# Patient Record
Sex: MALE | Race: WHITE | HISPANIC OR LATINO | Employment: OTHER | URBAN - METROPOLITAN AREA
[De-identification: names, ages, dates, MRNs, and addresses within clinical notes are randomized per-mention and may not be internally consistent; named-entity substitution may affect disease eponyms.]

---

## 2019-05-15 ENCOUNTER — TRANSCRIBE ORDERS (OUTPATIENT)
Dept: RADIOLOGY | Facility: HOSPITAL | Age: 69
End: 2019-05-15

## 2019-05-15 ENCOUNTER — HOSPITAL ENCOUNTER (OUTPATIENT)
Dept: RADIOLOGY | Facility: HOSPITAL | Age: 69
Discharge: HOME | End: 2019-05-15
Attending: PODIATRIST
Payer: COMMERCIAL

## 2019-05-15 ENCOUNTER — APPOINTMENT (OUTPATIENT)
Dept: LAB | Facility: HOSPITAL | Age: 69
End: 2019-05-15
Attending: PODIATRIST
Payer: COMMERCIAL

## 2019-05-15 DIAGNOSIS — Z00.00 ROUTINE GENERAL MEDICAL EXAMINATION AT A HEALTH CARE FACILITY: ICD-10-CM

## 2019-05-15 DIAGNOSIS — E11.621 DIABETIC FOOT ULCER WITH OSTEOMYELITIS (HCC): ICD-10-CM

## 2019-05-15 DIAGNOSIS — E11.69 DIABETIC FOOT ULCER WITH OSTEOMYELITIS (HCC): Primary | ICD-10-CM

## 2019-05-15 DIAGNOSIS — E11.69 DIABETIC FOOT ULCER WITH OSTEOMYELITIS (HCC): ICD-10-CM

## 2019-05-15 DIAGNOSIS — E11.621 DIABETIC FOOT ULCER WITH OSTEOMYELITIS (HCC): Primary | ICD-10-CM

## 2019-05-15 DIAGNOSIS — M86.9 DIABETIC FOOT ULCER WITH OSTEOMYELITIS (HCC): ICD-10-CM

## 2019-05-15 DIAGNOSIS — M86.9 DIABETIC FOOT ULCER WITH OSTEOMYELITIS (HCC): Primary | ICD-10-CM

## 2019-05-15 DIAGNOSIS — L97.509 DIABETIC FOOT ULCER WITH OSTEOMYELITIS (HCC): Primary | ICD-10-CM

## 2019-05-15 DIAGNOSIS — L97.509 DIABETIC FOOT ULCER WITH OSTEOMYELITIS (HCC): ICD-10-CM

## 2019-05-15 LAB
ALBUMIN SERPL BCP-MCNC: 3.4 G/DL (ref 3.5–5)
ALP SERPL-CCNC: 93 U/L (ref 46–116)
ALT SERPL W P-5'-P-CCNC: 25 U/L (ref 12–78)
ANION GAP SERPL CALCULATED.3IONS-SCNC: 8 MMOL/L (ref 4–13)
AST SERPL W P-5'-P-CCNC: 24 U/L (ref 5–45)
BASOPHILS # BLD AUTO: 0.03 THOUSANDS/ΜL (ref 0–0.1)
BASOPHILS NFR BLD AUTO: 1 % (ref 0–1)
BILIRUB SERPL-MCNC: 0.66 MG/DL (ref 0.2–1)
BUN SERPL-MCNC: 17 MG/DL (ref 5–25)
CALCIUM SERPL-MCNC: 8.7 MG/DL (ref 8.3–10.1)
CHLORIDE SERPL-SCNC: 108 MMOL/L (ref 100–108)
CO2 SERPL-SCNC: 24 MMOL/L (ref 21–32)
CREAT SERPL-MCNC: 0.85 MG/DL (ref 0.6–1.3)
EOSINOPHIL # BLD AUTO: 0.16 THOUSAND/ΜL (ref 0–0.61)
EOSINOPHIL NFR BLD AUTO: 3 % (ref 0–6)
ERYTHROCYTE [DISTWIDTH] IN BLOOD BY AUTOMATED COUNT: 13.2 % (ref 11.6–15.1)
EST. AVERAGE GLUCOSE BLD GHB EST-MCNC: 183 MG/DL
GFR SERPL CREATININE-BSD FRML MDRD: 90 ML/MIN/1.73SQ M
GLUCOSE P FAST SERPL-MCNC: 191 MG/DL (ref 65–99)
HBA1C MFR BLD: 8 % (ref 4.2–6.3)
HCT VFR BLD AUTO: 44.3 % (ref 36.5–49.3)
HGB BLD-MCNC: 14.8 G/DL (ref 12–17)
IMM GRANULOCYTES # BLD AUTO: 0.02 THOUSAND/UL (ref 0–0.2)
IMM GRANULOCYTES NFR BLD AUTO: 0 % (ref 0–2)
LYMPHOCYTES # BLD AUTO: 1.37 THOUSANDS/ΜL (ref 0.6–4.47)
LYMPHOCYTES NFR BLD AUTO: 25 % (ref 14–44)
MCH RBC QN AUTO: 30.8 PG (ref 26.8–34.3)
MCHC RBC AUTO-ENTMCNC: 33.4 G/DL (ref 31.4–37.4)
MCV RBC AUTO: 92 FL (ref 82–98)
MONOCYTES # BLD AUTO: 0.66 THOUSAND/ΜL (ref 0.17–1.22)
MONOCYTES NFR BLD AUTO: 12 % (ref 4–12)
NEUTROPHILS # BLD AUTO: 3.35 THOUSANDS/ΜL (ref 1.85–7.62)
NEUTS SEG NFR BLD AUTO: 59 % (ref 43–75)
NRBC BLD AUTO-RTO: 0 /100 WBCS
PLATELET # BLD AUTO: 171 THOUSANDS/UL (ref 149–390)
PMV BLD AUTO: 12.5 FL (ref 8.9–12.7)
POTASSIUM SERPL-SCNC: 4 MMOL/L (ref 3.5–5.3)
PROT SERPL-MCNC: 7.5 G/DL (ref 6.4–8.2)
RBC # BLD AUTO: 4.81 MILLION/UL (ref 3.88–5.62)
SODIUM SERPL-SCNC: 140 MMOL/L (ref 136–145)
WBC # BLD AUTO: 5.59 THOUSAND/UL (ref 4.31–10.16)

## 2019-05-15 PROCEDURE — 85025 COMPLETE CBC W/AUTO DIFF WBC: CPT

## 2019-05-15 PROCEDURE — 83036 HEMOGLOBIN GLYCOSYLATED A1C: CPT | Performed by: PODIATRIST

## 2019-05-15 PROCEDURE — 73630 X-RAY EXAM OF FOOT: CPT

## 2019-05-15 PROCEDURE — 36415 COLL VENOUS BLD VENIPUNCTURE: CPT | Performed by: PODIATRIST

## 2019-05-15 PROCEDURE — 80053 COMPREHEN METABOLIC PANEL: CPT

## 2019-05-21 ENCOUNTER — DOCUMENTATION (OUTPATIENT)
Dept: OTHER | Facility: HOSPITAL | Age: 69
End: 2019-05-21

## 2019-05-21 DIAGNOSIS — S91.009A OPEN WOUND OF ANKLE AND FOOT: ICD-10-CM

## 2019-05-21 DIAGNOSIS — M86.9 OSTEOMYELITIS OF GREAT TOE OF RIGHT FOOT (HCC): Primary | ICD-10-CM

## 2019-05-21 DIAGNOSIS — S91.309A OPEN WOUND OF ANKLE AND FOOT: ICD-10-CM

## 2019-05-23 ENCOUNTER — DOCUMENTATION (OUTPATIENT)
Dept: OTHER | Facility: HOSPITAL | Age: 69
End: 2019-05-23

## 2022-03-15 ENCOUNTER — HOSPITAL ENCOUNTER (INPATIENT)
Facility: HOSPITAL | Age: 72
LOS: 1 days | Discharge: HOME | DRG: 309 | End: 2022-03-17
Attending: EMERGENCY MEDICINE | Admitting: INTERNAL MEDICINE
Payer: COMMERCIAL

## 2022-03-15 DIAGNOSIS — Z86.73 HISTORY OF TRANSIENT ISCHEMIC ATTACK (TIA): ICD-10-CM

## 2022-03-15 DIAGNOSIS — R44.3 HALLUCINATIONS: ICD-10-CM

## 2022-03-15 DIAGNOSIS — R26.2 AMBULATORY DYSFUNCTION: Primary | ICD-10-CM

## 2022-03-15 DIAGNOSIS — I47.1 SVT (SUPRAVENTRICULAR TACHYCARDIA) (HCC): ICD-10-CM

## 2022-03-15 LAB
ALBUMIN SERPL BCP-MCNC: 3.1 G/DL (ref 3.5–5)
ALP SERPL-CCNC: 75 U/L (ref 46–116)
ALT SERPL W P-5'-P-CCNC: 66 U/L (ref 12–78)
AMMONIA PLAS-SCNC: <10 UMOL/L (ref 11–35)
ANION GAP SERPL CALCULATED.3IONS-SCNC: 10 MMOL/L (ref 4–13)
APTT PPP: 27 SECONDS (ref 23–37)
AST SERPL W P-5'-P-CCNC: 119 U/L (ref 5–45)
BACTERIA UR QL AUTO: ABNORMAL /HPF
BASOPHILS # BLD AUTO: 0.03 THOUSANDS/ΜL (ref 0–0.1)
BASOPHILS NFR BLD AUTO: 0 % (ref 0–1)
BILIRUB SERPL-MCNC: 0.79 MG/DL (ref 0.2–1)
BILIRUB UR QL STRIP: ABNORMAL
BUN SERPL-MCNC: 55 MG/DL (ref 5–25)
CALCIUM ALBUM COR SERPL-MCNC: 9.8 MG/DL (ref 8.3–10.1)
CALCIUM SERPL-MCNC: 9.1 MG/DL (ref 8.3–10.1)
CARDIAC TROPONIN I PNL SERPL HS: 26 NG/L
CHLORIDE SERPL-SCNC: 103 MMOL/L (ref 100–108)
CLARITY UR: CLEAR
CO2 SERPL-SCNC: 20 MMOL/L (ref 21–32)
COLOR UR: ABNORMAL
CREAT SERPL-MCNC: 1.51 MG/DL (ref 0.6–1.3)
EOSINOPHIL # BLD AUTO: 0.06 THOUSAND/ΜL (ref 0–0.61)
EOSINOPHIL NFR BLD AUTO: 1 % (ref 0–6)
ERYTHROCYTE [DISTWIDTH] IN BLOOD BY AUTOMATED COUNT: 14.7 % (ref 11.6–15.1)
ETHANOL SERPL-MCNC: <3 MG/DL (ref 0–3)
GFR SERPL CREATININE-BSD FRML MDRD: 45 ML/MIN/1.73SQ M
GLUCOSE SERPL-MCNC: 186 MG/DL (ref 65–140)
GLUCOSE SERPL-MCNC: 187 MG/DL (ref 65–140)
GLUCOSE UR STRIP-MCNC: NEGATIVE MG/DL
GRAN CASTS #/AREA URNS LPF: ABNORMAL /[LPF]
HCT VFR BLD AUTO: 40.9 % (ref 36.5–49.3)
HGB BLD-MCNC: 14.2 G/DL (ref 12–17)
HGB UR QL STRIP.AUTO: ABNORMAL
IMM GRANULOCYTES # BLD AUTO: 0.04 THOUSAND/UL (ref 0–0.2)
IMM GRANULOCYTES NFR BLD AUTO: 0 % (ref 0–2)
INR PPP: 1.28 (ref 0.84–1.19)
KETONES UR STRIP-MCNC: ABNORMAL MG/DL
LACTATE SERPL-SCNC: 1.6 MMOL/L (ref 0.5–2)
LEUKOCYTE ESTERASE UR QL STRIP: NEGATIVE
LYMPHOCYTES # BLD AUTO: 0.97 THOUSANDS/ΜL (ref 0.6–4.47)
LYMPHOCYTES NFR BLD AUTO: 10 % (ref 14–44)
MCH RBC QN AUTO: 28.6 PG (ref 26.8–34.3)
MCHC RBC AUTO-ENTMCNC: 34.7 G/DL (ref 31.4–37.4)
MCV RBC AUTO: 82 FL (ref 82–98)
MONOCYTES # BLD AUTO: 1.2 THOUSAND/ΜL (ref 0.17–1.22)
MONOCYTES NFR BLD AUTO: 12 % (ref 4–12)
NEUTROPHILS # BLD AUTO: 7.82 THOUSANDS/ΜL (ref 1.85–7.62)
NEUTS SEG NFR BLD AUTO: 77 % (ref 43–75)
NITRITE UR QL STRIP: NEGATIVE
NON-SQ EPI CELLS URNS QL MICRO: ABNORMAL /HPF
NRBC BLD AUTO-RTO: 0 /100 WBCS
PH UR STRIP.AUTO: 5.5 [PH] (ref 4.5–8)
PLATELET # BLD AUTO: 105 THOUSANDS/UL (ref 149–390)
POTASSIUM SERPL-SCNC: 4 MMOL/L (ref 3.5–5.3)
PROT SERPL-MCNC: 7.4 G/DL (ref 6.4–8.2)
PROT UR STRIP-MCNC: ABNORMAL MG/DL
PROTHROMBIN TIME: 15.5 SECONDS (ref 11.6–14.5)
RBC # BLD AUTO: 4.97 MILLION/UL (ref 3.88–5.62)
RBC #/AREA URNS AUTO: ABNORMAL /HPF
SODIUM SERPL-SCNC: 133 MMOL/L (ref 136–145)
SP GR UR STRIP.AUTO: >=1.03 (ref 1–1.03)
UROBILINOGEN UR QL STRIP.AUTO: 0.2 E.U./DL
WBC # BLD AUTO: 10.12 THOUSAND/UL (ref 4.31–10.16)
WBC #/AREA URNS AUTO: ABNORMAL /HPF

## 2022-03-15 PROCEDURE — 99285 EMERGENCY DEPT VISIT HI MDM: CPT | Performed by: EMERGENCY MEDICINE

## 2022-03-15 PROCEDURE — 85610 PROTHROMBIN TIME: CPT

## 2022-03-15 PROCEDURE — 93005 ELECTROCARDIOGRAM TRACING: CPT

## 2022-03-15 PROCEDURE — 85025 COMPLETE CBC W/AUTO DIFF WBC: CPT

## 2022-03-15 PROCEDURE — 82948 REAGENT STRIP/BLOOD GLUCOSE: CPT

## 2022-03-15 PROCEDURE — 92960 CARDIOVERSION ELECTRIC EXT: CPT | Performed by: EMERGENCY MEDICINE

## 2022-03-15 PROCEDURE — 82553 CREATINE MB FRACTION: CPT

## 2022-03-15 PROCEDURE — 36415 COLL VENOUS BLD VENIPUNCTURE: CPT

## 2022-03-15 PROCEDURE — 83605 ASSAY OF LACTIC ACID: CPT

## 2022-03-15 PROCEDURE — 84484 ASSAY OF TROPONIN QUANT: CPT

## 2022-03-15 PROCEDURE — 82550 ASSAY OF CK (CPK): CPT

## 2022-03-15 PROCEDURE — 80179 DRUG ASSAY SALICYLATE: CPT

## 2022-03-15 PROCEDURE — 80053 COMPREHEN METABOLIC PANEL: CPT

## 2022-03-15 PROCEDURE — 85730 THROMBOPLASTIN TIME PARTIAL: CPT

## 2022-03-15 PROCEDURE — 81001 URINALYSIS AUTO W/SCOPE: CPT

## 2022-03-15 PROCEDURE — 99291 CRITICAL CARE FIRST HOUR: CPT | Performed by: EMERGENCY MEDICINE

## 2022-03-15 PROCEDURE — 82077 ASSAY SPEC XCP UR&BREATH IA: CPT

## 2022-03-15 PROCEDURE — 99285 EMERGENCY DEPT VISIT HI MDM: CPT

## 2022-03-15 PROCEDURE — 96360 HYDRATION IV INFUSION INIT: CPT

## 2022-03-15 PROCEDURE — 82140 ASSAY OF AMMONIA: CPT

## 2022-03-15 PROCEDURE — 96361 HYDRATE IV INFUSION ADD-ON: CPT

## 2022-03-15 PROCEDURE — 80307 DRUG TEST PRSMV CHEM ANLYZR: CPT

## 2022-03-15 PROCEDURE — 83036 HEMOGLOBIN GLYCOSYLATED A1C: CPT | Performed by: STUDENT IN AN ORGANIZED HEALTH CARE EDUCATION/TRAINING PROGRAM

## 2022-03-15 PROCEDURE — 80143 DRUG ASSAY ACETAMINOPHEN: CPT

## 2022-03-15 PROCEDURE — 84443 ASSAY THYROID STIM HORMONE: CPT

## 2022-03-15 RX ORDER — ADENOSINE 3 MG/ML
6 INJECTION INTRAVENOUS ONCE
Status: COMPLETED | OUTPATIENT
Start: 2022-03-15 | End: 2022-03-16

## 2022-03-15 RX ADMIN — SODIUM CHLORIDE 1000 ML: 0.9 INJECTION, SOLUTION INTRAVENOUS at 23:06

## 2022-03-15 RX ADMIN — SODIUM CHLORIDE 1000 ML: 0.9 INJECTION, SOLUTION INTRAVENOUS at 23:12

## 2022-03-16 ENCOUNTER — APPOINTMENT (INPATIENT)
Dept: NON INVASIVE DIAGNOSTICS | Facility: HOSPITAL | Age: 72
DRG: 309 | End: 2022-03-16
Payer: COMMERCIAL

## 2022-03-16 ENCOUNTER — APPOINTMENT (EMERGENCY)
Dept: RADIOLOGY | Facility: HOSPITAL | Age: 72
DRG: 309 | End: 2022-03-16
Payer: COMMERCIAL

## 2022-03-16 PROBLEM — M62.82 RHABDOMYOLYSIS: Status: ACTIVE | Noted: 2022-03-16

## 2022-03-16 PROBLEM — I47.1 SVT (SUPRAVENTRICULAR TACHYCARDIA) (HCC): Status: ACTIVE | Noted: 2022-03-16

## 2022-03-16 PROBLEM — N17.9 AKI (ACUTE KIDNEY INJURY) (HCC): Status: ACTIVE | Noted: 2022-03-16

## 2022-03-16 PROBLEM — E11.9 DIABETES (HCC): Status: ACTIVE | Noted: 2022-03-16

## 2022-03-16 PROBLEM — I47.10 SVT (SUPRAVENTRICULAR TACHYCARDIA) (HCC): Status: ACTIVE | Noted: 2022-03-16

## 2022-03-16 PROBLEM — Z86.73 HISTORY OF TRANSIENT ISCHEMIC ATTACK (TIA): Status: ACTIVE | Noted: 2022-03-16

## 2022-03-16 PROBLEM — R41.89 IMPAIRED COGNITION: Status: ACTIVE | Noted: 2022-03-16

## 2022-03-16 PROBLEM — E87.1 HYPONATREMIA: Status: ACTIVE | Noted: 2022-03-16

## 2022-03-16 LAB
2HR DELTA HS TROPONIN: 8 NG/L
4HR DELTA HS TROPONIN: 1 NG/L
AMPHETAMINES SERPL QL SCN: NEGATIVE
ANION GAP SERPL CALCULATED.3IONS-SCNC: 7 MMOL/L (ref 4–13)
AORTIC ROOT: 4.3 CM
APAP SERPL-MCNC: <2 UG/ML (ref 10–20)
APICAL FOUR CHAMBER EJECTION FRACTION: 57 %
ASCENDING AORTA: 4 CM (ref 2.14–3.21)
ATRIAL RATE: 23 BPM
ATRIAL RATE: 88 BPM
BARBITURATES UR QL: NEGATIVE
BENZODIAZ UR QL: NEGATIVE
BUN SERPL-MCNC: 40 MG/DL (ref 5–25)
CALCIUM SERPL-MCNC: 8.7 MG/DL (ref 8.3–10.1)
CARDIAC TROPONIN I PNL SERPL HS: 27 NG/L
CARDIAC TROPONIN I PNL SERPL HS: 34 NG/L
CHLORIDE SERPL-SCNC: 109 MMOL/L (ref 100–108)
CHOLEST SERPL-MCNC: 147 MG/DL
CK MB SERPL-MCNC: 17.4 NG/ML (ref 0–5)
CK MB SERPL-MCNC: 22 NG/ML (ref 0–5)
CK MB SERPL-MCNC: <1 % (ref 0–2.5)
CK MB SERPL-MCNC: <1 % (ref 0–2.5)
CK SERPL-CCNC: 2081 U/L (ref 39–308)
CK SERPL-CCNC: 3039 U/L (ref 39–308)
CO2 SERPL-SCNC: 20 MMOL/L (ref 21–32)
COCAINE UR QL: NEGATIVE
CREAT SERPL-MCNC: 1.16 MG/DL (ref 0.6–1.3)
E WAVE DECELERATION TIME: 348 MS
EST. AVERAGE GLUCOSE BLD GHB EST-MCNC: 169 MG/DL
FRACTIONAL SHORTENING: 31 % (ref 28–44)
GFR SERPL CREATININE-BSD FRML MDRD: 63 ML/MIN/1.73SQ M
GLUCOSE SERPL-MCNC: 138 MG/DL (ref 65–140)
GLUCOSE SERPL-MCNC: 157 MG/DL (ref 65–140)
GLUCOSE SERPL-MCNC: 181 MG/DL (ref 65–140)
HBA1C MFR BLD: 7.5 %
HDLC SERPL-MCNC: 37 MG/DL
INTERVENTRICULAR SEPTUM IN DIASTOLE (PARASTERNAL SHORT AXIS VIEW): 1.4 CM
INTERVENTRICULAR SEPTUM: 1.4 CM (ref 0.56–1.04)
LAAS-AP2: 16.1 CM2
LAAS-AP4: 10.1 CM2
LDLC SERPL CALC-MCNC: 88 MG/DL (ref 0–100)
LEFT ATRIUM SIZE: 3.1 CM
LEFT INTERNAL DIMENSION IN SYSTOLE: 2.7 CM (ref 3.88–5.88)
LEFT VENTRICULAR INTERNAL DIMENSION IN DIASTOLE: 3.9 CM (ref 6.46–9.62)
LEFT VENTRICULAR POSTERIOR WALL IN END DIASTOLE: 1.4 CM (ref 0.54–1.03)
LEFT VENTRICULAR STROKE VOLUME: 38 ML
LVSV (TEICH): 38 ML
MAGNESIUM SERPL-MCNC: 2.7 MG/DL (ref 1.6–2.6)
METHADONE UR QL: NEGATIVE
MV E'TISSUE VEL-SEP: 7 CM/S
MV PEAK A VEL: 0.92 M/S
MV PEAK E VEL: 58 CM/S
MV STENOSIS PRESSURE HALF TIME: 101 MS
MV VALVE AREA P 1/2 METHOD: 2.18 CM2
NONHDLC SERPL-MCNC: 110 MG/DL
OPIATES UR QL SCN: NEGATIVE
OXYCODONE+OXYMORPHONE UR QL SCN: NEGATIVE
P AXIS: 36 DEGREES
PCP UR QL: NEGATIVE
PHOSPHATE SERPL-MCNC: 4.5 MG/DL (ref 2.3–4.1)
POTASSIUM SERPL-SCNC: 5.1 MMOL/L (ref 3.5–5.3)
PR INTERVAL: 154 MS
QRS AXIS: -54 DEGREES
QRS AXIS: -62 DEGREES
QRSD INTERVAL: 108 MS
QRSD INTERVAL: 112 MS
QT INTERVAL: 322 MS
QT INTERVAL: 366 MS
QTC INTERVAL: 442 MS
QTC INTERVAL: 511 MS
RIGHT ATRIAL 2D VOLUME: 20 ML
RIGHT ATRIUM AREA SYSTOLE A4C: 9 CM2
RIGHT VENTRICLE ID DIMENSION: 3.2 CM
SALICYLATES SERPL-MCNC: 3 MG/DL (ref 3–20)
SL CV LEFT ATRIUM LENGTH A2C: 4.8 CM
SL CV LV EF: 55
SL CV PED ECHO LEFT VENTRICLE DIASTOLIC VOLUME (MOD BIPLANE) 2D: 64 ML
SL CV PED ECHO LEFT VENTRICLE SYSTOLIC VOLUME (MOD BIPLANE) 2D: 26 ML
SODIUM SERPL-SCNC: 136 MMOL/L (ref 136–145)
T WAVE AXIS: 43 DEGREES
T WAVE AXIS: 87 DEGREES
THC UR QL: POSITIVE
TRIGL SERPL-MCNC: 109 MG/DL
TSH SERPL DL<=0.05 MIU/L-ACNC: 2.17 UIU/ML (ref 0.36–3.74)
VENTRICULAR RATE: 152 BPM
VENTRICULAR RATE: 88 BPM
Z-SCORE OF ASCENDING AORTA: 4.94
Z-SCORE OF INTERVENTRICULAR SEPTUM IN END DIASTOLE: 4.84
Z-SCORE OF LEFT VENTRICULAR DIMENSION IN END DIASTOLE: -7.03
Z-SCORE OF LEFT VENTRICULAR DIMENSION IN END SYSTOLE: -4.51
Z-SCORE OF LEFT VENTRICULAR POSTERIOR WALL IN END DIASTOLE: 4.95

## 2022-03-16 PROCEDURE — 80061 LIPID PANEL: CPT | Performed by: STUDENT IN AN ORGANIZED HEALTH CARE EDUCATION/TRAINING PROGRAM

## 2022-03-16 PROCEDURE — 84484 ASSAY OF TROPONIN QUANT: CPT

## 2022-03-16 PROCEDURE — 99223 1ST HOSP IP/OBS HIGH 75: CPT | Performed by: INTERNAL MEDICINE

## 2022-03-16 PROCEDURE — G1004 CDSM NDSC: HCPCS

## 2022-03-16 PROCEDURE — 82550 ASSAY OF CK (CPK): CPT | Performed by: STUDENT IN AN ORGANIZED HEALTH CARE EDUCATION/TRAINING PROGRAM

## 2022-03-16 PROCEDURE — 80048 BASIC METABOLIC PNL TOTAL CA: CPT | Performed by: STUDENT IN AN ORGANIZED HEALTH CARE EDUCATION/TRAINING PROGRAM

## 2022-03-16 PROCEDURE — 82948 REAGENT STRIP/BLOOD GLUCOSE: CPT

## 2022-03-16 PROCEDURE — 74177 CT ABD & PELVIS W/CONTRAST: CPT

## 2022-03-16 PROCEDURE — 93010 ELECTROCARDIOGRAM REPORT: CPT | Performed by: INTERNAL MEDICINE

## 2022-03-16 PROCEDURE — 83735 ASSAY OF MAGNESIUM: CPT | Performed by: STUDENT IN AN ORGANIZED HEALTH CARE EDUCATION/TRAINING PROGRAM

## 2022-03-16 PROCEDURE — 93306 TTE W/DOPPLER COMPLETE: CPT | Performed by: INTERNAL MEDICINE

## 2022-03-16 PROCEDURE — 84100 ASSAY OF PHOSPHORUS: CPT | Performed by: STUDENT IN AN ORGANIZED HEALTH CARE EDUCATION/TRAINING PROGRAM

## 2022-03-16 PROCEDURE — 99222 1ST HOSP IP/OBS MODERATE 55: CPT | Performed by: INTERNAL MEDICINE

## 2022-03-16 PROCEDURE — 93005 ELECTROCARDIOGRAM TRACING: CPT

## 2022-03-16 PROCEDURE — 72125 CT NECK SPINE W/O DYE: CPT

## 2022-03-16 PROCEDURE — 71260 CT THORAX DX C+: CPT

## 2022-03-16 PROCEDURE — 5A2204Z RESTORATION OF CARDIAC RHYTHM, SINGLE: ICD-10-PCS

## 2022-03-16 PROCEDURE — 36415 COLL VENOUS BLD VENIPUNCTURE: CPT

## 2022-03-16 PROCEDURE — 70450 CT HEAD/BRAIN W/O DYE: CPT

## 2022-03-16 PROCEDURE — 97163 PT EVAL HIGH COMPLEX 45 MIN: CPT

## 2022-03-16 PROCEDURE — 82553 CREATINE MB FRACTION: CPT | Performed by: STUDENT IN AN ORGANIZED HEALTH CARE EDUCATION/TRAINING PROGRAM

## 2022-03-16 PROCEDURE — 96374 THER/PROPH/DIAG INJ IV PUSH: CPT

## 2022-03-16 PROCEDURE — 93306 TTE W/DOPPLER COMPLETE: CPT

## 2022-03-16 RX ORDER — ASPIRIN 81 MG/1
81 TABLET, CHEWABLE ORAL DAILY
Status: DISCONTINUED | OUTPATIENT
Start: 2022-03-16 | End: 2022-03-17 | Stop reason: HOSPADM

## 2022-03-16 RX ORDER — HEPARIN SODIUM 5000 [USP'U]/ML
5000 INJECTION, SOLUTION INTRAVENOUS; SUBCUTANEOUS EVERY 8 HOURS SCHEDULED
Status: DISCONTINUED | OUTPATIENT
Start: 2022-03-16 | End: 2022-03-17 | Stop reason: HOSPADM

## 2022-03-16 RX ORDER — ATORVASTATIN CALCIUM 40 MG/1
40 TABLET, FILM COATED ORAL
Status: DISCONTINUED | OUTPATIENT
Start: 2022-03-16 | End: 2022-03-17 | Stop reason: HOSPADM

## 2022-03-16 RX ORDER — ADENOSINE 3 MG/ML
INJECTION, SOLUTION INTRAVENOUS
Status: COMPLETED
Start: 2022-03-16 | End: 2022-03-16

## 2022-03-16 RX ADMIN — ADENOSINE 6 MG: 3 INJECTION INTRAVENOUS at 00:06

## 2022-03-16 RX ADMIN — IOHEXOL 100 ML: 350 INJECTION, SOLUTION INTRAVENOUS at 00:25

## 2022-03-16 RX ADMIN — ADENOSINE 12 MG: 3 INJECTION INTRAVENOUS at 00:10

## 2022-03-16 NOTE — ASSESSMENT & PLAN NOTE
Lab Results   Component Value Date    HGBA1C 8 0 (H) 05/15/2019       Recent Labs     03/15/22  2136   POCGLU 186*       Blood Sugar Average: Last 72 hrs:  (P) 186     Patient with history of diabetes with A1c 8 0 in 2019     · Patient not compliant with medications  · Will recheck A1c

## 2022-03-16 NOTE — ED ATTENDING ATTESTATION
3/15/2022  I, Jorge Hernandez MD, saw and evaluated the patient  I have discussed the patient with the resident/non-physician practitioner and agree with the resident's/non-physician practitioner's findings, Plan of Care, and MDM as documented in the resident's/non-physician practitioner's note, except where noted  All available labs and Radiology studies were reviewed  I was present for key portions of any procedure(s) performed by the resident/non-physician practitioner and I was immediately available to provide assistance  At this point I agree with the current assessment done in the Emergency Department  I have conducted an independent evaluation of this patient a history and physical is as follows:    OA: 70 y/o m with h/o DM, HTN, HLD CVA stroke 2012 and TIA 2019, noncompliant with medications who presents with confusion  Pt found down by friends who came to check on him  Pt states that he felt weak 2 days ago, has not been able to get up since  Has not been eating or drinking fluids during this time  Evidence that the pt has soiled his clothing  + hallucinations of squirrels  Pt admits noncompliance with medications but 'takes vitamins'  Only c/o back pain and feeling week at this time  Otherwise denies cp/sob/n/v/headache/dizziness  Cannot clearly tell me how he ended up on the ground  PE, overall well developed m, VSS, dry MM, clear sclera, neck supple/FROM, nonttp over C, T and L spine, no stepoff or deformity, RR, lungs CTAB, abd soft, NT/ND, +Bs, -r/g, - LE edema, - calf ttp, intact distal pulses, symmetric face, clear speech, intact finger to nose, intact rapid alternating movements  Oriented and follows commands  A/p generalized weakness, on the ground for 2 days  Concern for primary neuro v cardiopulmonary v infectious cause of sxms  Broad work including eval for traumatic injury  CT imaging, broad labs, tox screen, EKG  IVF hydration, EKG  Monitor closely   No obvious stigmata of trauma however pt in the lopez and waiting to be moved to a room  Will require further assessment at lexi time  ED Course   pt signed out with workup pending including labs and imaging  Pt will ultimately require admission         Critical Care Time  Procedures

## 2022-03-16 NOTE — ASSESSMENT & PLAN NOTE
Patient with history of TIA in 2019  Was previously prescribed aspirin 81 mg daily and atorvastatin 40 mg daily     · Patient noncompliant with medications  · Will restart aspiring and atorvastatin for patient

## 2022-03-16 NOTE — ASSESSMENT & PLAN NOTE
Patient with CK elevated at 3,039 upon admission  · Received 1L normal saline bolus x2 in ED  · Will monitor CK in AM

## 2022-03-16 NOTE — ED CARE HANDOFF
Emergency Department Sign Out Note        Sign out and transfer of care from [place], [place]  See Separate Emergency Department note  The patient, Kolby De La Fuente, was evaluated by the previous provider for acute encephalopathy  Workup Completed:  Laboratory results and CT scanning    ED Course / Workup Pending (followup): Results Reviewed     Procedure Component Value Units Date/Time    Rapid drug screen, urine [[id number]]  (Abnormal) Collected: 03/15/22 2216    Lab Status: Final result Specimen: Urine, Clean Catch Updated: 03/16/22 0004     Amph/Meth UR Negative     Barbiturate Ur Negative     Benzodiazepine Urine Negative     Cocaine Urine Negative     Methadone Urine Negative     Opiate Urine Negative     PCP Ur Negative     THC Urine Positive     Oxycodone Urine Negative    Narrative:      Presumptive report  If requested, specimen will be sent to reference lab for confirmation  FOR MEDICAL PURPOSES ONLY  IF CONFIRMATION NEEDED PLEASE CONTACT THE LAB WITHIN 5 DAYS  Drug Screen Cutoff Levels:  AMPHETAMINE/METHAMPHETAMINES  1000 ng/mL  BARBITURATES     200 ng/mL  BENZODIAZEPINES     200 ng/mL  COCAINE      300 ng/mL  METHADONE      300 ng/mL  OPIATES      300 ng/mL  PHENCYCLIDINE     25 ng/mL  THC       50 ng/mL  OXYCODONE      022 ng/mL    Salicylate level [[id number]]  (Normal) Collected: 03/15/22 2223    Lab Status: Final result Specimen: Blood from Arm, Right Updated: 52/07/80 2364     Salicylate Lvl 3 mg/dL     Acetaminophen level-If concentration is detectable, please discuss with medical  on call   [[id number]]  (Abnormal) Collected: 03/15/22 2223    Lab Status: Final result Specimen: Blood from Arm, Right Updated: 03/16/22 0003     Acetaminophen Level <2 ug/mL     Comprehensive metabolic panel [[id number]]  (Abnormal) Resulted: 03/15/22 2353    Lab Status: Final result Specimen: Blood Updated: 03/15/22 2353     Sodium 133 mmol/L      Potassium 4 0 mmol/L      Chloride 103 mmol/L CO2 20 mmol/L      ANION GAP 10 mmol/L      BUN 55 mg/dL      Creatinine 1 51 mg/dL      Glucose 187 mg/dL      Calcium 9 1 mg/dL      Corrected Calcium 9 8 mg/dL       U/L      ALT 66 U/L      Alkaline Phosphatase 75 U/L      Total Protein 7 4 g/dL      Albumin 3 1 g/dL      Total Bilirubin 0 79 mg/dL      eGFR 45 ml/min/1 73sq m     Narrative:      [place] guidelines for Chronic Kidney Disease (CKD):     Stage 1 with normal or high GFR (GFR > 90 mL/min/1 73 square meters)    Stage 2 Mild CKD (GFR = 60-89 mL/min/1 73 square meters)    Stage 3A Moderate CKD (GFR = 45-59 mL/min/1 73 square meters)    Stage 3B Moderate CKD (GFR = 30-44 mL/min/1 73 square meters)    Stage 4 Severe CKD (GFR = 15-29 mL/min/1 73 square meters)    Stage 5 End Stage CKD (GFR <15 mL/min/1 73 square meters)  Note: GFR calculation is accurate only with a steady state creatinine    Ammonia [[id number]]  (Abnormal) Collected: 03/15/22 2305    Lab Status: Final result Specimen: Blood from Arm, Right Updated: 03/15/22 2346     Ammonia <10 umol/L     Ethanol [[id number]]  (Normal) Collected: 03/15/22 2224    Lab Status: Final result Specimen: Blood from Arm, Right Updated: 03/15/22 2346     Ethanol Lvl <3 mg/dL     Protime-INR [[id number]]  (Abnormal) Collected: 03/15/22 2305    Lab Status: Final result Specimen: Blood from Arm, Right Updated: 03/15/22 2334     Protime 15 5 seconds      INR 1 28    APTT [[id number]]  (Normal) Collected: 03/15/22 2305    Lab Status: Final result Specimen: Blood from Arm, Right Updated: 03/15/22 2334     PTT 27 seconds     HS Troponin I 4hr [[id number]]     Lab Status: No result Specimen: Blood     CBC and differential [[id number]]  (Abnormal) Collected: 03/15/22 2217    Lab Status: Final result Specimen: Blood Updated: 03/15/22 2323     WBC 10 12 Thousand/uL      RBC 4 97 Million/uL      Hemoglobin 14 2 g/dL      Hematocrit 40 9 %      MCV 82 fL      MCH 28 6 pg      MCHC 34 7 g/dL RDW 14 7 %      Platelets 970 Thousands/uL      nRBC 0 /100 WBCs      Neutrophils Relative 77 %      Immat GRANS % 0 %      Lymphocytes Relative 10 %      Monocytes Relative 12 %      Eosinophils Relative 1 %      Basophils Relative 0 %      Neutrophils Absolute 7 82 Thousands/µL      Immature Grans Absolute 0 04 Thousand/uL      Lymphocytes Absolute 0 97 Thousands/µL      Monocytes Absolute 1 20 Thousand/µL      Eosinophils Absolute 0 06 Thousand/µL      Basophils Absolute 0 03 Thousands/µL     HS Troponin I 2hr [[id number]]     Lab Status: No result Specimen: Blood     HS Troponin 0hr (reflex protocol) [[id number]]  (Normal) Resulted: 03/15/22 2311    Lab Status: Final result Specimen: Blood Updated: 03/15/22 2311     hs TnI 0hr 26 ng/L     Urine Microscopic [[id number]]  (Abnormal) Collected: 03/15/22 2144    Lab Status: Final result Specimen: Urine, Clean Catch Updated: 03/15/22 2258     RBC, UA None Seen /hpf      WBC, UA None Seen /hpf      Epithelial Cells Occasional /hpf      Bacteria, UA None Seen /hpf      Granular Casts, UA 0-3    Lactic acid [[id number]]  (Normal) Resulted: 03/15/22 2251    Lab Status: Final result Specimen: Blood Updated: 03/15/22 2251     LACTIC ACID 1 6 mmol/L     Narrative:      Result may be elevated if tourniquet was used during collection  TSH [[id number]] Updated: 03/15/22 2216    Lab Status: In process Specimen: Blood     CK Total with Reflex CKMB [[id number]] Updated: 03/15/22 2216    Lab Status:  In process Specimen: Blood     Urine Macroscopic, POC [[id number]]  (Abnormal) Collected: 03/15/22 2144    Lab Status: Final result Specimen: Urine Updated: 03/15/22 2146     Color, UA Lian     Clarity, UA Clear     pH, UA 5 5     Leukocytes, UA Negative     Nitrite, UA Negative     Protein,  (2+) mg/dl      Glucose, UA Negative mg/dl      Ketones, UA 15 (1+) mg/dl      Urobilinogen, UA 0 2 E U /dl      Bilirubin, UA Interference- unable to analyze     Blood, UA Trace Specific Gravity, UA >=1 030    Narrative:      CLINITEK RESULT    Fingerstick Glucose (POCT) [[id number]]  (Abnormal) Collected: 03/15/22 2136    Lab Status: Final result Updated: 03/15/22 2137     POC Glucose 186 mg/dl         CT head without contrast    (Results Pending)   CT cervical spine without contrast    (Results Pending)   CT chest abdomen pelvis w contrast    (Results Pending)        called to room about heart rate of 160 beats per minute; patient is awake and alert; blood pressure is 130/80  We discussed cardioversion with the patient  Because the patient is being evaluated for confusion and because of the life-threatening scenario of 160 beats per minute, plan to do emergent cardioversion utilizing adenosine  Adenosine 6 mg IV then 12 mg IV was administered; after the 12 mg dose of IV adenosine the patient reverted to sinus rhythm  ED Course as of 03/16/22 0016   Tue Mar 15, 2022   2328 71 YO gait problem; plan to admit   Wed Mar 16, 2022   0015 Called to room about narrow complex tachycardia that is regular at 150-160 beats per minute       CriticalCare Time  Performed by: Magda Molina MD  Authorized by: Magda Molina MD     Critical care provider statement:     Critical care time (minutes):  32    Critical care time was exclusive of:  Separately billable procedures and treating other patients and teaching time    Critical care was necessary to treat or prevent imminent or life-threatening deterioration of the following conditions:  Cardiac failure    Critical care was time spent personally by me on the following activities:  Obtaining history from patient or surrogate, development of treatment plan with patient or surrogate, discussions with consultants, evaluation of patient's response to treatment, examination of patient, ordering and performing treatments and interventions, ordering and review of laboratory studies, ordering and review of radiographic studies, re-evaluation of patient's condition and review of old charts    I assumed direction of critical care for this patient from another provider in my specialty: yes    Comments:      Patient is awake and alert tolerating heart rate of 160 beats per minute; vagal maneuvers attempted to slow heart rate without success  Discussed emergent cardioversion with the patient; plan to administer adenosine intravenously to cardiovert the patient  Patient received 6 mg of adenosine IV and then 12 mg of adenosine IV; after the 2nd dose of adenosine the patient converted to sinus rhythm  MDM        Disposition  Final diagnoses:   None     ED Disposition     None      Follow-up Information    None       Patient's Medications    No medications on file     No discharge procedures on file         ED Provider  Electronically Signed by     Yulissa Peraza MD  03/16/22 4899

## 2022-03-16 NOTE — ED NOTES
Pt's EKG showed SVT in hallway pt moved into room and another IV started and given 2L bolus  Pt on monitor and resident and attending in room with patient  Vagal maneuvers done and no success  Pt has no complaints at this time and awaiting for IVF to run it before any other treatment  Awaiting further orders at this time        Sarah Pelayo RN  03/15/22 0729

## 2022-03-16 NOTE — ASSESSMENT & PLAN NOTE
Patient found down by friend after 2 days  Presently alert and oriented to person, place, and time  Patient unable to describe events around his fall, and states that he thinks he was hallucinating while down as well  Patient has some word finding difficulty, and was tangential in thoughts  · Will monitor patient's mental status  · Patient has history of 2019, but does not seem to be compliant with medications prescribed at discharge  · CT Head on 03/15 showed "decreased attenuation is noted in periventricular and subcortical white matter demonstrating an appearance that is statistically most likely to represent mild to moderate microangiopathic change  There is an old infarction adjacent to the anterior left ventricle and extending to the centrum semiovale  No definite CT signs of acute infarction  No intracranial mass, positive mass effect or midline shift  No acute parenchymal hemorrhage    · May consider MRI as more sensitive than CT  · CT head negative for hydrocephalus

## 2022-03-16 NOTE — ASSESSMENT & PLAN NOTE
Patient found down at home by friend after 2 days  States that he feels loss of balance and is unable to walk, but denies any weakness  Patient unable to recall any inciting event and denied any history of palpitations, lightheadedness  In ED, kami had witnessed SVT with HR of 160  Patient otherwise asymptomatic during SVT  · S/p cardioversion in ED with adenosine 6 mg IV once then 12 mg IV  After 12 mg IV was administered patient converted to NSR and remains in NSR     · Cardiology consulted, recommendations are appreciated  · TTE ordered   · Patient had CT head, CT neck, and CT C/A/P but negative for explanation of loss of balance  · No indication of infection  · Patient denies any recent ETOH use and states he has not drank in over 20 years  · Suspect cardiogenic etiology of patient's symptoms  · Will monitor electrolytes and keep Mag >2, Phos >3, and K>4

## 2022-03-16 NOTE — PHYSICAL THERAPY NOTE
Physical Therapy Evaluation    Patient's Name: Peewee Metz    Admitting Diagnosis  Balance problem [R26 89]    Problem List  Patient Active Problem List   Diagnosis    [facility]ra[facility]    Impaired cognition    [facility]    Rhabdomyolysis    History of transient ischemic attack (TIA)    Diabetes ([facility] )    Hyponatremia       Past Medical History  No past medical history on file  Past Surgical History  No past surgical history on file        03/16/22 1227   PT Last Visit   PT Visit Date 03/16/22   Note Type   Note type Evaluation   Pain Assessment   Pain Assessment Tool 0-10   Pain Score No Pain   Restrictions/Precautions   Weight Bearing Precautions Per Order No   Other Precautions Fall Risk;Multiple lines;Telemetry   Home Living   Type of [address] [facility] Multi-level;Stairs to enter with rails; Able to live on main level with bedroom/bathroom  (2 [address]MANUEL)   Prior Function   Level of [place] Independent with ADLs and functional mobility   Lives With Alone   Receives Help From Family  (brother lives nearby)   ADL Assistance Independent   IADLs Independent  (+ )   Falls in the last 6 months 1 to 4  (pt recalls 2 falls leading to admission)   Vocational Retired   Comments Pt reports independent mobility, no AD PTA   General   Family/Caregiver Present No   Cognition   Overall Cognitive Status WFL   Orientation Level Oriented X4   Following Commands Follows multistep commands with increased time or repetition   Comments Pt pleasant and cooperative throughout session, motivated to participate and mobilize   RLE Assessment   RLE Assessment WFL  (Grossly 4+/5)   LLE Assessment   LLE Assessment WFL  (Grossly 4+/5)   Light Touch   RLE Light Touch Grossly intact   LLE Light Touch Grossly intact   Bed Mobility   Supine to Sit 5  Supervision   Sit to Supine 6  Modified independent   Transfers   Sit to Stand 5  Supervision   Stand to Sit 5  Supervision Additional Comments no AD   Ambulation/Elevation   Gait pattern Decreased foot clearance; Wide BEV; Short stride; Inconsistent blue   Gait Assistance 4  Minimal assist  (CGA)   Additional items Assist x 1   Assistive Device None   Distance 100 ft without AD, 1x LOB with head turn/distraction by environment, required Claudia to correct  Second trial with straight cane, cues for sequencing/cane placement, improved balance and control  HR stable throughout, /78 following activity  Stairs simulated with toe tap to high cone with 1 UE support   Balance   Static Sitting Good   Dynamic Sitting Fair +   Static Standing Fair -   Dynamic Standing Poor +   Ambulatory Poor +   Activity Tolerance   Activity Tolerance Patient tolerated treatment well   Nurse Made Aware RN updated   Assessment   Prognosis Good   Problem List Decreased endurance; Impaired balance;Decreased mobility; Decreased safety awareness   Assessment Pt is a 71 y o  male seen for PT evaluation s/p admit to [facility] on 3/15/2022  Pt was admitted with a primary dx of: SVT  PT now consulted for assessment of mobility and d/c needs  Pts current comorbidities and personal factors effecting treatment include: Rhabdomyolysis, DM, TIA, resides alone, stairs to enter home  Pts current clinical presentation is Unstable/ Unpredictable (high complexity) due to Ongoing medical management for primary dx, Increased reliance on more restrictive AD compared to baseline, Decreased activity tolerance compared to baseline, Fall risk, Ongoing telemetry monitoring, Trending lab values  Prior to admission, pt was independent with all mobility  Upon evaluation, pt currently is requiring supervision for bed mobility; supervision for transfers and Claudia for ambulation 100 ft w/ no AD   Pt presents at PT eval functioning below baseline and currently w/ overall mobility deficits 2* to: impaired balance, decreased endurance, gait deviations, decreased activity tolerance compared to baseline, decreased functional mobility tolerance compared to baseline, decreased safety awareness, fall risk  Pt currently at a fall risk 2* to impairments listed above  Pt will continue to benefit from skilled acute PT interventions to address stated impairments; to maximize functional mobility; for ongoing pt/ family training; and DME needs  At conclusion of PT session pt returned BTB with phone and call bell within reach  Pt denies any further questions at this time  Recommend home with family care and HHPT upon hospital D/C, encouraged pt to stay with his brother or have his brother stay with him upon discharge for increased support, verbalized understanding  Goals   Patient Goals to go home   STG Expiration Date 03/30/22   Short Term Goal #1 In 14 days pt will be able to: 1  Demonstrate ability to perform all aspects of bed mobility independently to increase functional independence  2  Perform functional transfers with LRAD independently to facilitate safe return to previous living environment  3   Ambulate 150 ft with LRAD independently with stable vitals to improve safety with household distances and reduce fall risk  4  Improve LE strength grades by 1 to increase ease of functional mobility with transfers and gait  5  Pt will demonstrate improved balance by one grade in order to decrease risk of falls  6  Climb 2 steps with supervision and 1 HR to simulate entrance to home  PT Treatment Day 0   Plan   Treatment/Interventions Functional transfer training;LE strengthening/ROM; Elevations; Therapeutic exercise; Endurance training;Patient/family training;Equipment eval/education; Bed mobility;Gait training   PT Frequency 2-3x/wk   Recommendation   PT Discharge Recommendation Home with home health rehabilitation   Equipment Recommended Cane  (issued during session, CM updated)   AM-PAC Basic Mobility Inpatient   Turning in Bed Without Bedrails 4   Lying on Back to Sitting on Edge of Flat Bed 4 Moving Bed to Chair 3   Standing Up From Chair 3   Walk in Room 3   Climb 3-5 Stairs 3   Basic Mobility Inpatient Raw Score 20   Basic Mobility Standardized Score 43 99   Highest Level Of Mobility   [facility] Goal 6: Walk 10 steps or more     Anna Krause, PT, DPT, GCS

## 2022-03-16 NOTE — ED PROVIDER NOTES
History  Chief Complaint   Patient presents with    Gait Problem     Pt has been having problems with balance and walking  Pt reports seeing things that other people aren't seeing  Previous stroke 2012     72 y o M w/ h/o DM2, HTN, PVD, L basal ganglia infarct in 2012, TIA 2019, presents via EMS due to ambulatory dysfunction and visual hallucinations  His friend notes that he had not heard from [address] for 3 days so he went over to his house and found him down on the ground  Pt says he fell 2 days ago and couldn't get himself up because his legs felt unstable  He stayed on the ground since then, unable to eat or drink during this time  When his friend arrived, he was telling him about squirrels that were moving around the room and admits that he was hallucinating them as well as other animals  He complains of back pain, lower extremity weakness, urinary incontinence, dysuria and generalized confusion/weakness  He hasn't been able to ambulate without significant assistance  He denies numbness, headache, nausea, vomiting, chest pain, sob, or other subjective symptoms  He has not taken any of his prior medications  Patient denies history of alcohol use or other illicit drugs  None       No past medical history on file  No past surgical history on file  No family history on file  I have reviewed and agree with the history as documented  No existing history information found  No existing history information found  Social History     Tobacco Use    Smoking status: Not on file    Smokeless tobacco: Not on file   Substance Use Topics    Alcohol use: Not on file    Drug use: Not on file        Review of Systems   Constitutional: Positive for fatigue  Negative for chills and fever  Respiratory: Negative for shortness of breath  Cardiovascular: Negative for chest pain  Genitourinary: Positive for dysuria  Neurological: Positive for weakness  Negative for dizziness, seizures, syncope and numbness  All other systems reviewed and are negative  Physical Exam  ED Triage Vitals   Temperature Pulse Respirations Blood Pressure SpO2   03/15/22 2022 03/15/22 2022 03/15/22 2022 03/15/22 2022 03/15/22 2022   98 5 °F (36 9 °C) 89 19 158/90 96 %      Temp Source Heart Rate Source Patient Position - Orthostatic VS BP Location FiO2 (%)   03/15/22 2022 03/15/22 2315 03/15/22 2315 03/15/22 2345 --   Oral Monitor Lying Right arm       Pain Score       03/15/22 2022       No Pain             Orthostatic Vital Signs  Vitals:    03/16/22 0245 03/16/22 0345 03/16/22 0530 03/16/22 0630   BP: 135/72 130/60 155/74    Pulse: 64 68 72 66   Patient Position - Orthostatic VS:  Lying         Physical Exam  Vitals reviewed  Constitutional:       General: He is not in acute distress  Appearance: He is not toxic-appearing  HENT:      Head: Normocephalic  Right Ear: External ear normal       Left Ear: External ear normal       Nose: Nose normal       Mouth/Throat:      Mouth: Mucous membranes are moist    Eyes:      Extraocular Movements: Extraocular movements intact  Pupils: Pupils are equal, round, and reactive to light  Cardiovascular:      Rate and Rhythm: Normal rate and regular rhythm  Heart sounds: Normal heart sounds  Pulmonary:      Effort: Pulmonary effort is normal       Breath sounds: Normal breath sounds  Abdominal:      General: There is no distension  Palpations: Abdomen is soft  Tenderness: There is no abdominal tenderness  Musculoskeletal:         General: No deformity  Cervical back: No rigidity  Right lower leg: No edema  Left lower leg: No edema  Skin:     General: Skin is warm  Coloration: Skin is not pale  Neurological:      General: No focal deficit present  Mental Status: He is alert and oriented to person, place, and time  Cranial Nerves: No cranial nerve deficit  Sensory: No sensory deficit  Motor: No weakness        Comments: Unsteady gait, unable to stand without support     Psychiatric:         Mood and Affect: Mood normal          ED Medications  Medications   heparin (porcine) subcutaneous injection 5,000 Units (5,000 Units Subcutaneous Not Given 3/16/22 0534)   aspirin chewable tablet 81 mg (has no administration in time range)   atorvastatin (LIPITOR) tablet 40 mg (has no administration in time range)   insulin lispro (HumaLOG) 100 units/mL subcutaneous injection 1-6 Units (has no administration in time range)   sodium chloride 0 9 % bolus 1,000 mL (0 mL Intravenous Stopped 3/16/22 0014)   sodium chloride 0 9 % bolus 1,000 mL (0 mL Intravenous Stopped 3/16/22 0000)   adenosine (ADENOCARD) injection 6 mg (6 mg Intravenous Given 3/16/22 0006)   adenosine (ADENOCARD) 6 mg/2 mL injection **ADS Override Pull** (12 mg  Given 3/16/22 0010)   iohexol (OMNIPAQUE) 350 MG/ML injection (SINGLE-DOSE) 100 mL (100 mL Intravenous Given 3/16/22 0025)       Diagnostic Studies  Results Reviewed     Procedure Component Value Units Date/Time    Hemoglobin A1C [[id number]]  (Abnormal) Collected: 03/15/22 2217    Lab Status: Final result Specimen: Blood Updated: 03/16/22 0733     Hemoglobin A1C 7 5 %       mg/dl     CKMB [[id number]]  (Abnormal) Collected: 03/16/22 0542    Lab Status: Final result Specimen: Blood from Arm, Left Updated: 03/16/22 0727     CK-MB Index <1 0 %      CK-MB 17 4 ng/mL     Lipid panel [[id number]]  (Abnormal) Collected: 03/16/22 0542    Lab Status: Final result Specimen: Blood from Arm, Left Updated: 03/16/22 0710     Cholesterol 147 mg/dL      Triglycerides 109 mg/dL      HDL, Direct 37 mg/dL      LDL Calculated 88 mg/dL      Non-HDL-Chol (CHOL-HDL) 110 mg/dl     Basic metabolic panel [[id number]]  (Abnormal) Collected: 03/16/22 0542    Lab Status: Final result Specimen: Blood from Arm, Left Updated: 03/16/22 0710     Sodium 136 mmol/L      Potassium 5 1 mmol/L      Chloride 109 mmol/L      CO2 20 mmol/L      ANION GAP 7 mmol/L      BUN 40 mg/dL      Creatinine 1 16 mg/dL      Glucose 157 mg/dL      Calcium 8 7 mg/dL      eGFR 63 ml/min/1 73sq m     Narrative:      [place] guidelines for Chronic Kidney Disease (CKD):     Stage 1 with normal or high GFR (GFR > 90 mL/min/1 73 square meters)    Stage 2 Mild CKD (GFR = 60-89 mL/min/1 73 square meters)    Stage 3A Moderate CKD (GFR = 45-59 mL/min/1 73 square meters)    Stage 3B Moderate CKD (GFR = 30-44 mL/min/1 73 square meters)    Stage 4 Severe CKD (GFR = 15-29 mL/min/1 73 square meters)    Stage 5 End Stage CKD (GFR <15 mL/min/1 73 square meters)  Note: GFR calculation is accurate only with a steady state creatinine    CK (with reflex to MB) [[id number]]  (Abnormal) Collected: 03/16/22 0542    Lab Status: Final result Specimen: Blood from Arm, Left Updated: 03/16/22 0710     Total CK 2,081 U/L     Magnesium [[id number]]  (Abnormal) Collected: 03/16/22 0542    Lab Status: Final result Specimen: Blood from Arm, Left Updated: 03/16/22 0710     Magnesium 2 7 mg/dL     Phosphorus [[id number]]  (Abnormal) Collected: 03/16/22 0542    Lab Status: Final result Specimen: Blood from Arm, Left Updated: 03/16/22 0710     Phosphorus 4 5 mg/dL     HS Troponin I 4hr [[id number]]  (Normal) Collected: 03/16/22 0141    Lab Status: Final result Specimen: Blood from Arm, Right Updated: 03/16/22 0219     hs TnI 4hr 27 ng/L      Delta 4hr hsTnI 1 ng/L     HS Troponin I 2hr [[id number]]  (Normal) Collected: 03/16/22 0019    Lab Status: Final result Specimen: Blood from Arm, Right Updated: 03/16/22 0100     hs TnI 2hr 34 ng/L      Delta 2hr hsTnI 8 ng/L     CKMB [[id number]]  (Abnormal) Resulted: 03/16/22 0058    Lab Status: Final result Specimen: Blood Updated: 03/16/22 0058     CK-MB Index <1 0 %      CK-MB 22 0 ng/mL     TSH [[id number]]  (Normal) Resulted: 03/16/22 0043    Lab Status: Final result Specimen: Blood Updated: 03/16/22 0043     TSH 3RD GENERATON 2 170 uIU/mL Narrative:      Patients undergoing fluorescein dye angiography may retain small amounts of fluorescein in the body for 48-72 hours post procedure  Samples containing fluorescein can produce falsely depressed TSH values  If the patient had this procedure,a specimen should be resubmitted post fluorescein clearance  CK Total with Reflex CKMB [[id number]]  (Abnormal) Resulted: 03/16/22 0043    Lab Status: Final result Specimen: Blood Updated: 03/16/22 0043     Total CK 3,039 U/L     Rapid drug screen, urine [[id number]]  (Abnormal) Collected: 03/15/22 2216    Lab Status: Final result Specimen: Urine, Clean Catch Updated: 03/16/22 0004     Amph/Meth UR Negative     Barbiturate Ur Negative     Benzodiazepine Urine Negative     Cocaine Urine Negative     Methadone Urine Negative     Opiate Urine Negative     PCP Ur Negative     THC Urine Positive     Oxycodone Urine Negative    Narrative:      Presumptive report  If requested, specimen will be sent to reference lab for confirmation  FOR MEDICAL PURPOSES ONLY  IF CONFIRMATION NEEDED PLEASE CONTACT THE LAB WITHIN 5 DAYS  Drug Screen Cutoff Levels:  AMPHETAMINE/METHAMPHETAMINES  1000 ng/mL  BARBITURATES     200 ng/mL  BENZODIAZEPINES     200 ng/mL  COCAINE      300 ng/mL  METHADONE      300 ng/mL  OPIATES      300 ng/mL  PHENCYCLIDINE     25 ng/mL  THC       50 ng/mL  OXYCODONE      399 ng/mL    Salicylate level [[id number]]  (Normal) Collected: 03/15/22 2223    Lab Status: Final result Specimen: Blood from Arm, Right Updated: 93/70/76 1519     Salicylate Lvl 3 mg/dL     Acetaminophen level-If concentration is detectable, please discuss with medical  on call   [[id number]]  (Abnormal) Collected: 03/15/22 2223    Lab Status: Final result Specimen: Blood from Arm, Right Updated: 03/16/22 0003     Acetaminophen Level <2 ug/mL     Comprehensive metabolic panel [[id number]]  (Abnormal) Resulted: 03/15/22 9553    Lab Status: Final result Specimen: Blood Updated: 03/15/22 2353     Sodium 133 mmol/L      Potassium 4 0 mmol/L      Chloride 103 mmol/L      CO2 20 mmol/L      ANION GAP 10 mmol/L      BUN 55 mg/dL      Creatinine 1 51 mg/dL      Glucose 187 mg/dL      Calcium 9 1 mg/dL      Corrected Calcium 9 8 mg/dL       U/L      ALT 66 U/L      Alkaline Phosphatase 75 U/L      Total Protein 7 4 g/dL      Albumin 3 1 g/dL      Total Bilirubin 0 79 mg/dL      eGFR 45 ml/min/1 73sq m     Narrative:      [place] guidelines for Chronic Kidney Disease (CKD):     Stage 1 with normal or high GFR (GFR > 90 mL/min/1 73 square meters)    Stage 2 Mild CKD (GFR = 60-89 mL/min/1 73 square meters)    Stage 3A Moderate CKD (GFR = 45-59 mL/min/1 73 square meters)    Stage 3B Moderate CKD (GFR = 30-44 mL/min/1 73 square meters)    Stage 4 Severe CKD (GFR = 15-29 mL/min/1 73 square meters)    Stage 5 End Stage CKD (GFR <15 mL/min/1 73 square meters)  Note: GFR calculation is accurate only with a steady state creatinine    Ammonia [[id number]]  (Abnormal) Collected: 03/15/22 2305    Lab Status: Final result Specimen: Blood from Arm, Right Updated: 03/15/22 2346     Ammonia <10 umol/L     Ethanol [[id number]]  (Normal) Collected: 03/15/22 2224    Lab Status: Final result Specimen: Blood from Arm, Right Updated: 03/15/22 2346     Ethanol Lvl <3 mg/dL     Protime-INR [[id number]]  (Abnormal) Collected: 03/15/22 2305    Lab Status: Final result Specimen: Blood from Arm, Right Updated: 03/15/22 2334     Protime 15 5 seconds      INR 1 28    APTT [[id number]]  (Normal) Collected: 03/15/22 2305    Lab Status: Final result Specimen: Blood from Arm, Right Updated: 03/15/22 2334     PTT 27 seconds     CBC and differential [[id number]]  (Abnormal) Collected: 03/15/22 2217    Lab Status: Final result Specimen: Blood Updated: 03/15/22 2323     WBC 10 12 Thousand/uL      RBC 4 97 Million/uL      Hemoglobin 14 2 g/dL      Hematocrit 40 9 %      MCV 82 fL      MCH 28 6 pg MCHC 34 7 g/dL      RDW 14 7 %      Platelets 863 Thousands/uL      nRBC 0 /100 WBCs      Neutrophils Relative 77 %      Immat GRANS % 0 %      Lymphocytes Relative 10 %      Monocytes Relative 12 %      Eosinophils Relative 1 %      Basophils Relative 0 %      Neutrophils Absolute 7 82 Thousands/µL      Immature Grans Absolute 0 04 Thousand/uL      Lymphocytes Absolute 0 97 Thousands/µL      Monocytes Absolute 1 20 Thousand/µL      Eosinophils Absolute 0 06 Thousand/µL      Basophils Absolute 0 03 Thousands/µL     HS Troponin 0hr (reflex protocol) [[id number]]  (Normal) Resulted: 03/15/22 2311    Lab Status: Final result Specimen: Blood Updated: 03/15/22 2311     hs TnI 0hr 26 ng/L     Urine Microscopic [[id number]]  (Abnormal) Collected: 03/15/22 2144    Lab Status: Final result Specimen: Urine, Clean Catch Updated: 03/15/22 2258     RBC, UA None Seen /hpf      WBC, UA None Seen /hpf      Epithelial Cells Occasional /hpf      Bacteria, UA None Seen /hpf      Granular Casts, UA 0-3    Lactic acid [[id number]]  (Normal) Resulted: 03/15/22 2251    Lab Status: Final result Specimen: Blood Updated: 03/15/22 2251     LACTIC ACID 1 6 mmol/L     Narrative:      Result may be elevated if tourniquet was used during collection      Urine Macroscopic, POC [[id number]]  (Abnormal) Collected: 03/15/22 2144    Lab Status: Final result Specimen: Urine Updated: 03/15/22 2146     Color, UA Lian     Clarity, UA Clear     pH, UA 5 5     Leukocytes, UA Negative     Nitrite, UA Negative     Protein,  (2+) mg/dl      Glucose, UA Negative mg/dl      Ketones, UA 15 (1+) mg/dl      Urobilinogen, UA 0 2 E U /dl      Bilirubin, UA Interference- unable to analyze     Blood, UA Trace     Specific Gravity, UA >=1 030    Narrative:      CLINITEK RESULT    Fingerstick Glucose (POCT) [[id number]]  (Abnormal) Collected: 03/15/22 2136    Lab Status: Final result Updated: 03/15/22 2137     POC Glucose 186 mg/dl                  CT head without contrast   Final Result by Roc Sneed MD (03/16 0104)      No acute intracranial hemorrhage  Chronic appearing findings, as described above  Please see discussion  Clinical correlation is recommended  If further evaluation is indicated, MRI with diffusion-weighted imaging could be performed, if there are no    contraindications  Workstation performed: [id number]         CT cervical spine without contrast   Final Result by Roc Sneed MD (03/16 0134)      No acute cervical spine fracture or traumatic malalignment  Workstation performed: [id number]         CT chest abdomen pelvis w contrast   Final Result by Roc Sneed MD (03/16 0208)      The prostate is enlarged, measuring approximately 6 2 cm transverse dimension  The prostate is somewhat heterogeneous and contains calcification  Clinical correlation, laboratory correlation and follow-up is recommended  The urinary bladder is distended, extending approximately 2 cm below the level of the umbilicus  Clinical correlation regarding the possibility of voiding difficulty is recommended  There is some material containing gas bubbles layering within the posterior aspect of the right mainstem bronchus, possibly representing secretions or aspiration  The lungs are well expanded  There is no evidence of focal consolidation  There is no    pneumothorax  There is some posterior abdominal and pelvic body wall edema, most notably overlying the mid to lower right flank  Clinical correlation is recommended  Atherosclerosis  Coronary artery disease  Cholelithiasis without CT evidence of acute cholecystitis              Workstation performed: [id number]               Procedures  Cardioversion    Date/Time: 3/16/2022 12:20 AM  Performed by: Mireya Rust MD  Authorized by: Mireya Rust MD     Verbal consent obtained?: Yes    Emergent situation    Risks and benefits: Risks, benefits and alternatives were discussed    Consent given by:  Patient  Required items: Required blood products, implants, devices and special equipment available    Patient identity confirmed:  Verbally with patient and arm band  Patient sedated: No    Cardioversion basis:  Emergent  Pre-procedure rhythm:  Supraventricular tachycardia  Position: Patient was placed in a supine position    Chest area exposed: Chest area was exposed    Electrodes:  Pads  Electrodes placed:  Anterior-posterior  Number of attempts:  2   Conversion initially attempted with 6mg adenosine with no response on cardiac monitor  Patient given 12mg on second attempt with return of normal sinus rhythm in 80s    ECG 12 Lead Documentation Only    Date/Time: 3/16/2022 12:32 AM  Performed by: Monica Calderon MD  Authorized by: Monica Calderon MD     ECG reviewed by me, the ED Provider: yes    Patient location:  ED  Previous ECG:     Previous ECG:  Unavailable  Interpretation:     Interpretation: abnormal    Rate:     ECG rate:  152    ECG rate assessment: tachycardic    Rhythm:     Rhythm: SVT    Ectopy:     Ectopy: none    QRS:     QRS axis:  Normal    QRS intervals:  Normal  Conduction:     Conduction: normal    ST segments:     ST segments:  Normal  T waves:     T waves: normal    ECG 12 Lead Documentation Only    Date/Time: 3/16/2022 12:33 AM  Performed by: Monica Calderon MD  Authorized by: Monica Calderon MD     Indications / Diagnosis:  Post-medical cardioversion with adenosine  ECG reviewed by me, the ED Provider: yes    Patient location:  ED  Interpretation:     Interpretation: abnormal    Rate:     ECG rate:  88    ECG rate assessment: normal    Rhythm:     Rhythm: sinus rhythm    Ectopy:     Ectopy: none    QRS:     QRS axis:  Left    QRS intervals:  Normal  Conduction:     Conduction: normal    ST segments:     ST segments:  Normal          ED Course                                       MDM  Number of Diagnoses or Management Options  Ambulatory dysfunction  Hallucinations  Diagnosis management comments: 71 y o M w/ poor medication compliance presents after being down for 2 days due to unknown etiology  Will order broad workup including CBC, CMP, TSH, Coma panel, lactate, UA, cardiac labs, CK, EKG, and scans to evaluate for trauma and possible etiology of the fall  Starting 1LNS due to dehydration  Patient went into SVT while in the emergency department  BP is stable and patient has no complaints  Did not respond to vagal maneuvers or another bolus of 1L NS  Will medically cardiovert with adenosine  Patient tolerated procedure with return of NSR  Workup significant for elevated CK likely reflective of long period down  Otherwise no obvious explanation for his current symptoms  Some blood work indicates the possibility of ethanol use, including abnormal LFTs  Patient adamantly declining ethanol use  Patient would benefit from further inpatient management of his symptoms including evaluation for cause of his fall  Disposition  Final diagnoses:   Ambulatory dysfunction   Hallucinations     Time reflects when diagnosis was documented in both MDM as applicable and the Disposition within this note     Time User Action Codes Description Comment    3/16/2022  2:43 AM Sherrie Aden Add [R26 2] Ambulatory dysfunction     3/16/2022  2:43 AM Sherrie Aden Add [R44 3] Hallucinations     3/16/2022  5:20 AM Steffany Holder Add [I47 1] SVT (supraventricular tachycardia) [facility])       ED Disposition     ED Disposition Condition Date/Time Comment    Admit Stable Wed Mar 16, 2022  2:43 AM Case was discussed with Dr Yoana Lester and the patient's admission status was agreed to be Admission Status: inpatient status to the service of Dr Hanh Hardy   Follow-up Information    None         Patient's Medications    No medications on file     No discharge procedures on file      PDMP Review     None           ED Provider  Attending physically available and evaluated Lora Diallo I managed the patient along with the ED Attending      Electronically Signed by         Kathyrn Sandhoff, MD  03/16/22 6908

## 2022-03-16 NOTE — CONSULTS
Consultation - Cardiology   Eli Shaikh 71 y o  male MRN: [id number]  Unit/Bed#: ED 17 Encounter: [id number]      Assessment and Plan:  Principal Problem:    SVT (supraventricular tachycardia) (HCC)  Active Problems:    Impaired cognition    NORMA (acute kidney injury) ([facility] )    Rhabdomyolysis    History of transient ischemic attack (TIA)    Diabetes ([facility] )    Hyponatremia    # SVT  - Noted to have SVT in the ED, s/p adenosine 6 mg followed by 12 mg   - He was asymptomatic  - Currently NSR, rate at 60-80s  - Will not give BB due to concerning for medication compliance  Patient reports he does not want to take any medications regularly  However he said he will follow up with cardiology as an outpatient and he is agreeable to wear cardiac event monitor  # Hypertension   - Refused to take any BP medications  # Hyperlipidemia  - Not on any medications  # NORMA  - Likely due to dehydration and rhabdomyolysis    # Rhabdomyolysis  - as per primary team    # History of CVA    # Medication noncompliance        History of Present Illness   Physician Requesting Consult: Re Beltran DO  Reason for Consult / Principal Problem: SVT  HPI: Eli Shaikh is a 71y o  year old male with PMH of HTN, HLD, DM, history of CVA and medication non compliance who presents with ambulatory dysfunction  Patient reports he recently bought Dragon mouth spray for penile enlargement from online  He started using it 2-3 days ago  He used 2 times a day  He reports after using sprays, he started hearing voices and seeing small animals  He felt that he almost passed out twice  First time, he was able to hold himself not to fall down  Second time, he fell back to the ground and not able to get up  He was sitting on the floor for 2 days  He denies any syncope, dizziness, lightheadedness, CP, SOB, palpitation  He reports he does not believe in medications and does not want to take any medications   Even if he needs it, he wants to take as needed  He reports he called his landlord after he realized that he saw the small animals that were not in the room  His brother came and checked him and found he was on the floor  He denies any similar episodes in the past      He was found to have SVT in the ED and he was given adenosine 6 mg followed by 12 mg, returned to NSR  He denies any palpitation, SOB , CP, lightheadedness or dizziness during that time  During our encounter, patient reports he is feeling well and is eager to go home  He states he will follow up with the cardiology upon discharge and he is agreeable to wear cardiac event monitor  However he does not want to take any medications on regular basics  Inpatient consult to Cardiology  Consult performed by: Jericho Nino MD  Consult ordered by: Dania Christianson DO          Review of Systems:  Review of Systems   Constitutional: Negative for activity change, chills, diaphoresis, fatigue, fever and unexpected weight change  HENT: Negative  Eyes: Negative for visual disturbance  Respiratory: Negative for cough, shortness of breath, wheezing and stridor  Cardiovascular: Negative for chest pain, palpitations and leg swelling  Gastrointestinal: Negative for abdominal distention, abdominal pain, constipation, diarrhea, nausea and vomiting  Genitourinary: Negative for difficulty urinating, dysuria, flank pain and frequency  Musculoskeletal: Negative  Neurological: Negative for dizziness, seizures, syncope, speech difficulty, weakness and light-headedness  Psychiatric/Behavioral: Positive for hallucinations (visual and auditory)  Negative for decreased concentration, sleep disturbance and suicidal ideas  The patient is not nervous/anxious  Historical Information   No past medical history on file  No past surgical history on file    Social History     Substance and Sexual Activity   Alcohol Use Not on file     Social History     Substance and Sexual Activity   Drug Use Not on file     Social History     Tobacco Use   Smoking Status Not on file   Smokeless Tobacco Not on file     Family History: non-contributory    Meds/Allergies   all current active meds have been reviewed  Allergies   Allergen Reactions    Penicillins Hives       Objective   Vitals: Blood pressure 144/72, pulse 62, temperature 98 5 °F (36 9 °C), temperature source Oral, resp  rate 21, height 5' 11" (1 803 m), weight 97 5 kg (215 lb), SpO2 96 %  , Body mass index is 29 99 kg/m² ,   Orthostatic Blood Pressures      Most Recent Value   Blood Pressure 144/72 filed at 03/16/2022 1331   Patient Position - Orthostatic VS Lying filed at 03/16/2022 1331            Intake/Output Summary (Last 24 hours) at 3/16/2022 1344  Last data filed at 3/16/2022 0014  Gross per 24 hour   Intake 1000 ml   Output --   Net 1000 ml       Invasive Devices  Report    Peripheral Intravenous Line            Peripheral IV 03/15/22 Right Forearm 1 day    Peripheral IV 03/16/22 Left Forearm <1 day                    Physical Exam:  Physical Exam  Vitals and nursing note reviewed  Constitutional:       Appearance: Normal appearance  HENT:      Head: Normocephalic and atraumatic  Eyes:      Extraocular Movements: Extraocular movements intact  Cardiovascular:      Rate and Rhythm: Normal rate and regular rhythm  Pulses: Normal pulses  Heart sounds: Normal heart sounds  No murmur heard  No gallop  Pulmonary:      Effort: Pulmonary effort is normal  No respiratory distress  Breath sounds: Normal breath sounds  No wheezing  Abdominal:      General: Bowel sounds are normal  There is no distension  Palpations: Abdomen is soft  Tenderness: There is no abdominal tenderness  Musculoskeletal:      Right lower leg: No edema  Left lower leg: No edema  Skin:     General: Skin is warm and dry  Neurological:      General: No focal deficit present  Mental Status: He is alert     Psychiatric:         Mood and Affect: Mood normal            Lab Results:     Lab Results   Component Value Date    CKTOTAL 2,081 (H) 03/16/2022    CKTOTAL 3,039 (H) 03/16/2022    CKMB 17 4 (H) 03/16/2022    CKMB 22 0 (H) 03/16/2022    CKMBINDEX <1 0 03/16/2022    CKMBINDEX <1 0 03/16/2022       Lab Results   Component Value Date    CALCIUM 8 7 03/16/2022    K 5 1 03/16/2022    CO2 20 (L) 03/16/2022     (H) 03/16/2022    BUN 40 (H) 03/16/2022    CREATININE 1 16 03/16/2022       Lab Results   Component Value Date    WBC 10 12 03/15/2022    HGB 14 2 03/15/2022    HCT 40 9 03/15/2022    MCV 82 03/15/2022     (L) 03/15/2022       No results found for: CHOL  Lab Results   Component Value Date    HDL 37 (L) 03/16/2022     Lab Results   Component Value Date    LDLCALC 88 03/16/2022     Lab Results   Component Value Date    TRIG 109 03/16/2022       Lab Results   Component Value Date    ALT 66 03/15/2022     (H) 03/15/2022       Results from last 7 days   Lab Units 03/15/22  2305   INR  1 28*         Imaging: I have personally reviewed pertinent reports        EKG: Supraventricular tachycardia  Left axis deviation  Left ventricular hypertrophy with repolarization abnormality    Repeat EKG: Normal sinus rhythm  Left anterior fascicular block  Moderate voltage criteria for LVH, may be normal variant

## 2022-03-16 NOTE — ASSESSMENT & PLAN NOTE
Patient presented to ED with sodium of 133  Prior sodium normal    · Unsure if hyponatremia is acute or chronic   If acute, may be contributing to patient's confusion  · S/P 1 L normal saline bolus x2 in ED  · Will monitor sodium

## 2022-03-16 NOTE — H&P
INTERNAL MEDICINE RESIDENCY ADMISSION H&P     Name: Luis M Cerna   Age & Sex: 71 y o  male   MRN: [id number]  Unit/Bed#: ED 16   Encounter: [id number]  Primary Care Provider: No primary care provider on file  Code Status: Level 1 - Full Code  Admission Status: INPATIENT   Disposition: Patient requires Med/Surg with Telemetry    Admit to team: SOD Team C     ASSESSMENT/PLAN     Principal Problem:    SVT (supraventricular tachycardia) (HCC)  Active Problems:    Impaired cognition    [facility] ([facility] )    Rhabdomyolysis    History of transient ischemic attack (TIA)    Diabetes ([facility] )    Hyponatremia      Hyponatremia  Assessment & Plan  Patient presented to ED with sodium of 133  Prior sodium normal    Unsure if hyponatremia is acute or chronic  If acute, may be contributing to patient's confusion  S/P 1 L normal saline bolus x2 in ED  Will monitor sodium    Diabetes [facility])  Assessment & Plan  Lab Results   Component Value Date    HGBA1C 8 0 (H) 05/15/2019       Recent Labs     03/15/22  2136   POCGLU 186*       Blood Sugar Average: Last 72 hrs:  (P) 186     Patient with history of diabetes with A1c 8 0 in 2019  Patient not compliant with medications  Will recheck A1c    History of transient ischemic attack (TIA)  Assessment & Plan  Patient with history of TIA in 2019  Was previously prescribed aspirin 81 mg daily and atorvastatin 40 mg daily  Patient noncompliant with medications  Will restart aspiring and atorvastatin for patient    Rhabdomyolysis  Assessment & Plan  Patient with CK elevated at 3,039 upon admission  Received 1L normal saline bolus x2 in ED  Will monitor CK in AM    NORMA (acute kidney injury) [facility])  Assessment & Plan  Patient presented with BUN 55 and Cr 1 51  CK also elevated upon admission at 3,039  Suspect NORMA 2/2 rhabdo   May be pre-renal due to dehydration as patient was down for 2 days  Patient received 1 L normal saline bolus x2 in ED  Will monitor patient's Cr and hold any further fluids until after echo is complete    Impaired cognition  Assessment & Plan  Patient found down by friend after 2 days  Presently alert and oriented to person, place, and time  Patient unable to describe events around his fall, and states that he thinks he was hallucinating while down as well  Patient has some word finding difficulty, and was tangential in thoughts  Will monitor patient's mental status  Patient has history of 2019, but does not seem to be compliant with medications prescribed at discharge  CT Head on 03/15 showed "decreased attenuation is noted in periventricular and subcortical white matter demonstrating an appearance that is statistically most likely to represent mild to moderate microangiopathic change  There is an old infarction adjacent to the anterior left ventricle and extending to the centrum semiovale  No definite CT signs of acute infarction  No intracranial mass, positive mass effect or midline shift  No acute parenchymal hemorrhage  May consider MRI as more sensitive than CT  CT head negative for hydrocephalus      * SVT (supraventricular tachycardia) [facility])  Assessment & Plan  Patient found down at home by friend after 2 days  States that he feels loss of balance and is unable to walk, but denies any weakness  Patient unable to recall any inciting event and denied any history of palpitations, lightheadedness  In ED, kami had witnessed SVT with HR of 160  Patient otherwise asymptomatic during SVT  S/p cardioversion in ED with adenosine 6 mg IV once then 12 mg IV  After 12 mg IV was administered patient converted to NSR and remains in NSR     Cardiology consulted, recommendations are appreciated  TTE ordered   Patient had CT head, CT neck, and CT C/A/P but negative for explanation of loss of balance  No indication of infection  Patient denies any recent ETOH use and states he has not drank in over 20 years  Suspect cardiogenic etiology of patient's symptoms  Will monitor electrolytes and keep Mag >2, Phos >3, and K>4      VTE Pharmacologic Prophylaxis: Heparin  VTE Mechanical Prophylaxis: sequential compression device    CHIEF COMPLAINT     Chief Complaint   Patient presents with    Gait Problem     Pt has been having problems with balance and walking  Pt reports seeing things that other people aren't seeing  Previous stroke 2012      HISTORY OF PRESENT ILLNESS     Mr Jermaine Kilpatrick is a 67-year-old male with past medical history of TIA in 2019 noncompliant with medication that presents to ED after being found down at home by friend after 2 days  Patient's friend has not heard from them for 2-3 days and so went over to patient's house and found him down on the ground  Patient states that about 2 days ago he fell to the ground and could not get himself up because he had lost his balance  He states that he did not have any weakness, but that his legs around stable and he could not keep his balance to get up  He states that he has stayed on the ground since then and has not eaten or drank during this 2 days  Patient states that when his friend arrived he believes that he was hallucinating and was telling him about some small animal that was moving around the room  Patient states that his friend was not able to see any the and will study was seeing  He also states that he has had an episode of urinary incontinence, some back pain, and some confusion  In the emergency department, patient arrived afebrile with temperature of 98 5°, pulse 89, respiratory rate 19, blood pressure 158/90, and SpO2 of 96% on room air  Urinalysis was positive for 2+ protein, 1+ ketones, negative for nitrites  Urine microscopic unremarkable  Rapid drug screen positive for THC in urine  Cbc unremarkable  CMP showed sodium of 133, BUN 55, creatinine 1 51, glucose 187, albumin 3 1, AST of 119  Lactic acid and ammonia less than 10   Initial high sensitivity troponin was 26 with subsequent readings of 34 and 27  TSH normal   Total CK elevated at 3039 with CK-MB elevated at 22 0  CT head showed no acute intracranial hemorrhaging but chronic problems of decreased attenuation in periventricular and subcortical white matter demonstrating appearance that is likely to represent mild to moderate microangiopathic change  Old infarction adjacent to the anterior left ventricle and extending to the centrum semiovale  No definite CT signs of acute infarction  CT spine negative for acute cervical fracture or traumatic malalignment  CT chest abdomen pelvis negative for explanation of patient's symptoms  While in ED, patient developed heart rate in 160s  Patient was alert and otherwise asymptomatic  He was cardioverted using adenosine 6 mg IV once then 12 mg IV once  After the administration of the 12 mg IV patient converted to sinus rhythm  Upon my encounter patient was lying comfortably in hospital bed  He was unable to describe events around his fall, and states that he thinks he was hallucinating while down as well  Patient has some word finding difficulty, and was tangential in thoughts  He had another episode of urinary incontinence  He states that he was trying to alert nurse with call bell, but they were unable to respond in time  He states his problem was that he has lost his balance  He presently denies any fever, chills, nausea vomiting, diarrhea, constipation, chest pain, shortness a breath  He otherwise denies any new or worsening complaints  He will be admitted to hospital under care of SOD-C for SVT and altered cognition  REVIEW OF SYSTEMS     Review of Systems   Constitutional: Negative for chills, fatigue and fever  HENT: Negative for congestion, rhinorrhea and sore throat  Eyes: Negative for pain and redness  Respiratory: Negative for apnea, cough, shortness of breath and wheezing  Cardiovascular: Negative for chest pain, palpitations and leg swelling  Gastrointestinal: Negative for abdominal distention, abdominal pain, constipation, diarrhea, nausea and vomiting  Endocrine: Negative for cold intolerance, heat intolerance and polydipsia  Genitourinary: Negative for difficulty urinating, dysuria and flank pain  Musculoskeletal: Negative for arthralgias and back pain  Skin: Negative for rash and wound  Neurological: Positive for dizziness (loss of balance)  Negative for syncope, weakness, light-headedness, numbness and headaches  Psychiatric/Behavioral: Negative for agitation, behavioral problems and confusion  OBJECTIVE     Vitals:    22 0115 22 0245 22 0345 22 0530   BP: 147/79 135/72 130/60 155/74   BP Location:   Right arm    Pulse: 82 64 68 72   Resp: (!) 25 21 22 20   Temp:       TempSrc:       SpO2: 95% 96% 96% 96%   Weight:       Height:          Temperature:   Temp (24hrs), Av 5 °F (36 9 °C), Min:98 5 °F (36 9 °C), Max:98 5 °F (36 9 °C)    Temperature: 98 5 °F (36 9 °C)  Intake & Output:  I/O        0701  03/15 0700 03/15 0701   0700    IV Piggyback  1000    Total Intake(mL/kg)  1000 (10 3)    Net  +1000              Weights:   IBW (Ideal Body Weight): 75 3 kg    Body mass index is 29 99 kg/m²  Weight (last 2 days)     Date/Time Weight    03/15/22 2022 97 5 (215)        Physical Exam  Constitutional:       General: He is not in acute distress  Appearance: Normal appearance  HENT:      Head: Normocephalic and atraumatic  Mouth/Throat:      Mouth: Mucous membranes are moist       Pharynx: Oropharynx is clear  Eyes:      Extraocular Movements: Extraocular movements intact  Conjunctiva/sclera: Conjunctivae normal       Pupils: Pupils are equal, round, and reactive to light  Cardiovascular:      Rate and Rhythm: Normal rate and regular rhythm  Pulses: Normal pulses  Heart sounds: Normal heart sounds  No murmur heard        Pulmonary:      Effort: Pulmonary effort is normal  No respiratory distress  Breath sounds: Normal breath sounds  No wheezing, rhonchi or rales  Abdominal:      General: Abdomen is flat  Bowel sounds are normal  There is no distension  Palpations: Abdomen is soft  Tenderness: There is no abdominal tenderness  Musculoskeletal:      Right lower leg: No edema  Left lower leg: No edema  Neurological:      General: No focal deficit present  Mental Status: He is alert and oriented to person, place, and time  Motor: No weakness  Psychiatric:         Mood and Affect: Mood normal          Speech: Speech is tangential          Behavior: Behavior normal          Cognition and Memory: Cognition is impaired  PAST MEDICAL HISTORY   No past medical history on file  PAST SURGICAL HISTORY   No past surgical history on file  SOCIAL & FAMILY HISTORY     Social History     Substance and Sexual Activity   Alcohol Use Not on file     Substance and Sexual Activity   Alcohol Use Not on file        Substance and Sexual Activity   Drug Use Not on file     Social History     Tobacco Use   Smoking Status Not on file   Smokeless Tobacco Not on file     No family history on file  LABORATORY DATA     Labs: I have personally reviewed pertinent reports  Results from last 7 days   Lab Units 03/15/22  2217   WBC Thousand/uL 10 12   HEMOGLOBIN g/dL 14 2   HEMATOCRIT % 40 9   PLATELETS Thousands/uL 105*   NEUTROS PCT % 77*   MONOS PCT % 12      Results from last 7 days   Lab Units 03/15/22  2353   POTASSIUM mmol/L 4 0   CHLORIDE mmol/L 103   CO2 mmol/L 20*   BUN mg/dL 55*   CREATININE mg/dL 1 51*   CALCIUM mg/dL 9 1   ALK PHOS U/L 75   ALT U/L 66   AST U/L 119*              Results from last 7 days   Lab Units 03/15/22  2305   INR  1 28*   PTT seconds 27     Results from last 7 days   Lab Units 03/15/22  2251   LACTIC ACID mmol/L 1 6         Micro:  No results found for: Susan Ortega SPUTUMCULTUR  IMAGING & DIAGNOSTIC TESTS     Imaging:  I have personally reviewed pertinent reports  CT head without contrast    Result Date: 3/16/2022  Impression: No acute intracranial hemorrhage  Chronic appearing findings, as described above  Please see discussion  Clinical correlation is recommended  If further evaluation is indicated, MRI with diffusion-weighted imaging could be performed, if there are no contraindications  Workstation performed: [id number]     CT cervical spine without contrast    Result Date: 3/16/2022  Impression: No acute cervical spine fracture or traumatic malalignment  Workstation performed: [id number]     CT chest abdomen pelvis w contrast    Result Date: 3/16/2022  Impression: The prostate is enlarged, measuring approximately 6 2 cm transverse dimension  The prostate is somewhat heterogeneous and contains calcification  Clinical correlation, laboratory correlation and follow-up is recommended  The urinary bladder is distended, extending approximately 2 cm below the level of the umbilicus  Clinical correlation regarding the possibility of voiding difficulty is recommended  There is some material containing gas bubbles layering within the posterior aspect of the right mainstem bronchus, possibly representing secretions or aspiration  The lungs are well expanded  There is no evidence of focal consolidation  There is no pneumothorax  There is some posterior abdominal and pelvic body wall edema, most notably overlying the mid to lower right flank  Clinical correlation is recommended  Atherosclerosis  Coronary artery disease  Cholelithiasis without CT evidence of acute cholecystitis  Workstation performed: [id number]     EKG, Pathology, and Other Studies: I have personally reviewed pertinent reports       ALLERGIES     Allergies   Allergen Reactions    Penicillins Hives     MEDICATIONS PRIOR TO ARRIVAL     Prior to Admission medications    Not on File     MEDICATIONS ADMINISTERED IN LAST 24 HOURS     Medication Administration - last 24 hours from 03/15/2022 0631 to 03/16/2022 0631       Date/Time Order Dose Route Action Action by     03/16/2022 0014 sodium chloride 0 9 % bolus 1,000 mL 0 mL Intravenous Stopped Heather Chaves RN     03/15/2022 2306 sodium chloride 0 9 % bolus 1,000 mL 1,000 mL Intravenous [place] Noreen, RN     03/16/2022 0000 sodium chloride 0 9 % bolus 1,000 mL 0 mL Intravenous Stopped Sarah Pelayo, AMEENA     03/15/2022 2312 sodium chloride 0 9 % bolus 1,000 mL 1,000 mL Intravenous New Bag Mike Net, RN     03/16/2022 0006 adenosine (ADENOCARD) injection 6 mg 6 mg Intravenous Given Heather Chaves RN     03/16/2022 0010 adenosine (ADENOCARD) 6 mg/2 mL injection **ADS Override Pull** 12 mg  Given Heather Chaves RN     03/16/2022 0025 iohexol (OMNIPAQUE) 350 MG/ML injection (SINGLE-DOSE) 100 mL 100 mL Intravenous Given Susana Jeffrey     03/16/2022 0534 heparin (porcine) subcutaneous injection 5,000 Units 5,000 Units Subcutaneous Not Given Heather Chaves RN        CURRENT MEDICATIONS     Current Facility-Administered Medications   Medication Dose Route Frequency Provider Last Rate    aspirin  81 mg Oral Daily Blayne Quispe DO      atorvastatin  40 mg Oral Daily With [employer], DO      heparin (porcine)  5,000 Units Subcutaneous Q8H [address] [place] [address] , DO               Admission Time  I spent 45 minutes admitting the patient  This involved direct patient contact where I performed a full history and physical, reviewing previous records, and reviewing laboratory and other diagnostic studies  Portions of the record may have been created with voice recognition software  Occasional wrong word or "sound a like" substitutions may have occurred due to the inherent limitations of voice recognition software    Read the chart carefully and recognize, using context, where substitutions have occurred     ==  Blayne Quispe DO  [address]  Internal Medicine Residency PGY-1

## 2022-03-16 NOTE — PLAN OF CARE
Problem: PHYSICAL THERAPY ADULT  Goal: Performs mobility at highest level of function for planned discharge setting  See evaluation for individualized goals  Description: Treatment/Interventions: Functional transfer training,LE strengthening/ROM,Elevations,Therapeutic exercise,Endurance training,Patient/family training,Equipment eval/education,Bed mobility,Gait training  Equipment Recommended: Cane (issued during session, CM updated)       See flowsheet documentation for full assessment, interventions and recommendations  Note: Prognosis: Good  Problem List: Decreased endurance,Impaired balance,Decreased mobility,Decreased safety awareness  Assessment: Pt is a 71 y o  male seen for PT evaluation s/p admit to [facility] on 3/15/2022  Pt was admitted with a primary dx of: SVT  PT now consulted for assessment of mobility and d/c needs  Pts current comorbidities and personal factors effecting treatment include: Rhabdomyolysis, DM, TIA, resides alone, stairs to enter home  Pts current clinical presentation is Unstable/ Unpredictable (high complexity) due to Ongoing medical management for primary dx, Increased reliance on more restrictive AD compared to baseline, Decreased activity tolerance compared to baseline, Fall risk, Ongoing telemetry monitoring, Trending lab values  Prior to admission, pt was independent with all mobility  Upon evaluation, pt currently is requiring supervision for bed mobility; supervision for transfers and Claudia for ambulation 100 ft w/ no AD  Pt presents at PT eval functioning below baseline and currently w/ overall mobility deficits 2* to: impaired balance, decreased endurance, gait deviations, decreased activity tolerance compared to baseline, decreased functional mobility tolerance compared to baseline, decreased safety awareness, fall risk  Pt currently at a fall risk 2* to impairments listed above    Pt will continue to benefit from skilled acute PT interventions to address stated impairments; to maximize functional mobility; for ongoing pt/ family training; and DME needs  At conclusion of PT session pt returned BTB with phone and call bell within reach  Pt denies any further questions at this time  Recommend home with family care and HHPT upon hospital D/C, encouraged pt to stay with his brother or have his brother stay with him upon discharge for increased support, verbalized understanding  PT Discharge Recommendation: Home with home health rehabilitation          See flowsheet documentation for full assessment

## 2022-03-16 NOTE — CASE MANAGEMENT
Case Management Assessment & Discharge Planning Note    Patient name Sánchez Palacios  Location ED 17/ED 17 MRN [id number]  : 1950 Date 3/16/2022       Current Admission Date: 3/15/2022  Current Admission Diagnosis:SVT (supraventricular tachycardia) [facility])   Patient Active Problem List    Diagnosis Date Noted    [facility]    Impaired cognition 2022    NORMA (acute kidney injury) ([facility] ) 2022    Rhabdomyolysis 2022    History of transient ischemic attack (TIA) 2022    Diabetes ([facility] ) 2022    Hyponatremia 2022      LOS (days): 0  Geometric Mean LOS (GMLOS) (days):   Days to GMLOS:     OBJECTIVE:    Risk of Unplanned Readmission Score: 11    Current admission status: Inpatient  Referral Reason: Other    Preferred Pharmacy:   PATIENT/FAMILY REPORTS NO PREFERRED PHARMACY  No address on file      Primary Care Provider: No primary care provider on file  Primary Insurance: [employer]  Secondary Insurance:     ASSESSMENT:  Active Health Care Proxies     [address] Representative - Other   Primary Phone: [phone] (Mobile)                  Readmission Root Cause  30 Day Readmission: No    Patient Information  Admitted from[de-identified] Home  Mental Status: Alert  During Assessment patient was accompanied by: Not accompanied during assessment  Assessment information provided by[de-identified] Patient  Primary Caregiver: Self  Support Systems: [facility] of Residence: [address] [place] [address] do you live in?: [facility] entry access options   Select all that apply : Stairs  Number of steps to enter home : 3  Type of Current Residence: 3 story home  Upon entering residence, is there a bedroom on the main floor (no further steps)?: No  A bedroom is located on the following floor levels of residence (select all that apply):: 2nd Floor  Upon entering residence, is there a bathroom on the main floor (no further steps)?: No  Indicate which floors of current residence have a bathroom (select all the apply):: 2nd Floor  Number of steps to 2nd floor from main floor: One Flight  In the last 12 months, was there a time when you were not able to pay the mortgage or rent on time?: No  In the last 12 months, how many places have you lived?: 1  In the last 12 months, was there a time when you did not have a steady place to sleep or slept in a shelter (including now)?: No  Homeless/housing insecurity resource given?: No  Living Arrangements: Lives Alone    Activities of Daily Living Prior to Admission  Functional Status: Independent  Completes ADLs independently?: Yes  Ambulates independently?: Yes  Does patient use assisted devices?: No  Does patient currently own DME?: No  Does patient have a history of Outpatient Therapy (PT/OT)?: No  Does the patient have a history of Short-Term Rehab?: No  Does patient have a history of HHC?: No  Does patient currently have ViViFi?: No    Patient Information Continued  Income Source: Pension/skilled nursing  Does patient have prescription coverage?: Yes  Within the past 12 months, you worried that your food would run out before you got the money to buy more : Never true  Within the past 12 months, the food you bought just didnt last and you didnt have money to get more : Never true  Food insecurity resource given?: N/A  Does patient receive dialysis treatments?: No  Does patient have a history of substance abuse?: No  Does patient have a history of Mental Health Diagnosis?: No    Means of Transportation  Means of Transport to Appts[de-identified] Drives Self  In the past 12 months, has lack of transportation kept you from medical appointments or from getting medications?: No  In the past 12 months, has lack of transportation kept you from meetings, work, or from getting things needed for daily living?: No  Was application for public transport provided?: No    DISCHARGE DETAILS:    Discharge planning discussed with[de-identified] patient  Freedom of Choice: Yes     CM contacted family/caregiver?: No- see comments (CM offered to contact pt's emergeny contact (his ex wife) however pt declined)     Treatment Team Recommendation: Home  Discharge Destination Plan[de-identified] Home  Transport at Discharge : Family      Additional Comments: CM met with pt at bedside to complete Open and address CM consult for "other"  At bedside pt was not altered and was able to easily participate in converstation  Pt expressed that he wants to go home and he feels he is ready to leave  Pt denies that he has ever felt "dizzy"  and he does not know why people keep saying that he was dizzy when he came in  Pt states to CM that he believes the whole issue occured because he bought a "medication" that would "help me grow because I am small" (pt gestured to his genitals) and pt states that he used several sprays of the "medication" and began having difficulty after taking it  Pt told CM that at this time he feels back to his base line

## 2022-03-17 VITALS
TEMPERATURE: 96.8 F | DIASTOLIC BLOOD PRESSURE: 78 MMHG | RESPIRATION RATE: 18 BRPM | SYSTOLIC BLOOD PRESSURE: 150 MMHG | BODY MASS INDEX: 30.1 KG/M2 | HEART RATE: 63 BPM | WEIGHT: 215 LBS | HEIGHT: 71 IN | OXYGEN SATURATION: 95 %

## 2022-03-17 PROBLEM — M62.82 RHABDOMYOLYSIS: Status: RESOLVED | Noted: 2022-03-16 | Resolved: 2022-03-17

## 2022-03-17 PROBLEM — N17.9 AKI (ACUTE KIDNEY INJURY) (HCC): Status: RESOLVED | Noted: 2022-03-16 | Resolved: 2022-03-17

## 2022-03-17 PROBLEM — E87.1 HYPONATREMIA: Status: RESOLVED | Noted: 2022-03-16 | Resolved: 2022-03-17

## 2022-03-17 PROBLEM — I47.10 SVT (SUPRAVENTRICULAR TACHYCARDIA) (HCC): Status: RESOLVED | Noted: 2022-03-16 | Resolved: 2022-03-17

## 2022-03-17 PROBLEM — I47.1 SVT (SUPRAVENTRICULAR TACHYCARDIA) (HCC): Status: RESOLVED | Noted: 2022-03-16 | Resolved: 2022-03-17

## 2022-03-17 LAB
ANION GAP SERPL CALCULATED.3IONS-SCNC: 8 MMOL/L (ref 4–13)
BASOPHILS # BLD AUTO: 0.03 THOUSANDS/ΜL (ref 0–0.1)
BASOPHILS NFR BLD AUTO: 1 % (ref 0–1)
BUN SERPL-MCNC: 36 MG/DL (ref 5–25)
CALCIUM SERPL-MCNC: 8.5 MG/DL (ref 8.3–10.1)
CHLORIDE SERPL-SCNC: 111 MMOL/L (ref 100–108)
CO2 SERPL-SCNC: 21 MMOL/L (ref 21–32)
CREAT SERPL-MCNC: 1 MG/DL (ref 0.6–1.3)
EOSINOPHIL # BLD AUTO: 0.26 THOUSAND/ΜL (ref 0–0.61)
EOSINOPHIL NFR BLD AUTO: 5 % (ref 0–6)
ERYTHROCYTE [DISTWIDTH] IN BLOOD BY AUTOMATED COUNT: 15 % (ref 11.6–15.1)
GFR SERPL CREATININE-BSD FRML MDRD: 75 ML/MIN/1.73SQ M
GLUCOSE SERPL-MCNC: 129 MG/DL (ref 65–140)
GLUCOSE SERPL-MCNC: 148 MG/DL (ref 65–140)
GLUCOSE SERPL-MCNC: 168 MG/DL (ref 65–140)
HCT VFR BLD AUTO: 35.2 % (ref 36.5–49.3)
HGB BLD-MCNC: 12 G/DL (ref 12–17)
IMM GRANULOCYTES # BLD AUTO: 0.02 THOUSAND/UL (ref 0–0.2)
IMM GRANULOCYTES NFR BLD AUTO: 0 % (ref 0–2)
LYMPHOCYTES # BLD AUTO: 1.3 THOUSANDS/ΜL (ref 0.6–4.47)
LYMPHOCYTES NFR BLD AUTO: 23 % (ref 14–44)
MAGNESIUM SERPL-MCNC: 2.6 MG/DL (ref 1.6–2.6)
MCH RBC QN AUTO: 28 PG (ref 26.8–34.3)
MCHC RBC AUTO-ENTMCNC: 34.1 G/DL (ref 31.4–37.4)
MCV RBC AUTO: 82 FL (ref 82–98)
MONOCYTES # BLD AUTO: 0.72 THOUSAND/ΜL (ref 0.17–1.22)
MONOCYTES NFR BLD AUTO: 13 % (ref 4–12)
NEUTROPHILS # BLD AUTO: 3.38 THOUSANDS/ΜL (ref 1.85–7.62)
NEUTS SEG NFR BLD AUTO: 58 % (ref 43–75)
NRBC BLD AUTO-RTO: 0 /100 WBCS
PHOSPHATE SERPL-MCNC: 3.5 MG/DL (ref 2.3–4.1)
PLATELET # BLD AUTO: 114 THOUSANDS/UL (ref 149–390)
PMV BLD AUTO: 13.2 FL (ref 8.9–12.7)
POTASSIUM SERPL-SCNC: 3 MMOL/L (ref 3.5–5.3)
RBC # BLD AUTO: 4.29 MILLION/UL (ref 3.88–5.62)
SODIUM SERPL-SCNC: 140 MMOL/L (ref 136–145)
WBC # BLD AUTO: 5.71 THOUSAND/UL (ref 4.31–10.16)

## 2022-03-17 PROCEDURE — 97129 THER IVNTJ 1ST 15 MIN: CPT

## 2022-03-17 PROCEDURE — 80048 BASIC METABOLIC PNL TOTAL CA: CPT

## 2022-03-17 PROCEDURE — 84100 ASSAY OF PHOSPHORUS: CPT

## 2022-03-17 PROCEDURE — 82948 REAGENT STRIP/BLOOD GLUCOSE: CPT

## 2022-03-17 PROCEDURE — 85025 COMPLETE CBC W/AUTO DIFF WBC: CPT

## 2022-03-17 PROCEDURE — 83735 ASSAY OF MAGNESIUM: CPT

## 2022-03-17 PROCEDURE — 99239 HOSP IP/OBS DSCHRG MGMT >30: CPT | Performed by: INTERNAL MEDICINE

## 2022-03-17 PROCEDURE — 97166 OT EVAL MOD COMPLEX 45 MIN: CPT

## 2022-03-17 RX ORDER — ASPIRIN 81 MG/1
81 TABLET, CHEWABLE ORAL DAILY
Qty: 30 TABLET | Refills: 0 | Status: SHIPPED | OUTPATIENT
Start: 2022-03-18 | End: 2022-04-17

## 2022-03-17 RX ORDER — ATORVASTATIN CALCIUM 40 MG/1
40 TABLET, FILM COATED ORAL
Qty: 30 TABLET | Refills: 0 | Status: SHIPPED | OUTPATIENT
Start: 2022-03-17 | End: 2022-04-16

## 2022-03-17 RX ORDER — POTASSIUM CHLORIDE 20 MEQ/1
40 TABLET, EXTENDED RELEASE ORAL
Status: DISCONTINUED | OUTPATIENT
Start: 2022-03-17 | End: 2022-03-17 | Stop reason: HOSPADM

## 2022-03-17 RX ORDER — POTASSIUM CHLORIDE 14.9 MG/ML
20 INJECTION INTRAVENOUS
Status: COMPLETED | OUTPATIENT
Start: 2022-03-17 | End: 2022-03-17

## 2022-03-17 RX ADMIN — POTASSIUM CHLORIDE 40 MEQ: 1500 TABLET, EXTENDED RELEASE ORAL at 12:24

## 2022-03-17 RX ADMIN — POTASSIUM CHLORIDE 20 MEQ: 14.9 INJECTION, SOLUTION INTRAVENOUS at 10:51

## 2022-03-17 RX ADMIN — ASPIRIN 81 MG CHEWABLE TABLET 81 MG: 81 TABLET CHEWABLE at 09:37

## 2022-03-17 RX ADMIN — POTASSIUM CHLORIDE 20 MEQ: 14.9 INJECTION, SOLUTION INTRAVENOUS at 12:28

## 2022-03-17 RX ADMIN — POTASSIUM CHLORIDE 40 MEQ: 1500 TABLET, EXTENDED RELEASE ORAL at 09:37

## 2022-03-17 NOTE — CASE MANAGEMENT
Case Management Discharge Planning Note    Patient name Eugenia Sauceda  Location [facility] 929/[facility] 069-46 MRN [id number]  : 1950 Date 3/17/2022       Current Admission Date: 3/15/2022  Current Admission Diagnosis:Impaired cognition   Patient Active Problem List    Diagnosis Date Noted    Impaired cognition 2022    History of transient ischemic attack (TIA) 2022    Diabetes ([address] ) 2022      LOS (days): 1  Geometric Mean LOS (GMLOS) (days):   Days to GMLOS:     OBJECTIVE:  Risk of Unplanned Readmission Score: 11         Current admission status: Inpatient   Preferred Pharmacy:   PATIENT/FAMILY REPORTS NO PREFERRED PHARMACY  No address on file      Primary Care Provider: No primary care provider on file  Primary Insurance: [employer]  Secondary Insurance:     DISCHARGE DETAILS:      DME Referral Provided  Referral made for DME?:  (Pt received SPC)      Treatment Team Recommendation: Home with  [facility]  Discharge Destination Plan[de-identified] Home,Other (OP PT)  Transport at Discharge : Other (Comment) (Shuttle)     Additional Comments: CM met with pt to discuss d/c plans  Pt refused VNA services but was open to OP PT  CM notified pt's resident via TT and provided pt the list of OP PT locations  CM requested shuttle ride for pt at d/c and pt was given [facility] at d/c

## 2022-03-17 NOTE — UTILIZATION REVIEW
Initial Clinical Review    Admission: Date/Time/Statement:   Admission Orders (From admission, onward)     Ordered        03/16/22 0244  Inpatient Admission  Once                      Orders Placed This Encounter   Procedures    Inpatient Admission     Standing Status:   Standing     Number of Occurrences:   1     Order Specific Question:   Level of Care     Answer:   Med Surg [16]     Order Specific Question:   Estimated length of stay     Answer:   More than 2 Midnights     Order Specific Question:   Certification     Answer:   I certify that inpatient services are medically necessary for this patient for a duration of greater than two midnights  See H&P and MD Progress Notes for additional information about the patient's course of treatment  ED Arrival Information     Expected Arrival Acuity    - 3/15/2022 20:14 Urgent         Means of arrival Escorted by Service Admission type    [facility] Urgent         Arrival complaint    Balance         Chief Complaint   Patient presents with    Gait Problem     Pt has been having problems with balance and walking  Pt reports seeing things that other people aren't seeing  Previous stroke 2012       Initial Presentation: 71year old male to the ED from home via EMS with complaints of difficulty walking, visual hallucinations  Fell 2 days prior,  Unable to get up  Admitted to inpatient  For hyponatremia, NORMA, rhabdomyolysis, SVT, impaired cognition  Found by his friend  CT head neg for acute abnormality  Arrives with GCS 15  Unclear as to why or how he fell  Has elevated CK  IV fluid bolus x 2 liters in the ED  States he took OTC medication called Dragon for erectile dysfunction and that's when his visual hallucinations started which have since resolved   Creat 1 51, bun55 likely in setting of rhabdo  Continue to monitor  While in the ED, he goes into SVT with heartrate 160  Given IV adenosine and converted to NSR  Cardiology consult  Check ECHO  Continue with tele  Tangential speech  Cardiology consult:  Not interested in medical therapy for svt at this time  Difficulty to determine cause of svt at this time  CUrrently in NSR  No BB due to concern for medication compliance  He does not want medications regularly  Date: 3/17   Day 2:   CK improving  Heart rate stable and WNL          ED Triage Vitals   Temperature Pulse Respirations Blood Pressure SpO2   03/15/22 2022 03/15/22 2022 03/15/22 2022 03/15/22 2022 03/15/22 2022   98 5 °F (36 9 °C) 89 19 158/90 96 %      Temp Source Heart Rate Source Patient Position - Orthostatic VS BP Location FiO2 (%)   03/15/22 2022 03/15/22 2315 03/15/22 2315 03/15/22 2345 --   Oral Monitor Lying Right arm       Pain Score       03/15/22 2022       No Pain          Wt Readings from Last 1 Encounters:   03/16/22 97 5 kg (215 lb)     Additional Vital Signs:   Date/Time Temp Pulse Resp BP MAP (mmHg) SpO2 O2 Device Patient Position - Orthostatic VS   03/17/22 11:16:53 -- 63 18 150/78 102 95 % -- --   03/17/22 07:16:05 96 8 °F (36 °C) Abnormal  57 18 148/80 103 97 % -- --   03/17/22 02:53:17 98 °F (36 7 °C) 65 16 127/78 94 94 % -- --   03/16/22 22:22:27 97 4 °F (36 3 °C) Abnormal  80 16 137/78 98 95 % -- --   03/16/22 19:19:54 98 1 °F (36 7 °C) 63 16 141/67 92 93 % -- --   03/16/22 16:51:48 -- 74 16 136/76 96 96 % -- --   03/16/22 1604 -- 59 19 153/71 102 97 % None (Room air) Lying   03/16/22 1431 -- 58 20 144/72 -- 97 % None (Room air) Lying   03/16/22 1331 -- 62 21 144/72 103 96 % None (Room air) Lying   03/16/22 1324 -- 62 20 -- -- 96 % -- --   03/16/22 1209 -- 60 18 141/68 98 98 % None (Room air) Lying   03/16/22 1052 -- 56 17 145/69 99 98 % None (Room air) Lying   03/16/22 0837 -- 66 29 Abnormal  155/74 -- 97 % None (Room air) Lying   03/16/22 0833 -- 58 19 -- -- 96 % -- --   03/16/22 0630 -- 66 19 -- -- 95 % -- --   03/16/22 0530 -- 72 20 155/74 106 96 % -- --   03/16/22 0345 -- 68 22 130/60 86 96 % None (Room air) Lying   03/16/22 0245 -- 64 21 135/72 98 96 % -- --   03/16/22 0115 -- 82 25 Abnormal  147/79 107 95 % -- --   03/16/22 0035 -- 88 -- -- -- -- -- --   03/16/22 0015 -- 84 22 159/75 108 98 % -- --   03/15/22 2345 -- 150 Abnormal  14 140/77 102 98 % None (Room air) Lying   03/15/22 2315 -- 158 Abnormal  26 Abnormal  129/76 97 99 % None (Room air) Lying   03/15/22 2306 -- 164 Abnormal  28 Abnormal  127/74 95 -- -- --   03/15/22 2300 -- 164 Abnormal  23 Abnormal  99/66 77 98 % None (Room air) --   03/15/22 2259 -- 164 Abnormal  19 108/58 73 -- -- --   03/15/22 2022 98 5 °F (36 9 °C) 89 19 158/90 -- 96 % None (Room air)        Pertinent Labs/Diagnostic Test Results:   3/16 ECHO:Left Ventricle: Left ventricular cavity size is normal  Wall thickness is normal  The left ventricular ejection fraction is 55%  Systolic function is normal  Wall motion is normal  Diastolic function is mildly abnormal, consistent with grade I (abnormal) relaxation    Aortic Valve: There is mild to moderate regurgitation with an eccentrically directed jet    Tricuspid Valve: There is mild regurgitation    Pulmonic Valve: There is mild regurgitation    Aorta: The aortic root is mildly dilated  4 3cm The ascending aorta is mildly dilated  4 2cm   3/16 EKG: Normal sinus rhythm  Left anterior fascicular block  Moderate voltage criteria for LVH, may be normal variant  Abnormal ECG  When compared with ECG of 16-MAR-2022 00:13, (unconfirmed)  Premature ventricular complexes are no longer Present    CT head without contrast   Final Result by Kamille Garcia MD (03/16 0104)      No acute intracranial hemorrhage  Chronic appearing findings, as described above  Please see discussion  Clinical correlation is recommended  If further evaluation is indicated, MRI with diffusion-weighted imaging could be performed, if there are no    contraindications                    Workstation performed: [id number]         CT cervical spine without contrast   Final Result by Karen Thomas MD (03/16 0134)      No acute cervical spine fracture or traumatic malalignment  Workstation performed: [id number]         CT chest abdomen pelvis w contrast   Final Result by Karen Thomas MD (03/16 0208)      The prostate is enlarged, measuring approximately 6 2 cm transverse dimension  The prostate is somewhat heterogeneous and contains calcification  Clinical correlation, laboratory correlation and follow-up is recommended  The urinary bladder is distended, extending approximately 2 cm below the level of the umbilicus  Clinical correlation regarding the possibility of voiding difficulty is recommended  There is some material containing gas bubbles layering within the posterior aspect of the right mainstem bronchus, possibly representing secretions or aspiration  The lungs are well expanded  There is no evidence of focal consolidation  There is no    pneumothorax  There is some posterior abdominal and pelvic body wall edema, most notably overlying the mid to lower right flank  Clinical correlation is recommended  Atherosclerosis  Coronary artery disease  Cholelithiasis without CT evidence of acute cholecystitis              Workstation performed: [id number]               Results from last 7 days   Lab Units 03/17/22  0514 03/15/22  2217   WBC Thousand/uL 5 71 10 12   HEMOGLOBIN g/dL 12 0 14 2   HEMATOCRIT % 35 2* 40 9   PLATELETS Thousands/uL 114* 105*   NEUTROS ABS Thousands/µL 3 38 7 82*         Results from last 7 days   Lab Units 03/17/22  0514 03/16/22  0542 03/15/22  2353   SODIUM mmol/L 140 136 133*   POTASSIUM mmol/L 3 0* 5 1 4 0   CHLORIDE mmol/L 111* 109* 103   CO2 mmol/L 21 20* 20*   ANION GAP mmol/L 8 7 10   BUN mg/dL 36* 40* 55*   CREATININE mg/dL 1 00 1 16 1 51*   EGFR ml/min/1 73sq m 75 63 45   CALCIUM mg/dL 8 5 8 7 9 1   MAGNESIUM mg/dL 2 6 2 7*  --    PHOSPHORUS mg/dL 3 5 4 5*  --      Results from last 7 days   Lab Units 03/15/22  2353 03/15/22  2305   AST U/L 119*  --    ALT U/L 66  --    ALK PHOS U/L 75  --    TOTAL PROTEIN g/dL 7 4  --    ALBUMIN g/dL 3 1*  --    TOTAL BILIRUBIN mg/dL 0 79  --    AMMONIA umol/L  --  <10*     Results from last 7 days   Lab Units 03/17/22  1118 03/17/22  0722 03/16/22  2056 03/16/22  1755 03/15/22  2136   POC GLUCOSE mg/dl 168* 129 181* 138 186*     Results from last 7 days   Lab Units 03/17/22  0514 03/16/22  0542 03/15/22  2353   GLUCOSE RANDOM mg/dL 148* 157* 187*         Results from last 7 days   Lab Units 03/15/22  2217   HEMOGLOBIN A1C % 7 5*   EAG mg/dl 169       Results from last 7 days   Lab Units 03/16/22  0542 03/16/22  0058 03/16/22  0043   CK TOTAL U/L 2,081*  --  3,039*   CK MB INDEX % <1 0 <1 0  --    CK MB ng/mL 17 4* 22 0*  --      Results from last 7 days   Lab Units 03/16/22  0141 03/16/22  0019 03/15/22  2311   HS TNI 0HR ng/L  --   --  26   HS TNI 2HR ng/L  --  34  --    HSTNI D2 ng/L  --  8  --    HS TNI 4HR ng/L 27  --   --    HSTNI D4 ng/L 1  --   --          Results from last 7 days   Lab Units 03/15/22  2305   PROTIME seconds 15 5*   INR  1 28*   PTT seconds 27     Results from last 7 days   Lab Units 03/16/22  0043   TSH 3RD GENERATON uIU/mL 2 170         Results from last 7 days   Lab Units 03/15/22  2251   LACTIC ACID mmol/L 1 6         Results from last 7 days   Lab Units 03/15/22  2144   CLARITY UA  Clear   COLOR UA  Lian   SPEC GRAV UA  >=1 030   PH UA  5 5   GLUCOSE UA mg/dl Negative   KETONES UA mg/dl 15 (1+)*   BLOOD UA  Trace*   PROTEIN UA mg/dl 100 (2+)*   NITRITE UA  Negative   BILIRUBIN UA  Interference- unable to analyze*   UROBILINOGEN UA E U /dl 0 2   LEUKOCYTES UA  Negative   WBC UA /hpf None Seen   RBC UA /hpf None Seen   BACTERIA UA /hpf None Seen   EPITHELIAL CELLS WET PREP /hpf Occasional             Results from last 7 days   Lab Units 03/15/22  2216   AMPH/METH  Negative   BARBITURATE UR  Negative BENZODIAZEPINE UR  Negative   COCAINE UR  Negative   METHADONE URINE  Negative   OPIATE UR  Negative   PCP UR  Negative   THC UR  Positive*     Results from last 7 days   Lab Units 03/15/22  2224 03/15/22  2223   ETHANOL LVL mg/dL <3  --    ACETAMINOPHEN LVL ug/mL  --  <2*   SALICYLATE LVL mg/dL  --  3     ED Treatment:   Medication Administration from 03/15/2022 2014 to 03/16/2022 1629       Date/Time Order Dose Route Comments     03/16/2022 0014 sodium chloride 0 9 % bolus 1,000 mL 0 mL Intravenous      03/15/2022 2306 sodium chloride 0 9 % bolus 1,000 mL 1,000 mL Intravenous      03/16/2022 0000 sodium chloride 0 9 % bolus 1,000 mL 0 mL Intravenous      03/15/2022 2312 sodium chloride 0 9 % bolus 1,000 mL 1,000 mL Intravenous      03/16/2022 0006 adenosine (ADENOCARD) injection 6 mg 6 mg Intravenous      03/16/2022 0010 adenosine (ADENOCARD) 6 mg/2 mL injection **ADS Override Pull** 12 mg       03/16/2022 1330 heparin (porcine) subcutaneous injection 5,000 Units 5,000 Units Subcutaneous      03/16/2022 0534 heparin (porcine) subcutaneous injection 5,000 Units 5,000 Units Subcutaneous      03/16/2022 0837 aspirin chewable tablet 81 mg 81 mg Oral      03/16/2022 1556 atorvastatin (LIPITOR) tablet 40 mg 40 mg Oral      03/16/2022 1556 insulin lispro (HumaLOG) 100 units/mL subcutaneous injection 1-6 Units 1 Units Subcutaneous      03/16/2022 1004 insulin lispro (HumaLOG) 100 units/mL subcutaneous injection 1-6 Units 1 Units Subcutaneous             Admitting Diagnosis: Hallucinations [R44 3]  SVT (supraventricular tachycardia) (HCC) [I47 1]  Balance problem [R26 89]  Ambulatory dysfunction [R26 2]  Age/Sex: 71 y o  male  Admission Orders:  Tele  I/O  SCDs  Scheduled Medications:  aspirin, 81 mg, Oral, Daily  atorvastatin, 40 mg, Oral, Daily With Dinner  heparin (porcine), 5,000 Units, Subcutaneous, Q8H JOSÉ MIGUEL  insulin lispro, 1-6 Units, Subcutaneous, TID AC  potassium chloride, 40 mEq, Oral, TID With Meals  potassium chloride, 20 mEq, Intravenous, Q2H      Continuous IV Infusions:     PRN Meds:       IP CONSULT TO CARDIOLOGY  IP CONSULT TO CASE MANAGEMENT    Network Utilization Review Department  ATTENTION: Please call with any questions or concerns to [phone] and carefully listen to the prompts so that you are directed to the right person  All voicemails are confidential   Nina Vela all requests for admission clinical reviews, approved or denied determinations and any other requests to dedicated fax number below belonging to the campus where the patient is receiving treatment   List of dedicated fax numbers for the Facilities:  [address] DENIALS (Administrative/Medical Necessity) [phone]   [address] (Maternity/NICU/Pediatrics) [phone] [address]  [address] Medical [place] [address] [address] [place] [address] [address] [facility] [address] [facility] [phone]

## 2022-03-17 NOTE — DISCHARGE SUMMARY
INTERNAL MEDICINE RESIDENCY DISCHARGE SUMMARY     Renan Sunshine   71 y o  male  MRN: [id number]  Room/Bed: [place] [address]/[facility] [address] [place] [address]   Encounter: [id number]    Principal Problem:    SVT (supraventricular tachycardia) ([facility] )  Active Problems:    Impaired cognition    NORMA (acute kidney injury) ([facility] )    Rhabdomyolysis    History of transient ischemic attack (TIA)    Diabetes ([facility] )    Hyponatremia      [address]     Patient presented to the ED after being found down at home for two days  The patient could not clearly remember the events leading up to or after the fall but did endorse visual hallucinations of squirrels while he was down  He stated that he was unable to get up because he felt too weak  In the ED, he was found to have SVT and was given adenosine 6 mg followed by 12 mg and returned to NSR  Patient also had an NORMA secondary to rhabdomyolysis after his fall  He received IV NS in the ED and his labs trended downward since admission  Cardiology assessed the patient and he medical therapy for SVT but agreed to following up with cardiology out-patient and wearing a cardiac monitor upon follow-up in the out-patient setting  PT evaluated patient and recommended he be discharged to home with family help and encouraged him to stay with his brother  The patient stated that he was able to move "fine" and did not need help at home  The risks of refusing treatment, including death, were explained to the patient multiple times by the medical team and the patient stated that he understood  He was instructed to follow up with his PCP or follow up with [address] [place] [address] outpatient for his condition  The patient does not have a pharmacy to send his prescriptions to and prefers them printed out and handed to him  Scripts for aspirin and atorvastatin were printed, signed, and handed to the patient   Plan was discussed with patient and patient reiterated that he understood and would follow-up, make appointments and see Cardiology and his PCP/ [facility]  The risks involved in not following up, including death, were explained to the patient by the medical team and the patient states that he understood  Patient was agreeable to plan  All questions answered  Pt medically stable for discharge  Physical Exam  Vitals and nursing note reviewed  Constitutional:       General: He is not in acute distress  Appearance: Normal appearance  Cardiovascular:      Rate and Rhythm: Normal rate and regular rhythm  Pulses: Normal pulses  Heart sounds: Normal heart sounds  Pulmonary:      Effort: Pulmonary effort is normal  No respiratory distress  Breath sounds: Normal breath sounds  Abdominal:      General: Bowel sounds are normal       Palpations: Abdomen is soft  Tenderness: There is no abdominal tenderness  Musculoskeletal:         General: Normal range of motion  Skin:     General: Skin is warm and dry  Capillary Refill: Capillary refill takes less than 2 seconds  Neurological:      General: No focal deficit present  Mental Status: He is alert and oriented to person, place, and time     Psychiatric:         Mood and Affect: Mood normal          Behavior: Behavior normal        DISCHARGE INFORMATION     PCP at Discharge: No PCP on file    Admitting Provider: Omid Mcclendon MD  Admission Date: 3/15/2022    Discharge Provider: Nathaniel Roberts DO  Discharge Date: 3/17/2022     Discharge Disposition: Final discharge disposition not confirmed  Discharge Condition: good  Discharge with Lines: no    Discharge Diet: diabetic diet  Activity Restrictions: none  Test Results Pending at Discharge: None    Discharge Diagnoses:  Principal Problem:    SVT (supraventricular tachycardia) ([facility] )  Active Problems:    Impaired cognition    NORMA (acute kidney injury) [facility] )    Rhabdomyolysis    History of transient ischemic attack (TIA) Diabetes ([facility] )    Hyponatremia  Resolved Problems:    * No resolved hospital problems  *      Consulting Providers: Cardiology      Diagnostic & Therapeutic Procedures Performed:  CT head without contrast    Result Date: 3/16/2022  Impression: No acute intracranial hemorrhage  Chronic appearing findings, as described above  Please see discussion  Clinical correlation is recommended  If further evaluation is indicated, MRI with diffusion-weighted imaging could be performed, if there are no contraindications  Workstation performed: [id number]     CT cervical spine without contrast    Result Date: 3/16/2022  Impression: No acute cervical spine fracture or traumatic malalignment  Workstation performed: [id number]     CT chest abdomen pelvis w contrast    Result Date: 3/16/2022  Impression: The prostate is enlarged, measuring approximately 6 2 cm transverse dimension  The prostate is somewhat heterogeneous and contains calcification  Clinical correlation, laboratory correlation and follow-up is recommended  The urinary bladder is distended, extending approximately 2 cm below the level of the umbilicus  Clinical correlation regarding the possibility of voiding difficulty is recommended  There is some material containing gas bubbles layering within the posterior aspect of the right mainstem bronchus, possibly representing secretions or aspiration  The lungs are well expanded  There is no evidence of focal consolidation  There is no pneumothorax  There is some posterior abdominal and pelvic body wall edema, most notably overlying the mid to lower right flank  Clinical correlation is recommended  Atherosclerosis  Coronary artery disease  Cholelithiasis without CT evidence of acute cholecystitis  Workstation performed: [id number]       Code Status: Level 1 - Full Code  Advance Directive & Living Will: <no information>  Power of :    POLST:        Allergies:   Allergies   Allergen Reactions    Penicillins Hives       FOLLOW-UP     PCP Outpatient Follow-up:  yes      Follow up within next: week    Consulting Providers Follow-up:  yes      Physician name: Bandar Sykes MD  Specialty: Cardiology  Follow up within next: 1-2 weeks     Active Issues Requiring Follow-up:   yes     Diabetes Mellitus Type 2, Supraventricular tachycardia, HTN, history of TIA and Infarct       Discharge Statement:   I spent 45 minutes minutes discharging the patient  This time was spent on the day of discharge  I had direct contact with the patient on the day of discharge  Additional documentation is required if more than 30 minutes were spent on discharge  Portions of the record may have been created with voice recognition software  Occasional wrong word or "sound a like" substitutions may have occurred due to the inherent limitations of voice recognition software    Read the chart carefully and recognize, using context, where substitutions have occurred     ==  [address]   Internal Medicine Resident PGY-1

## 2022-03-17 NOTE — PLAN OF CARE
Problem: OCCUPATIONAL THERAPY ADULT  Goal: Performs self-care activities at highest level of function for planned discharge setting  See evaluation for individualized goals  Description: Treatment Interventions: Cognitive reorientation          See flowsheet documentation for full assessment, interventions and recommendations  Note:       Assessment: Pt is a 71 y o  male admitted to [facility] on 3/15/2022 w/ SVT, hallucinations  Pt reports hallucinations are currently resolved  PMH includes NORMA, TIA, rhabdomyolysis, diabetes  Pt with active OT orders  Pt seen as a co-evaluation with PT due to the patient's co-morbidities, clinically unstable presentation/clinical complexity, and present impairments  As per pt report, pta, resides in multi-story home that was converted into apartments  Pt lives on 2nd floor - has 2STE + FF to his room  Pt reports being I with ADLs/IADLs, fnxl mobility PTA  Upon evaluation, pt currently requires S for transfers and mobility  Pt currently requires I eating, I grooming, I UB ADLs, S LB ADLs, and S toileting  Pt reports supportive brother and KAI who can assist if needed, states he believes brother will stop by to check in on him  Pt also reports supportive neighbors/other tennants of the building who can assist if needed as well  From OT standpoint, recommendation would be home with increased social support and OPOT for cognition  Pt to benefit from 1 additional OT tx session to administer cognitive assessment  The patient's raw score on the AM-PAC Daily Activity inpatient short form is 21, standardized score is 44 27, greater than 39 4  Patients at this level are likely to benefit from discharge to home  Please refer to the recommendation of the Occupational Therapist for safe discharge planning       OT Discharge Recommendation: No rehabilitation needs (home with increased social support)  OT - OK to Discharge: Yes (when medically stable )

## 2022-03-17 NOTE — OCCUPATIONAL THERAPY NOTE
Occupational Therapy Evaluation     Patient Name: Cherie Samano  Today's Date: 3/17/2022  Problem List  Active Problems:    Impaired cognition    History of transient ischemic attack (TIA)    Diabetes ([facility] )    Past Medical History  No past medical history on file  Past Surgical History  No past surgical history on file         03/17/22 1104   OT Last Visit   OT Visit Date 03/17/22   Note Type   Note type Evaluation   Restrictions/Precautions   Weight Bearing Precautions Per Order No   Other Precautions Multiple lines; Fall Risk   Pain Assessment   Pain Assessment Tool 0-10   Pain Score No Pain   Home Living   Type of Home House   Home Layout Multi-level;Bed/bath upstairs; Performs ADLs on one level  (2STE + FF to pt's living area)   Bathroom Shower/Tub Tub/shower unit   [address]   (denies)   Additional Comments Pt reports living in home that was converted into apts  Pt's apt is located on the 2nd flr   Prior Function   Level of [place] Independent with ADLs and functional mobility   Lives With Alone   Receives Help From Family;Friend(s); [address] [employer] in the last 6 months 1 to 4  (2 per pt report)   Vocational Retired   Comments Pt reports supportive, local brother and KAI   Also reports he is friends with other tennants of the building he lives in who can assist if needed   Lifestyle   Autonomy PTA, pt reports being I with ADLs/IADLs, fnxl mobility, (+)    Reciprocal Relationships Family, friends, neighbors   Service to Others Retired   [address] [employer]   Psychosocial   Psychosocial (WDL) WDL   ADL   Where Assessed Chair   Eating Assistance 7  Independent   Grooming Assistance 7  Independent   UB Bathing Assistance 7  Independent   LB Bathing Assistance 5  [address]  7  Independent   LB Dressing Assistance 5  [address]  5 Supervision/Setup   Bed Mobility   Supine to Sit Unable to assess   Sit to Supine Unable to assess   Additional Comments Pt seated OOB in chair upon arrival    Transfers   Sit to Stand 5  Supervision   Stand to Sit 5  Supervision   Additional Comments Transfers w/o AD   Functional Mobility   Functional Mobility 5  Supervision   Additional Comments Ax1  1 LOB, self-corrected   Balance   Static Sitting Good   Dynamic Sitting Fair +   Static Standing Fair   Dynamic Standing Fair -   Ambulatory Poor +   Activity Tolerance   Activity Tolerance Patient tolerated treatment well   Nurse Made Aware RN clearance for session    RUE Assessment   RUE Assessment WFL   LUE Assessment   LUE Assessment WFL   Hand Function   Gross Motor Coordination Functional   Fine Motor Coordination Functional   Vision - Complex Assessment   Ocular Range of Motion WFL   Head Position WDL   Tracking Able to track stimulus in all quads without difficulty   Additional Comments Per notes, pt was experiencing visual hallucinations PTA  Pt reports currently resolved    Cognition   Overall Cognitive Status WFL   Arousal/Participation Responsive; Cooperative   Attention Attends with cues to redirect   Orientation Level Oriented X4   Memory Within functional limits   Following Commands Follows one step commands without difficulty   Comments Pt pleasant and cooperative t/o session  Oriented, appropriately answered questions  Assessment   Assessment Pt is a 71 y o  male admitted to [facility] on 3/15/2022 w/ SVT, hallucinations  Pt reports hallucinations are currently resolved  PMH includes NORMA, TIA, rhabdomyolysis, diabetes  Pt with active OT orders  Pt seen as a co-evaluation with PT due to the patient's co-morbidities, clinically unstable presentation/clinical complexity, and present impairments  As per pt report, pta, resides in multi-story home that was converted into apartments  Pt lives on 2nd floor - has 2STE + FF to his room   Pt reports being I with ADLs/IADLs, fnxl mobility PTA  Upon evaluation, pt currently requires S for transfers and mobility  Pt currently requires I eating, I grooming, I UB ADLs, S LB ADLs, and S toileting  Pt reports supportive brother and KAI who can assist if needed, states he believes brother will stop by to check in on him  Pt also reports supportive neighbors/other tennants of the building who can assist if needed as well  From OT standpoint, recommendation would be home with increased social support  No further acute OT needs  D/C OT  Please re-consult if needed  Thank you  Pt was left after session with all current needs met  The patient's raw score on the AM-PAC Daily Activity inpatient short form is 21, standardized score is 44 27, greater than 39 4  Patients at this level are likely to benefit from discharge to home  Please refer to the recommendation of the Occupational Therapist for safe discharge planning     Goals   Patient Goals to shower   Plan   OT Frequency Eval only   Recommendation   OT Discharge Recommendation No rehabilitation needs  (home with increased social support)   OT - OK to Discharge Yes  (when medically stable )   AM-PAC Daily Activity Inpatient   Lower Body Dressing 3   Bathing 3   Toileting 3   Upper Body Dressing 4   Grooming 4   Eating 4   Daily Activity Raw Score 21   Daily Activity Standardized Score (Calc for Raw Score >=11) 44 27   AM-PAC Applied Cognition Inpatient   Following a Speech/Presentation 3   Understanding Ordinary Conversation 4   Taking Medications 4   Remembering Where Things Are Placed or Put Away 4   Remembering List of 4-5 Errands 4   Taking Care of Complicated Tasks 3   Applied Cognition Raw Score 22   Applied Cognition Standardized Score 47 83     Krissy Haskins MS, OTR/L

## 2022-03-17 NOTE — OCCUPATIONAL THERAPY NOTE
Occupational Therapy Evaluation and Tx Session      Patient Name: Nicole Jeffery  BJJPV'P Date: 3/17/2022  Problem List  Active Problems:    Impaired cognition    History of transient ischemic attack (TIA)    Diabetes ([facility] )    Past Medical History  No past medical history on file  Past Surgical History  No past surgical history on file         03/17/22 1104   OT Last Visit   OT Visit Date 03/17/22   Note Type   Note type Evaluation   Restrictions/Precautions   Weight Bearing Precautions Per Order No   Other Precautions Multiple lines; Fall Risk   Pain Assessment   Pain Assessment Tool 0-10   Pain Score No Pain   Home Living   Type of Home House   Home Layout Multi-level;Bed/bath upstairs; Performs ADLs on one level  (2STE + FF to pt's living area)   Bathroom Shower/Tub Tub/shower unit   [address]   (denies)   Additional Comments Pt reports living in home that was converted into apts  Pt's apt is located on the 2nd flr   Prior Function   Level of [place] Independent with ADLs and functional mobility   Lives With Alone   Receives Help From Family;Friend(s); [address] [employer] [address] in the last 6 months 1 to 4  (2 per pt report)   Vocational Retired   Comments Pt reports supportive, local brother and KAI   Also reports he is friends with other tennants of the building he lives in who can assist if needed   Lifestyle   Autonomy PTA, pt reports being I with ADLs/IADLs, fnxl mobility, (+)    Reciprocal Relationships Family, friends, neighbors   Service to Others Retired   [address]   Psychosocial   Psychosocial (WDL) WDL   ADL   Where Assessed Chair   Eating Assistance 7  Independent   Grooming Assistance 7  Independent   UB Bathing Assistance 7  [address] [address]  7  [address] [address]  [phone]  Supervision/Setup   Bed Mobility   Supine to Sit Unable to assess   Sit to Supine Unable to assess   Additional Comments Pt seated OOB in chair upon arrival    Transfers   Sit to Stand 5  Supervision   Stand to Sit 5  Supervision   Additional Comments Transfers w/o AD   Functional Mobility   Functional Mobility 5  Supervision   Additional Comments Ax1  1 LOB, self-corrected   Balance   Static Sitting Good   Dynamic Sitting Fair +   Static Standing Fair   Dynamic Standing Fair -   Ambulatory Poor +   Activity Tolerance   Activity Tolerance Patient tolerated treatment well   Nurse Made Aware RN clearance for session    RUE Assessment   RUE Assessment WFL   LUE Assessment   LUE Assessment WFL   Hand Function   Gross Motor Coordination Functional   Fine Motor Coordination Functional   Vision - Complex Assessment   Ocular Range of Motion WFL   Head Position WDL   Tracking Able to track stimulus in all quads without difficulty   Additional Comments Per notes, pt was experiencing visual hallucinations PTA  Pt reports currently resolved    Cognition   Overall Cognitive Status WFL   Arousal/Participation Responsive; Cooperative   Attention Attends with cues to redirect   Orientation Level Oriented X4   Memory Within functional limits   Following Commands Follows one step commands without difficulty   Comments Pt pleasant and cooperative t/o session  Oriented, appropriately answered questions  Does demonstrate decreased processing speed at times  Assessment   Assessment Pt is a 71 y o  male [facility] w/ SVT, hallucinations  Pt reports hallucinations are currently resolved  PMH includes NORMA, TIA, rhabdomyolysis, diabetes  Pt with active OT orders  Pt seen as a co-evaluation with PT due to the patient's co-morbidities, clinically unstable presentation/clinical complexity, and present impairments  As per pt report, pta, resides in multi-story home that was converted into apartments   Pt lives on 2nd floor - has 2STE + FF to his room  Pt reports being I with ADLs/IADLs, fnxl mobility PTA  Upon evaluation, pt currently requires S for transfers and mobility  Pt currently requires I eating, I grooming, I UB ADLs, S LB ADLs, and S toileting  Pt reports supportive brother and KAI who can assist if needed, states he believes brother will stop by to check in on him  Pt also reports supportive neighbors/other tennants of the building who can assist if needed as well  From OT standpoint, recommendation would be home with increased social support and OPOT for cognition  Pt to benefit from 1 additional OT tx session to administer cognitive assessment  The patient's raw score on the AM-PAC Daily Activity inpatient short form is 21, standardized score is 44 27, greater than 39 4  Patients at this level are likely to benefit from discharge to home  Please refer to the recommendation of the Occupational Therapist for safe discharge planning  Goals   Patient Goals to shower   STG Time Frame 1-3   Plan   Treatment Interventions Cognitive reorientation   Goal Expiration Date 03/17/22   OT Treatment Day 1   OT Frequency Eval only  (+ 1 follow-up tx)   Additional Treatment Session   Start Time 1521   End Time 1440   Treatment Assessment Pt seen for follow up OT tx session to [facility] Cognitive Assessment  Pt scored 22/30 indicating a mild cognitive impairment  Please see further details regarding score below  Recommend pt follow up with OPOT upon d/c to further address cognition  Pt reports supportive family and roommates who can check in on him daily and assist PRN  No further acute OT needs  D/C OT  Please re-consult if needed  Thank you  Pt was left after session with all current needs met      Additional Treatment Day 1   Recommendation   OT Discharge Recommendation No rehabilitation needs  (home with increased social support)   OT - OK to Discharge Yes  (when medically stable )   AM-PAC Daily Activity Inpatient   Lower Body Dressing 3   Bathing 3   Toileting 3   Upper Body Dressing 4   Grooming 4   Eating 4   Daily Activity Raw Score 21   Daily Activity Standardized Score (Calc for Raw Score >=11) 44 27   AM-PAC Applied Cognition Inpatient   Following a Speech/Presentation 3   Understanding Ordinary Conversation 4   Taking Medications 4   Remembering Where Things Are Placed or Put Away 4   Remembering List of 4-5 Errands 4   Taking Care of Complicated Tasks 3   Applied Cognition Raw Score 22   Applied Cognition Standardized Score 47 83     GOALS    - Pt will engage in ongoing cognitive assessment w/ G participation to assist w/ safe d/c planning/recommendations      MOCA    PT SEEN FOR BRITTNEY COGNITIVE ASSESSMENT  SCORED  20/32 INDICATING MILD COGNITIVE IMPAIRMENT FOR AGE/EDUCATION  SCORES ARE AS FOLLOWS:    VISUOSPATIAL/EXECUTIVE FUNCTION: 4/5  Pt was able to complete trail  Pt was able to copy cube  Pt was able to draw a clock, accurately place the numbers, but unable properly place the hands at ten past eleven (set to 11:05)  NAMING: 3/3  Pt able to name 3/3 animals  ATTENTION: 5/6  Pt was able to repeat sequence of numbers forwards and backwards  Pt able to attend to sequence of letters  Pt was able to correctly subtract 7 from 100 2/5 times  LANGUAGE: 1/3  Pt was able to repeat 1/2 sentences back to therapist  Pt was able to produce 4 words starting with the letter F in 1 minute  ABSTRACTION: 0/2  Pt was unable to identify the similarity of two items x2 trials  DELAYED RECALL: 4/5  Pt recalled 4/5 words without cues  Pt recalled 0/5 words with category cue  Pt recalled 1/5 words with multiple choice options  ORIENTATION: 5/6  Pt is oriented to date, month, year, place and city  States day is Thursday                 Sophia Marks MS, OTR/L

## 2022-03-23 NOTE — UTILIZATION REVIEW
Active EMPIRE plan for admission date 03/15/2022      Inpatient Admission Authorization Request   NOTIFICATION OF INPATIENT ADMISSION/INPATIENT AUTHORIZATION REQUEST   SERVICING FACILITY:   [address] [place] [address]  Address: [address] [place] [address], [address] [place] [address]  Tax ID: [id number]  NPI: [id number]  Place of Service: Inpatient [address] [facility] Code: 24     ATTENDING PROVIDER:  Attending Name and NPI#: Alden Karly [[id number]]  Address: [address] [place] [address], [address] [place] [address]  Phone: [phone]     UTILIZATION REVIEW CONTACT:  Honey Hearn, Utilization   Network Utilization Review Department  Phone: [phone]  Fax: [phone]  Email: [email or web address]     PHYSICIAN ADVISORY SERVICES:  FOR QLHA-RN-SXET REVIEW - MEDICAL NECESSITY DENIAL  Phone: [phone]  Fax: [phone]  Email: [email or web address]     TYPE OF REQUEST:  Inpatient Status     ADMISSION INFORMATION:  ADMISSION DATE/TIME: 3/16/22  2:44 AM  PATIENT DIAGNOSIS CODE/DESCRIPTION:  Hallucinations [R44 3]  SVT (supraventricular tachycardia) (HCC) [I47 1]  Balance problem [R26 89]  Ambulatory dysfunction [R26 2]  DISCHARGE DATE/TIME: 3/17/2022  5:55 PM   IMPORTANT INFORMATION:  Please contact the [facility] directly with any questions or concerns regarding this request  Department voicemails are confidential     Send requests for admission clinical reviews, concurrent reviews, approvals, and administrative denials due to lack of clinical to fax [phone]                               Initial Clinical Review    Admission: Date/Time/Statement:   Admission Orders (From admission, onward)     Ordered        03/16/22 0244  Inpatient Admission  Once                      Orders Placed This Encounter   Procedures    Inpatient Admission     Standing Status:   Standing     Number of Occurrences:   1     Order Specific Question:   Level of Care     Answer:   Med Surg [16] Order Specific Question:   Estimated length of stay     Answer:   More than 2 Midnights     Order Specific Question:   Certification     Answer:   I certify that inpatient services are medically necessary for this patient for a duration of greater than two midnights  See H&P and MD Progress Notes for additional information about the patient's course of treatment  ED Arrival Information     Expected Arrival Acuity    - 3/15/2022 20:14 Urgent         Means of arrival Escorted by Service Admission type    Ambulance [facility] SOD[facility] Medicine Urgent         Arrival complaint    Balance         Chief Complaint   Patient presents with    Gait Problem     Pt has been having problems with balance and walking  Pt reports seeing things that other people aren't seeing  Previous stroke 2012       Initial Presentation: 71year old male to the ED from home via EMS with complaints of difficulty walking, visual hallucinations  Fell 2 days prior,  Unable to get up  Admitted to inpatient  For hyponatremia, NORMA, rhabdomyolysis, SVT, impaired cognition  Found by his friend  CT head neg for acute abnormality  Arrives with GCS 15  Unclear as to why or how he fell  Has elevated CK  IV fluid bolus x 2 liters in the ED  States he took OTC medication called Dragon for erectile dysfunction and that's when his visual hallucinations started which have since resolved   Creat 1 51, bun55 likely in setting of rhabdo  Continue to monitor  While in the ED, he goes into SVT with heartrate 160  Given IV adenosine and converted to NSR  Cardiology consult  Check ECHO  Continue with tele  Tangential speech  Cardiology consult:  Not interested in medical therapy for svt at this time  Difficulty to determine cause of svt at this time  CUrrently in NSR  No BB due to concern for medication compliance  He does not want medications regularly  Date: 3/17   Day 2:   CK improving  Heart rate stable and WNL          ED Triage Vitals   Temperature Pulse Respirations Blood Pressure SpO2   03/15/22 2022 03/15/22 2022 03/15/22 2022 03/15/22 2022 03/15/22 2022   98 5 °F (36 9 °C) 89 19 158/90 96 %      Temp Source Heart Rate Source Patient Position - Orthostatic VS BP Location FiO2 (%)   03/15/22 2022 03/15/22 2315 03/15/22 2315 03/15/22 2345 --   Oral Monitor Lying Right arm       Pain Score       03/15/22 2022       No Pain          Wt Readings from Last 1 Encounters:   03/16/22 97 5 kg (215 lb)     Additional Vital Signs:   Date/Time Temp Pulse Resp BP MAP (mmHg) SpO2 O2 Device Patient Position - Orthostatic VS   03/17/22 11:16:53 -- 63 18 150/78 102 95 % -- --   03/17/22 07:16:05 96 8 °F (36 °C) Abnormal  57 18 148/80 103 97 % -- --   03/17/22 02:53:17 98 °F (36 7 °C) 65 16 127/78 94 94 % -- --   03/16/22 22:22:27 97 4 °F (36 3 °C) Abnormal  80 16 137/78 98 95 % -- --   03/16/22 19:19:54 98 1 °F (36 7 °C) 63 16 141/67 92 93 % -- --   03/16/22 16:51:48 -- 74 16 136/76 96 96 % -- --   03/16/22 1604 -- 59 19 153/71 102 97 % None (Room air) Lying   03/16/22 1431 -- 58 20 144/72 -- 97 % None (Room air) Lying   03/16/22 1331 -- 62 21 144/72 103 96 % None (Room air) Lying   03/16/22 1324 -- 62 20 -- -- 96 % -- --   03/16/22 1209 -- 60 18 141/68 98 98 % None (Room air) Lying   03/16/22 1052 -- 56 17 145/69 99 98 % None (Room air) Lying   03/16/22 0837 -- 66 29 Abnormal  155/74 -- 97 % None (Room air) Lying   03/16/22 0833 -- 58 19 -- -- 96 % -- --   03/16/22 0630 -- 66 19 -- -- 95 % -- --   03/16/22 0530 -- 72 20 155/74 106 96 % -- --   03/16/22 0345 -- 68 22 130/60 86 96 % None (Room air) Lying   03/16/22 0245 -- 64 21 135/72 98 96 % -- --   03/16/22 0115 -- 82 25 Abnormal  147/79 107 95 % -- --   03/16/22 0035 -- 88 -- -- -- -- -- --   03/16/22 0015 -- 84 22 159/75 108 98 % -- --   03/15/22 2345 -- 150 Abnormal  14 140/77 102 98 % None (Room air) Lying   03/15/22 2315 -- 158 Abnormal  26 Abnormal  129/76 97 99 % None (Room air) Lying   03/15/22 2306 -- 164 Abnormal  28 Abnormal  127/74 95 -- -- --   03/15/22 2300 -- 164 Abnormal  23 Abnormal  99/66 77 98 % None (Room air) --   03/15/22 2259 -- 164 Abnormal  19 108/58 73 -- -- --   03/15/22 2022 98 5 °F (36 9 °C) 89 19 158/90 -- 96 % None (Room air)        Pertinent Labs/Diagnostic Test Results:   3/16 ECHO:Left Ventricle: Left ventricular cavity size is normal  Wall thickness is normal  The left ventricular ejection fraction is 55%  Systolic function is normal  Wall motion is normal  Diastolic function is mildly abnormal, consistent with grade I (abnormal) relaxation    Aortic Valve: There is mild to moderate regurgitation with an eccentrically directed jet    Tricuspid Valve: There is mild regurgitation    Pulmonic Valve: There is mild regurgitation    Aorta: The aortic root is mildly dilated  4 3cm The ascending aorta is mildly dilated  4 2cm   3/16 EKG: Normal sinus rhythm  Left anterior fascicular block  Moderate voltage criteria for LVH, may be normal variant  Abnormal ECG  When compared with ECG of 16-MAR-2022 00:13, (unconfirmed)  Premature ventricular complexes are no longer Present    CT head without contrast   Final Result by Inder Salas MD (03/16 0104)      No acute intracranial hemorrhage  Chronic appearing findings, as described above  Please see discussion  Clinical correlation is recommended  If further evaluation is indicated, MRI with diffusion-weighted imaging could be performed, if there are no    contraindications  Workstation performed: [id number]         CT cervical spine without contrast   Final Result by Inder Salas MD (03/16 0139)      No acute cervical spine fracture or traumatic malalignment  Workstation performed: [id number]         CT chest abdomen pelvis w contrast   Final Result by Inder Salas MD (03/16 9344)      The prostate is enlarged, measuring approximately 6 2 cm transverse dimension    The prostate is somewhat heterogeneous and contains calcification  Clinical correlation, laboratory correlation and follow-up is recommended  The urinary bladder is distended, extending approximately 2 cm below the level of the umbilicus  Clinical correlation regarding the possibility of voiding difficulty is recommended  There is some material containing gas bubbles layering within the posterior aspect of the right mainstem bronchus, possibly representing secretions or aspiration  The lungs are well expanded  There is no evidence of focal consolidation  There is no    pneumothorax  There is some posterior abdominal and pelvic body wall edema, most notably overlying the mid to lower right flank  Clinical correlation is recommended  Atherosclerosis  Coronary artery disease  Cholelithiasis without CT evidence of acute cholecystitis              Workstation performed: [id number]               Results from last 7 days   Lab Units 03/17/22  0514   WBC Thousand/uL 5 71   HEMOGLOBIN g/dL 12 0   HEMATOCRIT % 35 2*   PLATELETS Thousands/uL 114*   NEUTROS ABS Thousands/µL 3 38         Results from last 7 days   Lab Units 03/17/22  0514   SODIUM mmol/L 140   POTASSIUM mmol/L 3 0*   CHLORIDE mmol/L 111*   CO2 mmol/L 21   ANION GAP mmol/L 8   BUN mg/dL 36*   CREATININE mg/dL 1 00   EGFR ml/min/1 73sq m 75   CALCIUM mg/dL 8 5   MAGNESIUM mg/dL 2 6   PHOSPHORUS mg/dL 3 5         Results from last 7 days   Lab Units 03/17/22  1118 03/17/22  0722 03/16/22 2056 03/16/22  1755   POC GLUCOSE mg/dl 168* 129 181* 138     Results from last 7 days   Lab Units 03/17/22  0514   GLUCOSE RANDOM mg/dL 148*                                                                   ED Treatment:   Medication Administration from 03/15/2022 2014 to 03/16/2022 1629       Date/Time Order Dose Route Comments     03/16/2022 0014 sodium chloride 0 9 % bolus 1,000 mL 0 mL Intravenous      03/15/2022 2306 sodium chloride 0 9 % bolus 1,000 mL 1,000 mL Intravenous      03/16/2022 0000 sodium chloride 0 9 % bolus 1,000 mL 0 mL Intravenous      03/15/2022 2312 sodium chloride 0 9 % bolus 1,000 mL 1,000 mL Intravenous      03/16/2022 0006 adenosine (ADENOCARD) injection 6 mg 6 mg Intravenous      03/16/2022 0010 adenosine (ADENOCARD) 6 mg/2 mL injection **ADS Override Pull** 12 mg       03/16/2022 1330 heparin (porcine) subcutaneous injection 5,000 Units 5,000 Units Subcutaneous      03/16/2022 0534 heparin (porcine) subcutaneous injection 5,000 Units 5,000 Units Subcutaneous      03/16/2022 0837 aspirin chewable tablet 81 mg 81 mg Oral      03/16/2022 1556 atorvastatin (LIPITOR) tablet 40 mg 40 mg Oral      03/16/2022 1556 insulin lispro (HumaLOG) 100 units/mL subcutaneous injection 1-6 Units 1 Units Subcutaneous      03/16/2022 1004 insulin lispro (HumaLOG) 100 units/mL subcutaneous injection 1-6 Units 1 Units Subcutaneous             Admitting Diagnosis: Hallucinations [R44 3]  SVT (supraventricular tachycardia) (HCC) [I47 1]  Balance problem [R26 89]  Ambulatory dysfunction [R26 2]  Age/Sex: 71 y o  male  Admission Orders:  Tele  I/O  SCDs  Scheduled Medications:  No current facility-administered medications for this encounter  Continuous IV Infusions:  No current facility-administered medications for this encounter  PRN Meds:  No current facility-administered medications for this encounter  IP CONSULT TO CARDIOLOGY  IP CONSULT TO CASE MANAGEMENT    Network Utilization Review Department  ATTENTION: Please call with any questions or concerns to [phone] and carefully listen to the prompts so that you are directed to the right person  All voicemails are confidential   Deloris Ann all requests for admission clinical reviews, approved or denied determinations and any other requests to dedicated fax number below belonging to the campus where the patient is receiving treatment   List of dedicated fax numbers for the Facilities:  Britany Lynn NUMBER   ADMISSION DENIALS (Administrative/Medical Necessity) [address] (Maternity/NICU/Pediatrics) [address] [place] [address],[address]  [phone]   Yareli Mcpherson [address] [facility] [place] Mairaida Alin [place] [address] [place] [address] [address] [facility] [phone]       Notification of Discharge   This is a Notification of Discharge from our facility [address]  Please be advised that this patient has been discharge from our facility  Below you will find the admission and discharge date and time including the patients disposition  UTILIZATION REVIEW CONTACT:  Shivam Escoto  Utilization   Network Utilization Review Department  Phone: [phone] x carefully listen to the prompts  All voicemails are confidential   Email: [email or web address]     PHYSICIAN ADVISORY SERVICES:  FOR VYCG-PP-ISZE REVIEW - MEDICAL NECESSITY DENIAL  Phone: [phone]  Fax: [phone]  Email: [email or web address]     PRESENTATION DATE: 3/15/2022  8:14 PM  OBERVATION ADMISSION DATE:  INPATIENT ADMISSION DATE: 3/16/22  2:44 AM   DISCHARGE DATE: 3/17/2022  5:55 PM  DISPOSITION: Home/Self Care Home/Self Care      IMPORTANT INFORMATION:  Send all requests for admission clinical reviews, approved or denied determinations and any other requests to dedicated fax number below belonging to the campus where the patient is receiving treatment   List of dedicated fax numbers:  FACILITY NAME UR FAX NUMBER   ADMISSION DENIALS (Administrative/Medical Necessity) [phone]   [facility] (Maternity/NICU/Pediatrics) [phone]   [facility] [phone]   [facility] [address] [facility] [phone]   [address] [place] [address],[address] [phone]   [facility]  [phone]   [address] [facility]  [address] [facility] [address] [facility] [phone]

## 2022-04-03 NOTE — PROGRESS NOTES
Cardiology Follow Up    Camille Watts  1950  [id number]  [address] [facility] [place]  [address]  [address]  [phone]    1  Primary hypertension     2  SVT (supraventricular tachycardia) ([facility] [address] )  Ambulatory referral to Cardiology   3  Hyperlipidemia, unspecified hyperlipidemia type     4  Type 2 diabetes mellitus without complication, without long-term current use of insulin ([facility])         Interval History:   Mr Camille Watts was admitted to [facility] with SVT and ambulatory dysfunction  He was found down at home on the floor for 2 days  He could not clearly remember events leading up to the fall  He admitted to visual hallucinations of squirrels while he was on the floor  Robert Krishnan was unable to get off the floor due to weakness  In the  emergency room [place] Krishnan was found to have SVT and treated with adenosine 6 mg followed by 12 mg of Adenosine with return to normal sinus rhythm  According to Robert Krishnan he took Dragon mouth spray for ED which was purchased on line  He was found to have NORMA secondary to rhabdomyolysis after his fall  He was treated with IV fluids  Cardiology was consulted for SVT  He was not interested in medical therapy for SVT  He was willing to follow up as an OP  Robert Krishnan was agreeable to 2 week Zio monitor to determine further therapy  3/16/22 TTE:   Left ventricle cavity size normal wall thickness normal LVEF 29% systolic function normal grade 1 abnormal relaxation  Aortic valve mild-to-moderate regurgitation with an eccentric lead directed jet  Mild TR, mild pulmonic valve regurgitation  Aortic root mildly dilated 4 3 cm  Ascending aorta mildly dilated 4 2 cm  3/16/22 , , HDL 37, LDL 88  Hgb A1C 7 5  He was encouraged to stay with his brother he was encouraged to follow-up with his PCP for follow-up [facility] clinic        Mr Camille Watts presents to our office for a recent hospitalization follow up visit  She is accompanied by his brother  Deepa Grijalva denies CP, palpitations, dyspnea with exertion  He is refusing further cardiac testing with ZIO patch  He does not want to take medications  Medical History:  Hypertension  Hyperlipidemia  DM2  TIA  Medical non compliance  Patient Active Problem List   Diagnosis    Impaired cognition    History of transient ischemic attack (TIA)    Diabetes ([facility] [address] )     No past medical history on file  Social History     Socioeconomic History    Marital status:      Spouse name: Not on file    Number of children: Not on file    Years of education: Not on file    Highest education level: Not on file   Occupational History    Not on file   Tobacco Use    Smoking status: Never Smoker    Smokeless tobacco: Never Used   Substance and Sexual Activity    Alcohol use: Not on file    Drug use: Not on file    Sexual activity: Not on file   Other Topics Concern    Not on file   Social History Narrative    Not on file     Social Determinants of Health     Financial Resource Strain: Not on file   Food Insecurity: No Food Insecurity    Worried About Running Out of Food in the Last Year: Never true    Martínez of Food in the Last Year: Never true   Transportation Needs: No Transportation Needs    Lack of Transportation (Medical): No    Lack of Transportation (Non-Medical): No   Physical Activity: Not on file   Stress: Not on file   Social Connections: Not on file   Intimate Partner Violence: Not on file   Housing Stability: Low Risk     Unable to Pay for Housing in the Last Year: No    Number of Places Lived in the Last Year: 1    Unstable Housing in the Last Year: No      No family history on file  No past surgical history on file      Current Outpatient Medications:     aspirin 81 mg chewable tablet, Chew 1 tablet (81 mg total) daily, Disp: 30 tablet, Rfl: 0    atorvastatin (LIPITOR) 40 mg tablet, Take 1 tablet (40 mg total) by mouth daily with dinner, Disp: 30 tablet, Rfl: 0  Allergies   Allergen Reactions    Penicillins Hives       Labs:  Admission on 03/15/2022, Discharged on 03/17/2022   Component Date Value    WBC 03/15/2022 10 12     RBC 03/15/2022 4 97     Hemoglobin 03/15/2022 14 2     Hematocrit 03/15/2022 40 9     MCV 03/15/2022 82     MCH 03/15/2022 28 6     MCHC 03/15/2022 34 7     RDW 03/15/2022 14 7     Platelets 08/04/0893 105*    nRBC 03/15/2022 0     Neutrophils Relative 03/15/2022 77*    Immat GRANS % 03/15/2022 0     Lymphocytes Relative 03/15/2022 10*    Monocytes Relative 03/15/2022 12     Eosinophils Relative 03/15/2022 1     Basophils Relative 03/15/2022 0     Neutrophils Absolute 03/15/2022 7 82*    Immature Grans Absolute 03/15/2022 0 04     Lymphocytes Absolute 03/15/2022 0 97     Monocytes Absolute 03/15/2022 1 20     Eosinophils Absolute 03/15/2022 0 06     Basophils Absolute 03/15/2022 0 03     Protime 03/15/2022 15 5*    INR 03/15/2022 1 28*    PTT 03/15/2022 27     Sodium 03/15/2022 133*    Potassium 03/15/2022 4 0     Chloride 03/15/2022 103     CO2 03/15/2022 20*    ANION GAP 03/15/2022 10     BUN 03/15/2022 55*    Creatinine 03/15/2022 1 51*    Glucose 03/15/2022 187*    Calcium 03/15/2022 9 1     Corrected Calcium 03/15/2022 9 8     AST 03/15/2022 119*    ALT 03/15/2022 66     Alkaline Phosphatase 03/15/2022 75     Total Protein 03/15/2022 7 4     Albumin 03/15/2022 3 1*    Total Bilirubin 03/15/2022 0 79     eGFR 03/15/2022 45     TSH 3RD GENERATON 03/16/2022 2 170     Ammonia 03/15/2022 <10*    LACTIC ACID 03/15/2022 1 6     Total CK 03/16/2022 3,039*    hs TnI 0hr 03/15/2022 26     POC Glucose 03/15/2022 186*    Color, UA 03/15/2022 Lian     Clarity, UA 03/15/2022 Clear     pH, UA 03/15/2022 5 5     Leukocytes, UA 03/15/2022 Negative     Nitrite, UA 03/15/2022 Negative     Protein, UA 03/15/2022 100 (2+)*    Glucose, UA 03/15/2022 Negative     Ketones, UA 03/15/2022 15 (1+)*    Urobilinogen, UA 03/15/2022 0 2     Bilirubin, UA 03/15/2022 Interference- unable to analyze*    Blood, UA 03/15/2022 Trace*    Specific Gravity, UA 03/15/2022 >=1 030     RBC, UA 03/15/2022 None Seen     WBC, UA 03/15/2022 None Seen     Epithelial Cells 03/15/2022 Occasional     Bacteria, UA 03/15/2022 None Seen     Granular Casts, UA 03/15/2022 0-3*    Amph/Meth UR 03/15/2022 Negative     Barbiturate Ur 03/15/2022 Negative     Benzodiazepine Urine 03/15/2022 Negative     Cocaine Urine 03/15/2022 Negative     Methadone Urine 03/15/2022 Negative     Opiate Urine 03/15/2022 Negative     PCP Ur 03/15/2022 Negative     THC Urine 03/15/2022 Positive*    Oxycodone Urine 03/15/2022 Negative     Ethanol Lvl 95/73/9360 <3     Salicylate Lvl 70/33/4758 3     Acetaminophen Level 03/15/2022 <2*    hs TnI 2hr 03/16/2022 34     Delta 2hr hsTnI 03/16/2022 8     hs TnI 4hr 03/16/2022 27     Delta 4hr hsTnI 03/16/2022 1     CK-MB Index 03/16/2022 <1 0     CK-MB 03/16/2022 22 0*    Hemoglobin A1C 03/15/2022 7 5*    EAG 03/15/2022 169     Cholesterol 03/16/2022 147     Triglycerides 03/16/2022 109     HDL, Direct 03/16/2022 37*    LDL Calculated 03/16/2022 88     Non-HDL-Chol (CHOL-HDL) 03/16/2022 110     Sodium 03/16/2022 136     Potassium 03/16/2022 5 1     Chloride 03/16/2022 109*    CO2 03/16/2022 20*    ANION GAP 03/16/2022 7     BUN 03/16/2022 40*    Creatinine 03/16/2022 1 16     Glucose 03/16/2022 157*    Calcium 03/16/2022 8 7     eGFR 03/16/2022 63     Total CK 03/16/2022 2,081*    LA size 03/16/2022 3 1     LVPWd 03/16/2022 1 40     Left Atrium Area-systoli* 03/16/2022 16 1     Left Atrium Area-systoli* 03/16/2022 10 1     MV E' Tissue Velocity Se* 03/16/2022 7     RA 2D Volume 03/16/2022 20 0     IVSd 03/16/2022 1 33     LV DIASTOLIC VOLUME (MOD* 33/01/3496 64     LEFT VENTRICLE SYSTOLIC * 31/33/3338 26     Left ventricular stroke * 03/16/2022 38 00     A4C EF 03/16/2022 57     LA length (A2C) 03/16/2022 4 80     LVIDd 03/16/2022 3 90     IVS 03/16/2022 1 4     LVIDS 03/16/2022 2 70     FS 03/16/2022 31     Asc Ao 03/16/2022 4     Ao root 03/16/2022 4 30     RVID d 03/16/2022 3 2     MV valve area p 1/2 meth* 03/16/2022 2 18     E wave deceleration time 03/16/2022 348     MV Peak E Ahmet 03/16/2022 58     MV Peak A Ahmet 03/16/2022 0 92     RAA A4C 03/16/2022 9     MV stenosis pressure 1/2* 03/16/2022 101     LVSV, 2D 03/16/2022 38     Ao asc z-score 03/16/2022 4 94     ZLVPWD 03/16/2022 4 95     ZLVIDS 03/16/2022 -4 51     ZLVIDD 03/16/2022 -7 03     ZIVSD 03/16/2022 4 84     LV EF 03/16/2022 55     Magnesium 03/16/2022 2 7*    Phosphorus 03/16/2022 4 5*    CK-MB Index 03/16/2022 <1 0     CK-MB 03/16/2022 17 4*    Ventricular Rate 03/16/2022 88     Atrial Rate 03/16/2022 88     CT Interval 03/16/2022 154     QRSD Interval 03/16/2022 108     QT Interval 03/16/2022 366     QTC Interval 03/16/2022 442     P Axis 03/16/2022 36     QRS Axis 03/16/2022 -54     T Wave Axis 03/16/2022 43     Ventricular Rate 03/15/2022 152     Atrial Rate 03/15/2022 23     QRSD Interval 03/15/2022 112     QT Interval 03/15/2022 322     QTC Interval 03/15/2022 511     QRS Axis 03/15/2022 -62     T Wave Axis 03/15/2022 87     POC Glucose 03/16/2022 138     POC Glucose 03/16/2022 181*    WBC 03/17/2022 5 71     RBC 03/17/2022 4 29     Hemoglobin 03/17/2022 12 0     Hematocrit 03/17/2022 35 2*    MCV 03/17/2022 82     MCH 03/17/2022 28 0     MCHC 03/17/2022 34 1     RDW 03/17/2022 15 0     MPV 03/17/2022 13 2*    Platelets 50/38/6868 114*    nRBC 03/17/2022 0     Neutrophils Relative 03/17/2022 58     Immat GRANS % 03/17/2022 0     Lymphocytes Relative 03/17/2022 23     Monocytes Relative 03/17/2022 13*    Eosinophils Relative 03/17/2022 5     Basophils Relative 03/17/2022 1     Neutrophils Absolute 03/17/2022 3 38     Immature Grans Absolute 03/17/2022 0 02     Lymphocytes Absolute 03/17/2022 1 30     Monocytes Absolute 03/17/2022 0 72     Eosinophils Absolute 03/17/2022 0 26     Basophils Absolute 03/17/2022 0 03     Magnesium 03/17/2022 2 6     Phosphorus 03/17/2022 3 5     Sodium 03/17/2022 140     Potassium 03/17/2022 3 0*    Chloride 03/17/2022 111*    CO2 03/17/2022 21     ANION GAP 03/17/2022 8     BUN 03/17/2022 36*    Creatinine 03/17/2022 1 00     Glucose 03/17/2022 148*    Calcium 03/17/2022 8 5     eGFR 03/17/2022 75     POC Glucose 03/17/2022 129     POC Glucose 03/17/2022 168*     Imaging: CT head without contrast    Result Date: 3/16/2022  Narrative: CT BRAIN - WITHOUT CONTRAST INDICATION:   fall, down for 2 days  hallucinations  Frequent falls, weakness, hallucinations  History of diabetes, hypertension, prior stroke  COMPARISON:  None available  TECHNIQUE:  CT examination of the brain was performed  In addition to axial images, sagittal and coronal 2D reformatted images were created and submitted for interpretation  Radiation dose length product (DLP) for this visit:  859 58 mGy-cm   This examination, like all CT scans performed in the [facility], was performed utilizing techniques to minimize radiation dose exposure, including the use of iterative  reconstruction and automated exposure control  IMAGE QUALITY:  Diagnostic  FINDINGS: PARENCHYMA: Decreased attenuation is noted in periventricular and subcortical white matter demonstrating an appearance that is statistically most likely to represent mild to moderate microangiopathic change  There is an old infarction adjacent to the anterior left ventricle and extending to the centrum semiovale  No definite CT signs of acute infarction; MRI is more sensitive  No intracranial mass, positive mass effect or midline shift  No acute parenchymal hemorrhage   VENTRICLES AND EXTRA-AXIAL SPACES:  There is ex vacuo dilatation of the left ventricle, most pronounced involving the anterior horn  No hydrocephalus  No acute extra-axial hemorrhage  VISUALIZED ORBITS AND PARANASAL SINUSES:  There is mild mucosal thickening in the left maxillary sinus  There is partial opacification of the right ethmoids  No evidence of acute abnormality involving the orbits  CALVARIUM AND EXTRACRANIAL SOFT TISSUES:  Normal       Impression: No acute intracranial hemorrhage  Chronic appearing findings, as described above  Please see discussion  Clinical correlation is recommended  If further evaluation is indicated, MRI with diffusion-weighted imaging could be performed, if there are no contraindications  Workstation performed: [id number]     CT cervical spine without contrast    Result Date: 3/16/2022  Narrative: CT CERVICAL SPINE - WITHOUT CONTRAST INDICATION:   fall, down for 2 days  Frequent falls  COMPARISON:  None available  TECHNIQUE:  CT examination of the cervical spine was performed without intravenous contrast   Contiguous axial images were obtained  Sagittal and coronal reconstructions were performed  Radiation dose length product (DLP) for this visit:  419 27 mGy-cm   This examination, like all CT scans performed in the [facility], was performed utilizing techniques to minimize radiation dose exposure, including the use of iterative  reconstruction and automated exposure control  IMAGE QUALITY:  Diagnostic  FINDINGS: ALIGNMENT:  There is mild straightening of cervical lordosis in the mid to lower cervical spine  There is no subluxation  VERTEBRAL BODIES:  No acute fracture  DEGENERATIVE CHANGES:  Mild to moderate multilevel degenerative changes are present  Disc space narrowing is most pronounced at C5-6  Facet arthropathy is most pronounced at C4-5  PREVERTEBRAL AND PARASPINAL SOFT TISSUES:  Unremarkable  THORACIC INLET:  Unremarkable       Impression: No acute cervical spine fracture or traumatic malalignment  Workstation performed: [id number]     CT chest abdomen pelvis w contrast    Result Date: 3/16/2022  Narrative: CT CHEST, ABDOMEN AND PELVIS WITH IV CONTRAST INDICATION:   fall, down for 2 days, urinary sxs     Multiple recent falls, hallucinations  Dysuria, urinary incontinence  COMPARISON:  None available  TECHNIQUE: CT examination of the chest, abdomen and pelvis was performed  Axial, sagittal, and coronal 2D reformatted images were created from the source data and submitted for interpretation  3D reconstructions of the bony thorax were performed in order to improved sensitivity of evaluation for rib fractures  Radiation dose length product (DLP) for this visit:  1165 04 mGy-cm   This examination, like all CT scans performed in the [facility], was performed utilizing techniques to minimize radiation dose exposure, including the use of iterative reconstruction and automated exposure control  IV Contrast:  100 mL of iohexol (OMNIPAQUE) Enteric Contrast: Enteric contrast was not administered  FINDINGS: CHEST LUNGS:  The lungs are well expanded  There is no focal consolidation  There is no pneumothorax  There is some material containing gas bubbles layering within the posterior aspect of the right mainstem bronchus, possibly related to secretions or aspiration  PLEURA:  Unremarkable  HEART/GREAT VESSELS: Coronary artery disease  The ascending thoracic aorta measures approximately 4 cm diameter  MEDIASTINUM AND NORBERTO:  Unremarkable  CHEST WALL AND LOWER NECK:   Unremarkable  ABDOMEN LIVER/BILIARY TREE:  Unremarkable  GALLBLADDER:  There are gallstone(s) within the gallbladder neck and proximal cystic duct, without pericholecystic inflammatory changes  SPLEEN:  Unremarkable  PANCREAS:  Unremarkable  ADRENAL GLANDS:  Unremarkable  KIDNEYS/URETERS:  Unremarkable  No hydronephrosis  STOMACH AND BOWEL:  Unremarkable  APPENDIX:  No findings to suggest appendicitis   ABDOMINOPELVIC CAVITY: No ascites  No pneumoperitoneum  No lymphadenopathy  VESSELS:  Atherosclerosis  No abdominal aortic aneurysm  PELVIS REPRODUCTIVE ORGANS:  The prostate is enlarged, measuring approximately 6 2 cm transverse dimension  The prostate is somewhat heterogeneous and contains calcifications  URINARY BLADDER:  The urinary bladder is distended, extending approximately 2 cm below the level of the umbilicus  ABDOMINAL WALL/INGUINAL REGIONS:  There is some body wall edema, most notably posteriorly and overlying the mid to lower right flank  OSSEOUS STRUCTURES:  No acute fracture or destructive osseous lesion  Impression: The prostate is enlarged, measuring approximately 6 2 cm transverse dimension  The prostate is somewhat heterogeneous and contains calcification  Clinical correlation, laboratory correlation and follow-up is recommended  The urinary bladder is distended, extending approximately 2 cm below the level of the umbilicus  Clinical correlation regarding the possibility of voiding difficulty is recommended  There is some material containing gas bubbles layering within the posterior aspect of the right mainstem bronchus, possibly representing secretions or aspiration  The lungs are well expanded  There is no evidence of focal consolidation  There is no pneumothorax  There is some posterior abdominal and pelvic body wall edema, most notably overlying the mid to lower right flank  Clinical correlation is recommended  Atherosclerosis  Coronary artery disease  Cholelithiasis without CT evidence of acute cholecystitis  Workstation performed: [id number]     Echo complete w/ contrast if indicated    Result Date: 3/16/2022  Narrative: Holly Go  Left Ventricle: Left ventricular cavity size is normal  Wall thickness is normal  The left ventricular ejection fraction is 55%  Systolic function is normal  Wall motion is normal  Diastolic function is mildly abnormal, consistent with grade I (abnormal) relaxation    Aortic Valve: There is mild to moderate regurgitation with an eccentrically directed jet    Tricuspid Valve: There is mild regurgitation    Pulmonic Valve: There is mild regurgitation    Aorta: The aortic root is mildly dilated  4 3cm The ascending aorta is mildly dilated  4 2cm       Review of Systems:  Review of Systems   All other systems reviewed and are negative  Physical Exam:  Physical Exam  Vitals reviewed  Constitutional:       Appearance: He is obese  HENT:      Head: Normocephalic  Cardiovascular:      Rate and Rhythm: Normal rate and regular rhythm  Pulses: Normal pulses  Heart sounds: Normal heart sounds  Pulmonary:      Effort: Pulmonary effort is normal       Breath sounds: Normal breath sounds  Abdominal:      General: Bowel sounds are normal       Palpations: Abdomen is soft  Musculoskeletal:         General: Normal range of motion  Cervical back: Normal range of motion and neck supple  Right lower leg: No edema  Left lower leg: No edema  Skin:     General: Skin is warm and dry  Capillary Refill: Capillary refill takes less than 2 seconds  Neurological:      General: No focal deficit present  Mental Status: He is alert and oriented to person, place, and time  Psychiatric:         Mood and Affect: Mood normal          Behavior: Behavior normal          Discussion/Summary:  1  SVT refusing further evaluation, refusing Zio patch, refusing to take medication  2  Hypertension RUE sitting 142/86, educated on 2gm sodium diet   3  Hyperlipidemia 3/16/22 , , HDL 37, LDL 8- refusing Lipitor, education on heart healthy diet      4  DM2 3/16/22  Hgb A1C 7 5  refusing medication  Discussed risk factors associated with DM that is not treated  Discussed dietary management and daily exercise  Refusing testing and medication management  Suggested follow up with PCP and cardiology when needed

## 2022-04-04 ENCOUNTER — OFFICE VISIT (OUTPATIENT)
Dept: CARDIOLOGY CLINIC | Facility: CLINIC | Age: 72
End: 2022-04-04
Payer: COMMERCIAL

## 2022-04-04 VITALS
BODY MASS INDEX: 31.05 KG/M2 | HEIGHT: 71 IN | WEIGHT: 221.8 LBS | HEART RATE: 70 BPM | DIASTOLIC BLOOD PRESSURE: 86 MMHG | SYSTOLIC BLOOD PRESSURE: 152 MMHG | OXYGEN SATURATION: 98 %

## 2022-04-04 DIAGNOSIS — I10 PRIMARY HYPERTENSION: Primary | ICD-10-CM

## 2022-04-04 DIAGNOSIS — E11.9 TYPE 2 DIABETES MELLITUS WITHOUT COMPLICATION, WITHOUT LONG-TERM CURRENT USE OF INSULIN (HCC): ICD-10-CM

## 2022-04-04 DIAGNOSIS — E78.5 HYPERLIPIDEMIA, UNSPECIFIED HYPERLIPIDEMIA TYPE: ICD-10-CM

## 2022-04-04 DIAGNOSIS — I47.1 SVT (SUPRAVENTRICULAR TACHYCARDIA) (HCC): ICD-10-CM

## 2022-04-04 PROCEDURE — 99214 OFFICE O/P EST MOD 30 MIN: CPT | Performed by: NURSE PRACTITIONER

## 2022-04-04 NOTE — PATIENT INSTRUCTIONS
2gm sodium diet, No concentrated sweets, low carbohydrate diet, low fat low chortosterol diet       Daily exercise

## 2025-07-05 ENCOUNTER — HOSPITAL ENCOUNTER (INPATIENT)
Facility: HOSPITAL | Age: 75
LOS: 12 days | Discharge: NURSING, REHAB OR LONG-TERM CARE | DRG: 853 | End: 2025-07-17
Attending: EMERGENCY MEDICINE | Admitting: INTERNAL MEDICINE
Payer: MEDICARE

## 2025-07-05 DIAGNOSIS — L03.90 CELLULITIS: Primary | ICD-10-CM

## 2025-07-05 DIAGNOSIS — R78.81 MSSA BACTEREMIA: ICD-10-CM

## 2025-07-05 DIAGNOSIS — L03.115 CELLULITIS OF RIGHT FOOT: ICD-10-CM

## 2025-07-05 DIAGNOSIS — I63.9 CVA (CEREBRAL VASCULAR ACCIDENT) (HCC): ICD-10-CM

## 2025-07-05 DIAGNOSIS — B35.1 ONYCHOMYCOSIS: ICD-10-CM

## 2025-07-05 DIAGNOSIS — B95.61 MSSA BACTEREMIA: ICD-10-CM

## 2025-07-05 DIAGNOSIS — R29.90 STROKE-LIKE SYMPTOMS: ICD-10-CM

## 2025-07-05 DIAGNOSIS — N17.9 AKI (ACUTE KIDNEY INJURY) (HCC): ICD-10-CM

## 2025-07-05 DIAGNOSIS — R78.81 GRAM-POSITIVE COCCI BACTEREMIA: ICD-10-CM

## 2025-07-05 DIAGNOSIS — I10 HYPERTENSION: ICD-10-CM

## 2025-07-05 DIAGNOSIS — R41.89 IMPAIRED COGNITION: ICD-10-CM

## 2025-07-05 DIAGNOSIS — E11.9 DIABETES (HCC): ICD-10-CM

## 2025-07-05 DIAGNOSIS — R33.9 URINARY RETENTION: ICD-10-CM

## 2025-07-05 PROBLEM — R79.89 ELEVATED LFTS: Chronic | Status: ACTIVE | Noted: 2025-07-05

## 2025-07-05 PROBLEM — Z86.39 HISTORY OF DIABETES MELLITUS: Chronic | Status: ACTIVE | Noted: 2022-03-16

## 2025-07-05 LAB
ALBUMIN SERPL BCG-MCNC: 3.7 G/DL (ref 3.5–5)
ALP SERPL-CCNC: 69 U/L (ref 34–104)
ALT SERPL W P-5'-P-CCNC: 30 U/L (ref 7–52)
ANION GAP SERPL CALCULATED.3IONS-SCNC: 10 MMOL/L (ref 4–13)
APTT PPP: 29 SECONDS (ref 23–34)
AST SERPL W P-5'-P-CCNC: 55 U/L (ref 13–39)
BASOPHILS # BLD AUTO: 0.02 THOUSANDS/ÂΜL (ref 0–0.1)
BASOPHILS NFR BLD AUTO: 0 % (ref 0–1)
BILIRUB SERPL-MCNC: 1.48 MG/DL (ref 0.2–1)
BUN SERPL-MCNC: 42 MG/DL (ref 5–25)
CALCIUM SERPL-MCNC: 8.7 MG/DL (ref 8.4–10.2)
CHLORIDE SERPL-SCNC: 100 MMOL/L (ref 96–108)
CHOLEST SERPL-MCNC: 127 MG/DL (ref ?–200)
CO2 SERPL-SCNC: 22 MMOL/L (ref 21–32)
CREAT SERPL-MCNC: 1.95 MG/DL (ref 0.6–1.3)
EOSINOPHIL # BLD AUTO: 0 THOUSAND/ÂΜL (ref 0–0.61)
EOSINOPHIL NFR BLD AUTO: 0 % (ref 0–6)
ERYTHROCYTE [DISTWIDTH] IN BLOOD BY AUTOMATED COUNT: 14.8 % (ref 11.6–15.1)
GFR SERPL CREATININE-BSD FRML MDRD: 32 ML/MIN/1.73SQ M
GLUCOSE SERPL-MCNC: 191 MG/DL (ref 65–140)
HCT VFR BLD AUTO: 38.6 % (ref 36.5–49.3)
HDLC SERPL-MCNC: 47 MG/DL
HGB BLD-MCNC: 13 G/DL (ref 12–17)
IMM GRANULOCYTES # BLD AUTO: 0.09 THOUSAND/UL (ref 0–0.2)
IMM GRANULOCYTES NFR BLD AUTO: 1 % (ref 0–2)
INR PPP: 1.34 (ref 0.85–1.19)
LACTATE SERPL-SCNC: 1.5 MMOL/L (ref 0.5–2)
LDLC SERPL CALC-MCNC: 66 MG/DL (ref 0–100)
LYMPHOCYTES # BLD AUTO: 0.55 THOUSANDS/ÂΜL (ref 0.6–4.47)
LYMPHOCYTES NFR BLD AUTO: 3 % (ref 14–44)
MCH RBC QN AUTO: 30.3 PG (ref 26.8–34.3)
MCHC RBC AUTO-ENTMCNC: 33.7 G/DL (ref 31.4–37.4)
MCV RBC AUTO: 90 FL (ref 82–98)
MONOCYTES # BLD AUTO: 2.28 THOUSAND/ÂΜL (ref 0.17–1.22)
MONOCYTES NFR BLD AUTO: 14 % (ref 4–12)
NEUTROPHILS # BLD AUTO: 13.05 THOUSANDS/ÂΜL (ref 1.85–7.62)
NEUTS SEG NFR BLD AUTO: 82 % (ref 43–75)
NRBC BLD AUTO-RTO: 0 /100 WBCS
PLATELET # BLD AUTO: 112 THOUSANDS/UL (ref 149–390)
PMV BLD AUTO: 13.2 FL (ref 8.9–12.7)
POTASSIUM SERPL-SCNC: 3.5 MMOL/L (ref 3.5–5.3)
PROCALCITONIN SERPL-MCNC: 4.06 NG/ML
PROT SERPL-MCNC: 7 G/DL (ref 6.4–8.4)
PROTHROMBIN TIME: 16.8 SECONDS (ref 12.3–15)
RBC # BLD AUTO: 4.29 MILLION/UL (ref 3.88–5.62)
SODIUM SERPL-SCNC: 132 MMOL/L (ref 135–147)
TRIGL SERPL-MCNC: 69 MG/DL (ref ?–150)
WBC # BLD AUTO: 15.99 THOUSAND/UL (ref 4.31–10.16)

## 2025-07-05 PROCEDURE — 87186 SC STD MICRODIL/AGAR DIL: CPT

## 2025-07-05 PROCEDURE — 80061 LIPID PANEL: CPT

## 2025-07-05 PROCEDURE — 80053 COMPREHEN METABOLIC PANEL: CPT

## 2025-07-05 PROCEDURE — 93005 ELECTROCARDIOGRAM TRACING: CPT

## 2025-07-05 PROCEDURE — 99223 1ST HOSP IP/OBS HIGH 75: CPT | Performed by: INTERNAL MEDICINE

## 2025-07-05 PROCEDURE — 80074 ACUTE HEPATITIS PANEL: CPT

## 2025-07-05 PROCEDURE — 87147 CULTURE TYPE IMMUNOLOGIC: CPT

## 2025-07-05 PROCEDURE — NC001 PR NO CHARGE: Performed by: INTERNAL MEDICINE

## 2025-07-05 PROCEDURE — 83036 HEMOGLOBIN GLYCOSYLATED A1C: CPT

## 2025-07-05 PROCEDURE — 99285 EMERGENCY DEPT VISIT HI MDM: CPT | Performed by: EMERGENCY MEDICINE

## 2025-07-05 PROCEDURE — 85025 COMPLETE CBC W/AUTO DIFF WBC: CPT

## 2025-07-05 PROCEDURE — 99285 EMERGENCY DEPT VISIT HI MDM: CPT

## 2025-07-05 PROCEDURE — 96365 THER/PROPH/DIAG IV INF INIT: CPT

## 2025-07-05 PROCEDURE — 87040 BLOOD CULTURE FOR BACTERIA: CPT

## 2025-07-05 PROCEDURE — 96375 TX/PRO/DX INJ NEW DRUG ADDON: CPT

## 2025-07-05 PROCEDURE — 84145 PROCALCITONIN (PCT): CPT

## 2025-07-05 PROCEDURE — 87154 CUL TYP ID BLD PTHGN 6+ TRGT: CPT

## 2025-07-05 PROCEDURE — 83605 ASSAY OF LACTIC ACID: CPT

## 2025-07-05 PROCEDURE — 36415 COLL VENOUS BLD VENIPUNCTURE: CPT

## 2025-07-05 PROCEDURE — 85610 PROTHROMBIN TIME: CPT

## 2025-07-05 PROCEDURE — 85730 THROMBOPLASTIN TIME PARTIAL: CPT

## 2025-07-05 RX ORDER — OXYCODONE HYDROCHLORIDE 5 MG/1
5 TABLET ORAL EVERY 4 HOURS PRN
Refills: 0 | Status: DISCONTINUED | OUTPATIENT
Start: 2025-07-05 | End: 2025-07-09

## 2025-07-05 RX ORDER — HYDROMORPHONE HCL/PF 1 MG/ML
0.5 SYRINGE (ML) INJECTION ONCE
Status: COMPLETED | OUTPATIENT
Start: 2025-07-05 | End: 2025-07-05

## 2025-07-05 RX ORDER — KETOROLAC TROMETHAMINE 30 MG/ML
15 INJECTION, SOLUTION INTRAMUSCULAR; INTRAVENOUS ONCE
Status: COMPLETED | OUTPATIENT
Start: 2025-07-05 | End: 2025-07-05

## 2025-07-05 RX ORDER — CEFAZOLIN SODIUM 2 G/50ML
2000 SOLUTION INTRAVENOUS ONCE
Status: COMPLETED | OUTPATIENT
Start: 2025-07-05 | End: 2025-07-05

## 2025-07-05 RX ORDER — ACETAMINOPHEN 325 MG/1
650 TABLET ORAL ONCE
Status: COMPLETED | OUTPATIENT
Start: 2025-07-05 | End: 2025-07-05

## 2025-07-05 RX ORDER — POLYETHYLENE GLYCOL 3350 17 G/17G
17 POWDER, FOR SOLUTION ORAL DAILY PRN
Status: DISCONTINUED | OUTPATIENT
Start: 2025-07-05 | End: 2025-07-08

## 2025-07-05 RX ORDER — ACETAMINOPHEN 325 MG/1
975 TABLET ORAL EVERY 8 HOURS PRN
Status: DISCONTINUED | OUTPATIENT
Start: 2025-07-05 | End: 2025-07-17 | Stop reason: HOSPADM

## 2025-07-05 RX ORDER — HYDROMORPHONE HCL IN WATER/PF 6 MG/30 ML
0.2 PATIENT CONTROLLED ANALGESIA SYRINGE INTRAVENOUS EVERY 2 HOUR PRN
Refills: 0 | Status: DISCONTINUED | OUTPATIENT
Start: 2025-07-05 | End: 2025-07-09

## 2025-07-05 RX ORDER — CEFAZOLIN SODIUM 2 G/50ML
2000 SOLUTION INTRAVENOUS EVERY 8 HOURS
Status: DISCONTINUED | OUTPATIENT
Start: 2025-07-05 | End: 2025-07-06

## 2025-07-05 RX ORDER — SODIUM CHLORIDE, SODIUM GLUCONATE, SODIUM ACETATE, POTASSIUM CHLORIDE, MAGNESIUM CHLORIDE, SODIUM PHOSPHATE, DIBASIC, AND POTASSIUM PHOSPHATE .53; .5; .37; .037; .03; .012; .00082 G/100ML; G/100ML; G/100ML; G/100ML; G/100ML; G/100ML; G/100ML
1000 INJECTION, SOLUTION INTRAVENOUS ONCE
Status: COMPLETED | OUTPATIENT
Start: 2025-07-05 | End: 2025-07-06

## 2025-07-05 RX ORDER — HEPARIN SODIUM 5000 [USP'U]/ML
5000 INJECTION, SOLUTION INTRAVENOUS; SUBCUTANEOUS EVERY 8 HOURS SCHEDULED
Status: DISCONTINUED | OUTPATIENT
Start: 2025-07-05 | End: 2025-07-17 | Stop reason: HOSPADM

## 2025-07-05 RX ORDER — ONDANSETRON 2 MG/ML
4 INJECTION INTRAMUSCULAR; INTRAVENOUS EVERY 6 HOURS PRN
Status: DISCONTINUED | OUTPATIENT
Start: 2025-07-05 | End: 2025-07-17 | Stop reason: HOSPADM

## 2025-07-05 RX ADMIN — SODIUM CHLORIDE, SODIUM GLUCONATE, SODIUM ACETATE, POTASSIUM CHLORIDE, MAGNESIUM CHLORIDE, SODIUM PHOSPHATE, DIBASIC, AND POTASSIUM PHOSPHATE 1000 ML: .53; .5; .37; .037; .03; .012; .00082 INJECTION, SOLUTION INTRAVENOUS at 19:05

## 2025-07-05 RX ADMIN — ACETAMINOPHEN 650 MG: 325 TABLET ORAL at 15:20

## 2025-07-05 RX ADMIN — KETOROLAC TROMETHAMINE 15 MG: 30 INJECTION, SOLUTION INTRAMUSCULAR; INTRAVENOUS at 15:19

## 2025-07-05 RX ADMIN — CEFAZOLIN SODIUM 2000 MG: 2 SOLUTION INTRAVENOUS at 21:58

## 2025-07-05 RX ADMIN — HYDROMORPHONE HYDROCHLORIDE 0.5 MG: 1 INJECTION, SOLUTION INTRAMUSCULAR; INTRAVENOUS; SUBCUTANEOUS at 16:32

## 2025-07-05 RX ADMIN — HEPARIN SODIUM 5000 UNITS: 5000 INJECTION INTRAVENOUS; SUBCUTANEOUS at 21:58

## 2025-07-05 RX ADMIN — CEFAZOLIN SODIUM 2000 MG: 2 SOLUTION INTRAVENOUS at 14:50

## 2025-07-05 NOTE — ASSESSMENT & PLAN NOTE
R foot pain x4 days. Inability to bear weight. Limited ROM. Endorses fatigue, fevers, chills, loss of appetite. Febrile on admissoin to 101.5. ED Workup significant for WBC of 15.99, stable Hb, mild thrombocytopenia, mild hyponatremia, NORMA w Cr of 1.95 (baseline of 1), glucose of 191, AST elevated at 55, T bili 1.48. Lactic 1.5, procal 4.06, INR 1.34. Bcx drawn, and patient started on Ancef    Of note, patient has hx of R foot cellulitis, admission at [facility] in Jan 2024. Received Abx but left AMA before recommended amputation vs. Debridement by care team there. Has questionable hx of diabetes. Patient denies any hx of diabetes.    Plan:  Ancef for Abx Tx. Patient has allergies to penicillin but has had no rxn to ancef  F/u Bcx  F/u A1c , if elevated control sugars between 140-180 to promote healing  Encourage patient to eat/drink throughout day to promote healing  Will obtain EKG, given that patient has had prior Hx of SVT. Has not had recent EKG in close to 3.5 years  Patient should establish with podiatry outpatient, as he appears to have several ingrown toenails on R foot  Trend WBC, fever  Given patient's prior admission and him leaving AMA, continue to explain importance of receiving Abx therapy  Consider MRI should patient's exam, WBC, fever not improve to r/o OM

## 2025-07-05 NOTE — ASSESSMENT & PLAN NOTE
R foot pain x4 days. Inability to bear weight. Limited ROM. Endorses fatigue, fevers, chills, loss of appetite. Febrile on admissoin to 101.5. ED Workup significant for WBC of 15.99, stable Hb, mild thrombocytopenia, mild hyponatremia, NORMA w Cr of 1.95 (baseline of 1), glucose of 191, AST elevated at 55, T bili 1.48. Lactic 1.5, procal 4.06, INR 1.34. Bcx drawn, and patient started on Ancef    Of note, patient has hx of R foot cellulitis, admission at [facility] in Jan 2024. Received Abx but left AMA before recommended amputation vs. Debridement by care team there. Has questionable hx of diabetes. Patient denies any hx of diabetes.    Plan:  Ancef for Abx Tx. Patient has allergies to penicillin but has had no rxn to ancef  F/u Bcx  F/u A1c , if elevated control sugars between 140-180 to promote healing  Encourage patient to eat/drink throughout day to promote healing  Will obtain EKG, given that patient has had prior Hx of SVT. Has not had recent EKG in close to 3.5 years  Patient should establish with podiatry outpatient, as he appears to have several ingrown toenails on R foot  Trend WBC, fever  Given patient's prior admission and him leaving AMA, continue to explain importance of receiving Abx therapy  Consider MRI should patient's exam, WBC, fever not improve to r/o OM

## 2025-07-05 NOTE — ASSESSMENT & PLAN NOTE
Cr of 1.95 on admission. BUN:Cr ratio >20. Given this and patient's hx of not eating/drinking much for past 4 days along w end organ damage on other labs, suggest prerenal insult    Plan:  Avoid NSAIDs for pain mgmt  Given 1L bolus isolyte  Continue to trend Cr

## 2025-07-05 NOTE — ASSESSMENT & PLAN NOTE
Hx of elevated LFTs and INR of 1.34. Mild AST elevation this admission. Appears to have never had hepatitis panel in past    Plan:  F/u hepatitis panel

## 2025-07-05 NOTE — ASSESSMENT & PLAN NOTE
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 Unable to see in medical record if patient had residual deficits following diagnosis. Only + neuro findings are mild word finding difficulty    Plan:  Will contact family to obtain more hx and ask about word finding difficulty  Patient has mentioned that he has not been eating or drinking for the past 4 days. May be contributory to word finding difficulty  Can consider starting ASA 81 mg daily. Patient was not taking this as an outpatient, although it is listed in home meds

## 2025-07-05 NOTE — ASSESSMENT & PLAN NOTE
Remote hx of T2DM. Patient states that he does not take any medications and that he is not diabetics, although this is mentioned in prior documentation    Plan:  F/u HbA1c

## 2025-07-05 NOTE — PROGRESS NOTES
Senior Admission Note - Internal Medicine   Name: River Pedro 75 y.o. male I MRN: [id number]  Unit/Bed#: CRB I Date of Admission: 7/5/2025   Date of Service: 7/5/2025 I Hospital Day: 0       History and physical reviewed as per Dr. Suh. I agree with the documented assessment and plan, with the following additions/exceptions:    75-year-old male with remote history of left basal ganglia infarct in 2012, possible TIA in 2019, does not regularly follow with doctors.  Comes to the ED with 4-day history of worsening right foot pain and swelling.  Notes he has been having trouble with weightbearing and decreased range of motion.  Additionally complains of fever, chills and decreased appetite.    Reviewed previous records, he has a previous admission at [facility] in 2024 for right lower extremity cellulitis.  At that time left AMA and was discharged on oral cephalosporin, reports it did eventually resolve.  Assessment & Plan  Cellulitis of right foot  R foot pain x4 days. Inability to bear weight. Limited ROM. Endorses fatigue, fevers, chills, loss of appetite. Febrile on admissoin to 101.5. ED Workup significant for WBC of 15.99, stable Hb, mild thrombocytopenia, mild hyponatremia, NORMA w Cr of 1.95 (baseline of 1), glucose of 191, AST elevated at 55, T bili 1.48. Lactic 1.5, procal 4.06, INR 1.34. Bcx drawn, and patient started on Ancef    Of note, patient has hx of R foot cellulitis, admission at [facility] in Jan 2024. Received Abx but left AMA before recommended amputation vs. Debridement by care team there. Has questionable hx of diabetes. Patient denies any hx of diabetes.    Plan:  Ancef for Abx Tx. Patient has allergies to penicillin but has had no rxn to ancef  F/u Bcx  F/u A1c , if elevated control sugars between 140-180 to promote healing  Encourage patient to eat/drink throughout day to promote healing  Will obtain EKG, given that patient has had prior Hx of SVT. Has not had recent EKG in close to 3.5 years  Patient  should establish with podiatry outpatient, as he appears to have several ingrown toenails on R foot  Trend WBC, fever  Given patient's prior admission and him leaving AMA, continue to explain importance of receiving Abx therapy  Consider MRI should patient's exam, WBC, fever not improve to r/o OM  NORMA (acute kidney injury) (HCC)  Cr of 1.95 on admission. BUN:Cr ratio >20. Given this and patient's hx of not eating/drinking much for past 4 days along w end organ damage on other labs, suggest prerenal insult    Plan:  Avoid NSAIDs for pain mgmt  Given 1L bolus isolyte  Continue to trend Cr  History of diabetes mellitus  Remote hx of T2DM. Patient states that he does not take any medications and that he is not diabetics, although this is mentioned in prior documentation    Plan:  F/u HbA1c  CVA (cerebral vascular accident) (HCC)  Hx of L basal ganglia infarct in 2012 and TIA in 2019. Unable to see in medical record if patient had residual deficits following diagnosis. Only + neuro findings are mild word finding difficulty    Plan:  Will contact family to obtain more hx and ask about word finding difficulty  Patient has mentioned that he has not been eating or drinking for the past 4 days. May be contributory to word finding difficulty  Can consider starting ASA 81 mg daily. Patient was not taking this as an outpatient, although it is listed in home meds  Elevated LFTs  Hx of elevated LFTs and INR of 1.34. Mild AST elevation this admission. Appears to have never had hepatitis panel in past    Plan:  F/u hepatitis panel    Physical Exam  Vitals reviewed.   Constitutional:       General: He is not in acute distress.     Appearance: He is not toxic-appearing.   HENT:      Head: Normocephalic.      Mouth/Throat:      Mouth: Mucous membranes are dry.      Pharynx: Oropharynx is clear.     Eyes:      Pupils: Pupils are equal, round, and reactive to light.       Cardiovascular:      Rate and Rhythm: Normal rate and regular  rhythm.   Pulmonary:      Effort: Pulmonary effort is normal. No respiratory distress.   Abdominal:      General: There is no distension.      Palpations: Abdomen is soft.      Tenderness: There is no abdominal tenderness.     Musculoskeletal:      Left lower leg: No edema.      Comments: Nonpitting right lower extremity edema with surrounding erythema, no visible skin breaks or lesions noted.  No fluctuance.  No lymphangitis.     Skin:     General: Skin is warm and dry.     Neurological:      General: No focal deficit present.      Mental Status: He is alert and oriented to person, place, and time. Mental status is at baseline.          Patient will be admitted to [facility] under Dr. Blanton's service for continued IV antibiotics for treatment of nonpurulent cellulitis, as well as IV fluid hydration in setting of sepsis with NORMA.    Eren Fox MD  [facility]  Internal Medicine Residency PGY-3

## 2025-07-05 NOTE — ED PROVIDER NOTES
"Time reflects when diagnosis was documented in both MDM as applicable and the Disposition within this note       Time User Action Codes Description Comment    7/5/2025  3:45 PM Wicho Huddleston Add [L03.90] Cellulitis     7/5/2025  3:45 PM Wicho Huddleston Add [N17.9] NORMA (acute kidney injury) (HCC)     7/5/2025  3:45 PM Wicho Huddleston Add [E11.9] Diabetes (HCC)           ED Disposition       ED Disposition   Admit    Condition   Stable    Date/Time   Sat Jul 5, 2025  3:45 PM    Comment   Case was discussed with Dr. Fox and the patient's admission status was agreed to be Admission Status: inpatient status to the service of Dr. Blanton.               Assessment & Plan       Medical Decision Making  Patient is a 75 y.o. male who presents to the ED with right foot swelling, word finding difficulty.    Vital signs show the patient is febrile.  Physical exam as above.    History and physical exam most consistent with cellulitis. However, differential diagnosis included but not limited to stroke unlikely, DVT unlikely, osteomyelitis unlikely, at risk for sepsis, electrolyte abnormality, decreased kidney function.     Plan: We will order CBC, CMP, coags, lactic acid, Pro-Feliberto, blood cultures, Tylenol, Toradol, and Ancef.    View ED course above for further discussion on patient workup.     On review of previous records, patient was admitted to [facility] on 1/13/2024.  Discharge note reviewed.    All labs reviewed and utilized in the medical decision making process  All radiology studies independently viewed by me and interpreted by the radiologist.  I reviewed all testing with the patient.     Upon re-evaluation, patient remained stable.    Disposition: Patient admitted to [facility].    Portions of the record may have been created with voice recognition software. Occasional wrong word or \"sound a like\" substitutions may have occurred due to the inherent limitations of voice recognition software. Read the chart " carefully and recognize, using context, where substitutions have occurred.     Amount and/or Complexity of Data Reviewed  Labs: ordered. Decision-making details documented in ED Course.    Risk  OTC drugs.  Prescription drug management.  Decision regarding hospitalization.        ED Course as of 07/05/25 1558   Sat Jul 05, 2025   1428 Temperature(!): 101.5 °F (38.6 °C)  Rectal temp   1428 Blood Pressure: 155/72   1428 Pulse: 88   1428 Respirations: 18   1428 SpO2: 96 %   1511 CBC and differential(!)  Elevated WBC, left shift   1521 Protime-INR(!)  Reassuring   1521 APTT  Reassuring   1523 LACTIC ACID: 1.5  Reassuring   1527 Comprehensive metabolic panel(!)  NORMA, mild hyponatremia, mildly elevated bili, patient does not meet criteria for severe sepsis   1533 Procalcitonin(!): 4.06   1541 SOD contacted for admission.       Medications   HYDROmorphone (DILAUDID) injection 0.5 mg (has no administration in time range)   ceFAZolin (ANCEF) IVPB (premix in dextrose) 2,000 mg 50 mL (0 mg Intravenous Stopped 7/5/25 1520)   acetaminophen (TYLENOL) tablet 650 mg (650 mg Oral Given 7/5/25 1520)   ketorolac (TORADOL) injection 15 mg (15 mg Intravenous Given 7/5/25 1519)       ED Risk Strat Scores         Stroke Assessment       Row Name 07/05/25 1355             NIH Stroke Scale    Interval Baseline      Level of Consciousness (1a.) 0      LOC Questions (1b.) 0      LOC Commands (1c.) 0      Best Gaze (2.) 0      Visual (3.) 0      Facial Palsy (4.) 0      Motor Arm, Left (5a.) 0      Motor Arm, Right (5b.) 0      Motor Leg, Left (6a.) 0      Motor Leg, Right (6b.) 0      Limb Ataxia (7.) 0      Sensory (8.) 0      Best Language (9.) 0      Dysarthria (10.) 0      Extinction and Inattention (11.) (Formerly Neglect) 0      Total 0                    Flowsheet Row Most Recent Value   Thrombolytic Decision Options    Thrombolytic Decision Patient not a candidate.   Patient is not a candidate options Symptoms resolved/clearly non  disabling.                    No data recorded                            History of Present Illness       Chief Complaint   Patient presents with    Foot Swelling     Right foot swelling, redness, pain over the last 4 days, worse today resulting in EMS called.     Aphasia     Pt started to have trouble saying words he wanted to communicate starting last night apx 1830. Dr Johnston at bedside for eval.        Past Medical History[1]   Past Surgical History[2]   Family History[3]   Social History[4]   E-Cigarette/Vaping    E-Cigarette Use Never User       E-Cigarette/Vaping Substances      I have reviewed and agree with the history as documented.     Patient is a 75-year-old male with a past medical history of diabetes presents today with swelling of his right foot as well as some expressive aphasia.  The patient states that over the past 4 days he has noticed some redness and swelling of his right foot.  Patient states that the symptoms have gotten significantly worse over the past 2 days.  Patient notes chills but no fevers, however denies any other systemic symptoms.    The patient presents with his ex-wife who notes that since last night he has been having some slight word finding difficulty.  Patient states that this is new and he has never experienced this before.  They deny any other neurologic symptoms including weakness, facial droop, visual disturbances, sensory loss.        Review of Systems   Constitutional:  Positive for chills and fever.   HENT:  Negative for ear pain and sore throat.    Eyes:  Negative for pain and visual disturbance.   Respiratory:  Negative for cough and shortness of breath.    Cardiovascular:  Negative for chest pain and palpitations.   Gastrointestinal:  Negative for abdominal pain and vomiting.   Genitourinary:  Negative for dysuria and hematuria.   Musculoskeletal:  Negative for arthralgias and back pain.   Skin:  Positive for wound (Swelling and erythema of right foot). Negative  for color change and rash.   Neurological:  Negative for seizures and syncope.        Slight word finding difficulty   All other systems reviewed and are negative.          Objective       ED Triage Vitals [07/05/25 1356]   Temperature Pulse Blood Pressure Respirations SpO2 Patient Position - Orthostatic VS   99.1 °F (37.3 °C) 88 155/72 18 96 % --      Temp Source Heart Rate Source BP Location FiO2 (%) Pain Score    Oral -- -- -- 7      Vitals      Date and Time Temp Pulse SpO2 Resp BP Pain Score FACES Pain Rating User   07/05/25 1519 -- -- -- -- -- 9 -- HK   07/05/25 1424 101.5 °F (38.6 °C) -- -- -- -- -- --    07/05/25 1356 99.1 °F (37.3 °C) 88 96 % 18 155/72 7 -- HK            Physical Exam  Vitals and nursing note reviewed.   Constitutional:       General: He is not in acute distress.     Appearance: Normal appearance. He is well-developed. He is not ill-appearing.   HENT:      Head: Normocephalic and atraumatic.      Nose: Nose normal.      Mouth/Throat:      Mouth: Mucous membranes are moist.      Pharynx: Oropharynx is clear.     Eyes:      General: No visual field deficit.     Extraocular Movements: Extraocular movements intact.      Conjunctiva/sclera: Conjunctivae normal.      Pupils: Pupils are equal, round, and reactive to light.       Cardiovascular:      Rate and Rhythm: Normal rate and regular rhythm.      Pulses: Normal pulses.      Heart sounds: Normal heart sounds. No murmur heard.     No friction rub. No gallop.   Pulmonary:      Effort: Pulmonary effort is normal. No respiratory distress.      Breath sounds: Normal breath sounds. No stridor. No wheezing, rhonchi or rales.   Abdominal:      General: Abdomen is flat. There is no distension.      Palpations: Abdomen is soft. There is no mass.      Tenderness: There is no abdominal tenderness. There is no guarding or rebound.     Musculoskeletal:         General: Swelling present. Normal range of motion.      Cervical back: Normal range of motion  and neck supple.      Comments: The patient has erythema, pitting edema, and warmth to touch of his right foot.  Erythema extends to distal shin.  Patient has normal sensation and motor of foot.  Normal pulses and cap refill.     Skin:     General: Skin is warm and dry.      Capillary Refill: Capillary refill takes less than 2 seconds.     Neurological:      General: No focal deficit present.      Mental Status: He is alert and oriented to person, place, and time.      GCS: GCS eye subscore is 4. GCS verbal subscore is 5. GCS motor subscore is 6.      Cranial Nerves: Cranial nerves 2-12 are intact. No cranial nerve deficit or facial asymmetry.      Sensory: Sensation is intact. No sensory deficit.      Motor: Motor function is intact. No weakness, atrophy, abnormal muscle tone or pronator drift.      Coordination: Coordination is intact. Finger-Nose-Finger Test and Heel to Shin Test normal.      Comments: NIH 0   Psychiatric:         Mood and Affect: Mood normal.         Results Reviewed       Procedure Component Value Units Date/Time    Procalcitonin [[id number]]  (Abnormal) Collected: 07/05/25 1453    Lab Status: Final result Specimen: Blood from Arm, Right Updated: 07/05/25 1533     Procalcitonin 4.06 ng/ml     Comprehensive metabolic panel [[id number]]  (Abnormal) Collected: 07/05/25 1453    Lab Status: Final result Specimen: Blood from Arm, Right Updated: 07/05/25 1524     Sodium 132 mmol/L      Potassium 3.5 mmol/L      Chloride 100 mmol/L      CO2 22 mmol/L      ANION GAP 10 mmol/L      BUN 42 mg/dL      Creatinine 1.95 mg/dL      Glucose 191 mg/dL      Calcium 8.7 mg/dL      AST 55 U/L      ALT 30 U/L      Alkaline Phosphatase 69 U/L      Total Protein 7.0 g/dL      Albumin 3.7 g/dL      Total Bilirubin 1.48 mg/dL      eGFR 32 ml/min/1.73sq m     Narrative:      National Kidney Disease Foundation guidelines for Chronic Kidney Disease (CKD):     Stage 1 with normal or high GFR (GFR > 90 mL/min/1.73 square  meters)    Stage 2 Mild CKD (GFR = 60-89 mL/min/1.73 square meters)    Stage 3A Moderate CKD (GFR = 45-59 mL/min/1.73 square meters)    Stage 3B Moderate CKD (GFR = 30-44 mL/min/1.73 square meters)    Stage 4 Severe CKD (GFR = 15-29 mL/min/1.73 square meters)    Stage 5 End Stage CKD (GFR <15 mL/min/1.73 square meters)  Note: GFR calculation is accurate only with a steady state creatinine    Lactic acid [[id number]]  (Normal) Collected: 07/05/25 1453    Lab Status: Final result Specimen: Blood from Arm, Right Updated: 07/05/25 1523     LACTIC ACID 1.5 mmol/L     Narrative:      Result may be elevated if tourniquet was used during collection.    Protime-INR [[id number]]  (Abnormal) Collected: 07/05/25 1453    Lab Status: Final result Specimen: Blood from Arm, Right Updated: 07/05/25 1519     Protime 16.8 seconds      INR 1.34    Narrative:      INR Therapeutic Range    Indication                                             INR Range      Atrial Fibrillation                                               2.0-3.0  Hypercoagulable State                                    2.0.2.3  Left Ventricular Asist Device                            2.0-3.0  Mechanical Heart Valve                                  -    Aortic(with afib, MI, embolism, HF, LA enlargement,    and/or coagulopathy)                                     2.0-3.0 (2.5-3.5)     Mitral                                                             2.5-3.5  Prosthetic/Bioprosthetic Heart Valve               2.0-3.0  Venous thromboembolism (VTE: VT, PE        2.0-3.0    APTT [[id number]]  (Normal) Collected: 07/05/25 1453    Lab Status: Final result Specimen: Blood from Arm, Right Updated: 07/05/25 1519     PTT 29 seconds     CBC and differential [[id number]]  (Abnormal) Collected: 07/05/25 1453    Lab Status: Final result Specimen: Blood from Arm, Right Updated: 07/05/25 1506     WBC 15.99 Thousand/uL      RBC 4.29 Million/uL      Hemoglobin 13.0 g/dL      Hematocrit 38.6  %      MCV 90 fL      MCH 30.3 pg      MCHC 33.7 g/dL      RDW 14.8 %      MPV 13.2 fL      Platelets 112 Thousands/uL      nRBC 0 /100 WBCs      Segmented % 82 %      Immature Grans % 1 %      Lymphocytes % 3 %      Monocytes % 14 %      Eosinophils Relative 0 %      Basophils Relative 0 %      Absolute Neutrophils 13.05 Thousands/µL      Absolute Immature Grans 0.09 Thousand/uL      Absolute Lymphocytes 0.55 Thousands/µL      Absolute Monocytes 2.28 Thousand/µL      Eosinophils Absolute 0.00 Thousand/µL      Basophils Absolute 0.02 Thousands/µL     Blood culture #2 [[id number]] Collected: 07/05/25 1453    Lab Status: In process Specimen: Blood from Line, Venous Updated: 07/05/25 1459    Blood culture #1 [[id number]] Collected: 07/05/25 1453    Lab Status: In process Specimen: Blood from Line, Venous Updated: 07/05/25 1459            No orders to display       Procedures    ED Medication and Procedure Management   Prior to Admission Medications   Prescriptions Last Dose Informant Patient Reported? Taking?   aspirin 81 mg chewable tablet   No No   Sig: Chew 1 tablet (81 mg total) daily   atorvastatin (LIPITOR) 40 mg tablet   No No   Sig: Take 1 tablet (40 mg total) by mouth daily with dinner      Facility-Administered Medications: None     Patient's Medications   Discharge Prescriptions    No medications on file     No discharge procedures on file.  ED SEPSIS DOCUMENTATION   Time reflects when diagnosis was documented in both MDM as applicable and the Disposition within this note       Time User Action Codes Description Comment    7/5/2025  3:45 PM Wicho Huddleston Add [L03.90] Cellulitis     7/5/2025  3:45 PM Wicho Huddleston Add [N17.9] NORMA (acute kidney injury) (HCC)     7/5/2025  3:45 PM Wicho Huddleston Add [E11.9] Diabetes (HCC)            Initial Sepsis Screening       Row Name 07/05/25 1525                Is the patient's history suggestive of a new or worsening infection? Yes (Proceed)  -ODILIA        Suspected source  of infection soft tissue  -ODILIA        Indicate SIRS criteria Hyperthemia > 38.3C (100.9F) OR Hypothermia <36C (96.8F);Leukocytosis (WBC > 27010 IJL) OR Leukopenia (WBC <4000 IJL) OR Bandemia (WBC >10% bands)  -ODILIA        Are two or more of the above signs & symptoms of infection both present and new to the patient? Yes (Proceed)  -ODILIA        Assess for evidence of organ dysfunction: Are any of the below criteria present within 6 hours of suspected infection and SIRS criteria that are NOT considered to be chronic conditions? --                  User Key  (r) = Recorded By, (t) = Taken By, (c) = Cosigned By      Initials Name Provider Type    ODILIA iWcho Huddleston DO Resident                         [1] No past medical history on file.  [2] No past surgical history on file.  [3] No family history on file.  [4]   Social History  Tobacco Use    Smoking status: Never    Smokeless tobacco: Never   Vaping Use    Vaping status: Never Used   Substance Use Topics    Alcohol use: Never    Drug use: Yes     Types: Marijuana        Wicho Huddleston DO  07/05/25 1551

## 2025-07-05 NOTE — H&P
H&P - Internal Medicine   Name: River Pedro 75 y.o. male I MRN: [id number]  Unit/Bed#: CRB I Date of Admission: 7/5/2025   Date of Service: 7/5/2025 I Hospital Day: 0     Assessment & Plan  Cellulitis of right foot  R foot pain x4 days. Inability to bear weight. Limited ROM. Endorses fatigue, fevers, chills, loss of appetite. Febrile on admissoin to 101.5. ED Workup significant for WBC of 15.99, stable Hb, mild thrombocytopenia, mild hyponatremia, NORMA w Cr of 1.95 (baseline of 1), glucose of 191, AST elevated at 55, T bili 1.48. Lactic 1.5, procal 4.06, INR 1.34. Bcx drawn, and patient started on Ancef    Of note, patient has hx of R foot cellulitis, admission at [facility] in Jan 2024. Received Abx but left AMA before recommended amputation vs. Debridement by care team there. Has questionable hx of diabetes. Patient denies any hx of diabetes.    Plan:  Ancef for Abx Tx. Patient has allergies to penicillin but has had no rxn to ancef  F/u Bcx  F/u A1c , if elevated control sugars between 140-180 to promote healing  Encourage patient to eat/drink throughout day to promote healing  Will obtain EKG, given that patient has had prior Hx of SVT. Has not had recent EKG in close to 3.5 years  Patient should establish with podiatry outpatient, as he appears to have several ingrown toenails on R foot  Trend WBC, fever  Given patient's prior admission and him leaving AMA, continue to explain importance of receiving Abx therapy  Consider MRI should patient's exam, WBC, fever not improve to r/o OM  NORMA (acute kidney injury) (HCC)  Cr of 1.95 on admission. BUN:Cr ratio >20. Given this and patient's hx of not eating/drinking much for past 4 days along w end organ damage on other labs, suggest prerenal insult    Plan:  Avoid NSAIDs for pain mgmt  Given 1L bolus isolyte  Continue to trend Cr  History of diabetes mellitus  Remote hx of T2DM. Patient states that he does not take any medications and that he is not diabetics, although this  is mentioned in prior documentation    Plan:  F/u HbA1c  CVA (cerebral vascular accident) (HCC)  Hx of L basal ganglia infarct in 2012 and TIA in 2019. Unable to see in medical record if patient had residual deficits following diagnosis. Only + neuro findings are mild word finding difficulty    Plan:  Will contact family to obtain more hx and ask about word finding difficulty  Patient has mentioned that he has not been eating or drinking for the past 4 days. May be contributory to word finding difficulty  Can consider starting ASA 81 mg daily. Patient was not taking this as an outpatient, although it is listed in home meds  Elevated LFTs  Hx of elevated LFTs and INR of 1.34. Mild AST elevation this admission. Appears to have never had hepatitis panel in past    Plan:  F/u hepatitis panel    Code Status: Level 1 - Full Code  Admission Status: INPATIENT   Disposition: Patient requires Med Surg    Admit to team: SOD TEAM A    History of Present Illness   River Pedro is a 76 y/o M w remote hx of CVA- L basal ganglia infarct in 2012, TIA 2019 (poor f/u with doctors) who presents to the ED with R foot pain for the past 4 days. When asked what brought him to the hospital, he stated that he was having extreme difficulty putting weight on the food and was unable to move his foot much. He also presents with mild word-finding difficulty, fatigue, fevers, chills, loss of appetite for these 4 days. He denies any falls with his difficulty walking, any trauma to the foot, and any drainage from the foot. He also denies any loss of sensation over this area. He cannot remember the last time that he saw a provider, and he does not take any medications at home. Of note, he was admitted at [facility] on 01/13/2024 for a similar infection that was labeled a diabetic foot wound extending from R great toe up his distal R leg. He denies any hx of diabetes. Patient left hospital AMA, although care team there recommended  debridement vs. Amputation. Imaging at this visit was negative for OM. Of note, patient was seen on 04/04/2022 by cardiology following an admission in March of this year for ambulatory dysfxn. He was found down in his home on the floor for 2 days and was in SVT upon presentation to the ED. Patient declined any further cardiac testing beyond an echocardiogram done this year (findings below).    Review of Systems  Medical History Review: I have reviewed the patient's PMH, PSH, Social History, Family History, Meds, and Allergies   Historical Information   Past Medical History[1]  Past Surgical History[2]  Social History[3]  E-Cigarette/Vaping    E-Cigarette Use Never User      E-Cigarette/Vaping Substances     Family history non-contributory  Social History[4]    Current Facility-Administered Medications:     acetaminophen (TYLENOL) tablet 975 mg, Q8H PRN    heparin (porcine) subcutaneous injection 5,000 Units, Q8H JOSÉ MIGUEL    HYDROmorphone HCl (DILAUDID) injection 0.2 mg, Q2H PRN    multi-electrolyte (Plasmalyte-A/Isolyte-S PH 7.4/Normosol-R) IV bolus 1,000 mL, Once    naloxone (NARCAN) 0.04 mg/mL syringe 0.04 mg, Q1MIN PRN    ondansetron (ZOFRAN) injection 4 mg, Q6H PRN    oxyCODONE (ROXICODONE) split tablet 2.5 mg, Q4H PRN **OR** oxyCODONE (ROXICODONE) IR tablet 5 mg, Q4H PRN    polyethylene glycol (MIRALAX) packet 17 g, Daily PRN  Prior to Admission Medications   Prescriptions Last Dose Informant Patient Reported? Taking?   aspirin 81 mg chewable tablet   No No   Sig: Chew 1 tablet (81 mg total) daily   atorvastatin (LIPITOR) 40 mg tablet   No No   Sig: Take 1 tablet (40 mg total) by mouth daily with dinner      Facility-Administered Medications: None     Penicillins    Objective :  Temp:  [99.1 °F (37.3 °C)-101.5 °F (38.6 °C)] 101.5 °F (38.6 °C)  HR:  [64-88] 64  BP: (130-155)/(61-72) 140/68  Resp:  [18] 18  SpO2:  [94 %-96 %] 96 %  O2 Device: None (Room air)    Intake & Output:  I/O         07/03 0701 07/04 0700  07/04 0701 07/05 0700 07/05 0701 07/06 0700    IV Piggyback   50    Total Intake   50    Net   +50                 Weights:        There is no height or weight on file to calculate BMI.  Weight (last 2 days)       None          Physical Exam  Constitutional:       Appearance: He is obese. He is diaphoretic.   HENT:      Head: Normocephalic and atraumatic.      Right Ear: External ear normal.      Left Ear: External ear normal.      Nose: Nose normal. No congestion.      Mouth/Throat:      Mouth: Mucous membranes are moist.      Comments: Poor dentition    Eyes:      General: No scleral icterus.        Right eye: No discharge.         Left eye: No discharge.      Conjunctiva/sclera: Conjunctivae normal.       Cardiovascular:      Rate and Rhythm: Normal rate and regular rhythm.      Pulses: Normal pulses.      Heart sounds: Normal heart sounds. No murmur heard.     No friction rub. No gallop.   Pulmonary:      Effort: Pulmonary effort is normal. No respiratory distress.      Breath sounds: Normal breath sounds. No wheezing, rhonchi or rales.   Abdominal:      General: Abdomen is flat. There is no distension.      Palpations: Abdomen is soft.      Tenderness: There is no abdominal tenderness.     Musculoskeletal:         General: Swelling (of R foot and distal R leg), tenderness (R foot/distal R leg) and deformity (ingrown toe nails present on patient's R foot) present.      Right lower leg: No edema.      Left lower leg: No edema.     Skin:     Capillary Refill: Capillary refill takes less than 2 seconds.      Findings: Erythema (over R foot and distal R leg) present. No rash.     Neurological:      Mental Status: He is alert and oriented to person, place, and time.      Sensory: No sensory deficit.      Motor: No weakness.      Comments: Mild word finding difficulties while interviewing patient   Psychiatric:         Mood and Affect: Mood normal.         Behavior: Behavior normal.         Thought Content: Thought  "content normal.         Judgment: Judgment normal.         Lab Results: I have reviewed the following results:  Recent Labs     07/05/25  1453   WBC 15.99*   HGB 13.0   HCT 38.6   *   SODIUM 132*   K 3.5      CO2 22   BUN 42*   CREATININE 1.95*   GLUC 191*   AST 55*   ALT 30   ALB 3.7   TBILI 1.48*   ALKPHOS 69   PTT 29   INR 1.34*   LACTICACID 1.5     Micro:  No results found for: \"BLOODCX\", \"URINECX\", \"WOUNDCULT\", \"SPUTUMCULTUR\"    Imaging Results Review: No pertinent imaging studies reviewed.    Other Study Results Review: EKG was reviewed. Last EKG on file from 03/16/2022:     Echo from 2022: EF of 55%, Grade I diastolic dysfxn, Mild to mod regurg of aortic valve. Mild tricuspid and pulmonic regurg    Currently Ordered Meds:   Current Facility-Administered Medications:     acetaminophen (TYLENOL) tablet 975 mg, Q8H PRN    heparin (porcine) subcutaneous injection 5,000 Units, Q8H JOSÉ MIGUEL    HYDROmorphone HCl (DILAUDID) injection 0.2 mg, Q2H PRN    multi-electrolyte (Plasmalyte-A/Isolyte-S PH 7.4/Normosol-R) IV bolus 1,000 mL, Once    naloxone (NARCAN) 0.04 mg/mL syringe 0.04 mg, Q1MIN PRN    ondansetron (ZOFRAN) injection 4 mg, Q6H PRN    oxyCODONE (ROXICODONE) split tablet 2.5 mg, Q4H PRN **OR** oxyCODONE (ROXICODONE) IR tablet 5 mg, Q4H PRN    polyethylene glycol (MIRALAX) packet 17 g, Daily PRN  VTE Pharmacologic Prophylaxis: VTE covered by:  heparin (porcine), Subcutaneous      VTE Mechanical Prophylaxis: sequential compression device    Administrative Statements   I have spent a total time of 45 minutes in caring for this patient on the day of the visit/encounter including Diagnostic results, Risks and benefits of tx options, Patient and family education, Importance of tx compliance, Risk factor reductions, Counseling / Coordination of care, Documenting in the medical record, Reviewing/placing orders in the medical record (including tests, medications, and/or procedures), Obtaining or reviewing " "history  , and Communicating with other healthcare professionals .    Portions of the record may have been created with voice recognition software.  Occasional wrong word or \"sound a like\" substitutions may have occurred due to the inherent limitations of voice recognition software.  Read the chart carefully and recognize, using context, where substitutions have occurred.  ==  Loi Suh MD  [facility]  Internal Medicine Residency PGY-I       [1] No past medical history on file.  [2] No past surgical history on file.  [3]   Social History  Tobacco Use    Smoking status: Never    Smokeless tobacco: Never   Vaping Use    Vaping status: Never Used   Substance and Sexual Activity    Alcohol use: Never    Drug use: Yes     Types: Marijuana   [4]   Social History  Tobacco Use    Smoking status: Never    Smokeless tobacco: Never   Vaping Use    Vaping status: Never Used   Substance and Sexual Activity    Alcohol use: Never    Drug use: Yes     Types: Marijuana     "

## 2025-07-05 NOTE — SEPSIS NOTE
Sepsis Note   River Pedro 75 y.o. male MRN: [id number]  Unit/Bed#: CRB Encounter: [id number]       Initial Sepsis Screening       Row Name 07/05/25 1525                Is the patient's history suggestive of a new or worsening infection? Yes (Proceed)  -ODILIA        Suspected source of infection soft tissue  -ODILIA        Indicate SIRS criteria Hyperthemia > 38.3C (100.9F) OR Hypothermia <36C (96.8F);Leukocytosis (WBC > 65121 IJL) OR Leukopenia (WBC <4000 IJL) OR Bandemia (WBC >10% bands)  -ODILIA        Are two or more of the above signs & symptoms of infection both present and new to the patient? Yes (Proceed)  -ODILIA        Assess for evidence of organ dysfunction: Are any of the below criteria present within 6 hours of suspected infection and SIRS criteria that are NOT considered to be chronic conditions? --                  User Key  (r) = Recorded By, (t) = Taken By, (c) = Cosigned By      Initials Name Provider Type    DO Jovon Lewis                   Initial Sepsis Screening       Row Name 07/05/25 1525                   Initial Sepsis Assessment    Is the patient's history suggestive of a new or worsening infection? Yes (Proceed)  -ODILIA        Suspected source of infection soft tissue  -ODILIA        Indicate SIRS criteria Hyperthemia > 38.3C (100.9F) OR Hypothermia <36C (96.8F);Leukocytosis (WBC > 67780 IJL) OR Leukopenia (WBC <4000 IJL) OR Bandemia (WBC >10% bands)  -ODILIA        Are two or more of the above signs & symptoms of infection both present and new to the patient? Yes (Proceed)  -ODILIA                  User Key  (r) = Recorded By, (t) = Taken By, (c) = Cosigned By      Initials Name Provider Type    DO Jovon Lewis                         There is no height or weight on file to calculate BMI.  Wt Readings from Last 1 Encounters:   04/04/22 101 kg (221 lb 12.8 oz)        Patient weight not recorded

## 2025-07-05 NOTE — ED NOTES
Patient c/o headache at this time. Denies any other symptoms. Dr. Huddleston notified.     Char Coreas RN  07/05/25 9883

## 2025-07-05 NOTE — ED ATTENDING ATTESTATION
7/5/2025  I, Daljit Lehman MD, saw and evaluated the patient. I have discussed the patient with the resident/non-physician practitioner and agree with the resident's/non-physician practitioner's findings, Plan of Care, and MDM as documented in the resident's/non-physician practitioner's note, except where noted. All available labs and Radiology studies were reviewed.  I was present for key portions of any procedure(s) performed by the resident/non-physician practitioner and I was immediately available to provide assistance.       At this point I agree with the current assessment done in the Emergency Department.  I have conducted an independent evaluation of this patient a history and physical is as follows:    75-year-old male with history of DM presenting with right foot swelling, erythema and pain and subjective fever.  He does have fever in the emergency department.  There is significant swelling and erythema to the right foot consistent with cellulitis.  White blood cell count 16,000.  Meet SIRS criteria.  Will start IV antibiotics and admit.  Of note there had been triage note regarding expressive aphasia but the patient does not have this on my exam.  He does not have any other abnormal neurological findings on neuro exam.    ED Course         Critical Care Time  Procedures

## 2025-07-06 ENCOUNTER — APPOINTMENT (INPATIENT)
Dept: RADIOLOGY | Facility: HOSPITAL | Age: 75
DRG: 853 | End: 2025-07-06
Payer: MEDICARE

## 2025-07-06 PROBLEM — T14.8XXA OPEN WOUND: Status: ACTIVE | Noted: 2025-07-06

## 2025-07-06 PROBLEM — R78.81 GRAM-POSITIVE COCCI BACTEREMIA: Status: ACTIVE | Noted: 2025-07-06

## 2025-07-06 LAB
ANION GAP SERPL CALCULATED.3IONS-SCNC: 11 MMOL/L (ref 4–13)
ATRIAL RATE: 58 BPM
BACTERIA UR QL AUTO: ABNORMAL /HPF
BASOPHILS # BLD AUTO: 0.02 THOUSANDS/ÂΜL (ref 0–0.1)
BASOPHILS NFR BLD AUTO: 0 % (ref 0–1)
BILIRUB UR QL STRIP: NEGATIVE
BUDDING YEAST: PRESENT
BUN SERPL-MCNC: 51 MG/DL (ref 5–25)
CALCIUM SERPL-MCNC: 8.2 MG/DL (ref 8.4–10.2)
CHLORIDE SERPL-SCNC: 102 MMOL/L (ref 96–108)
CLARITY UR: ABNORMAL
CO2 SERPL-SCNC: 21 MMOL/L (ref 21–32)
COLOR UR: YELLOW
CREAT SERPL-MCNC: 1.87 MG/DL (ref 0.6–1.3)
EOSINOPHIL # BLD AUTO: 0 THOUSAND/ÂΜL (ref 0–0.61)
EOSINOPHIL NFR BLD AUTO: 0 % (ref 0–6)
ERYTHROCYTE [DISTWIDTH] IN BLOOD BY AUTOMATED COUNT: 14.7 % (ref 11.6–15.1)
EST. AVERAGE GLUCOSE BLD GHB EST-MCNC: 146 MG/DL
GFR SERPL CREATININE-BSD FRML MDRD: 34 ML/MIN/1.73SQ M
GLUCOSE SERPL-MCNC: 149 MG/DL (ref 65–140)
GLUCOSE SERPL-MCNC: 215 MG/DL (ref 65–140)
GLUCOSE UR STRIP-MCNC: NEGATIVE MG/DL
HAV IGM SER QL: NORMAL
HBA1C MFR BLD: 6.7 %
HBV CORE IGM SER QL: NORMAL
HBV SURFACE AG SER QL: NORMAL
HCT VFR BLD AUTO: 39.8 % (ref 36.5–49.3)
HCV AB SER QL: NORMAL
HGB BLD-MCNC: 13.2 G/DL (ref 12–17)
HGB UR QL STRIP.AUTO: ABNORMAL
IMM GRANULOCYTES # BLD AUTO: 0.06 THOUSAND/UL (ref 0–0.2)
IMM GRANULOCYTES NFR BLD AUTO: 1 % (ref 0–2)
KETONES UR STRIP-MCNC: NEGATIVE MG/DL
LEUKOCYTE ESTERASE UR QL STRIP: ABNORMAL
LYMPHOCYTES # BLD AUTO: 0.52 THOUSANDS/ÂΜL (ref 0.6–4.47)
LYMPHOCYTES NFR BLD AUTO: 4 % (ref 14–44)
MCH RBC QN AUTO: 30.1 PG (ref 26.8–34.3)
MCHC RBC AUTO-ENTMCNC: 33.2 G/DL (ref 31.4–37.4)
MCV RBC AUTO: 91 FL (ref 82–98)
MONOCYTES # BLD AUTO: 1.37 THOUSAND/ÂΜL (ref 0.17–1.22)
MONOCYTES NFR BLD AUTO: 11 % (ref 4–12)
MUCOUS THREADS UR QL AUTO: ABNORMAL
NEUTROPHILS # BLD AUTO: 10.54 THOUSANDS/ÂΜL (ref 1.85–7.62)
NEUTS SEG NFR BLD AUTO: 84 % (ref 43–75)
NITRITE UR QL STRIP: NEGATIVE
NON-SQ EPI CELLS URNS QL MICRO: ABNORMAL /HPF
NRBC BLD AUTO-RTO: 0 /100 WBCS
P AXIS: 46 DEGREES
PH UR STRIP.AUTO: 6.5 [PH]
PLATELET # BLD AUTO: 102 THOUSANDS/UL (ref 149–390)
PMV BLD AUTO: 13.2 FL (ref 8.9–12.7)
POTASSIUM SERPL-SCNC: 3.3 MMOL/L (ref 3.5–5.3)
PR INTERVAL: 190 MS
PROT UR STRIP-MCNC: ABNORMAL MG/DL
QRS AXIS: -58 DEGREES
QRSD INTERVAL: 128 MS
QT INTERVAL: 440 MS
QTC INTERVAL: 431 MS
RBC # BLD AUTO: 4.39 MILLION/UL (ref 3.88–5.62)
RBC #/AREA URNS AUTO: ABNORMAL /HPF
SODIUM SERPL-SCNC: 134 MMOL/L (ref 135–147)
SP GR UR STRIP.AUTO: 1.03 (ref 1–1.03)
T WAVE AXIS: -28 DEGREES
UROBILINOGEN UR STRIP-ACNC: <2 MG/DL
VENTRICULAR RATE: 58 BPM
WBC # BLD AUTO: 12.51 THOUSAND/UL (ref 4.31–10.16)
WBC #/AREA URNS AUTO: ABNORMAL /HPF

## 2025-07-06 PROCEDURE — 73630 X-RAY EXAM OF FOOT: CPT

## 2025-07-06 PROCEDURE — 81001 URINALYSIS AUTO W/SCOPE: CPT

## 2025-07-06 PROCEDURE — 0JBQ0ZZ EXCISION OF RIGHT FOOT SUBCUTANEOUS TISSUE AND FASCIA, OPEN APPROACH: ICD-10-PCS | Performed by: PODIATRIST

## 2025-07-06 PROCEDURE — 99232 SBSQ HOSP IP/OBS MODERATE 35: CPT | Performed by: INTERNAL MEDICINE

## 2025-07-06 PROCEDURE — 82948 REAGENT STRIP/BLOOD GLUCOSE: CPT

## 2025-07-06 PROCEDURE — 80048 BASIC METABOLIC PNL TOTAL CA: CPT

## 2025-07-06 PROCEDURE — 93010 ELECTROCARDIOGRAM REPORT: CPT | Performed by: INTERNAL MEDICINE

## 2025-07-06 PROCEDURE — 85025 COMPLETE CBC W/AUTO DIFF WBC: CPT

## 2025-07-06 RX ORDER — INSULIN LISPRO 100 [IU]/ML
1-5 INJECTION, SOLUTION INTRAVENOUS; SUBCUTANEOUS
Status: DISCONTINUED | OUTPATIENT
Start: 2025-07-06 | End: 2025-07-08

## 2025-07-06 RX ORDER — POTASSIUM CHLORIDE 1500 MG/1
40 TABLET, EXTENDED RELEASE ORAL ONCE
Status: COMPLETED | OUTPATIENT
Start: 2025-07-06 | End: 2025-07-06

## 2025-07-06 RX ORDER — TAMSULOSIN HYDROCHLORIDE 0.4 MG/1
0.4 CAPSULE ORAL
Status: DISCONTINUED | OUTPATIENT
Start: 2025-07-06 | End: 2025-07-17 | Stop reason: HOSPADM

## 2025-07-06 RX ADMIN — ACETAMINOPHEN 975 MG: 325 TABLET ORAL at 02:40

## 2025-07-06 RX ADMIN — HEPARIN SODIUM 5000 UNITS: 5000 INJECTION INTRAVENOUS; SUBCUTANEOUS at 13:33

## 2025-07-06 RX ADMIN — TAMSULOSIN HYDROCHLORIDE 0.4 MG: 0.4 CAPSULE ORAL at 20:31

## 2025-07-06 RX ADMIN — INSULIN LISPRO 2 UNITS: 100 INJECTION, SOLUTION INTRAVENOUS; SUBCUTANEOUS at 21:34

## 2025-07-06 RX ADMIN — POTASSIUM CHLORIDE 40 MEQ: 1500 TABLET, EXTENDED RELEASE ORAL at 11:09

## 2025-07-06 RX ADMIN — HEPARIN SODIUM 5000 UNITS: 5000 INJECTION INTRAVENOUS; SUBCUTANEOUS at 06:02

## 2025-07-06 RX ADMIN — VANCOMYCIN HYDROCHLORIDE 2000 MG: 1 INJECTION, POWDER, LYOPHILIZED, FOR SOLUTION INTRAVENOUS at 13:30

## 2025-07-06 RX ADMIN — HEPARIN SODIUM 5000 UNITS: 5000 INJECTION INTRAVENOUS; SUBCUTANEOUS at 21:34

## 2025-07-06 RX ADMIN — OXYCODONE HYDROCHLORIDE 5 MG: 5 TABLET ORAL at 20:02

## 2025-07-06 RX ADMIN — CEFAZOLIN SODIUM 2000 MG: 2 SOLUTION INTRAVENOUS at 06:02

## 2025-07-06 NOTE — ASSESSMENT & PLAN NOTE
R foot pain x4 days. Inability to bear weight. Limited ROM. Endorses fatigue, fevers, chills, loss of appetite. Febrile on admissoin to 101.5. ED Workup significant for WBC of 15.99, stable Hb, mild thrombocytopenia, mild hyponatremia, NORMA w Cr of 1.95 (baseline of 1), glucose of 191, AST elevated at 55, T bili 1.48. Lactic 1.5, procal 4.06, INR 1.34. Bcx drawn, and patient started on Ancef    Of note, patient has hx of R foot cellulitis, admission at [facility] in Jan 2024. Received Abx but left AMA before recommended amputation vs. Debridement by care team there. Has questionable hx of diabetes. Patient denies any hx of diabetes.    Plan:  Ancef for Abx Tx, switched to vancomycin s/p gram positive bacteremia. Patient has allergies to penicillin but has had no rxn to ancef  F/u Bcx  Control glucose between 140-180 to promote healing  Encourage patient to eat/drink throughout day to promote healing  Podiatry consulted, appreciate recs  Trend WBC, fever  Given patient's prior admission and him leaving AMA, continue to explain importance of receiving Abx therapy  Consider MRI should patient's exam, WBC, fever not improve to r/o OM

## 2025-07-06 NOTE — ASSESSMENT & PLAN NOTE
Patient's blood cultures 2/2 for gram positive cocci in clusters. Escalated antibiotics to vancomycin     Plan:   ID consulted, appreciate recs  Pending final blood cultures   IV vancomycin, pharmacy consulted.

## 2025-07-06 NOTE — ASSESSMENT & PLAN NOTE
River Pedro is a 75 y.o. male admitted 7/5/2025 for right foot pain for the past few days.  Of note, patient has history of right foot cellulitis, admission at [facility] in January 2024.  Podiatry was consulted to evaluate the right foot pain.     Diagnostics:  7/6: Right foot X-Ray -per my personal read, no cortical erosions, no OM, no soft tissue emphysema.  Increase in soft tissue volume.  7/6: Left foot X-Ray -Per my personal read, no cortical erosions, no OM,  no soft tissue emphysema. Minor soft tissue disruption seen due to open wound on the distal aspect of hallux    Initial lab work/cultures  WBC: 12.51 (15.99)     Plan:    Wound debrided on distal hallux bilaterally.  Open wound (full thickness) found on distal left hallux and partial thickness on distal right hallux.  Plan to continue IV antibiotics management per internal medicine and local wound care. Will follow up on the final read of XR b/l.  Will monitor the cellulitic changes on the right foot.  No podiatric surgical intervention needed at this time.  Recommend local wound care on the left great toe consisting of Betadine paint, 2 x 2 gauze, wrap with Mendez. Wound care instructions placed.  Elevation on green foam wedges or pillows when non-ambulatory.  Rest of care per primary team.    Weightbearing status: Weightbearing as tolerated to bilateral lower extremities

## 2025-07-06 NOTE — ASSESSMENT & PLAN NOTE
Cr of 1.95 on admission. BUN:Cr ratio >20. Given this and patient's hx of not eating/drinking much for past 4 days along w end organ damage on other labs, suggest prerenal insult  Patient with urinary retention, reports waking up two times a night to go to the bathroom.    Plan:  Avoid NSAIDs for pain mgmt  Given 1L bolus isolyte  Continue to trend Cr daily  Urinary retention protocol, patient may require urology follow up outpatient

## 2025-07-06 NOTE — QUICK NOTE
Attempted to call patient's emergency contact, no answer. Reaching out to obtain more history of patient's baseline. Voicemail box full. Will try to reach out to contact again this afternoon.     Micheline Barrett,    Internal Medicine Residency PGY-2  [facility]

## 2025-07-06 NOTE — OCCUPATIONAL THERAPY NOTE
Occupational Therapy Cancel        Patient Name: River Pedro  Today's Date: 7/6/2025 07/06/25 0759   OT Last Visit   OT Visit Date 07/06/25   Note Type   Note type Cancelled Session   Cancel Reasons Medical status   Additional Comments Pt pending possible poditary consult. Will hold OT evaluation at this time, OT will continue to follow as appropriate/able.     RODOLFO Christina, OTR/L

## 2025-07-06 NOTE — CASE MANAGEMENT
Case Management Assessment & Discharge Planning Note    Patient name River Pedro  Location [facility] 9[facility] 901-01 MRN [id number]  : 1950 Date 2025       Current Admission Date: 2025  Current Admission Diagnosis:Cellulitis of right foot   Patient Active Problem List    Diagnosis Date Noted    Cellulitis of right foot 2025    CVA (cerebral vascular accident) ([facility]) 2025    Elevated LFTs 2025    Impaired cognition 2022    NORMA (acute kidney injury) ([facility]) 2022    History of transient ischemic attack (TIA) 2022    History of diabetes mellitus 2022      LOS (days): 1  Geometric Mean LOS (GMLOS) (days):   Days to GMLOS:     OBJECTIVE:    Risk of Unplanned Readmission Score: 8.83         Current admission status: Inpatient  Referral Reason: Other    Preferred Pharmacy:   PATIENT/FAMILY REPORTS NO PREFERRED PHARMACY  No address on file      Primary Care Provider: No primary care provider on file.    Primary Insurance: [employer] [facility]  Secondary Insurance:     ASSESSMENT:  Active Health Care Proxies       [facility] Representative - Other   Primary Phone: [phone] (Mobile)               Readmission Root Cause  30 Day Readmission: No    Patient Information  Admitted from:: Home  Mental Status: Alert  During Assessment patient was accompanied by: Not accompanied during assessment  Assessment information provided by:: Patient  Primary Caregiver: Self  Support Systems: Self, Family members  County of Residence: [place]  What city do you live in?: [place]  Home entry access options. Select all that apply.: No steps to enter home  Type of Current Residence: Apartment  Floor Level: 2  Upon entering residence, is there a bedroom on the main floor (no further steps)?: Yes  Upon entering residence, is there a bathroom on the main floor (no further steps)?: Yes  Living Arrangements: Lives Alone  Is patient a ?: No    Activities of Daily  Living Prior to Admission  Functional Status: Independent  Completes ADLs independently?: Yes  Ambulates independently?: Yes  Does patient use assisted devices?: No  Does patient currently own DME?: No  Does patient have a history of Outpatient Therapy (PT/OT)?: No  Does the patient have a history of Short-Term Rehab?: No  Does patient have a history of HHC?: No  Does patient currently have HHC?: No         Patient Information Continued  Income Source: Pension/jail  Does patient have prescription coverage?: Yes  Can the patient afford their medications and any related supplies (such as glucometers or test strips)?: Yes  Does patient receive dialysis treatments?: No  Does patient have a history of substance abuse?: No  Does patient have a history of Mental Health Diagnosis?: No         Means of Transportation  Means of Transport to [facility]:: Drives Self          DISCHARGE DETAILS:    Discharge planning discussed with:: Patient  Freedom of Choice: Yes  Comments - Freedom of Choice: Discussed FOC  CM contacted family/caregiver?: No- see comments (decline)  Were Treatment Team discharge recommendations reviewed with patient/caregiver?: Yes  Did patient/caregiver verbalize understanding of patient care needs?: Yes  Were patient/caregiver advised of the risks associated with not following Treatment Team discharge recommendations?: Yes        Additional Comments: CM student met with patient at bedside to complete open assessment. Patient lives alone in a 2nd-floor apartment and is independent at baseline with all ADLs and does not use any DME for ambulation. Patient currently drives. Patient denies any history of mental health concerns and denies current or past substance use. Patient stated he will need transportation home at d/c. CM will remain available for discharge need.

## 2025-07-06 NOTE — ASSESSMENT & PLAN NOTE
Remote hx of T2DM. Patient states that he does not take any medications and that he is not diabetics, although this is mentioned in prior documentation  Hemoglobin 6.7    Plan:  SSI, will continue to monitor glucose

## 2025-07-06 NOTE — PROGRESS NOTES
Progress Note - Internal Medicine   Name: River Pedro 75 y.o. male I MRN: [id number]  Unit/Bed#: [facility] 901-01 I Date of Admission: 7/5/2025   Date of Service: 7/6/2025 I Hospital Day: 1    Assessment & Plan  Cellulitis of right foot  R foot pain x4 days. Inability to bear weight. Limited ROM. Endorses fatigue, fevers, chills, loss of appetite. Febrile on admissoin to 101.5. ED Workup significant for WBC of 15.99, stable Hb, mild thrombocytopenia, mild hyponatremia, NORMA w Cr of 1.95 (baseline of 1), glucose of 191, AST elevated at 55, T bili 1.48. Lactic 1.5, procal 4.06, INR 1.34. Bcx drawn, and patient started on Ancef    Of note, patient has hx of R foot cellulitis, admission at [facility] in Jan 2024. Received Abx but left AMA before recommended amputation vs. Debridement by care team there. Has questionable hx of diabetes. Patient denies any hx of diabetes.    Plan:  Ancef for Abx Tx, switched to vancomycin s/p gram positive bacteremia. Patient has allergies to penicillin but has had no rxn to ancef  F/u Bcx  Control glucose between 140-180 to promote healing  Encourage patient to eat/drink throughout day to promote healing  Podiatry consulted, appreciate recs  Trend WBC, fever  Given patient's prior admission and him leaving AMA, continue to explain importance of receiving Abx therapy  Consider MRI should patient's exam, WBC, fever not improve to r/o OM  Gram-positive cocci bacteremia  Patient's blood cultures 2/2 for gram positive cocci in clusters. Escalated antibiotics to vancomycin     Plan:   ID consulted, appreciate recs  Pending final blood cultures   IV vancomycin, pharmacy consulted.   NORMA (acute kidney injury) (HCC)  Cr of 1.95 on admission. BUN:Cr ratio >20. Given this and patient's hx of not eating/drinking much for past 4 days along w end organ damage on other labs, suggest prerenal insult  Patient with urinary retention, reports waking up two times a night to go to the bathroom.    Plan:  Avoid NSAIDs  for pain mgmt  Given 1L bolus isolyte  Continue to trend Cr daily  Urinary retention protocol, patient may require urology follow up outpatient  History of diabetes mellitus  Remote hx of T2DM. Patient states that he does not take any medications and that he is not diabetics, although this is mentioned in prior documentation  Hemoglobin 6.7    Plan:  SSI, will continue to monitor glucose  CVA (cerebral vascular accident) (HCC)  Hx of L basal ganglia infarct in 2012 and TIA in 2019. Unable to see in medical record if patient had residual deficits following diagnosis. Only + neuro findings are mild word finding difficulty    Plan:  Will contact family to obtain more hx and ask about word finding difficulty  Patient has mentioned that he has not been eating or drinking for the past 4 days. May be contributory to word finding difficulty  Can consider starting ASA 81 mg daily. Patient was not taking this as an outpatient, although it is listed in home meds  Elevated LFTs  Hx of elevated LFTs and INR of 1.34. Mild AST elevation this admission. Appears to have never had hepatitis panel in past    Plan:  F/u hepatitis panel  Open wound on left distal toe  Podiatry consulted, appreciate recs    Disposition: Remains inpatient for 2/2 + gram positive cocci bacteremia      Team: SOD TEAM A    Subjective   Patient seen and examined. No acute events overnight. Patient with word finding difficulty. Patient reports that he feels like he is not getting better. Patient with fever overnight 100.9 x2. Patient states that he is having difficulty ambulating.   Patient reports that he has had self reported fevers for a couple of days.   Spoke with patient's emergency contact who helps him at home. She reports that patient's word finding difficulty is residual from stroke history. Emily reports that he only follows with podiatry and dentistry, he does not have a PCP. He reports he takes no medications and hasn't followed with doctors in  > 10 years. Patient's main concern today is how quickly his cellulitis will go away.     Objective :  Temp:  [97.3 °F (36.3 °C)-100.9 °F (38.3 °C)] 97.7 °F (36.5 °C)  HR:  [64-86] 72  BP: (108-149)/(59-76) 133/64  Resp:  [17-19] 18  SpO2:  [91 %-99 %] 94 %  O2 Device: None (Room air)    I/O         07/04 0701 07/05 0700 07/05 0701 07/06 0700 07/06 0701 07/07 0700    IV Piggyback  50     Total Intake(mL/kg)  50 (0.6)     Urine (mL/kg/hr)  845     Total Output  845     Net  -795                  Weights:   IBW (Ideal Body Weight): 75.3 kg    Body mass index is 25.52 kg/m².  Weight (last 2 days)       Date/Time Weight    07/05/25 20:27:33 83 (183)            Physical Exam  Constitutional:       Appearance: Normal appearance. He is not ill-appearing.   HENT:      Head: Normocephalic and atraumatic.      Mouth/Throat:      Comments: Poor dentition    Cardiovascular:      Rate and Rhythm: Normal rate and regular rhythm.      Pulses: Normal pulses.      Heart sounds: Normal heart sounds.   Pulmonary:      Effort: Pulmonary effort is normal.      Breath sounds: Normal breath sounds.   Abdominal:      General: Abdomen is flat. There is no distension.      Palpations: Abdomen is soft.     Musculoskeletal:      Right lower leg: Edema present.      Left lower leg: No edema.      Comments: RLE edema where cellulitis present. Patient reports not his baseline     Skin:     General: Skin is warm and dry.      Comments: Patient with RLE cellulitis, sharpie demarcating boarders, not extending. Toe wounds b/l see media.      Neurological:      Mental Status: Mental status is at baseline. He is disoriented.      Comments: Patient with word finding difficulty, required prompts to orient to location.    Psychiatric:         Mood and Affect: Mood normal.           Lab Results: I have reviewed the following results:  Recent Labs     07/05/25  1453 07/06/25  0540   WBC 15.99* 12.51*   HGB 13.0 13.2   HCT 38.6 39.8   * 102*    SODIUM 132* 134*   K 3.5 3.3*    102   CO2 22 21   BUN 42* 51*   CREATININE 1.95* 1.87*   GLUC 191* 149*   AST 55*  --    ALT 30  --    ALB 3.7  --    TBILI 1.48*  --    ALKPHOS 69  --    PTT 29  --    INR 1.34*  --    LACTICACID 1.5  --              Currently Ordered Meds:   Current Facility-Administered Medications:     acetaminophen (TYLENOL) tablet 975 mg, Q8H PRN    heparin (porcine) subcutaneous injection 5,000 Units, Q8H JOSÉ MIGUEL    HYDROmorphone HCl (DILAUDID) injection 0.2 mg, Q2H PRN    naloxone (NARCAN) 0.04 mg/mL syringe 0.04 mg, Q1MIN PRN    ondansetron (ZOFRAN) injection 4 mg, Q6H PRN    oxyCODONE (ROXICODONE) split tablet 2.5 mg, Q4H PRN **OR** oxyCODONE (ROXICODONE) IR tablet 5 mg, Q4H PRN    polyethylene glycol (MIRALAX) packet 17 g, Daily PRN    [START ON 7/7/2025] vancomycin 750 mg in sodium chloride 0.9% 250 mL, Q24H  VTE Pharmacologic Prophylaxis: VTE covered by:  heparin (porcine), Subcutaneous, 5,000 Units at 07/06/25 1333     VTE Mechanical Prophylaxis: sequential compression device    Administrative Statements     Portions of the record may have been created with voice recognition software.      Micheline Barrett,    Internal Medicine Residency PGY-2  [facility]

## 2025-07-06 NOTE — CONSULTS
Consultation - Podiatry   Name: River Pedro 75 y.o. male I MRN: [id number]  Unit/Bed#: [facility] 901-01 I Date of Admission: 7/5/2025   Date of Service: 7/6/2025 I Hospital Day: 1   Inpatient consult to Podiatry  Consult performed by: Geremias Christianson DPM  Consult ordered by: Micheline Barrett DO        Physician Requesting Evaluation: Camila Blanton MD   Reason for Evaluation / Principal Problem: right foot cellulitis    Assessment & Plan  Cellulitis of right foot  Open wound on left distal toe  River Pedro is a 75 y.o. male admitted 7/5/2025 for right foot pain for the past few days.  Of note, patient has history of right foot cellulitis, admission at [facility] in January 2024.  Podiatry was consulted to evaluate the right foot pain.     Diagnostics:  7/6: Right foot X-Ray -per my personal read, no cortical erosions, no OM, no soft tissue emphysema.  Increase in soft tissue volume.  7/6: Left foot X-Ray -Per my personal read, no cortical erosions, no OM,  no soft tissue emphysema. Minor soft tissue disruption seen due to open wound on the distal aspect of hallux    Initial lab work/cultures  WBC: 12.51 (15.99)     Plan:    Wound debrided on distal hallux bilaterally.  Open wound (full thickness) found on distal left hallux and partial thickness on distal right hallux.  Plan to continue IV antibiotics management per internal medicine and local wound care. Will follow up on the final read of XR b/l.  Will monitor the cellulitic changes on the right foot.  No podiatric surgical intervention needed at this time.  Recommend local wound care on the left great toe consisting of Betadine paint, 2 x 2 gauze, wrap with Mendez. Wound care instructions placed.  Elevation on green foam wedges or pillows when non-ambulatory.  Rest of care per primary team.    Weightbearing status: Weightbearing as tolerated to bilateral lower extremities    History of diabetes mellitus  Barrier for wound healing.  Management per primary  team.  NORMA (acute kidney injury) (HCC)  CVA (cerebral vascular accident) (HCC)  Elevated LFTs  Management per primary team    History of Present Illness   River Pedro is a 75 y.o. male who is currently admitted for right foot pain.  He has a past medical history of diabetes, CVA, NORMA, and cellulitis right foot.  Patient's ex-wife is at bedside.  Patient reports that he has been seeing Dr. Feliciano for podiatric care. Patient reports that he has been experiencing right foot pain for about 5 days which got progressively worse and could not even bear weight on it.  Patient's ex-wife states that the cellulitis has gotten better after receiving IV antibiotics. The patient was awake, alert, and in no acute distress. The patient is still experiencing significant pain to the right foot and denies pain to the left foot. Patient denies N/V/F/chills/SOB/CP.       Review of Systems    Constitutional: Negative.    HENT: Negative.    Eyes: Negative.    Respiratory: Negative.    Cardiovascular: Negative.    Gastrointestinal: Negative.    Musculoskeletal: Right foot pain, unable to move.   Skin: left distal hallux open wound.  See physical exam.  Neurological: Negative  Psych: Negative.      Objective :  Temp:  [97.3 °F (36.3 °C)-100.9 °F (38.3 °C)] 97.7 °F (36.5 °C)  HR:  [58-86] 72  BP: (108-149)/(59-76) 133/64  Resp:  [17-19] 18  SpO2:  [91 %-99 %] 94 %  O2 Device: None (Room air)    Physical Exam:    General Appearance: Alert, cooperative, no distress.  HEENT: Head normocephalic, atraumatic, without obvious abnormality.  Heart: Normal rate and rhythm.  Lungs: Non-labored breathing. No respiratory distress.  Abdomen: Without distension.  Psychiatric: AAOx3  Lower Extremity:  Vascular:   Right DP pulse is 2+  Right PT pulse is 2+  Left DP pulse is 1+  Left PT pulse is 1+   CRT < 3 seconds at the digits.   +2/4 edema noted at right lower extremities.   Pedal hair is absent.   Skin temperature is warm  bilaterally.    Musculoskeletal:  MMT is 5/5 in all muscle compartments left foot.  Patient is guarding his right foot due to the pain so unable to check for MMT.    Pain on palpation of right foot specially the dorsum of the foot  No gross deformities noted.     Dermatological:    Right foot is erythematous and edematous.  No open lesions, skin dry and intact  Right distal hallux has hyperkeratotic skin.  No open wound noted under after debridement.    Left lower extremity wound(s) as noted below:    Wound #: 1  Location: Distal tuft of left hallux  Length 0.3 cm: Width 0.4 cm: Depth 0.3 cm:   Deepest Tissue Noted in Base: Subcutaneous  Probe to Bone: No  Peripheral Skin Description: Attached  Granulation: 100% Fibrotic Tissue: 0% Necrotic Tissue: 0%   Drainage Amount: None  Signs of Infection: none    Neurological:  Gross sensation is diminished.   Protective sensation is diminished.   Patient Denies numbness and/or paresthesias..    Debridement Procedure:    After  Verbal Consent was obtained and time out performed. Wound located distal hallux of left foot was  excisionally debrided using scapel. Pre-debridement wound measures .1 cm x 0.1 cm x 0.3 cm.  Post debridement measurement 0.3 cm x 0.4 cm x 0.3 cm for a total of 1.2 square centimeters, with wound appearing Fresh bleeding tissue, viable, and granular. 100%  of wound debrided. Tissue debrided includes depth of Subcutaneous with devitalized tissue being debrided including Hyperkeratosis without any bleeding was noted during procedure.  Pain noted during procedure rated as 0. Pain noted after procedure rated as 0. Procedure was Well tolerated by patient.    Wound located distal hallux of right foot was excisionally debrided using scapel. Post debridement measurement 0.1 cm x 0.2 cm x 0.1 cm for a total of 0.2 square centimeters, with wound appearing  viable, and granular. Tissue debrided includes devitalized tissue including Hyperkeratosis without any  bleeding was noted during procedure.  Pain noted during procedure rated as 0. Pain noted after procedure rated as 0. Procedure was Well tolerated by patient.          Clinical Images 07/06/25:  Pre-debridement                Post debridement    Postdebridement      Additional data:     Lab Results: I have personally reviewed pertinent labs including:    Results from last 7 days   Lab Units 07/06/25  0540   WBC Thousand/uL 12.51*   HEMOGLOBIN g/dL 13.2   HEMATOCRIT % 39.8   PLATELETS Thousands/uL 102*   SEGS PCT % 84*   LYMPHO PCT % 4*   MONO PCT % 11   EOS PCT % 0     Results from last 7 days   Lab Units 07/06/25  0540 07/05/25  1453   POTASSIUM mmol/L 3.3* 3.5   CHLORIDE mmol/L 102 100   CO2 mmol/L 21 22   BUN mg/dL 51* 42*   CREATININE mg/dL 1.87* 1.95*   CALCIUM mg/dL 8.2* 8.7   ALK PHOS U/L  --  69   ALT U/L  --  30   AST U/L  --  55*     Results from last 7 days   Lab Units 07/05/25  1453   INR  1.34*       Cultures: I have personally reviewed pertinent cultures including:    Results from last 7 days   Lab Units 07/05/25  1453   GRAM STAIN RESULT  Gram positive cocci in clusters*  Gram positive cocci in clusters*           Imaging: I have personally reviewed pertinent reports in PACS.  EKG, Pathology, and Other Studies: I have personally reviewed pertinent reports.      Lab Results: I have reviewed the following results    Imaging Results Review: I reviewed radiology reports from this admission including: xray(s).

## 2025-07-06 NOTE — PLAN OF CARE
Problem: INFECTION - ADULT  Goal: Absence or prevention of progression during hospitalization  Description: INTERVENTIONS:  - Assess and monitor for signs and symptoms of infection  - Monitor lab/diagnostic results  - Monitor all insertion sites, i.e. indwelling lines, tubes, and drains  - Monitor endotracheal if appropriate and nasal secretions for changes in amount and color  - [place] appropriate cooling/warming therapies per order  - Administer medications as ordered  - Instruct and encourage patient and family to use good hand hygiene technique  - Identify and instruct in appropriate isolation precautions for identified infection/condition  Outcome: Progressing     Problem: SAFETY ADULT  Goal: Patient will remain free of falls  Description: INTERVENTIONS:  - Educate patient/family on patient safety including physical limitations  - Instruct patient to call for assistance with activity   - Consider consulting OT/PT to assist with strengthening/mobility based on AM PAC & JH-HLM score  - Consult OT/PT to assist with strengthening/mobility   - Keep Call bell within reach  - Keep bed low and locked with side rails adjusted as appropriate  - Keep care items and personal belongings within reach  - Initiate and maintain comfort rounds  - Make Fall Risk Sign visible to staff  - Offer Toileting every  Hours, in advance of need  - Initiate/Maintain alarm  - Obtain necessary fall risk management equipment:     Problem: DISCHARGE PLANNING  Goal: Discharge to home or other facility with appropriate resources  Description: INTERVENTIONS:  - Identify barriers to discharge w/patient and caregiver  - Arrange for needed discharge resources and transportation as appropriate  - Identify discharge learning needs (meds, wound care, etc.)  - Arrange for interpretive services to assist at discharge as needed  - Refer to Case Management Department for coordinating discharge planning if the patient needs post-hospital services based  on physician/advanced practitioner order or complex needs related to functional status, cognitive ability, or social support system  Outcome: Progressing     - Apply yellow socks and bracelet for high fall risk patients  - Consider moving patient to room near nurses station  Outcome: Progressing

## 2025-07-06 NOTE — DISCHARGE INSTR - AVS FIRST PAGE
Discharge Instructions - Podiatry    Weight Bearing Status: Weight bearing as tolerated bilaterally.                   Pain: Continue analgesics as needed     Follow-up appointment instructions: Please make an appointment within one week of discharge with Dr. Feliciano. Contact sooner if any increase in pain, or signs of infection occur    Nursing instructions: Please apply Betadine paint. Then cover with 4x4 dry gauze and secure with rolled gauze and tape. Please change dressing every Monday, Wednesday, and Friday .     Please establish with a PCP at [facility] in [place] and make an appointment for a hospital follow-up in 1 week.  Please follow up with urology in one week for a post hospital visit and to manage your hanks catheter.   Please follow up with neurology in one week for post hospital visit.  Please follow-up with infectious disease for your appointment on 7/29/2025 at 11:45 at [facility], [address] [place] [address], [id number] [phone]

## 2025-07-06 NOTE — ASSESSMENT & PLAN NOTE
River Pedro is a 75 y.o. male admitted 7/5/2025 for right foot pain for the past few days.  Of note, patient has history of right foot cellulitis, admission at [facility] in January 2024.  Podiatry was consulted to evaluate the right foot pain.     Diagnostics:  7/6: Right foot X-Ray -per my personal read, no cortical erosions, no OM, no soft tissue emphysema.  Increase in soft tissue volume.  7/6: Left foot X-Ray -Per my personal read, no cortical erosions, no OM,  no soft tissue emphysema. Minor soft tissue disruption seen due to open wound on the distal aspect of hallux    Initial lab work/cultures  WBC: 12.51 (15.99)     Plan:    Wound debrided on distal hallux bilaterally.  Open wound (full thickness) found on distal left hallux and partial thickness on distal right hallux.  Plan to continue IV antibiotics management per internal medicine and local wound care. Will follow up on the final read of XR b/l.  Will monitor the cellulitic changes on the right foot.  No podiatric surgical intervention needed at this time.  Recommend local wound care on the left great toe consisting of Betadine paint, 2 x 2 gauze, wrap with Mendez. Wound care instructions placed.  Elevation on green foam wedges or pillows when non-ambulatory.  Rest of care per primary team.    Weightbearing status: Weightbearing as tolerated to bilateral lower extremities

## 2025-07-06 NOTE — PROGRESS NOTES
River Pedro is a 75 y.o. male who is currently ordered Vancomycin IV with management by the Pharmacy Consult service.  Relevant clinical data and objective / subjective history reviewed.  Vancomycin Assessment:  Indication and Goal AUC/Trough: Bacteremia (goal -600, trough >10), -600, trough >10  Clinical Status: worsening  Micro:   Blood cultures abnormal with gram pos cocci in clusters  Renal Function:  SCr: 1.87 mg/dL  CrCl: 36.9 mL/min  Renal replacement: Not on dialysis  Days of Therapy: 1  Current Dose: 2000 mg IV load; 750 mg IV Q24H  Vancomycin Plan:  New Dosin mg IV Q24H  Estimated AUC: 444 mcg*hr/mL  Estimated Trough: 13.7 mcg/mL  Next Level: 2025 at 0600  Renal Function Monitoring: Daily BMP and UOP  Pharmacy will continue to follow closely for s/sx of nephrotoxicity, infusion reactions and appropriateness of therapy.  BMP and CBC will be ordered per protocol. We will continue to follow the patient’s culture results and clinical progress daily.    Ike Padilla, Pharmacist

## 2025-07-07 PROBLEM — B95.61 MSSA BACTEREMIA: Status: ACTIVE | Noted: 2025-07-06

## 2025-07-07 PROBLEM — A41.9 SEPSIS (HCC): Status: ACTIVE | Noted: 2025-07-07

## 2025-07-07 PROBLEM — R33.9 URINARY RETENTION: Status: ACTIVE | Noted: 2025-07-07

## 2025-07-07 LAB
ANION GAP SERPL CALCULATED.3IONS-SCNC: 6 MMOL/L (ref 4–13)
BASOPHILS # BLD AUTO: 0.02 THOUSANDS/ÂΜL (ref 0–0.1)
BASOPHILS NFR BLD AUTO: 0 % (ref 0–1)
BUN SERPL-MCNC: 43 MG/DL (ref 5–25)
CALCIUM SERPL-MCNC: 7.9 MG/DL (ref 8.4–10.2)
CHLORIDE SERPL-SCNC: 104 MMOL/L (ref 96–108)
CO2 SERPL-SCNC: 23 MMOL/L (ref 21–32)
CREAT SERPL-MCNC: 1.53 MG/DL (ref 0.6–1.3)
EOSINOPHIL # BLD AUTO: 0.02 THOUSAND/ÂΜL (ref 0–0.61)
EOSINOPHIL NFR BLD AUTO: 0 % (ref 0–6)
ERYTHROCYTE [DISTWIDTH] IN BLOOD BY AUTOMATED COUNT: 14.9 % (ref 11.6–15.1)
GFR SERPL CREATININE-BSD FRML MDRD: 44 ML/MIN/1.73SQ M
GLUCOSE SERPL-MCNC: 135 MG/DL (ref 65–140)
GLUCOSE SERPL-MCNC: 149 MG/DL (ref 65–140)
GLUCOSE SERPL-MCNC: 149 MG/DL (ref 65–140)
GLUCOSE SERPL-MCNC: 171 MG/DL (ref 65–140)
GLUCOSE SERPL-MCNC: 180 MG/DL (ref 65–140)
HCT VFR BLD AUTO: 34.5 % (ref 36.5–49.3)
HGB BLD-MCNC: 11.7 G/DL (ref 12–17)
IMM GRANULOCYTES # BLD AUTO: 0.07 THOUSAND/UL (ref 0–0.2)
IMM GRANULOCYTES NFR BLD AUTO: 1 % (ref 0–2)
LYMPHOCYTES # BLD AUTO: 0.78 THOUSANDS/ÂΜL (ref 0.6–4.47)
LYMPHOCYTES NFR BLD AUTO: 7 % (ref 14–44)
MCH RBC QN AUTO: 30.4 PG (ref 26.8–34.3)
MCHC RBC AUTO-ENTMCNC: 33.9 G/DL (ref 31.4–37.4)
MCV RBC AUTO: 90 FL (ref 82–98)
MONOCYTES # BLD AUTO: 1.19 THOUSAND/ÂΜL (ref 0.17–1.22)
MONOCYTES NFR BLD AUTO: 10 % (ref 4–12)
NEUTROPHILS # BLD AUTO: 9.67 THOUSANDS/ÂΜL (ref 1.85–7.62)
NEUTS SEG NFR BLD AUTO: 82 % (ref 43–75)
NRBC BLD AUTO-RTO: 0 /100 WBCS
PLATELET # BLD AUTO: 114 THOUSANDS/UL (ref 149–390)
PMV BLD AUTO: 13.4 FL (ref 8.9–12.7)
POTASSIUM SERPL-SCNC: 3.7 MMOL/L (ref 3.5–5.3)
RBC # BLD AUTO: 3.85 MILLION/UL (ref 3.88–5.62)
SODIUM SERPL-SCNC: 133 MMOL/L (ref 135–147)
WBC # BLD AUTO: 11.75 THOUSAND/UL (ref 4.31–10.16)

## 2025-07-07 PROCEDURE — 82948 REAGENT STRIP/BLOOD GLUCOSE: CPT

## 2025-07-07 PROCEDURE — 99223 1ST HOSP IP/OBS HIGH 75: CPT | Performed by: NURSE PRACTITIONER

## 2025-07-07 PROCEDURE — 97163 PT EVAL HIGH COMPLEX 45 MIN: CPT

## 2025-07-07 PROCEDURE — 85025 COMPLETE CBC W/AUTO DIFF WBC: CPT

## 2025-07-07 PROCEDURE — NC001 PR NO CHARGE: Performed by: INTERNAL MEDICINE

## 2025-07-07 PROCEDURE — 97167 OT EVAL HIGH COMPLEX 60 MIN: CPT

## 2025-07-07 PROCEDURE — G0545 PR INHERENT VISIT TO INPT: HCPCS | Performed by: INTERNAL MEDICINE

## 2025-07-07 PROCEDURE — 99223 1ST HOSP IP/OBS HIGH 75: CPT | Performed by: INTERNAL MEDICINE

## 2025-07-07 PROCEDURE — 87040 BLOOD CULTURE FOR BACTERIA: CPT

## 2025-07-07 PROCEDURE — 80048 BASIC METABOLIC PNL TOTAL CA: CPT

## 2025-07-07 RX ORDER — ATORVASTATIN CALCIUM 40 MG/1
40 TABLET, FILM COATED ORAL
Status: DISCONTINUED | OUTPATIENT
Start: 2025-07-07 | End: 2025-07-09

## 2025-07-07 RX ORDER — CEFAZOLIN SODIUM 2 G/50ML
2000 SOLUTION INTRAVENOUS EVERY 8 HOURS
Status: DISCONTINUED | OUTPATIENT
Start: 2025-07-07 | End: 2025-07-17 | Stop reason: HOSPADM

## 2025-07-07 RX ADMIN — CEFAZOLIN SODIUM 2000 MG: 2 SOLUTION INTRAVENOUS at 21:41

## 2025-07-07 RX ADMIN — HYDROMORPHONE HYDROCHLORIDE 0.2 MG: 0.2 INJECTION, SOLUTION INTRAMUSCULAR; INTRAVENOUS; SUBCUTANEOUS at 10:46

## 2025-07-07 RX ADMIN — ATORVASTATIN CALCIUM 40 MG: 40 TABLET, FILM COATED ORAL at 16:52

## 2025-07-07 RX ADMIN — OXYCODONE HYDROCHLORIDE 5 MG: 5 TABLET ORAL at 00:34

## 2025-07-07 RX ADMIN — HEPARIN SODIUM 5000 UNITS: 5000 INJECTION INTRAVENOUS; SUBCUTANEOUS at 05:18

## 2025-07-07 RX ADMIN — TAMSULOSIN HYDROCHLORIDE 0.4 MG: 0.4 CAPSULE ORAL at 16:52

## 2025-07-07 RX ADMIN — OXYCODONE HYDROCHLORIDE 5 MG: 5 TABLET ORAL at 19:50

## 2025-07-07 RX ADMIN — HEPARIN SODIUM 5000 UNITS: 5000 INJECTION INTRAVENOUS; SUBCUTANEOUS at 14:06

## 2025-07-07 RX ADMIN — OXYCODONE HYDROCHLORIDE 5 MG: 5 TABLET ORAL at 14:06

## 2025-07-07 RX ADMIN — INSULIN LISPRO 1 UNITS: 100 INJECTION, SOLUTION INTRAVENOUS; SUBCUTANEOUS at 12:41

## 2025-07-07 RX ADMIN — CEFAZOLIN SODIUM 2000 MG: 2 SOLUTION INTRAVENOUS at 14:07

## 2025-07-07 RX ADMIN — INSULIN LISPRO 1 UNITS: 100 INJECTION, SOLUTION INTRAVENOUS; SUBCUTANEOUS at 17:50

## 2025-07-07 RX ADMIN — ACETAMINOPHEN 975 MG: 325 TABLET ORAL at 16:53

## 2025-07-07 RX ADMIN — OXYCODONE HYDROCHLORIDE 5 MG: 5 TABLET ORAL at 07:25

## 2025-07-07 NOTE — ASSESSMENT & PLAN NOTE
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 Unable to see in medical record if patient had residual deficits following diagnosis. Only + neuro findings are mild word finding difficulty   Managed by primary team

## 2025-07-07 NOTE — ASSESSMENT & PLAN NOTE
R foot pain x4 days. Inability to bear weight. Limited ROM. Endorses fatigue, fevers, chills, loss of appetite. Febrile on admissoin to 101.5. ED Workup significant for WBC of 15.99, stable Hb, mild thrombocytopenia, mild hyponatremia, NORMA w Cr of 1.95 (baseline of 1), glucose of 191, AST elevated at 55, T bili 1.48. Lactic 1.5, procal 4.06, INR 1.34. Bcx drawn, and patient started on Ancef    Of note, patient has hx of R foot cellulitis, admission at [facility] in Jan 2024. Received Abx but left AMA before recommended amputation vs. Debridement by care team there. Has questionable hx of diabetes. Patient denies any hx of diabetes.    Plan:  Abx Tx w Ancef, as patient's Bcx have come back as gram positive cocci in clusters, suspect MSSA  Continue F/u Bcx for susceptibilities  Control glucose between 140-180 to promote healing  Encourage patient to eat/drink throughout day to promote healing  Podiatry consulted:  Yesterday, podiatry debrided distal hallux b/l. Open wound was found on distal L hallux  Trend WBC, fever  Given patient's prior admission and him leaving AMA, continue to explain importance of receiving Abx therapy  R foot MRI ordered, f/u read

## 2025-07-07 NOTE — OCCUPATIONAL THERAPY NOTE
Occupational Therapy Evaluation     Patient Name: River Pedro  Today's Date: 7/7/2025  Problem List  Principal Problem:    Cellulitis of right foot  Active Problems:    NORMA (acute kidney injury) ([facility])    History of diabetes mellitus    CVA (cerebral vascular accident) ([facility])    Elevated LFTs    Open wound bilateral 1st toes    MSSA bacteremia    Past Medical History  Past Medical History[1]  Past Surgical History  Past Surgical History[2]        07/07/25 0835   OT Last Visit   OT Visit Date 07/07/25   Note Type   Note type Evaluation   Pain Assessment   Pain Assessment Tool 0-10   Pain Score 9   Pain Location/Orientation Orientation: Left;Location: Foot   Pain Onset/Description Onset: Ongoing   Effect of Pain on Daily Activities Limits ability to engage in desired occupations   Patient's Stated Pain Goal No pain   Hospital Pain Intervention(s) Repositioned;Ambulation/increased activity   Multiple Pain Sites No   Restrictions/Precautions   Weight Bearing Precautions Per Order Yes   RLE Weight Bearing Per Order WBAT   LLE Weight Bearing Per Order WBAT   Other Precautions Cognitive;Chair Alarm;Bed Alarm;Telemetry;Fall Risk   Home Living   Type of Home Apartment   Home Layout One level;Stairs to enter with rails;Performs ADLs on one level   Bathroom Shower/Tub Tub/shower unit   Bathroom Toilet Standard   Bathroom Equipment Other (Comment)  (Reports no DME)   Bathroom Accessibility Accessible   Home Equipment Cane   Additional Comments Pt reports living in a second flr apt, no [address] but 3 steps w/ railing to second flr, no elevator.   Prior Function   Level of [place] Independent with ADLs;Independent with functional mobility;Independent with IADLS   Lives With (S)  Alone   Receives Help From Family   IADLs Independent with meal prep;Family/Friend/Other provides transportation   Falls in the last 6 months 0   Vocational Retired   Comments Pt reports his brother is local and he sees ~1x/month. Uses the bus/public  "transportation.   Lifestyle   Autonomy Pta pt ind. w/ adls and iadls, functional mobility, and (-) . Utilizes public transportation.   Reciprocal Relationships Supportive brother   Service to Others Retired   General   Additional Pertinent History Hx of basal ganglia infarct 2012 and suspect TIA 2019   Family/Caregiver Present Yes   Additional General Comments Pt requires increased time to respond and process questions/provide answers.   Subjective   Subjective \"The the the, the [place]\"   ADL   Where Assessed Edge of bed   Eating Assistance 5  Supervision/Setup   Grooming Assistance 5  Supervision/Setup   UB Bathing Assistance 4  Minimal Assistance   LB Bathing Assistance 3  Moderate Assistance   UB Dressing Assistance 4  Minimal Assistance   LB Dressing Assistance 3  Moderate Assistance   Toileting Assistance  3  Moderate Assistance   Functional Assistance 3  Moderate Assistance   Bed Mobility   Supine to Sit 3  Moderate assistance   Additional items Assist x 1;Increased time required;Verbal cues;LE management   Sit to Supine Unable to assess   Transfers   Sit to Stand 3  Moderate assistance   Additional items Assist x 2;Increased time required;Verbal cues   Stand to Sit 3  Moderate assistance   Additional items Assist x 2;Increased time required;Verbal cues   Additional Comments w/ RW   Functional Mobility   Functional Mobility 3  Moderate assistance   Additional Comments Pt performs transfers from EOB to bedside chair w/ mod a x 2 and RW this date.   Additional items Rolling walker   Balance   Static Sitting Fair +   Dynamic Sitting Fair   Static Standing Fair   Dynamic Standing Fair -   Ambulatory Fair -   Activity Tolerance   Activity Tolerance Patient limited by fatigue;Patient limited by pain   Medical Staff Made Aware Co-eval w/ PT Shauna due to pt medical complexities.   Nurse Made Aware RN cleared and updated.   RUE Assessment   RUE Assessment WFL   LUE Assessment   LUE Assessment WFL   Hand Function "   Gross Motor Coordination Functional   Fine Motor Coordination Functional   Sensation   Light Touch No apparent deficits   Sharp/Dull No apparent deficits   Cognition   Overall Cognitive Status Impaired   Arousal/Participation Alert;Responsive;Cooperative   Attention Attends with cues to redirect   Orientation Level Oriented X4   Memory Decreased long term memory;Decreased short term memory;Decreased recall of recent events;Decreased recall of precautions   Following Commands Follows one step commands with increased time or repetition   Comments Pt requires increased time for processing and response time for questions. Nonsensical at times and reports he has difficulty word finding. Questionable historian so w/ follow up w/ CM for confirmation on home set up and assist levels.   Assessment   Limitation Decreased ADL status;Decreased endurance;Decreased self-care trans;Decreased high-level ADLs   Prognosis Fair   Assessment Pt is a 75 y.o. male who was admitted to [facility] on 7/5/2025 with Cellulitis of right foot . Pt seen for an OT evaluation per active OT orders.  Pt  has no past medical history on file. Pt lives in a [address] [place] [address] apt, no [facility], 3 steps up to apartment w/ railings.  Pta, pt was independent w/ ADL/IADL and functional mobility, was (-) driving and was using a SPC at baseline. Currently, pt is Min Ax1 for UB ADL, Mod Ax1 for LB ADL, and completed transfers/FM w Mod Ax2. Pt currently presents with impairments in the following categories -steps to enter environment, limited home support, difficulty performing ADLS, difficulty performing IADLS , limited insight into deficits, and decreased initiation and engagement  activity tolerance, endurance, standing balance/tolerance, sitting balance/tolerance, arousal, memory, insight, safety , judgement , attention , and sequencing . These impairments, as well as pt's fatigue, pain, WBS , risk for falls, and home environment  limit pt's ability to safely  engage in all baseline areas of occupation, includinggrooming, bathing, dressing, toileting, functional mobility/transfers, community mobility, social participation , and leisure activities .    The patient's raw score on the AM-PAC Daily Activity Inpatient Short Form is 17 . A raw score of less than 19 suggests the patient may benefit from discharge to post-acute rehabilitation services. Please refer to the recommendation of the Occupational Therapist for safe discharge planning. . Pt would benefit from continued acute OT services throughout hospital course and following D/C. Plan for OT interventions 2-3xper week. From OT standpoint, recommend   Goals   Patient Goals To be relieved of pain   LTG Time Frame 10-14   Plan   Treatment Interventions ADL retraining;Functional transfer training;Endurance training;Cognitive reorientation;Patient/family training;Compensatory technique education;Continued evaluation;Energy conservation;Activityengagement   Goal Expiration Date 07/21/25   OT Treatment Day 0   OT Frequency 2-3x/wk   Discharge Recommendation   Rehab Resource Intensity Level, OT II (Moderate Resource Intensity)   AM-PAC Daily Activity Inpatient   Lower Body Dressing 3   Bathing 3   Toileting 2   Upper Body Dressing 3   Grooming 3   Eating 3   Daily Activity Raw Score 17   Daily Activity Standardized Score (Calc for Raw Score >=11) 37.26   AM-PAC Applied Cognition Inpatient   Following a Speech/Presentation 3   Understanding Ordinary Conversation 3   Taking Medications 2   Remembering Where Things Are Placed or Put Away 2   Remembering List of 4-5 Errands 2   Taking Care of Complicated Tasks 2   Applied Cognition Raw Score 14   Applied Cognition Standardized Score 32.02   End of Consult   Education Provided Yes   Patient Position at End of Consult Bedside chair;Bed/Chair alarm activated;All needs within reach   Nurse Communication Nurse aware of consult   End of Consult Comments Pt left seated in bedside chair  w/ alarm on and all needs within reach.       OT GOALS     - Pt will be Supervision with LB ADL by end of hospital course.    - Pt will be Supervision with UB ADL by end of hospital course.    - Pt will be Supervision with all functional transfers required for patient safety by end of hospital course.    - Pt will be Supervision with functional mobility to/from bathroom for increased independence with toileting tasks.    - Pt will transfer from  OOB to BSC Supervision using AD/DME as needed to maximize independence.     -Pt will demonstrate improved mobility using assistive device.       - Pt will demonstrate good understanding of safety precautions/activity restrictions prior to discharge.     -Pt will demonstrate increased safety awareness and problem-solving during ADLs/IADLS.     -Pt will independently implement energy conservation techniques into OT sessions.     - Pt activity tolerance will increase to 20 minutes in order to safely engage in ADLs, IADLs, and transfers.     - Pt standing tolerance will increase to 10 minutes  in order to promote desired ADLs and IADLs activity.    -Pt will demonstrate appropriate use of adaptive equipment (grab bars, modified utensils, commode chair, walker, shoe horn, sock aide, reacher) to improve safety and independence in daily activities.      -Pt will attend to and complete ongoing cognitive evaluation for safe discharge planning.     -Pt will attend to directions w/ min to no v/c and redirect in order to participate in therapeutic activities.     -Pt will attend to desired task/activity for 10 minutes to improve attention.        Kristen Macias, JERRICA, OTR/L                        [1] No past medical history on file.  [2] No past surgical history on file.

## 2025-07-07 NOTE — UTILIZATION REVIEW
Initial Clinical Review    Admission: Date/Time/Statement:   Admission Orders (From admission, onward)       Ordered        07/05/25 1545  INPATIENT ADMISSION  Once                          Orders Placed This Encounter   Procedures    INPATIENT ADMISSION     Standing Status:   Standing     Number of Occurrences:   1     Level of Care:   Med Surg [16]     Estimated length of stay:   More than 2 Midnights     Certification:   I certify that inpatient services are medically necessary for this patient for a duration of greater than two midnights. See H&P and MD Progress Notes for additional information about the patient's course of treatment.     ED Arrival Information       Expected   -    Arrival   7/5/2025 13:47    Acuity   Emergent              Means of arrival   Ambulance    Escorted by   [facility]    Service   [facility]    Admission type   Emergency              Arrival complaint   foot pain             Chief Complaint   Patient presents with    Foot Swelling     Right foot swelling, redness, pain over the last 4 days, worse today resulting in EMS called.     Aphasia     Pt started to have trouble saying words he wanted to communicate starting last night apx 1830. Dr Johnston at bedside for eval.        Initial Presentation: 75 y.o. male to ED by ems with R foot pain x4 days. Inability to bear weight. Limited ROM. Endorses fatigue, fevers, chills, loss of appetite.   In ED T 101.5. WBC 15.99, , BUN 42, Cr 1.95 (baseline 1.0), glucose of 191, AST elevated at 55, T bili 1.48. Lactic 1.5, procal 4.06, INR 1.34. Bcx drawn, and patient started on Ancef  Exam: obese, diaphoretic,   Swelling & erythema of R foot and distal R leg with tenderness, deformity (ingrown toe nails present on patient's R foot) present.   Plan: Admit INPT to MS unit with Cellulitis R foot. Continue Ancef. F/u blood cxs. Check A1c. Encourage po intake. Obtain EKG. Trend WBC,, fever. Continue to trend CR, BMP in AM. Consult  podiatry. Cons carb diet. Accu-Checks w/ ssi. SCDs.            Date: 7/6   Day 2: Temps elevated overnight. Blood cultures 2/2 for gram positive cocci in clusters. Escalated antibiotics to vancomycin. ID consulted. Pharmacy consult re vanco dose. Control glucose between 140-180 to promote healing. Urinary retention protocol.     Podiatry consult -- A: Cellulitis of right foot, Open wound on left distal toe   Wound debrided on distal hallux bilaterally.  Open wound found on distal left hallux.  Continue IV abx and local wound care - consisting of Betadine paint, 2 x 2 gauze, wrap with Mendez. . F/u on the final read of XR b/l.  Will monitor the cellulitic changes on the right foot.  No podiatric surgical intervention needed at this time. Elevation on green foam wedges or pillows when non-ambulatory. WBAT to b/l LE.    Date: 7/7  Day 3: Has surpassed a 2nd midnight with active treatments and services for tx of R foot cellulitis and + blood cxs.  Continue IV abx, f/u on final cxs and sensitivities. Accu-check w/ ssi. Cons carb diet, encourage good glucose control. Supportive care, prn analgesics.          ED Treatment-Medication Administration from 07/05/2025 1347 to 07/05/2025 2017         Date/Time Order Dose Route Action     07/05/2025 1450 ceFAZolin (ANCEF) IVPB (premix in dextrose) 2,000 mg 50 mL 2,000 mg Intravenous New Bag     07/05/2025 1520 acetaminophen (TYLENOL) tablet 650 mg 650 mg Oral Given     07/05/2025 1519 ketorolac (TORADOL) injection 15 mg 15 mg Intravenous Given     07/05/2025 1632 HYDROmorphone (DILAUDID) injection 0.5 mg 0.5 mg Intravenous Given     07/05/2025 1905 multi-electrolyte (Plasmalyte-A/Isolyte-S PH 7.4/Normosol-R) IV bolus 1,000 mL 1,000 mL Intravenous New Bag       Scheduled Medications:  Medications 07/05 07/06 07/07   acetaminophen (TYLENOL) tablet 650 mg  Dose: 650 mg  Freq: Once Route: PO  Start: 07/05/25 1515 End: 07/05/25 1520    1520          ceFAZolin (ANCEF) IVPB (premix in  dextrose) 2,000 mg 50 mL  Dose: 2,000 mg  Freq: Every 8 hours Route: IV  Start: 07/07/25 1400   Admin Instructions:      Order specific questions:         1400     2200        ceFAZolin (ANCEF) IVPB (premix in dextrose) 2,000 mg 50 mL  Dose: 2,000 mg  Freq: Every 8 hours Route: IV  Last Dose: 2,000 mg (07/06/25 0602)  Start: 07/05/25 2100 End: 07/06/25 1146   Admin Instructions:      Order specific questions:       2158 0602     1146-D/C'd       ceFAZolin (ANCEF) IVPB (premix in dextrose) 2,000 mg 50 mL  Dose: 2,000 mg  Freq: Once Route: IV  Last Dose: Stopped (07/05/25 1520)  Start: 07/05/25 1415 End: 07/05/25 1520   Admin Instructions:      Order specific questions:       1450     1520          heparin (porcine) subcutaneous injection 5,000 Units  Dose: 5,000 Units  Freq: Every 8 hours scheduled Route: SC  Start: 07/05/25 2200   Admin Instructions:       2158      0602     1333     2138      0518     1400     2200        HYDROmorphone (DILAUDID) injection 0.5 mg  Dose: 0.5 mg  Freq: Once Route: IV  Start: 07/05/25 1600 End: 07/05/25 1632   Admin Instructions:       1632           insulin lispro (HumALOG/ADMELOG) 100 units/mL subcutaneous injection 1-5 Units  Dose: 1-5 Units  Freq: 4 times daily (before meals and at bedtime) Route: SC  Start: 07/06/25 2200   Admin Instructions:        2138 (0144) 1584     1600     2200        ketorolac (TORADOL) injection 15 mg  Dose: 15 mg  Freq: Once Route: IV  Start: 07/05/25 1515 End: 07/05/25 1519   Admin Instructions:       1519          multi-electrolyte (Plasmalyte-A/Isolyte-S PH 7.4/Normosol-R) IV bolus 1,000 mL  Dose: 1,000 mL  Freq: Once Route: IV  Last Dose: Stopped (07/06/25 0647)  Start: 07/05/25 1815 End: 07/06/25 0647    1905      0647         potassium chloride (Klor-Con M20) CR tablet 40 mEq  Dose: 40 mEq  Freq: Once Route: PO  Start: 07/06/25 0830 End: 07/06/25 1109   Admin Instructions:        1109         tamsulosin (FLOMAX) capsule 0.4 mg  Dose:  0.4 mg  Freq: Daily with dinner Route: PO  Start: 07/06/25 2030   Admin Instructions:        2031 1630        vancomycin (VANCOCIN) 2,000 mg in sodium chloride 0.9 % 500 mL IVPB  Dose: 25 mg/kg  Weight Dosing Info: 83 kg  Freq: Once Route: IV  Last Dose: 2,000 mg (07/06/25 1330)  Start: 07/06/25 1230 End: 07/06/25 1530   Admin Instructions:      Order specific questions:        1330         vancomycin 750 mg in sodium chloride 0.9% 250 mL  Dose: 750 mg  Freq: Every 24 hours Route: IV  Start: 07/07/25 1230 End: 07/07/25 0746   Admin Instructions:      Order specific questions:         0746-D/C'd           PRN Meds:  Medications 07/05 07/06 07/07   acetaminophen (TYLENOL) tablet 975 mg  Dose: 975 mg  Freq: Every 8 hours PRN Route: PO  PRN Reasons: mild pain,fever  Indications Comment: Around the clock pain.  Start: 07/05/25 1806 0240         Or   oxyCODONE (ROXICODONE) IR tablet 5 mg  Dose: 5 mg  Freq: Every 4 hours PRN Route: PO  PRN Reason: severe pain  Start: 07/05/25 1807   Admin Instructions:        2002      0034 [C]     0725          ED Triage Vitals [07/05/25 1356]   Temperature Pulse Respirations Blood Pressure SpO2 Pain Score   99.1 °F (37.3 °C) 88 18 155/72 96 % 7     Weight (last 2 days)       Date/Time Weight    07/05/25 20:27:33 83 (183)            Vital Signs (last 3 days)       Date/Time Temp Pulse Resp BP MAP (mmHg) SpO2 O2 Device Patient Position - Orthostatic VS Tamika Coma Scale Score Pain    07/07/25 0725 -- -- -- -- -- -- -- -- -- 9    07/07/25 07:20:20 97.3 °F (36.3 °C) 62 -- 131/66 88 96 % -- -- -- --    07/06/25 22:08:34 99.9 °F (37.7 °C) 71 -- 124/65 85 93 % -- -- -- --    07/06/25 2002 -- -- -- -- -- -- -- -- -- 8    07/06/25 2000 -- -- -- -- -- -- None (Room air) -- 15 --    07/06/25 15:43:59 97.7 °F (36.5 °C) 72 18 133/64 87 94 % -- -- -- --    07/06/25 07:16:26 98.2 °F (36.8 °C) 64 -- 137/65 89 94 % -- -- 15 No Pain    07/06/25 03:13:47 98.3 °F (36.8 °C) 81 -- 108/59 75 91 % --  -- -- --    07/06/25 0306 98.3 °F (36.8 °C) 81 19 108/59 -- 91 % -- -- -- --    07/06/25 02:17:09 100.9 °F (38.3 °C) 86 -- 149/76 100 95 % -- -- -- --    07/06/25 0206 100.9 °F (38.3 °C) 86 18 149/76 -- 95 % -- -- -- --    07/05/25 2100 -- -- -- -- -- 96 % None (Room air) -- 15 No Pain    07/05/25 20:59:55 -- 69 17 148/60 89 96 % -- -- -- --    07/05/25 20:27:33 97.3 °F (36.3 °C) 64 18 139/73 95 99 % -- -- -- No Pain    07/05/25 1915 97.8 °F (36.6 °C) 58 19 136/66 95 97 % None (Room air) Lying -- --    07/05/25 1700 -- 64 18 140/68 98 96 % None (Room air) Lying -- 5    07/05/25 1630 -- 71 18 146/65 -- 95 % None (Room air) Lying -- 9    07/05/25 1600 -- 79 18 130/61 88 94 % None (Room air) -- -- --    07/05/25 1519 -- -- -- -- -- -- -- -- -- 9 07/05/25 1424 101.5 °F (38.6 °C) -- -- -- -- -- -- -- -- --    07/05/25 1400 -- -- -- -- -- -- -- -- 15 --    07/05/25 1356 99.1 °F (37.3 °C) 88 18 155/72 -- 96 % None (Room air) -- -- 7              Pertinent Labs/Diagnostic Test Results:   Radiology:  7/6: Right foot X-Ray -per my personal read, no cortical erosions, no OM, no soft tissue emphysema.  Increase in soft tissue volume.  7/6: Left foot X-Ray -Per my personal read, no cortical erosions, no OM,  no soft tissue emphysema. Minor soft tissue disruption seen due to open wound on the distal aspect of hallux    Cardiology:  ECG 12 lead   Final Result by Kenneth Mcdaniel MD (07/06 0715)   Sinus bradycardia   Left axis deviation   Left ventricular hypertrophy with QRS widening   Nonspecific T wave abnormality   Abnormal ECG   When compared with ECG of 16-Mar-2022 00:13,   No significant change was found   Confirmed by Kenneth Mcdaniel ([id number]) on 7/6/2025 7:15:17 AM            Results from last 7 days   Lab Units 07/07/25  0445 07/06/25  0540 07/05/25  1453   WBC Thousand/uL 11.75* 12.51* 15.99*   HEMOGLOBIN g/dL 11.7* 13.2 13.0   HEMATOCRIT % 34.5* 39.8 38.6   PLATELETS Thousands/uL 114* 102* 112*   TOTAL NEUT ABS  Thousands/µL 9.67* 10.54* 13.05*         Results from last 7 days   Lab Units 07/07/25  0445 07/06/25  0540 07/05/25  1453   SODIUM mmol/L 133* 134* 132*   POTASSIUM mmol/L 3.7 3.3* 3.5   CHLORIDE mmol/L 104 102 100   CO2 mmol/L 23 21 22   ANION GAP mmol/L 6 11 10   BUN mg/dL 43* 51* 42*   CREATININE mg/dL 1.53* 1.87* 1.95*   EGFR ml/min/1.73sq m 44 34 32   CALCIUM mg/dL 7.9* 8.2* 8.7     Results from last 7 days   Lab Units 07/05/25  1453   AST U/L 55*   ALT U/L 30   ALK PHOS U/L 69   TOTAL PROTEIN g/dL 7.0   ALBUMIN g/dL 3.7   TOTAL BILIRUBIN mg/dL 1.48*     Results from last 7 days   Lab Units 07/07/25  0718 07/06/25  2040   POC GLUCOSE mg/dl 149* 215*     Results from last 7 days   Lab Units 07/07/25  0445 07/06/25  0540 07/05/25  1453   GLUCOSE RANDOM mg/dL 135 149* 191*         Results from last 7 days   Lab Units 07/05/25  1453   HEMOGLOBIN A1C % 6.7*   EAG mg/dl 146       Results from last 7 days   Lab Units 07/05/25  1453   PROTIME seconds 16.8*   INR  1.34*   PTT seconds 29         Results from last 7 days   Lab Units 07/05/25  1453   PROCALCITONIN ng/ml 4.06*     Results from last 7 days   Lab Units 07/05/25  1453   LACTIC ACID mmol/L 1.5           Results from last 7 days   Lab Units 07/05/25  1905   HEP B S AG  Non-reactive   HEP C AB  Non-reactive   HEP B C IGM  Non-reactive       Results from last 7 days   Lab Units 07/06/25  0309   CLARITY UA  Turbid   COLOR UA  Yellow   SPEC GRAV UA  1.026   PH UA  6.5   GLUCOSE UA mg/dl Negative   KETONES UA mg/dl Negative   BLOOD UA  Large*   PROTEIN UA mg/dl 70 (1+)*   NITRITE UA  Negative   BILIRUBIN UA  Negative   UROBILINOGEN UA (BE) mg/dl <2.0   LEUKOCYTES UA  Trace*   WBC UA /hpf 2-4*   RBC UA /hpf Innumerable*   BACTERIA UA /hpf Moderate*   EPITHELIAL CELLS WET PREP /hpf None Seen   MUCUS THREADS  Occasional*         Results from last 7 days   Lab Units 07/05/25  1453   BLOOD CULTURE  Staphylococcus aureus*  Staphylococcus aureus*   GRAM STAIN RESULT  Gram  positive cocci in clusters*  Gram positive cocci in clusters*             Past Medical History[1]  Present on Admission:   History of diabetes mellitus   Cellulitis of right foot   CVA (cerebral vascular accident) (HCC)   NORMA (acute kidney injury) (HCC)   Open wound on left distal toe      Admitting Diagnosis: Cellulitis [L03.90]  Diabetes (HCC) [E11.9]  Foot pain [M79.673]  NORMA (acute kidney injury) (HCC) [N17.9]  Cellulitis of right foot [L03.115]  Age/Sex: 75 y.o. male    Network Utilization Review Department  ATTENTION: Please call with any questions or concerns to [phone] and carefully listen to the prompts so that you are directed to the right person. All voicemails are confidential.   For Discharge needs, contact Care Management DC Support Team at [phone] opt. 2  Send all requests for admission clinical reviews, approved or denied determinations and any other requests to dedicated fax number below belonging to the campus where the patient is receiving treatment. List of dedicated fax numbers for the Facilities:  FACILITY NAME UR FAX NUMBER   ADMISSION DENIALS (Administrative/Medical Necessity) [phone]   DISCHARGE SUPPORT TEAM (NETWORK) [phone]   PARENT CHILD HEALTH (Maternity/NICU/Pediatrics) [phone]   [facility] [phone]   [facility] [phone]   [facility] [phone]   [facility] [phone]   [facility] [phone]   [facility] [phone]   [facility] [phone]   [facility] [phone]   [facility] [phone]   [facility] [phone]   [facility] [phone]   [facility] [place] [phone]              [1] No past medical  history on file.

## 2025-07-07 NOTE — PLAN OF CARE
Problem: OCCUPATIONAL THERAPY ADULT  Goal: Performs self-care activities at highest level of function for planned discharge setting.  See evaluation for individualized goals.  Description: Treatment Interventions: ADL retraining, Functional transfer training, Endurance training, Cognitive reorientation, Patient/family training, Compensatory technique education, Continued evaluation, Energy conservation, Activityengagement          See flowsheet documentation for full assessment, interventions and recommendations.   Note: Limitation: Decreased ADL status, Decreased endurance, Decreased self-care trans, Decreased high-level ADLs  Prognosis: Fair  Assessment: Pt is a 75 y.o. male who was admitted to [facility] on 7/5/2025 with Cellulitis of right foot . Pt seen for an OT evaluation per active OT orders.  Pt  has no past medical history on file. Pt lives in a 2nd flr apt, no EMANUEL, 3 steps up to apartment w/ railings.  Pta, pt was independent w/ ADL/IADL and functional mobility, was (-) driving and was using a SPC at baseline. Currently, pt is Min Ax1 for UB ADL, Mod Ax1 for LB ADL, and completed transfers/FM w Mod Ax2. Pt currently presents with impairments in the following categories -steps to enter environment, limited home support, difficulty performing ADLS, difficulty performing IADLS , limited insight into deficits, and decreased initiation and engagement  activity tolerance, endurance, standing balance/tolerance, sitting balance/tolerance, arousal, memory, insight, safety , judgement , attention , and sequencing . These impairments, as well as pt's fatigue, pain, WBS , risk for falls, and home environment  limit pt's ability to safely engage in all baseline areas of occupation, includinggrooming, bathing, dressing, toileting, functional mobility/transfers, community mobility, social participation , and leisure activities .    The patient's raw score on the AM-PAC Daily Activity Inpatient Short Form is  17 . A raw score of less than 19 suggests the patient may benefit from discharge to post-acute rehabilitation services. Please refer to the recommendation of the Occupational Therapist for safe discharge planning. . Pt would benefit from continued acute OT services throughout hospital course and following D/C. Plan for OT interventions 2-3xper week. From OT standpoint, recommend     Rehab Resource Intensity Level, OT: II (Moderate Resource Intensity)

## 2025-07-07 NOTE — ASSESSMENT & PLAN NOTE
Likely multifactorial: Prior CVA, diabetes (highest documented A1c of 8.0), current medical condition  S/p placement of Manuel catheter 7/7 for 350 mL of urine; required straight cath for up to 600 mL previously  Urine microscopic with moderate bacteria and pyuria    Plan:  Continue Flomax 0.4 mg daily  Continue cefazolin and tailor to culture  Avoid medications that can cause urinary retention such as antihistamines, anticholinergics and narcotic pain medication  Daily bowel regimen prevent constipation  Glucose control  Continue Manuel catheter until patient medically stabilizes and is more ambulatory, not constipated  Void trial can be completed during hospitalization or as outpatient or in rehab

## 2025-07-07 NOTE — PROGRESS NOTES
Progress Note - Internal Medicine   Name: River Pedro 75 y.o. male I MRN: [id number]  Unit/Bed#: [facility] 901-01 I Date of Admission: 7/5/2025   Date of Service: 7/7/2025 I Hospital Day: 2    Assessment & Plan  Cellulitis of right foot  R foot pain x4 days. Inability to bear weight. Limited ROM. Endorses fatigue, fevers, chills, loss of appetite. Febrile on admissoin to 101.5. ED Workup significant for WBC of 15.99, stable Hb, mild thrombocytopenia, mild hyponatremia, NORMA w Cr of 1.95 (baseline of 1), glucose of 191, AST elevated at 55, T bili 1.48. Lactic 1.5, procal 4.06, INR 1.34. Bcx drawn, and patient started on Ancef    Of note, patient has hx of R foot cellulitis, admission at [facility] in Jan 2024. Received Abx but left AMA before recommended amputation vs. Debridement by care team there. Has questionable hx of diabetes. Patient denies any hx of diabetes.    Plan:  Abx Tx w Ancef, as patient's Bcx have come back as gram positive cocci in clusters, suspect MSSA  Continue F/u Bcx for susceptibilities  Control glucose between 140-180 to promote healing  Encourage patient to eat/drink throughout day to promote healing  Podiatry consulted:  Yesterday, podiatry debrided distal hallux b/l. Open wound was found on distal L hallux  Trend WBC, fever  Given patient's prior admission and him leaving AMA, continue to explain importance of receiving Abx therapy  R foot MRI ordered, f/u read  Gram-positive cocci bacteremia  Patient's blood cultures 2/2 for gram positive cocci in clusters. Escalated antibiotics to vancomycin     Plan:   ID consulted, appreciate recs:  Recommending TTE for possible IE, given positive blood culture for typical IE and fever  Also recommended 6 weeks of IV Abx tx  Pending final blood cultures   NORMA (acute kidney injury) (HCC)  Cr of 1.95 on admission. BUN:Cr ratio >20. Given this and patient's hx of not eating/drinking much for past 4 days along w end organ damage on other labs, suggest prerenal  insult  Patient with urinary retention, reports waking up two times a night to go to the bathroom.    Plan:  Avoid NSAIDs for pain mgmt  Given 1L bolus isolyte at admission, patient eating well and hydrating well, no need for fluids at this time  Continue to trend Cr daily  Urinary retention protocol, patient may require urology follow up outpatient  Urology consulted for red-colored urine in Manuel bag, likely traumatic Manuel insertion, f/u recommendations  History of diabetes mellitus  Remote hx of T2DM. Patient states that he does not take any medications and that he is not diabetics, although this is mentioned in prior documentation  Hemoglobin 6.7    Plan:  SSI, will continue to monitor glucose  CVA (cerebral vascular accident) (HCC)  Hx of L basal ganglia infarct in 2012 and TIA in 2019. Unable to see in medical record if patient had residual deficits following diagnosis. Only + neuro findings are mild word finding difficulty    Plan:  Will contact family to obtain more hx and ask about word finding difficulty  Patient has mentioned that he has not been eating or drinking for the past 4 days. May be contributory to word finding difficulty  Can consider starting ASA 81 mg daily. Patient was not taking this as an outpatient, although it is listed in home meds  Elevated LFTs  Hx of elevated LFTs and INR of 1.34. Mild AST elevation this admission. Appears to have never had hepatitis panel in past    Plan:  Hepatitis panel normal  Open wound on left distal toe  Podiatry consulted, appreciate recs    Disposition: Continue inpatient stay for IV Abx, monitoring of WBC and fever, wound care     Team: SOD TEAM A    Subjective   Patient seen and examined. No acute events overnight. This AM, Mr. Pedro says that he feels well, but he still endorses moderate R foot pain and limited ROM. He denies any fevers, chills, N/V/D, CP, palpitations, orthopnea, SOB. He says that he has a much better appetite now, and he is agreeable  to our plan of IV Abx and monitoring of his R foot wound at this time.    Objective :  Temp:  [97.3 °F (36.3 °C)-99.9 °F (37.7 °C)] 97.3 °F (36.3 °C)  HR:  [62-72] 62  BP: (124-133)/(64-66) 131/66  Resp:  [18] 18  SpO2:  [93 %-96 %] 96 %  O2 Device: None (Room air)    I/O         07/05 0701 07/06 0700 07/06 0701 07/07 0700    P.O.  120    IV Piggyback 50     Total Intake(mL/kg) 50 (0.6) 120 (1.4)    Urine (mL/kg/hr) 845 1000 (0.5)    Total Output 845 1000    Net -683 -478                Weights:   IBW (Ideal Body Weight): 75.3 kg    Body mass index is 25.52 kg/m².  Weight (last 2 days)       Date/Time Weight    07/05/25 20:27:33 83 (183)            Physical Exam      Lab Results: I have reviewed the following results:  Recent Labs     07/05/25  1453 07/06/25  0540 07/07/25  0445   WBC 15.99* 12.51* 11.75*   HGB 13.0 13.2 11.7*   HCT 38.6 39.8 34.5*   * 102* 114*   SODIUM 132* 134*  --    K 3.5 3.3*  --     102  --    CO2 22 21  --    BUN 42* 51*  --    CREATININE 1.95* 1.87*  --    GLUC 191* 149*  --    AST 55*  --   --    ALT 30  --   --    ALB 3.7  --   --    TBILI 1.48*  --   --    ALKPHOS 69  --   --    PTT 29  --   --    INR 1.34*  --   --    LACTICACID 1.5  --   --        Imaging Results Review: I personally reviewed the following image studies in PACS and associated radiology reports: XR L and R foot. My interpretation of the radiology images/reports is: osteo lesion of R great toe on Xray, suspicious for OM. MRI ordered, pending official read.    Other Study Results Review: EKG was personally reviewed and my interpretation is: Personally Reviewed. NSR w HR of 60. Slight QRS widening, LVH ..    Currently Ordered Meds:   Current Facility-Administered Medications:     acetaminophen (TYLENOL) tablet 975 mg, Q8H PRN    heparin (porcine) subcutaneous injection 5,000 Units, Q8H JOSÉ MIGUEL    HYDROmorphone HCl (DILAUDID) injection 0.2 mg, Q2H PRN    insulin lispro (HumALOG/ADMELOG) 100 units/mL subcutaneous  injection 1-5 Units, 4x Daily (AC & HS) **AND** Fingerstick Glucose (POCT), 4x Daily AC and at bedtime    naloxone (NARCAN) 0.04 mg/mL syringe 0.04 mg, Q1MIN PRN    ondansetron (ZOFRAN) injection 4 mg, Q6H PRN    oxyCODONE (ROXICODONE) split tablet 2.5 mg, Q4H PRN **OR** oxyCODONE (ROXICODONE) IR tablet 5 mg, Q4H PRN    polyethylene glycol (MIRALAX) packet 17 g, Daily PRN    tamsulosin (FLOMAX) capsule 0.4 mg, Daily With Dinner    vancomycin 750 mg in sodium chloride 0.9% 250 mL, Q24H  VTE Pharmacologic Prophylaxis: VTE covered by:  heparin (porcine), Subcutaneous, 5,000 Units at 07/07/25 0518     VTE Mechanical Prophylaxis: reason for no mechanical VTE prophylaxis Patient with RLE wound.    Administrative Statements   I have spent a total time of 30 minutes in caring for this patient on the day of the visit/encounter including Diagnostic results, Patient and family education, Importance of tx compliance, Risk factor reductions, Impressions, Counseling / Coordination of care, Documenting in the medical record, Reviewing/placing orders in the medical record (including tests, medications, and/or procedures), Obtaining or reviewing history  , and Communicating with other healthcare professionals .  Portions of the record may have been created with voice recognition software.      Loi Suh MD

## 2025-07-07 NOTE — PLAN OF CARE
Problem: PHYSICAL THERAPY ADULT  Goal: Performs mobility at highest level of function for planned discharge setting.  See evaluation for individualized goals.  Description: Treatment/Interventions: ADL retraining, Functional transfer training, LE strengthening/ROM, Elevations, Therapeutic exercise, Endurance training, Patient/family training, Equipment eval/education, Bed mobility, Gait training, Spoke to nursing, Spoke to case management, OT          See flowsheet documentation for full assessment, interventions and recommendations.  7/7/2025 1234 by Shauan Prado PT  Note: Prognosis: Good  Problem List: Decreased strength, Impaired balance, Decreased mobility, Decreased endurance, Decreased cognition, Impaired judgement, Decreased safety awareness, Pain  Assessment: Pt is a 75 y.o. male seen for PT evaluation s/p admit to [facility] on 7/5/2025. Pt was admitted with a primary dx of: cellulitis of R foot.  PT now consulted for assessment of mobility and d/c needs. Pt with OOB to chair orders.  Pts current comorbidities effecting treatment include: NORMA, hx of DM, hx of CVA, elevated LEFTs, open wound bilateral 1st toes, MSSA bacteremia, sepsis. Pt  has no past medical history on file. Pts current clinical presentation is Unstable/ Unpredictable (high complexity) due to Ongoing medical management for primary dx, Increased reliance on more restrictive AD compared to baseline, Decreased activity tolerance compared to baseline, Fall risk, Increased assistance needed from caregiver at current time, Cog status, Trending lab values, Continuous pulse oximetry monitoring . Prior to admission, pt was residing alone in apartment with EMANUEL, used SPC PRN. Upon evaluation, pt currently is requiring mod A for bed mobility; mod Ax2 for transfers; mod Ax2 for ambulation w/ RW. Pt presents at PT eval functioning below baseline and currently w/ overall mobility deficits 2* to: BLE weakness, impaired balance, decreased  endurance, gait deviations, pain, decreased activity tolerance compared to baseline, decreased functional mobility tolerance compared to baseline, impaired judgement, fall risk. Pt currently at a fall risk 2* to impairments listed above.  Pt will continue to benefit from skilled acute PT interventions to address stated impairments; to maximize functional mobility; for ongoing pt/ family training; and DME needs. At conclusion of PT session pt returned back in chair and chair alarm engaged with phone and call bell within reach. Pt denies any further questions at this time. The patient's AM-PAC Basic Mobility Inpatient Short Form Raw Score is 11. A Raw score of less than or equal to 16 suggests the patient may benefit from discharge to post-acute rehabilitation services. Please also refer to the recommendation of the Physical Therapist for safe discharge planning. PT to continue to follow pt t/o hospital stay, recommend level 2 resource intensity upon hospital D/C.  Barriers to Discharge: Decreased caregiver support, Inaccessible home environment     Rehab Resource Intensity Level, PT: II (Moderate Resource Intensity)    See flowsheet documentation for full assessment.

## 2025-07-07 NOTE — PLAN OF CARE
Problem: PAIN - ADULT  Goal: Verbalizes/displays adequate comfort level or baseline comfort level  Description: Interventions:  - Encourage patient to monitor pain and request assistance  - Assess pain using appropriate pain scale  - Administer analgesics as ordered based on type and severity of pain and evaluate response  - Implement non-pharmacological measures as appropriate and evaluate response  - Consider cultural and social influences on pain and pain management  - Notify physician/advanced practitioner if interventions unsuccessful or patient reports new pain  - Educate patient/family on pain management process including their role and importance of  reporting pain   - Provide non-pharmacologic/complimentary pain relief interventions  Outcome: Progressing     Problem: INFECTION - ADULT  Goal: Absence or prevention of progression during hospitalization  Description: INTERVENTIONS:  - Assess and monitor for signs and symptoms of infection  - Monitor lab/diagnostic results  - Monitor all insertion sites, i.e. indwelling lines, tubes, and drains  - Monitor endotracheal if appropriate and nasal secretions for changes in amount and color  - [place] appropriate cooling/warming therapies per order  - Administer medications as ordered  - Instruct and encourage patient and family to use good hand hygiene technique  - Identify and instruct in appropriate isolation precautions for identified infection/condition  Outcome: Progressing

## 2025-07-07 NOTE — UTILIZATION REVIEW
NOTIFICATION OF INPATIENT ADMISSION   AUTHORIZATION REQUEST   SERVICING FACILITY:   [facility]  Address: [address] [place] [address]  Tax ID: [id number]  NPI: [id number] ATTENDING PROVIDER:  Attending Name and NPI#: Camila Blanton Md [[id number]]  Address: [address] [place] [address]  Phone: [phone]   ADMISSION INFORMATION:  Place of Service: Inpatient [facility]  Place of Service Code: 21  Inpatient Admission Date/Time: 7/5/25  3:46 PM  Discharge Date/Time: No discharge date for patient encounter.  Admitting Diagnosis Code/Description:  Cellulitis [L03.90]  Diabetes (HCC) [E11.9]  Foot pain [M79.673]  NORMA (acute kidney injury) ([facility]) [N17.9]  Cellulitis of right foot [L03.115]     UTILIZATION REVIEW CONTACT:  Rey May Utilization   Network Utilization Review Department  Phone: [phone]  Fax: [phone]  Email: [email or web address]  Contact for approvals/pending authorizations, clinical reviews, and discharge.     PHYSICIAN ADVISORY SERVICES:  Medical Necessity Denial & Clxr-lm-Uncd Review  Phone: [phone]  Fax: [phone]  Email: [email or web address]     DISCHARGE SUPPORT TEAM:  For Patients Discharge Needs & Updates  Phone: [phone] opt. 2 Fax: [phone]  Email: [email or web address]

## 2025-07-07 NOTE — CONSULTS
Consultation - Urology   Name: River Pedro 75 y.o. male I MRN: [id number]  Unit/Bed#: [facility] 901-01 I Date of Admission: 7/5/2025   Date of Service: 7/7/2025 I Hospital Day: 2   Inpatient consult to Urology  Consult performed by: KAITY Parra  Consult ordered by: Micheline Barrett DO        Physician Requesting Evaluation: Camila Blanton MD   Reason for Evaluation / Principal Problem: Urinary retention    Assessment & Plan  Urinary retention  Likely multifactorial: Prior CVA, diabetes (highest documented A1c of 8.0), current medical condition  S/p placement of Manuel catheter 7/7 for 350 mL of urine; required straight cath for up to 600 mL previously  Urine microscopic with moderate bacteria and pyuria    Plan:  Continue Flomax 0.4 mg daily  Continue cefazolin and tailor to culture  Avoid medications that can cause urinary retention such as antihistamines, anticholinergics and narcotic pain medication  Daily bowel regimen prevent constipation  Glucose control  Continue Manuel catheter until patient medically stabilizes and is more ambulatory, not constipated  Void trial can be completed during hospitalization or as outpatient or in rehab  NORMA (acute kidney injury) (HCC)  Creat 1.53 (1.87 on 7/6 and 1.95 on 7/5)  No prior urological imaging    Plan:  Continue to trend labs  If creatinine does not continue to improve consider ultrasound of kidney and bladder to evaluate hydronephrosis  Avoid nephrotoxins  History of diabetes mellitus  Last A1c 6.7 in January 2024  Management per primary team  CVA (cerebral vascular accident) (HCC)  Management per primary team  Open wound bilateral 1st toes  Management per primary team  Sepsis (HCC)  Management per primary team  Urology service signing off.  Please contact the SecureChat role for the Urology service with any questions/concerns.    History of Present Illness   River Pedro is a 75 y.o. male who was admitted to the hospital on 7/5/2025 with right foot swelling  and redness with wounds to bilateral toes with fevers and leukocytosis.  Patient has a history of diabetes and hypertension with recent weight loss.  He also has a history of CVA with difficulty word finding    urology was asked to evaluate patient due to urinary retention.  Patient reports history of BPH but no personal or family history of prostate cancer.  He reports at home he was voiding without difficulty and complained of nocturia x 2.  He felt like he emptied his bladder and did not need to strain to start his stream.  Does report history of prior UTI which occur occasionally.  He has not previously been on BPH medications.    Review of Systems   Constitutional:  Negative for activity change, appetite change, chills, fatigue, fever and unexpected weight change.   HENT:  Negative for facial swelling.    Eyes:  Negative for discharge.   Respiratory: Negative.  Negative for cough and shortness of breath.    Cardiovascular:  Negative for chest pain and leg swelling.   Gastrointestinal: Negative.  Negative for abdominal distention, abdominal pain, constipation, diarrhea, nausea and vomiting.   Endocrine: Negative.    Genitourinary:  Positive for difficulty urinating. Negative for decreased urine volume, dysuria, enuresis, flank pain, frequency, genital sores, hematuria and urgency.   Musculoskeletal:  Positive for gait problem. Negative for back pain and myalgias.   Skin:  Negative for pallor and rash.   Allergic/Immunologic: Negative.  Negative for immunocompromised state.   Neurological:  Positive for speech difficulty. Negative for facial asymmetry.   Psychiatric/Behavioral:  Negative for agitation and confusion.      Medical History Review: I have reviewed the patient's PMH, PSH, Social History, Family History, Meds, and Allergies     Objective :  Temp:  [97.3 °F (36.3 °C)-100.5 °F (38.1 °C)] 100.5 °F (38.1 °C)  HR:  [62-77] 77  BP: (124-152)/(65-76) 152/76  Resp:  [18] 18  SpO2:  [93 %-96 %] 94 %  O2 Device:  "None (Room air)    I/O         07/05 0701 07/06 0700 07/06 0701 07/07 0700 07/07 0701 07/08 0700    P.O.  120     IV Piggyback 50      Total Intake(mL/kg) 50 (0.6) 120 (1.4)     Urine (mL/kg/hr) 845 1000 (0.5) 350 (0.4)    Total Output 845 1000 350    Net -796 -930 -350                 Lines/Drains/Airways       Active Status       Name Placement date Placement time Site Days    Urethral Catheter 16 Fr. 07/07/25  0108  --  less than 1                  Physical Exam  Vitals reviewed.   Constitutional:       General: He is not in acute distress.     Appearance: Normal appearance. He is not ill-appearing, toxic-appearing or diaphoretic.   HENT:      Head: Normocephalic and atraumatic.     Eyes:      General: No scleral icterus.      Cardiovascular:      Rate and Rhythm: Normal rate.   Pulmonary:      Effort: Pulmonary effort is normal. No respiratory distress.   Abdominal:      General: Abdomen is flat. There is no distension.   Genitourinary:     Comments: Manuel catheter draining yellow urine    Musculoskeletal:         General: No swelling.     Skin:     General: Skin is warm and dry.      Coloration: Skin is not jaundiced or pale.      Findings: No rash.      Comments: Right lower extremity swelling with erythema.  Left lower extremity with dressing intact     Neurological:      General: No focal deficit present.      Mental Status: He is alert and oriented to person, place, and time.      Gait: Gait abnormal.     Psychiatric:         Mood and Affect: Mood normal.         Behavior: Behavior normal.            Lab Results: I have reviewed the following results:CBC/BMP:   .     07/07/25  0445   WBC 11.75*   HGB 11.7*   HCT 34.5*   *   SODIUM 133*   K 3.7      CO2 23   BUN 43*   CREATININE 1.53*   GLUC 135    , Urinalysis: No results found for: \"COLORU\", \"CLARITYU\", \"SPECGRAV\", \"PHUR\", \"LEUKOCYTESUR\", \"NITRITE\", \"PROTEINUA\", \"GLUCOSEU\", \"KETONESU\", \"BILIRUBINUR\", \"BLOODU\"  Recent Labs     07/05/25  1453 " "07/06/25  0540 07/07/25  0445   WBC 15.99*   < > 11.75*   HGB 13.0   < > 11.7*   HCT 38.6   < > 34.5*   *   < > 114*   SODIUM 132*   < > 133*   K 3.5   < > 3.7      < > 104   CO2 22   < > 23   BUN 42*   < > 43*   CREATININE 1.95*   < > 1.53*   GLUC 191*   < > 135   AST 55*  --   --    ALT 30  --   --    ALB 3.7  --   --    TBILI 1.48*  --   --    ALKPHOS 69  --   --    PTT 29  --   --    INR 1.34*  --   --     < > = values in this interval not displayed.     Lab Results   Component Value Date    COLORU Yellow 07/06/2025    CLARITYU Turbid 07/06/2025    SPECGRAV 1.026 07/06/2025    PHUR 6.5 07/06/2025    PHUR 5.5 03/15/2022    LEUKOCYTESUR Trace (A) 07/06/2025    NITRITE Negative 07/06/2025    GLUCOSEU Negative 07/06/2025    KETONESU Negative 07/06/2025    BILIRUBINUR Negative 07/06/2025    BLOODU Large (A) 07/06/2025   ,   No results found for: \"URINECX\"    Imaging Results Review: No pertinent imaging studies reviewed.  Other Study Results Review: No additional pertinent studies reviewed.    VTE Prophylaxis: VTE covered by:  heparin (porcine), Subcutaneous, 5,000 Units at 07/07/25 1406       Administrative Statements   I have spent a total time of 30 minutes in caring for this patient on the day of the visit/encounter including Diagnostic results, Prognosis, Risks and benefits of tx options, Instructions for management, Patient and family education, Importance of tx compliance, Risk factor reductions, Impressions, Counseling / Coordination of care, Documenting in the medical record, Reviewing/placing orders in the medical record (including tests, medications, and/or procedures), Obtaining or reviewing history  , and Communicating with other healthcare professionals .  "

## 2025-07-07 NOTE — ASSESSMENT & PLAN NOTE
Presented to the ED with 4-day history of worsening R foot pain with associated erythema, edema, trouble with weightbearing, fever, chills, and decreased PO intake.   Patient was admitted at [facility] in Jan 2024 for R foot cellulitis, MRI negative for osteomyelitis. ID recommended IV Ancef. Podiatry recommended OR debridement vs amputation. Vascular surgery reported good healing potential. Plan was for OR debridement 1/18 however patient left AMA On 1/17. He was given 10-day course of Cefadroxil and instructed to follow up with his PCP.    WBC 15.9>>12.5>>11.7  Tmax 100.9 in the past 24 hours  Improved in the size of erythema and edema  Cellulitis most likely from open wounds and onychomycosis +/- tinea pedis     Plan:  - Continue Cefazolin (D3)  - Elevation of feet while at rest   - Follow up on feet XR  - Recommend oral terbinafine or itraconazole for onychomycosis

## 2025-07-07 NOTE — ASSESSMENT & PLAN NOTE
Blood cultures collected on 7/5 both positive for MSSA on 7/7. However both from venous line, colonization/contamination vs true infection  Echo in March 2022 revealed EF 55%, NWM, G1DD. Mild to mod AR. Mild TR and mild NC. Mildly dilated aortic root and ascending aorta.  Source is most likely from the open wound of bilateral first toes.  Splinter hemorrhage vs melanonychia noted on left 5th digit   Systolic murmurs noted on exam    Plan:  - TTE ordered   - New set of blood cultures ordered  - Recommend MRI of R foot for concern of osteo  - Continue Cefazolin 2g Q8H   - If no infective endocarditis and no signs of ongoing systemic staphylococcal infection, patient would need to complete a 6-week course of Cefazolin following the first negative blood cultures

## 2025-07-07 NOTE — ASSESSMENT & PLAN NOTE
Creat 1.53 (1.87 on 7/6 and 1.95 on 7/5)  No prior urological imaging    Plan:  Continue to trend labs  If creatinine does not continue to improve consider ultrasound of kidney and bladder to evaluate hydronephrosis  Avoid nephrotoxins

## 2025-07-07 NOTE — PHYSICAL THERAPY NOTE
Physical Therapy Evaluation     Patient's Name: River Pedro    Admitting Diagnosis  Cellulitis [L03.90]  Diabetes ([facility]) [E11.9]  Foot pain [M79.673]  NORMA (acute kidney injury) ([facility]) [N17.9]  Cellulitis of right foot [L03.115]    Problem List  Problem List[1]    Past Medical History  Past Medical History[2]    Past Surgical History  Past Surgical History[3]       07/07/25 0849   PT Last Visit   PT Visit Date 07/07/25   Note Type   Note type Evaluation   Pain Assessment   Pain Assessment Tool 0-10   Pain Score 9   Pain Location/Orientation Orientation: Bilateral;Location: Foot   Hospital Pain Intervention(s) Repositioned;Ambulation/increased activity;Elevated   Restrictions/Precautions   Weight Bearing Precautions Per Order Yes   RLE Weight Bearing Per Order WBAT  (per most recent podiatry note)   LLE Weight Bearing Per Order WBAT  (per most recent podiatry note)   Other Precautions Cognitive;Chair Alarm;Bed Alarm;Multiple lines;Fall Risk;Pain;WBS   Home Living   Type of Home Apartment   Home Layout Stairs to enter with rails   Bathroom Shower/Tub Tub/shower unit   Bathroom Toilet Standard   Bathroom Equipment   (denies)   Home Equipment Cane  (used PRN)   Additional Comments pt poor historian, will need to confirm with CM/ family   Prior Function   Level of [place] Independent with ADLs;Independent with functional mobility;Independent with IADLS   Lives With (S)  Alone   Receives Help From Family   IADLs Independent with meal prep;Independent with medication management;Family/Friend/Other provides transportation   Falls in the last 6 months 0   Vocational Retired   General   Family/Caregiver Present No   Cognition   Overall Cognitive Status Impaired   Attention Attends with cues to redirect   Orientation Level Oriented X4   Following Commands Follows one step commands with increased time or repetition   Comments Pleasant and cooperative. At times pt with difficulty word finding; apahsia?   RLE Assessment    RLE Assessment   (functionally 3+/5)   LLE Assessment   LLE Assessment   (functionally 3+/5)   Bed Mobility   Supine to Sit 3  Moderate assistance   Additional items Assist x 1;HOB elevated;Bedrails;Increased time required;Verbal cues;LE management   Sit to Supine Unable to assess   Additional Comments pt supine in bed upon arrival. pt left sitting OOB in recliner with all needs within reach - alarm on post session   Transfers   Sit to Stand 3  Moderate assistance   Additional items Assist x 2;Increased time required;Verbal cues   Stand to Sit 3  Moderate assistance   Additional items Assist x 2;Increased time required;Verbal cues   Additional Comments with RW   Ambulation/Elevation   Gait pattern Excessively slow;Short stride;Inconsistent blue;Decreased foot clearance;Improper Weight shift   Gait Assistance 3  Moderate assist   Additional items Assist x 2;Tactile cues;Verbal cues   Assistive Device Rolling walker   Distance 4'   Balance   Static Sitting Fair +   Dynamic Sitting Fair   Static Standing Fair -   Dynamic Standing Poor   Ambulatory Poor   Activity Tolerance   Activity Tolerance Patient limited by fatigue;Patient limited by pain   Medical Staff Made Aware OT; co-session completed this date 2* increased medical complexity and multiple co-morbidities   Nurse Made Aware RN cleared   Assessment   Prognosis Good   Problem List Decreased strength;Impaired balance;Decreased mobility;Decreased endurance;Decreased cognition;Impaired judgement;Decreased safety awareness;Pain   Assessment Pt is a 75 y.o. male seen for PT evaluation s/p admit to [facility] on 7/5/2025. Pt was admitted with a primary dx of: cellulitis of R foot.  PT now consulted for assessment of mobility and d/c needs. Pt with OOB to chair orders.  Pts current comorbidities effecting treatment include: NORMA, hx of DM, hx of CVA, elevated LEFTs, open wound bilateral 1st toes, MSSA bacteremia, sepsis. Pt  has no past medical history on  file. Pts current clinical presentation is Unstable/ Unpredictable (high complexity) due to Ongoing medical management for primary dx, Increased reliance on more restrictive AD compared to baseline, Decreased activity tolerance compared to baseline, Fall risk, Increased assistance needed from caregiver at current time, Cog status, Trending lab values, Continuous pulse oximetry monitoring . Prior to admission, pt was residing alone in apartment with EMANUEL, used SPC PRN. Upon evaluation, pt currently is requiring mod A for bed mobility; mod Ax2 for transfers; mod Ax2 for ambulation w/ RW. Pt presents at PT eval functioning below baseline and currently w/ overall mobility deficits 2* to: BLE weakness, impaired balance, decreased endurance, gait deviations, pain, decreased activity tolerance compared to baseline, decreased functional mobility tolerance compared to baseline, impaired judgement, fall risk. Pt currently at a fall risk 2* to impairments listed above.  Pt will continue to benefit from skilled acute PT interventions to address stated impairments; to maximize functional mobility; for ongoing pt/ family training; and DME needs. At conclusion of PT session pt returned back in chair and chair alarm engaged with phone and call bell within reach. Pt denies any further questions at this time. The patient's AM-PAC Basic Mobility Inpatient Short Form Raw Score is 11. A Raw score of less than or equal to 16 suggests the patient may benefit from discharge to post-acute rehabilitation services. Please also refer to the recommendation of the Physical Therapist for safe discharge planning. PT to continue to follow pt t/o hospital stay, recommend level 2 resource intensity upon hospital D/C.   Barriers to Discharge Decreased caregiver support;Inaccessible home environment   Goals   Patient Goals to sit in chair   STG Expiration Date 07/21/25   Short Term Goal #1 STG 1. Pt will be able to perform bed mobility tasks with mod I  level in order to improve overall functional mobility and assist in safe d/c. STG 2. Pt with sit EOB for at least 25 minutes at mod I level in order to strengthen abdominal musculature and assist in future transfers/ ambulation. STG 3. Pt will be able to perform functional transfer with mod I level in order to improve overall functional mobility and assist in safe d/c. STG 4. Pt will be able to ambulate at least 250 feet with least restrictive device with mod I level in order to improve overall functional mobility and assist in safe d/c. STG 5. Pt will improve sitting/standing static/dynamic balance 1/2 grade in order to improve functional mobility and assist in safe d/c. STG 6. Pt will improve LE strength by 1/2 grade in order to improve functional mobility and assist in safe d/c. STG 7. Pt will be able to negotiate at least 7 stairs with least restrictive device with S level in order to improve overall functional mobility and assist in safe d/c.   PT Treatment Day 0   Plan   Treatment/Interventions ADL retraining;Functional transfer training;LE strengthening/ROM;Elevations;Therapeutic exercise;Endurance training;Patient/family training;Equipment eval/education;Bed mobility;Gait training;Spoke to nursing;Spoke to case management;OT   PT Frequency 2-3x/wk   Discharge Recommendation   Rehab Resource Intensity Level, PT II (Moderate Resource Intensity)   AM-PAC Basic Mobility Inpatient   Turning in Flat Bed Without Bedrails 3   Lying on Back to Sitting on Edge of Flat Bed Without Bedrails 2   Moving Bed to Chair 2   Standing Up From Chair Using Arms 2   Walk in Room 1   Climb 3-5 Stairs With Railing 1   Basic Mobility Inpatient Raw Score 11   Basic Mobility Standardized Score 30.25   [facility] Highest Level Of Mobility   -[facility] Goal 4: Move to chair/commode   -HL Achieved 4: Move to chair/commode   Modified Celine Scale   Modified Celine Scale 4         Shauna Prado, PT DPT          [1]   Patient Active Problem  List  Diagnosis    Impaired cognition    NORMA (acute kidney injury) (HCC)    History of transient ischemic attack (TIA)    History of diabetes mellitus    Cellulitis of right foot    CVA (cerebral vascular accident) (HCC)    Elevated LFTs    Open wound bilateral 1st toes    MSSA bacteremia    Sepsis (HCC)   [2] No past medical history on file.  [3] No past surgical history on file.

## 2025-07-07 NOTE — ASSESSMENT & PLAN NOTE
Hx of elevated LFTs and INR of 1.34. Mild AST elevation this admission. Appears to have never had hepatitis panel in past    Plan:  Hepatitis panel normal

## 2025-07-07 NOTE — PLAN OF CARE
Problem: PAIN - ADULT  Goal: Verbalizes/displays adequate comfort level or baseline comfort level  Description: Interventions:  - Encourage patient to monitor pain and request assistance  - Assess pain using appropriate pain scale  - Administer analgesics as ordered based on type and severity of pain and evaluate response  - Implement non-pharmacological measures as appropriate and evaluate response  - Consider cultural and social influences on pain and pain management  - Notify physician/advanced practitioner if interventions unsuccessful or patient reports new pain  - Educate patient/family on pain management process including their role and importance of  reporting pain   - Provide non-pharmacologic/complimentary pain relief interventions  Outcome: Progressing     Problem: INFECTION - ADULT  Goal: Absence or prevention of progression during hospitalization  Description: INTERVENTIONS:  - Assess and monitor for signs and symptoms of infection  - Monitor lab/diagnostic results  - Monitor all insertion sites, i.e. indwelling lines, tubes, and drains  - Monitor endotracheal if appropriate and nasal secretions for changes in amount and color  - [place] appropriate cooling/warming therapies per order  - Administer medications as ordered  - Instruct and encourage patient and family to use good hand hygiene technique  - Identify and instruct in appropriate isolation precautions for identified infection/condition  Outcome: Progressing  Goal: Absence of fever/infection during neutropenic period  Description: INTERVENTIONS:  - Monitor WBC  - Perform strict hand hygiene  - Limit to healthy visitors only  - No plants, dried, fresh or silk flowers with hobbs in patient room  Outcome: Progressing     Problem: SAFETY ADULT  Goal: Patient will remain free of falls  Description: INTERVENTIONS:  - Educate patient/family on patient safety including physical limitations  - Instruct patient to call for assistance with activity   -  Consider consulting OT/PT to assist with strengthening/mobility based on AM PAC & JH-HLM score  - Consult OT/PT to assist with strengthening/mobility   - Keep Call bell within reach  - Keep bed low and locked with side rails adjusted as appropriate  - Keep care items and personal belongings within reach  - Initiate and maintain comfort rounds  - Make Fall Risk Sign visible to staff  - Apply yellow socks and bracelet for high fall risk patients  - Consider moving patient to room near nurses station  Outcome: Progressing  Goal: Maintain or return to baseline ADL function  Description: INTERVENTIONS:  -  Assess patient's ability to carry out ADLs; assess patient's baseline for ADL function and identify physical deficits which impact ability to perform ADLs (bathing, care of mouth/teeth, toileting, grooming, dressing, etc.)  - Assess/evaluate cause of self-care deficits   - Assess range of motion  - Assess patient's mobility; develop plan if impaired  - Assess patient's need for assistive devices and provide as appropriate  - Encourage maximum independence but intervene and supervise when necessary  - Involve family in performance of ADLs  - Assess for home care needs following discharge   - Consider OT consult to assist with ADL evaluation and planning for discharge  - Provide patient education as appropriate  - Monitor functional capacity and physical performance, use of AM PAC & JH-HLM   - Monitor gait, balance and fatigue with ambulation    Outcome: Progressing  Goal: Maintains/Returns to pre admission functional level  Description: INTERVENTIONS:  - Perform AM-PAC 6 Click Basic Mobility/ Daily Activity assessment daily.  - Set and communicate daily mobility goal to care team and patient/family/caregiver.   - Collaborate with rehabilitation services on mobility goals if consulted  - Out of bed for toileting  - Record patient progress and toleration of activity level   Outcome: Progressing     Problem: DISCHARGE  PLANNING  Goal: Discharge to home or other facility with appropriate resources  Description: INTERVENTIONS:  - Identify barriers to discharge w/patient and caregiver  - Arrange for needed discharge resources and transportation as appropriate  - Identify discharge learning needs (meds, wound care, etc.)  - Arrange for interpretive services to assist at discharge as needed  - Refer to Case Management Department for coordinating discharge planning if the patient needs post-hospital services based on physician/advanced practitioner order or complex needs related to functional status, cognitive ability, or social support system  Outcome: Progressing     Problem: Knowledge Deficit  Goal: Patient/family/caregiver demonstrates understanding of disease process, treatment plan, medications, and discharge instructions  Description: Complete learning assessment and assess knowledge base.  Interventions:  - Provide teaching at level of understanding  - Provide teaching via preferred learning methods  Outcome: Progressing

## 2025-07-07 NOTE — ASSESSMENT & PLAN NOTE
Cr of 1.95 on admission, baseline 0.8-1.1. BUN:Cr ratio >20, likely pre-renal from decreased PO intake.   Managed by primary team

## 2025-07-07 NOTE — ASSESSMENT & PLAN NOTE
Cr of 1.95 on admission. BUN:Cr ratio >20. Given this and patient's hx of not eating/drinking much for past 4 days along w end organ damage on other labs, suggest prerenal insult  Patient with urinary retention, reports waking up two times a night to go to the bathroom.    Plan:  Avoid NSAIDs for pain mgmt  Given 1L bolus isolyte at admission, patient eating well and hydrating well, no need for fluids at this time  Continue to trend Cr daily  Urinary retention protocol, patient may require urology follow up outpatient  Urology consulted for red-colored urine in Manuel bag, likely traumatic Manuel insertion, f/u recommendations

## 2025-07-07 NOTE — ASSESSMENT & PLAN NOTE
Patient's blood cultures 2/2 for gram positive cocci in clusters. Escalated antibiotics to vancomycin     Plan:   ID consulted, appreciate recs:  Recommending TTE for possible IE, given positive blood culture for typical IE and fever  Also recommended 6 weeks of IV Abx tx  Pending final blood cultures

## 2025-07-07 NOTE — ASSESSMENT & PLAN NOTE
Slight elevation of AST 55 with T bili 1.48 on admission. INR of 1.34. Mild AST elevation this admission. No prior hepatitis panel.   Acute hepatitis panel negative this admission.    CT CAP in 2022 unremarkable hepatobiliary.  Most likely reactive in the setting of infection  Continue to trend.

## 2025-07-07 NOTE — CONSULTS
"Consultation - Infectious Disease   Name: River Pedro 75 y.o. male I MRN: [id number]  Unit/Bed#: [facility] 901-01 I Date of Admission: 7/5/2025   Date of Service: 7/7/2025 I Hospital Day: 2   Inpatient consult to Infectious Diseases  Consult performed by: Levi Choe DO  Consult ordered by: Micheline Barrett DO        Physician Requesting Evaluation: Camila Blanton MD   Reason for Evaluation / Principal Problem: MSSA bacteremia    Assessment & Plan  Cellulitis of right foot  Presented to the ED with 4-day history of worsening R foot pain with associated erythema, edema, trouble with weightbearing, fever, chills, and decreased PO intake.   Patient was admitted at [facility] in Jan 2024 for R foot cellulitis, MRI negative for osteomyelitis. ID recommended IV Ancef. Podiatry recommended OR debridement vs amputation. Vascular surgery reported good healing potential. Plan was for OR debridement 1/18 however patient left AMA On 1/17. He was given 10-day course of Cefadroxil and instructed to follow up with his PCP.    WBC 15.9>>12.5>>11.7  Tmax 100.9 in the past 24 hours  Improved in the size of erythema and edema  Cellulitis most likely from open wounds and onychomycosis +/- tinea pedis     Plan:  - Continue Cefazolin (D3)  - Elevation of feet while at rest   - Follow up on feet XR  - Recommend oral terbinafine or itraconazole for onychomycosis   Open wound bilateral 1st toes  See \"cellulitis of right foot\"   MSSA bacteremia  Blood cultures collected on 7/5 both positive for MSSA on 7/7. However both from venous line, colonization/contamination vs true infection  Echo in March 2022 revealed EF 55%, NWM, G1DD. Mild to mod AR. Mild TR and mild CO. Mildly dilated aortic root and ascending aorta.  Source is most likely from the open wound of bilateral first toes.  Splinter hemorrhage vs melanonychia noted on left 5th digit   Systolic murmurs noted on exam    Plan:  - TTE ordered   - New set of blood cultures ordered  - " Recommend MRI of R foot for concern of osteo  - Continue Cefazolin 2g Q8H   - If no infective endocarditis and no signs of ongoing systemic staphylococcal infection, patient would need to complete a 6-week course of Cefazolin following the first negative blood cultures  NORMA (acute kidney injury) (HCC)  Cr of 1.95 on admission, baseline 0.8-1.1. BUN:Cr ratio >20, likely pre-renal from decreased PO intake.   Managed by primary team  History of diabetes mellitus  A1c 6.7 this admission, no change since Jan 2024  Managed by primary team  CVA (cerebral vascular accident) (HCC)  Hx of L basal ganglia infarct in 2012 and TIA in 2019. Unable to see in medical record if patient had residual deficits following diagnosis. Only + neuro findings are mild word finding difficulty   Managed by primary team  Elevated LFTs  Slight elevation of AST 55 with T bili 1.48 on admission. INR of 1.34. Mild AST elevation this admission. No prior hepatitis panel.   Acute hepatitis panel negative this admission.    CT CAP in 2022 unremarkable hepatobiliary.  Most likely reactive in the setting of infection  Continue to trend.     Sepsis (HCC)  Sepsis 2/2 MSSA bacteremia and R foot cellulitis.   Continue abx above    Recommendations discussed with Dr. Barrett from primary team.    Antibiotics:  Cefazolin (D3)  Vancomycin started on 7/6 and dc'd on 7/7    History of Present Illness   River Pedro is a 75 y.o. year old male with a past medical history of CVA (L basal ganglia infarct in 2012 and TIA in 2019), HTN, T2DM with diabetic neuropathy, and R foot cellulitis in Jan 2024 who presented to the ED on 7/5 with 4-day history of worsening R foot pain with associated erythema, edema, trouble with weightbearing, fever, chills, and decreased PO intake. Patient reported that he started noticing redness in his right foot with pain on last Monday which has been progressively worsening to the point he has to use cane to ambulate. Denied trauma, fall,  "drug injection, animal bites, and being barefoot in outdoor. Patient reported he was told to apply some \"cream\" for his feet in the past but he did not. Never a smoker. Last alcohol use was years ago. Marijuana use but no other illicit drug use. Lives alone in [place]. No domestic animals. Upon arrival, febrile with Tmax 101.5 F with a WBC of 16. Labs notable for BUN 42, Cr 1.95 (baseline), T bili 1.48. Procal 4.06. Lactic acid wnl. XR of both feet still pending. Patient admitted for R foot cellulitis. Patient was started on Cefazolin on 7/5 and Vancomycin added on 7/6. Blood cultures positive for MSSA bacteremia on 7/7. Infectious disease consulted for MSSA bacteremia.       A complete review of systems is negative other than that noted in the HPI.    Medical History Review: I have reviewed the patient's PMH, PSH, Social History, Family History, Meds, and Allergies     Objective :  Temp:  [97.3 °F (36.3 °C)-99.9 °F (37.7 °C)] 97.3 °F (36.3 °C)  HR:  [62-72] 62  BP: (124-133)/(64-66) 131/66  Resp:  [18] 18  SpO2:  [93 %-96 %] 96 %  O2 Device: None (Room air)    Physical Exam  Vitals and nursing note reviewed.   Constitutional:       General: He is not in acute distress.     Appearance: He is well-developed.   HENT:      Head: Normocephalic and atraumatic.     Eyes:      Conjunctiva/sclera: Conjunctivae normal.       Cardiovascular:      Rate and Rhythm: Normal rate and regular rhythm.      Pulses:           Dorsalis pedis pulses are 2+ on the right side and 2+ on the left side.      Heart sounds: Murmur (systolic) heard.   Pulmonary:      Effort: Pulmonary effort is normal. No respiratory distress.      Breath sounds: Normal breath sounds.   Abdominal:      General: There is no distension.      Palpations: Abdomen is soft.      Tenderness: There is no abdominal tenderness.     Musculoskeletal:         General: Swelling and tenderness (right foot) present.      Cervical back: Neck supple.      Right lower leg: " Edema present.      Left lower leg: No edema.     Skin:     General: Skin is warm and dry.      Capillary Refill: Capillary refill takes less than 2 seconds.      Findings: Erythema (right foot) present.      Comments: Warmth in R foot   Splinter hemorrhage vs melanonychia noted on left 5th digit        Neurological:      Mental Status: He is alert and oriented to person, place, and time.     Psychiatric:         Mood and Affect: Mood normal.            Lab Results: I have reviewed the following results:  Results from last 7 days   Lab Units 07/07/25 0445 07/06/25 0540 07/05/25  1453   WBC Thousand/uL 11.75* 12.51* 15.99*   HEMOGLOBIN g/dL 11.7* 13.2 13.0   PLATELETS Thousands/uL 114* 102* 112*     Results from last 7 days   Lab Units 07/07/25 0445 07/06/25 0540 07/05/25  1453   SODIUM mmol/L 133* 134* 132*   POTASSIUM mmol/L 3.7 3.3* 3.5   CHLORIDE mmol/L 104 102 100   CO2 mmol/L 23 21 22   BUN mg/dL 43* 51* 42*   CREATININE mg/dL 1.53* 1.87* 1.95*   EGFR ml/min/1.73sq m 44 34 32   CALCIUM mg/dL 7.9* 8.2* 8.7   AST U/L  --   --  55*   ALT U/L  --   --  30   ALK PHOS U/L  --   --  69   ALBUMIN g/dL  --   --  3.7     Results from last 7 days   Lab Units 07/05/25  1453   BLOOD CULTURE  Staphylococcus aureus*  Staphylococcus aureus*   GRAM STAIN RESULT  Gram positive cocci in clusters*  Gram positive cocci in clusters*     Results from last 7 days   Lab Units 07/05/25  1453   PROCALCITONIN ng/ml 4.06*

## 2025-07-07 NOTE — CONSULTS
Vancomycin Pharmacy Consult      Vancomycin has been discontinued; Pharmacy will sign off now. Thank you for involving us in this patient's care. Please do not hesitate to reach back out to Pharmacy.      Chadd Matthew, PharmD  Internal Medicine Clinical Pharmacist Specialist  [phone]  [employer] Secure Chat/Teams

## 2025-07-08 ENCOUNTER — APPOINTMENT (INPATIENT)
Dept: RADIOLOGY | Facility: HOSPITAL | Age: 75
DRG: 853 | End: 2025-07-08
Payer: MEDICARE

## 2025-07-08 ENCOUNTER — APPOINTMENT (INPATIENT)
Dept: NON INVASIVE DIAGNOSTICS | Facility: HOSPITAL | Age: 75
DRG: 853 | End: 2025-07-08
Payer: MEDICARE

## 2025-07-08 PROBLEM — R79.89 ELEVATED LFTS: Chronic | Status: RESOLVED | Noted: 2025-07-05 | Resolved: 2025-07-08

## 2025-07-08 PROBLEM — R29.90 STROKE-LIKE SYMPTOMS: Status: ACTIVE | Noted: 2025-07-08

## 2025-07-08 LAB
ALBUMIN SERPL BCG-MCNC: 3.1 G/DL (ref 3.5–5)
ALP SERPL-CCNC: 78 U/L (ref 34–104)
ALT SERPL W P-5'-P-CCNC: 12 U/L (ref 7–52)
ANION GAP SERPL CALCULATED.3IONS-SCNC: 9 MMOL/L (ref 4–13)
AORTIC ROOT: 4.1 CM
AORTIC VALVE MEAN VELOCITY: 13.7 M/S
ASCENDING AORTA: 4.3 CM
AST SERPL W P-5'-P-CCNC: 24 U/L (ref 13–39)
AV AREA BY CONTINUOUS VTI: 2.9 CM2
AV AREA PEAK VELOCITY: 2.9 CM2
AV LVOT MEAN GRADIENT: 2 MMHG
AV LVOT PEAK GRADIENT: 6 MMHG
AV MEAN PRESS GRAD SYS DOP V1V2: 8 MMHG
AV ORIFICE AREA US: 2.91 CM2
AV PEAK GRADIENT: 13 MMHG
AV VELOCITY RATIO: 0.7
AV VMAX SYS DOP: 1.78 M/S
BACTERIA BLD CULT: ABNORMAL
BACTERIA BLD CULT: ABNORMAL
BASOPHILS # BLD AUTO: 0.02 THOUSANDS/ÂΜL (ref 0–0.1)
BASOPHILS NFR BLD AUTO: 0 % (ref 0–1)
BILIRUB DIRECT SERPL-MCNC: 0.31 MG/DL (ref 0–0.2)
BILIRUB SERPL-MCNC: 0.84 MG/DL (ref 0.2–1)
BSA FOR ECHO PROCEDURE: 2.03 M2
BUN SERPL-MCNC: 42 MG/DL (ref 5–25)
CALCIUM SERPL-MCNC: 8.3 MG/DL (ref 8.4–10.2)
CHLORIDE SERPL-SCNC: 101 MMOL/L (ref 96–108)
CO2 SERPL-SCNC: 21 MMOL/L (ref 21–32)
CREAT SERPL-MCNC: 1.38 MG/DL (ref 0.6–1.3)
DOP CALC AO VTI: 34.67 CM
DOP CALC LVOT AREA: 4.15 CM2
DOP CALC LVOT CARDIAC INDEX: 3.4 L/MIN/M2
DOP CALC LVOT CARDIAC OUTPUT: 6.87 L/MIN
DOP CALC LVOT DIAMETER: 2.3 CM
DOP CALC LVOT PEAK VEL VTI: 24.31 CM
DOP CALC LVOT PEAK VEL: 1.23 M/S
DOP CALC LVOT STROKE INDEX: 51 ML/M2
DOP CALC LVOT STROKE VOLUME: 100.95
EOSINOPHIL # BLD AUTO: 0.09 THOUSAND/ÂΜL (ref 0–0.61)
EOSINOPHIL NFR BLD AUTO: 1 % (ref 0–6)
ERYTHROCYTE [DISTWIDTH] IN BLOOD BY AUTOMATED COUNT: 14.7 % (ref 11.6–15.1)
FRACTIONAL SHORTENING: 37 (ref 28–44)
GFR SERPL CREATININE-BSD FRML MDRD: 50 ML/MIN/1.73SQ M
GLUCOSE SERPL-MCNC: 166 MG/DL (ref 65–140)
GLUCOSE SERPL-MCNC: 172 MG/DL (ref 65–140)
GLUCOSE SERPL-MCNC: 229 MG/DL (ref 65–140)
GRAM STN SPEC: ABNORMAL
GRAM STN SPEC: ABNORMAL
HCT VFR BLD AUTO: 36.6 % (ref 36.5–49.3)
HGB BLD-MCNC: 12.4 G/DL (ref 12–17)
IMM GRANULOCYTES # BLD AUTO: 0.05 THOUSAND/UL (ref 0–0.2)
IMM GRANULOCYTES NFR BLD AUTO: 1 % (ref 0–2)
INTERVENTRICULAR SEPTUM IN DIASTOLE (PARASTERNAL SHORT AXIS VIEW): 1.3 CM
INTERVENTRICULAR SEPTUM: 1.3 CM (ref 0.6–1.1)
LAAS-AP2: 18.3 CM2
LAAS-AP4: 15.1 CM2
LEFT ATRIUM SIZE: 3.6 CM
LEFT ATRIUM VOLUME (MOD BIPLANE): 42 ML
LEFT ATRIUM VOLUME INDEX (MOD BIPLANE): 20.8 ML/M2
LEFT INTERNAL DIMENSION IN SYSTOLE: 3.1 CM (ref 2.1–4)
LEFT VENTRICULAR INTERNAL DIMENSION IN DIASTOLE: 4.9 CM (ref 3.5–6)
LEFT VENTRICULAR POSTERIOR WALL IN END DIASTOLE: 0.9 CM
LEFT VENTRICULAR STROKE VOLUME: 75 ML
LV EF US.2D.A4C+ESTIMATED: 52 %
LVSV (TEICH): 75 ML
LYMPHOCYTES # BLD AUTO: 0.59 THOUSANDS/ÂΜL (ref 0.6–4.47)
LYMPHOCYTES NFR BLD AUTO: 6 % (ref 14–44)
MCH RBC QN AUTO: 30.5 PG (ref 26.8–34.3)
MCHC RBC AUTO-ENTMCNC: 33.9 G/DL (ref 31.4–37.4)
MCV RBC AUTO: 90 FL (ref 82–98)
MONOCYTES # BLD AUTO: 0.82 THOUSAND/ÂΜL (ref 0.17–1.22)
MONOCYTES NFR BLD AUTO: 8 % (ref 4–12)
MV E'TISSUE VEL-SEP: 5 CM/S
NEUTROPHILS # BLD AUTO: 9.21 THOUSANDS/ÂΜL (ref 1.85–7.62)
NEUTS SEG NFR BLD AUTO: 84 % (ref 43–75)
NRBC BLD AUTO-RTO: 0 /100 WBCS
PLATELET # BLD AUTO: 147 THOUSANDS/UL (ref 149–390)
PMV BLD AUTO: 12.9 FL (ref 8.9–12.7)
POTASSIUM SERPL-SCNC: 3.8 MMOL/L (ref 3.5–5.3)
PROT SERPL-MCNC: 6.6 G/DL (ref 6.4–8.4)
RBC # BLD AUTO: 4.07 MILLION/UL (ref 3.88–5.62)
RIGHT ATRIAL 2D VOLUME: 61 ML
RIGHT ATRIUM AREA SYSTOLE A4C: 20.5 CM2
RIGHT VENTRICLE ID DIMENSION: 3 CM
S AUREUS DNA BLD POS QL NAA+NON-PROBE: DETECTED
SINOTUBULAR JUNCTION: 4 CM
SL CV LEFT ATRIUM LENGTH A2C: 5.4 CM
SL CV LV EF: 60
SL CV PED ECHO LEFT VENTRICLE DIASTOLIC VOLUME (MOD BIPLANE) 2D: 113 ML
SL CV PED ECHO LEFT VENTRICLE SYSTOLIC VOLUME (MOD BIPLANE) 2D: 38 ML
SL CV SINUS OF VALSALVA 2D: 4.3 CM
SODIUM SERPL-SCNC: 131 MMOL/L (ref 135–147)
STJ: 4 CM
TR MAX PG: 20 MMHG
TR PEAK VELOCITY: 2.3 M/S
TRICUSPID ANNULAR PLANE SYSTOLIC EXCURSION: 2.5 CM
TRICUSPID VALVE PEAK REGURGITATION VELOCITY: 2.25 M/S
WBC # BLD AUTO: 10.78 THOUSAND/UL (ref 4.31–10.16)

## 2025-07-08 PROCEDURE — 70450 CT HEAD/BRAIN W/O DYE: CPT

## 2025-07-08 PROCEDURE — 99232 SBSQ HOSP IP/OBS MODERATE 35: CPT | Performed by: INTERNAL MEDICINE

## 2025-07-08 PROCEDURE — 80076 HEPATIC FUNCTION PANEL: CPT

## 2025-07-08 PROCEDURE — 93306 TTE W/DOPPLER COMPLETE: CPT

## 2025-07-08 PROCEDURE — 80048 BASIC METABOLIC PNL TOTAL CA: CPT

## 2025-07-08 PROCEDURE — 82948 REAGENT STRIP/BLOOD GLUCOSE: CPT

## 2025-07-08 PROCEDURE — 93306 TTE W/DOPPLER COMPLETE: CPT | Performed by: INTERNAL MEDICINE

## 2025-07-08 PROCEDURE — G0545 PR INHERENT VISIT TO INPT: HCPCS | Performed by: INTERNAL MEDICINE

## 2025-07-08 PROCEDURE — 85025 COMPLETE CBC W/AUTO DIFF WBC: CPT

## 2025-07-08 PROCEDURE — 92610 EVALUATE SWALLOWING FUNCTION: CPT

## 2025-07-08 RX ORDER — POLYETHYLENE GLYCOL 3350 17 G/17G
17 POWDER, FOR SOLUTION ORAL DAILY
Status: DISCONTINUED | OUTPATIENT
Start: 2025-07-08 | End: 2025-07-10

## 2025-07-08 RX ORDER — ATORVASTATIN CALCIUM 40 MG/1
40 TABLET, FILM COATED ORAL
Status: CANCELLED | OUTPATIENT
Start: 2025-07-08

## 2025-07-08 RX ORDER — ASPIRIN 81 MG/1
81 TABLET ORAL DAILY
Status: DISCONTINUED | OUTPATIENT
Start: 2025-07-08 | End: 2025-07-09

## 2025-07-08 RX ORDER — SENNOSIDES 8.6 MG
1 TABLET ORAL
Status: DISCONTINUED | OUTPATIENT
Start: 2025-07-08 | End: 2025-07-15

## 2025-07-08 RX ADMIN — POLYETHYLENE GLYCOL 3350 17 G: 17 POWDER, FOR SOLUTION ORAL at 08:22

## 2025-07-08 RX ADMIN — TAMSULOSIN HYDROCHLORIDE 0.4 MG: 0.4 CAPSULE ORAL at 16:44

## 2025-07-08 RX ADMIN — ASPIRIN 81 MG: 81 TABLET ORAL at 08:22

## 2025-07-08 RX ADMIN — ATORVASTATIN CALCIUM 40 MG: 40 TABLET, FILM COATED ORAL at 16:44

## 2025-07-08 RX ADMIN — INSULIN LISPRO 1 UNITS: 100 INJECTION, SOLUTION INTRAVENOUS; SUBCUTANEOUS at 08:14

## 2025-07-08 RX ADMIN — ACETAMINOPHEN 975 MG: 325 TABLET ORAL at 16:44

## 2025-07-08 RX ADMIN — CEFAZOLIN SODIUM 2000 MG: 2 SOLUTION INTRAVENOUS at 22:01

## 2025-07-08 RX ADMIN — CEFAZOLIN SODIUM 2000 MG: 2 SOLUTION INTRAVENOUS at 05:50

## 2025-07-08 RX ADMIN — HEPARIN SODIUM 5000 UNITS: 5000 INJECTION INTRAVENOUS; SUBCUTANEOUS at 13:46

## 2025-07-08 RX ADMIN — HEPARIN SODIUM 5000 UNITS: 5000 INJECTION INTRAVENOUS; SUBCUTANEOUS at 22:01

## 2025-07-08 RX ADMIN — CEFAZOLIN SODIUM 2000 MG: 2 SOLUTION INTRAVENOUS at 13:45

## 2025-07-08 NOTE — ASSESSMENT & PLAN NOTE
River Pedro is a 75 y.o. male admitted 7/5/2025 for right foot pain for the past few days.  Of note, patient has history of right foot cellulitis, admission at [facility] in January 2024.  Podiatry was consulted to evaluate the right foot pain.     Diagnostics:  7/6: Right foot X-Ray -No acute osseous abnormality.  7/6: Left foot X-Ray - No acute osseous abnormality.    Initial lab work/cultures  WBC: 10.78 (11.75)     Plan:    Plan to continue IV antibiotics management per infectious disease currently on [facility], and local wound care.  Will monitor the cellulitic changes on the right foot.  No podiatric surgical intervention needed at this time.  Recommend local wound care on the left great toe consisting of Betadine paint, 2 x 2 gauze, wrap with Mendez. Wound care instructions placed.  Elevation on green foam wedges or pillows when non-ambulatory.  Rest of care per primary team.    Weightbearing status: Weightbearing as tolerated to bilateral lower extremities

## 2025-07-08 NOTE — ASSESSMENT & PLAN NOTE
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 Unable to see in medical record if patient had residual deficits following diagnosis.  Patient has not been taking his ASA 81 mg since 2022  New left facial droop noted today with CT scan revealed no acute infarct.   Positive neuro findings are left facial droop with mild speech difficulty   Managed by primary team

## 2025-07-08 NOTE — ASSESSMENT & PLAN NOTE
This morning, nursing staff alerted our team that patient had left-sided facial droop, around the corner of his mouth.  While examining him, this was noted, but the rest of his neuroexam was benign.  When taking his morning aspirin, staff noticed that he started to choke.    Plan:  STAT CT head was ordered with the following findings: No hemorrhage or evidence of acute cortical infarct.  Chronic infarct in the left basal ganglia was noted.  Speech therapy saw the patient today and was recommending dysphagia diet, puréed solids and thin liquids  MRI of the brain was also ordered.  Follow-up official read  Continue aspirin and statin

## 2025-07-08 NOTE — ASSESSMENT & PLAN NOTE
Patient's blood cultures 2/2 for gram positive cocci in clusters. Escalated antibiotics to vancomycin     Plan:   ID consulted, appreciate recs:  Recommending TTE for possible IE, given positive blood culture for typical IE and fever  Echo showing ejection fraction of 60%, mild right atrial dilation, mild aortic valve regurgitation, no echocardiographic evidence of valvular vegetation affecting the mitral, aortic, or tricuspid valves.  Pulmonic valve not adequately visualized to assess for endocarditis. Overall, no significant changes noted compared to the prior study.  Also recommended 6 weeks of IV Abx tx  Pending final blood cultures

## 2025-07-08 NOTE — PROGRESS NOTES
"Progress Note - Infectious Disease   Name: River Pedro 75 y.o. male I MRN: [id number]  Unit/Bed#: [facility] 901-01 I Date of Admission: 7/5/2025   Date of Service: 7/8/2025 I Hospital Day: 3    Assessment & Plan  Cellulitis of right foot  Presented to the ED with 4-day history of worsening R foot pain with associated erythema, edema, trouble with weightbearing, fever, chills, and decreased PO intake.   History of R foot cellulitis requiring hospitalization in Jan 2024. MRI that time was negative for osteomyelitis. Treated with IV Ancef. Plan was for OR debridement 1/18 however patient left AMA On 1/17. He was given 10-day course of Cefadroxil.    XR of both feet revealed no acute osseous abnormality.  WBC 15.9>>12.5>>11.7>>10.7  Tmax 100.5 in the past 24 hours  Improved in the size of erythema and edema  Cellulitis most likely from open wounds and onychomycosis +/- tinea pedis     Plan:  - Continue Cefazolin (D4)  - Elevation of feet while at rest   - Recommend oral terbinafine or itraconazole for onychomycosis   Open wound bilateral 1st toes  See \"cellulitis of right foot\"   MSSA bacteremia  Blood cultures collected on 7/5 both positive for MSSA on 7/7. However both from venous line, colonization/contamination vs true infection  Echo in March 2022 revealed EF 55%, NWM, G1DD. Mild to mod AR. Mild TR and mild IN. Mildly dilated aortic root and ascending aorta.  Source is most likely from the open wound of bilateral first toes.  Splinter hemorrhage vs melanonychia noted on left 5th digit   Systolic murmurs noted on exam    Plan:  - Continue Cefazolin 2g Q8H   - Pending TTE    - F/u on new blood cultures collected don 7/7  - Pending MRI of R foot for concern of osteo  - If no infective endocarditis and no signs of ongoing systemic staphylococcal infection, patient would need to complete a 6-week course of Cefazolin following the first negative blood cultures  Sepsis (HCC)  Sepsis 2/2 MSSA bacteremia and R foot " cellulitis.   Continue abx above  NORMA (acute kidney injury) (HCC)  Cr of 1.95 on admission, baseline 0.8-1.1. BUN:Cr ratio >20, likely pre-renal from decreased PO intake.   Cr improved to 1.38 this AM  Managed by primary team  History of diabetes mellitus  A1c 6.7 this admission, no change since Jan 2024  Managed by primary team  CVA (cerebral vascular accident) (HCC)  Hx of L basal ganglia infarct in 2012 and TIA in 2019. Unable to see in medical record if patient had residual deficits following diagnosis.  Patient has not been taking his ASA 81 mg since 2022  New left facial droop noted today with CT scan revealed no acute infarct.   Positive neuro findings are left facial droop with mild speech difficulty   Managed by primary team  Elevated LFTs (Resolved: 7/8/2025)  Slight elevation of AST 55 with T bili 1.48 on admission. INR of 1.34. Mild AST elevation this admission. No prior hepatitis panel.   Acute hepatitis panel negative this admission.    CT CAP in 2022 unremarkable hepatobiliary.  Most likely reactive in the setting of infection  Continue to trend.           Antibiotics:  Cefazolin #4    Subjective   Patient seen and examined at bedside. Brother is present in the room. Per brother, left facial droop is new to him. Patient reports improvement in R foot pain and swelling. Denies fever, chills, night sweats, nausea, vomiting, diarrhea, cough, and SOB. Patient endorses globus sensation in the back of his throat without problem swallowing and pain in general.     Objective :  Temp:  [98.4 °F (36.9 °C)-100.5 °F (38.1 °C)] 98.9 °F (37.2 °C)  HR:  [71-77] 71  BP: (135-152)/(64-76) 141/64  Resp:  [16-18] 16  SpO2:  [93 %-94 %] 93 %  O2 Device: None (Room air)    Physical Exam  Vitals and nursing note reviewed.   Constitutional:       General: He is not in acute distress.     Appearance: He is well-developed.   HENT:      Head: Normocephalic and atraumatic.     Eyes:      Conjunctiva/sclera: Conjunctivae normal.        Cardiovascular:      Rate and Rhythm: Normal rate and regular rhythm.      Heart sounds: Murmur (systolic) heard.   Pulmonary:      Effort: Pulmonary effort is normal. No respiratory distress.      Breath sounds: Normal breath sounds.   Abdominal:      Palpations: Abdomen is soft.      Tenderness: There is no abdominal tenderness.     Musculoskeletal:         General: No swelling.      Cervical back: Neck supple.      Right lower leg: Edema present.      Left lower leg: No edema.      Comments: Improved in the size of erythema and edema in right foot  Warmth appreciated on R > L foot     Skin:     General: Skin is warm and dry.      Capillary Refill: Capillary refill takes less than 2 seconds.      Findings: Erythema present.     Neurological:      Mental Status: He is alert and oriented to person, place, and time.      Comments: Slight L facial droop with slight speech difficulty   CN 2-12 grossly intact  Muscle strength 5/5 in all extremities   Psychiatric:         Mood and Affect: Mood normal.             Lab Results: I have reviewed the following results:  Results from last 7 days   Lab Units 07/08/25  0808 07/07/25  0445 07/06/25  0540   WBC Thousand/uL 10.78* 11.75* 12.51*   HEMOGLOBIN g/dL 12.4 11.7* 13.2   PLATELETS Thousands/uL 147* 114* 102*     Results from last 7 days   Lab Units 07/07/25  0445 07/06/25  0540 07/05/25  1453   SODIUM mmol/L 133* 134* 132*   POTASSIUM mmol/L 3.7 3.3* 3.5   CHLORIDE mmol/L 104 102 100   CO2 mmol/L 23 21 22   BUN mg/dL 43* 51* 42*   CREATININE mg/dL 1.53* 1.87* 1.95*   EGFR ml/min/1.73sq m 44 34 32   CALCIUM mg/dL 7.9* 8.2* 8.7   AST U/L  --   --  55*   ALT U/L  --   --  30   ALK PHOS U/L  --   --  69   ALBUMIN g/dL  --   --  3.7     Results from last 7 days   Lab Units 07/07/25  1359 07/05/25  1453   BLOOD CULTURE  Received in Microbiology Lab. Culture in Progress.  Received in Microbiology Lab. Culture in Progress. Staphylococcus aureus*  Staphylococcus aureus*    GRAM STAIN RESULT   --  Gram positive cocci in clusters*  Gram positive cocci in clusters*     Results from last 7 days   Lab Units 07/05/25  1453   PROCALCITONIN ng/ml 4.06*

## 2025-07-08 NOTE — ASSESSMENT & PLAN NOTE
Remote hx of T2DM. Patient states that he does not take any medications and that he is not diabetics, although this is mentioned in prior documentation  Hemoglobin 6.7    Plan:  Sugars are well-controlled at this time, we will monitor sugars on daily BMP.  Will discontinue sliding scale insulin at this time, as patient has had minimal to no requirements of insulin.

## 2025-07-08 NOTE — PLAN OF CARE
Problem: PAIN - ADULT  Goal: Verbalizes/displays adequate comfort level or baseline comfort level  Description: Interventions:  - Encourage patient to monitor pain and request assistance  - Assess pain using appropriate pain scale  - Administer analgesics as ordered based on type and severity of pain and evaluate response  - Implement non-pharmacological measures as appropriate and evaluate response  - Consider cultural and social influences on pain and pain management  - Notify physician/advanced practitioner if interventions unsuccessful or patient reports new pain  - Educate patient/family on pain management process including their role and importance of  reporting pain   - Provide non-pharmacologic/complimentary pain relief interventions  Outcome: Progressing     Problem: INFECTION - ADULT  Goal: Absence or prevention of progression during hospitalization  Description: INTERVENTIONS:  - Assess and monitor for signs and symptoms of infection  - Monitor lab/diagnostic results  - Monitor all insertion sites, i.e. indwelling lines, tubes, and drains  - Monitor endotracheal if appropriate and nasal secretions for changes in amount and color  - [place] appropriate cooling/warming therapies per order  - Administer medications as ordered  - Instruct and encourage patient and family to use good hand hygiene technique  - Identify and instruct in appropriate isolation precautions for identified infection/condition  Outcome: Progressing  Goal: Absence of fever/infection during neutropenic period  Description: INTERVENTIONS:  - Monitor WBC  - Perform strict hand hygiene  - Limit to healthy visitors only  - No plants, dried, fresh or silk flowers with hobbs in patient room  Outcome: Progressing     Problem: SAFETY ADULT  Goal: Patient will remain free of falls  Description: INTERVENTIONS:  - Educate patient/family on patient safety including physical limitations  - Instruct patient to call for assistance with activity   -  Consider consulting OT/PT to assist with strengthening/mobility based on AM PAC & JH-HLM score  - Consult OT/PT to assist with strengthening/mobility   - Keep Call bell within reach  - Keep bed low and locked with side rails adjusted as appropriate  - Keep care items and personal belongings within reach  - Initiate and maintain comfort rounds  - Make Fall Risk Sign visible to staff  - Apply yellow socks and bracelet for high fall risk patients  - Consider moving patient to room near nurses station  Outcome: Progressing  Goal: Maintain or return to baseline ADL function  Description: INTERVENTIONS:  -  Assess patient's ability to carry out ADLs; assess patient's baseline for ADL function and identify physical deficits which impact ability to perform ADLs (bathing, care of mouth/teeth, toileting, grooming, dressing, etc.)  - Assess/evaluate cause of self-care deficits   - Assess range of motion  - Assess patient's mobility; develop plan if impaired  - Assess patient's need for assistive devices and provide as appropriate  - Encourage maximum independence but intervene and supervise when necessary  - Involve family in performance of ADLs  - Assess for home care needs following discharge   - Consider OT consult to assist with ADL evaluation and planning for discharge  - Provide patient education as appropriate  - Monitor functional capacity and physical performance, use of AM PAC & JH-HLM   - Monitor gait, balance and fatigue with ambulation    Outcome: Progressing  Goal: Maintains/Returns to pre admission functional level  Description: INTERVENTIONS:  - Perform AM-PAC 6 Click Basic Mobility/ Daily Activity assessment daily.  - Set and communicate daily mobility goal to care team and patient/family/caregiver.   - Collaborate with rehabilitation services on mobility goals if consulted  - Out of bed for toileting  - Record patient progress and toleration of activity level   Outcome: Progressing     Problem: DISCHARGE  PLANNING  Goal: Discharge to home or other facility with appropriate resources  Description: INTERVENTIONS:  - Identify barriers to discharge w/patient and caregiver  - Arrange for needed discharge resources and transportation as appropriate  - Identify discharge learning needs (meds, wound care, etc.)  - Arrange for interpretive services to assist at discharge as needed  - Refer to Case Management Department for coordinating discharge planning if the patient needs post-hospital services based on physician/advanced practitioner order or complex needs related to functional status, cognitive ability, or social support system  Outcome: Progressing     Problem: Knowledge Deficit  Goal: Patient/family/caregiver demonstrates understanding of disease process, treatment plan, medications, and discharge instructions  Description: Complete learning assessment and assess knowledge base.  Interventions:  - Provide teaching at level of understanding  - Provide teaching via preferred learning methods  Outcome: Progressing

## 2025-07-08 NOTE — QUICK NOTE
Went to bedside to discuss plan of care with ex-wife, Emily. Emily expresses her concerns about the patient and all questions were answered. I explained the services that have been consulted as well as current treatment plan for MSSA bacteremia, cellulitis, and feet wounds. Explained concern of patient having new stroke-like symptoms with concern of new left facial droop of mouth and dysarthria. This is different from word finding difficulty at baseline. Informed stat CT head was negative for hemorrhage. MRI brain ordered.     Pastoral care was consulted per request of patient and wife. Will continue to answer all questions.    Micheline Barrett,    Internal Medicine Residency PGY-2  [facility]

## 2025-07-08 NOTE — RESTORATIVE TECHNICIAN NOTE
Restorative Technician Note      Patient Name: River Pedro     [facility] Tech Visit Date: 07/08/25  Note Type: Mobility  Patient Position Upon Consult: Supine (On transport stretcher)  Activity Performed: Ambulated; Transferred  Assistive Device: Roller walker; Other (Comment) ([facility] x 2)  Patient Position at End of Consult: Supine; All needs within reach; Bed/Chair alarm activated    Edvin aGrdner, Restorative Technician

## 2025-07-08 NOTE — ASSESSMENT & PLAN NOTE
Presented to the ED with 4-day history of worsening R foot pain with associated erythema, edema, trouble with weightbearing, fever, chills, and decreased PO intake.   History of R foot cellulitis requiring hospitalization in Jan 2024. MRI that time was negative for osteomyelitis. Treated with IV Ancef. Plan was for OR debridement 1/18 however patient left AMA On 1/17. He was given 10-day course of Cefadroxil.    XR of both feet revealed no acute osseous abnormality.  WBC 15.9>>12.5>>11.7>>10.7  Tmax 100.5 in the past 24 hours  Improved in the size of erythema and edema  Cellulitis most likely from open wounds and onychomycosis +/- tinea pedis     Plan:  - Continue Cefazolin (D4)  - Elevation of feet while at rest   - Recommend oral terbinafine or itraconazole for onychomycosis

## 2025-07-08 NOTE — SPEECH THERAPY NOTE
Speech-Language Pathology Bedside Swallow Evaluation      Patient Name: River Pedro    Today's Date: 7/8/2025     Problem List  Principal Problem:    Cellulitis of right foot  Active Problems:    NORMA (acute kidney injury) ([facility])    History of diabetes mellitus    CVA (cerebral vascular accident) ([facility])    Open wound bilateral 1st toes    MSSA bacteremia    Sepsis ([facility])    Urinary retention      Past Medical History  Past Medical History[1]    Past Surgical History  Past Surgical History[2]    Summary   Pt presented with s/s suggestive of moderate oral dysphagia characterized by decreased bolus breakdown, prolonged mastication and left buccal pocketing with regular solids. The patient spits out partially masticated solids that he can't clear. The patient presents with a mild pharyngeal dysphagia characterized by intermittent coughing with thin liquids.     Risk/s for Aspiration: mod     Recommended Diet: puree/level 1 diet and thin liquids   Recommended Form of Meds: crushed with puree   Aspiration precautions and swallowing strategies: small bites/sips  Other Recommendations: Continue frequent oral care; consider MBS if concerns for aspiration continue    Current Medical Status  River Pedro is a 76 y/o M w remote hx of CVA- L basal ganglia infarct in 2012, TIA 2019 (poor f/u with doctors) who presents to the ED with R foot pain for the past 4 days. When asked what brought him to the hospital, he stated that he was having extreme difficulty putting weight on the food and was unable to move his foot much. He also presents with mild word-finding difficulty, fatigue, fevers, chills, loss of appetite for these 4 days. He denies any falls with his difficulty walking, any trauma to the foot, and any drainage from the foot. He also denies any loss of sensation over this area. He cannot remember the last time that he saw a provider, and he does not take any medications at home. Of note, he was admitted at [facility]  hospital on 01/13/2024 for a similar infection that was labeled a diabetic foot wound extending from R great toe up his distal R leg. He denies any hx of diabetes. Patient left hospital AMA, although care team there recommended debridement vs. Amputation. Imaging at this visit was negative for OM. Of note, patient was seen on 04/04/2022 by cardiology following an admission in March of this year for ambulatory dysfxn. He was found down in his home on the floor for 2 days and was in SVT upon presentation to the ED. Patient declined any further cardiac testing beyond an echocardiogram done this year (findings below).     Current Precautions:  Fall  Aspiration    Allergies:  No known food allergies  Past medical history:  Please see H&P for details    Special Studies:  CT head 7/8/25:   No hemorrhage or CT evidence of an acute cortical infarct     Social/Education/Vocational Hx:  Pt lives alone    Swallow Information   Current Risks for Dysphagia & Aspiration: h/o CVA; left facial droop this am  Current Symptoms/Concerns: coughing with po and pocketing food  Current Diet: NPO   Baseline Diet: regular diet and thin liquids    Baseline Assessment   Behavior/Cognition: alert  Speech/Language Status: able to participate in conversation and able to follow commands  Patient Positioning: upright in bed  Pain Status/Interventions/Response to Interventions: No report of or nonverbal indications of pain.     Swallow Mechanism Exam  Facial: slight left sided weakness  Labial: WFL  Lingual: WFL  Velum: unable to visualize  Mandible: adequate ROM  Dentition: adequate  Vocal quality:clear/adequate   Respiratory Status: on RA       Consistencies Assessed and Performance   Consistencies Administered: thin liquids, puree, and hard solids  Materials administered included applesauce and pancakes with thin liquids    Oral Stage: moderate  Mastication time is prolonged and inefficient. Patient unable to clear regular solids and has  significant left side buccal residue noted. He attempts to clear with lingual sweep but is unable. Patient spits out partially masticated solids. No overt s/s reduced oral control.    Pharyngeal Stage: mild  Swallow Mechanics:  Swallowing initiation appeared prompt.  Laryngeal rise was palpated and judged to be within functional limits.  Intermittent cough observed with thin liquids.     Esophageal Concerns: none reported    Summary and Recommendations (see above)    Results Reviewed with: patient, RN, and MD     Treatment Recommended: dysphagia therapy      Frequency of treatment: 2-5x week as able     Patient Stated Goal: none stated     Dysphagia LTG  -Patient will demonstrate safe and effective oral intake (without overt s/s significant oral/pharyngeal dysphagia including s/s penetration or aspiration) for the highest appropriate diet level.     Short Term Goals:  -Pt will tolerate Dysphagia 1/pureed diet and thin liquid with no significant s/s oral or pharyngeal dysphagia across 1-3 diagnostic session/s    -Patient will tolerate trials of upgraded food and/or liquid texture with no significant s/s of oral or pharyngeal dysphagia including aspiration across 1-3 diagnostic sessions     -Patient will comply with a Video/Modified Barium Swallow study for more complete assessment of swallowing anatomy/physiology/aspiration risk and to assess efficacy of treatment techniques so as to best guide treatment plan    Speech Therapy Prognosis   Prognosis: fair    Prognosis Considerations: medical status             [1] No past medical history on file.  [2] No past surgical history on file.

## 2025-07-08 NOTE — ASSESSMENT & PLAN NOTE
Cr of 1.95 on admission. BUN:Cr ratio >20. Given this and patient's hx of not eating/drinking much for past 4 days along w end organ damage on other labs, suggest prerenal insult  Patient with urinary retention, reports waking up two times a night to go to the bathroom.    Creatinine today 1.38    Plan:  Avoid NSAIDs for pain mgmt  Given 1L bolus isolyte at admission, patient eating well and hydrating well, no need for fluids at this time  Continue to trend Cr daily

## 2025-07-08 NOTE — QUICK NOTE
Called to bedside by patient's nurse Adriana, she was signed out report that patient had left facial droop that was noted overnight.  We are unaware of the last known normal.  Patient does have residual word finding difficulty from his CVA in 2019.  Left facial droop was not noted by our  providers prior.  Patient was also having slurred speech.  Patient attempted to drink water and was coughing. Stat CT of the head.   Patient was made n.p.o.  Speech eval ordered for concern of dysphagia.  Patient without any cranial nerve deficit noted.  Patient strength intact bilaterally.     Micheline Barrett,    Internal Medicine Residency PGY-2  [facility]

## 2025-07-08 NOTE — ASSESSMENT & PLAN NOTE
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 Unable to see in medical record if patient had residual deficits following diagnosis. Only + neuro findings are mild word finding difficulty    Plan:  Will contact family to obtain more hx and ask about word finding difficulty  Patient has mentioned that he has not been eating or drinking for the past 4 days. May be contributory to word finding difficulty  Restarting ASA 81 mg daily. Patient was not taking this as an outpatient, although it is listed in home meds  Restarted Lipitor 40 mg daily

## 2025-07-08 NOTE — ASSESSMENT & PLAN NOTE
Cr of 1.95 on admission, baseline 0.8-1.1. BUN:Cr ratio >20, likely pre-renal from decreased PO intake.   Cr improved to 1.38 this AM  Managed by primary team

## 2025-07-08 NOTE — ASSESSMENT & PLAN NOTE
Urology consulted for retention and re-colored urine in Hanks bag    Plan:  Continue to monitor for further hematuria. Favor traumatic hanks insertion  Continue flomax 0.4 mg daily  Avoid medications that cause urinary retention such as antihistamines and anticholinergics  Bowel regimen: Patient now has scheduled miralax.  Will follow-up to determine if patient had bowel movement tomorrow during examination  Continue hanks until patient is ambulating and able to use bathroom. Will plan for void trial at this time  Urinary retention protocol, patient may require urology follow up outpatient

## 2025-07-08 NOTE — PROGRESS NOTES
Progress Note - Internal Medicine   Name: River Pedro 75 y.o. male I MRN: [id number]  Unit/Bed#: [facility] 901-01 I Date of Admission: 7/5/2025   Date of Service: 7/8/2025 I Hospital Day: 3    Assessment & Plan  Sepsis (HCC)  See cellulitis of R foot below  Cellulitis of right foot  R foot pain x4 days. Inability to bear weight. Limited ROM. Endorses fatigue, fevers, chills, loss of appetite. Febrile on admissoin to 101.5. ED Workup significant for WBC of 15.99, stable Hb, mild thrombocytopenia, mild hyponatremia, NORMA w Cr of 1.95 (baseline of 1), glucose of 191, AST elevated at 55, T bili 1.48. Lactic 1.5, procal 4.06, INR 1.34. Bcx drawn, and patient started on Ancef    Of note, patient has hx of R foot cellulitis, admission at [facility] in Jan 2024. Received Abx but left AMA before recommended amputation vs. Debridement by care team there. Has questionable hx of diabetes. Patient denies any hx of diabetes.    Plan:  Continue Abx Tx w Ancef, as patient's Bcx have come back as S. Aureus, favor MSSA. This is Day 4 of IV Abx  Continue F/u Bcx for susceptibilities  Control glucose between 140-180 to promote healing  Encourage patient to eat/drink throughout day to promote healing  Podiatry consulted:  On 07/06, podiatry debrided distal hallux b/l. Open wound was found on distal L hallux  Trend WBC, fever  Given patient's prior admission and him leaving AMA, continue to explain importance of receiving Abx therapy  XR of b/l feet done.  No acute osseous abnormalities were visualized on the bilateral x-rays of the feet  R foot MRI ordered, f/u official read  MSSA bacteremia  Patient's blood cultures 2/2 for gram positive cocci in clusters. Escalated antibiotics to vancomycin     Plan:   ID consulted, appreciate recs:  Recommending TTE for possible IE, given positive blood culture for typical IE and fever  Echo showing ejection fraction of 60%, mild right atrial dilation, mild aortic valve regurgitation, no echocardiographic  evidence of valvular vegetation affecting the mitral, aortic, or tricuspid valves.  Pulmonic valve not adequately visualized to assess for endocarditis. Overall, no significant changes noted compared to the prior study.  Also recommended 6 weeks of IV Abx tx  Pending final blood cultures   Urinary retention  Urology consulted for retention and re-colored urine in Hanks bag    Plan:  Continue to monitor for further hematuria. Favor traumatic hanks insertion  Continue flomax 0.4 mg daily  Avoid medications that cause urinary retention such as antihistamines and anticholinergics  Bowel regimen: Patient now has scheduled miralax.  Will follow-up to determine if patient had bowel movement tomorrow during examination  Continue hanks until patient is ambulating and able to use bathroom. Will plan for void trial at this time  Urinary retention protocol, patient may require urology follow up outpatient  NORMA (acute kidney injury) ([facility])  Cr of 1.95 on admission. BUN:Cr ratio >20. Given this and patient's hx of not eating/drinking much for past 4 days along w end organ damage on other labs, suggest prerenal insult  Patient with urinary retention, reports waking up two times a night to go to the bathroom.    Creatinine today 1.38    Plan:  Avoid NSAIDs for pain mgmt  Given 1L bolus isolyte at admission, patient eating well and hydrating well, no need for fluids at this time  Continue to trend Cr daily  History of diabetes mellitus  Remote hx of T2DM. Patient states that he does not take any medications and that he is not diabetics, although this is mentioned in prior documentation  Hemoglobin 6.7    Plan:  Sugars are well-controlled at this time, we will monitor sugars on daily BMP.  Will discontinue sliding scale insulin at this time, as patient has had minimal to no requirements of insulin.  CVA (cerebral vascular accident) (HCC)  Hx of L basal ganglia infarct in 2012 and TIA in 2019. Unable to see in medical record if patient  had residual deficits following diagnosis. Only + neuro findings are mild word finding difficulty    Plan:  Will contact family to obtain more hx and ask about word finding difficulty  Patient has mentioned that he has not been eating or drinking for the past 4 days. May be contributory to word finding difficulty  Restarting ASA 81 mg daily. Patient was not taking this as an outpatient, although it is listed in home meds  Restarted Lipitor 40 mg daily  Elevated LFTs (Resolved: 7/8/2025)  Hx of elevated LFTs and INR of 1.34. Mild AST elevation this admission. Appears to have never had hepatitis panel in past    Plan:  Hepatitis panel normal  Open wound bilateral 1st toes  Podiatry consulted, appreciate recs  Stroke-like symptoms  This morning, nursing staff alerted our team that patient had left-sided facial droop, around the corner of his mouth.  While examining him, this was noted, but the rest of his neuroexam was benign.  When taking his morning aspirin, staff noticed that he started to choke.    Plan:  STAT CT head was ordered with the following findings: No hemorrhage or evidence of acute cortical infarct.  Chronic infarct in the left basal ganglia was noted.  Speech therapy saw the patient today and was recommending dysphagia diet, puréed solids and thin liquids  MRI of the brain was also ordered.  Follow-up official read  Continue aspirin and statin    Disposition: Continue inpatient stay for IV Abx, TTE to r/o endocarditis, R foot MRI, wound care, monitoring of fevers/WBC    Team: SOD TEAM A    Subjective   Patient seen and examined. No acute events overnight.  Mr. Pedro states that he feels well.  He continues to deny fevers, chills, nausea, vomiting, diarrhea, chest pain, palpitations, shortness of breath, and orthopnea.  When asked about his slurred speech, he recognizes that his speech sounds different than it usually does.  He denies any changes in sensation on his bilateral face, upper extremities,  and lower extremities.  He says that he feels weak overall, but he does not say that he has any focal weakness anywhere.  He is agreeable to our plan to continue antibiotics, obtain images of his bilateral lower extremities, and obtain echocardiogram.    Objective :  Temp:  [97.3 °F (36.3 °C)-100.5 °F (38.1 °C)] 98.4 °F (36.9 °C)  HR:  [62-77] 71  BP: (131-152)/(66-76) 135/74  Resp:  [16-18] 16  SpO2:  [94 %-96 %] 94 %  O2 Device: None (Room air)    I/O         07/06 0701  07/07 0700 07/07 0701  07/08 0700    P.O. 120     Total Intake(mL/kg) 120 (1.4)     Urine (mL/kg/hr) 1000 (0.5) 625 (0.3)    Stool  0    Total Output 1000 625    Net -880 -625          Unmeasured Stool Occurrence  1 x          Weights:   IBW (Ideal Body Weight): 75.3 kg    Body mass index is 25.52 kg/m².  Weight (last 2 days)       None            Physical Exam  Vitals and nursing note reviewed.   Constitutional:       General: He is not in acute distress.     Appearance: He is well-developed.   HENT:      Head: Normocephalic and atraumatic.      Right Ear: External ear normal.      Left Ear: External ear normal.      Nose: Nose normal. No congestion.      Mouth/Throat:      Mouth: Mucous membranes are moist.      Pharynx: No oropharyngeal exudate.      Comments: Left corner of patient's mouth w slight droop    Eyes:      General: No scleral icterus.        Right eye: No discharge.         Left eye: No discharge.      Conjunctiva/sclera: Conjunctivae normal.       Cardiovascular:      Rate and Rhythm: Normal rate and regular rhythm.      Pulses: Normal pulses.      Heart sounds: Normal heart sounds. No murmur heard.     No friction rub. No gallop.   Pulmonary:      Effort: Pulmonary effort is normal. No respiratory distress.      Breath sounds: Normal breath sounds. No wheezing, rhonchi or rales.   Abdominal:      General: Abdomen is flat. There is no distension.      Palpations: Abdomen is soft.      Tenderness: There is no abdominal tenderness.      Musculoskeletal:         General: Swelling (Right foot swelling increased compared to yesterday's examination), tenderness (Tenderness of bilateral feet, right greater than left) and signs of injury (Wounds on bilateral great toes w/o drainage) present.      Right lower leg: No edema.      Left lower leg: No edema.     Skin:     General: Skin is warm and dry.      Capillary Refill: Capillary refill takes less than 2 seconds.      Findings: No rash.     Neurological:      General: No focal deficit present.      Mental Status: He is alert. Mental status is at baseline.      Cranial Nerves: Cranial nerve deficit (Left-sided facial droop that is observed when patient smiles) present.      Sensory: No sensory deficit.      Motor: Weakness (no focal weakness, only generalized) present.      Comments: Dysarthria present   Psychiatric:         Mood and Affect: Mood normal.         Behavior: Behavior normal.         Thought Content: Thought content normal.         Judgment: Judgment normal.           Lab Results: I have reviewed the following results:  Recent Labs     07/05/25  1453 07/06/25  0540 07/07/25  0445   WBC 15.99*   < > 11.75*   HGB 13.0   < > 11.7*   HCT 38.6   < > 34.5*   *   < > 114*   SODIUM 132*   < > 133*   K 3.5   < > 3.7      < > 104   CO2 22   < > 23   BUN 42*   < > 43*   CREATININE 1.95*   < > 1.53*   GLUC 191*   < > 135   AST 55*  --   --    ALT 30  --   --    ALB 3.7  --   --    TBILI 1.48*  --   --    ALKPHOS 69  --   --    PTT 29  --   --    INR 1.34*  --   --    LACTICACID 1.5  --   --     < > = values in this interval not displayed.     Imaging Results Review: I personally reviewed the following image studies in PACS and associated radiology reports: XR L and R foot. My interpretation of the radiology images/reports is: osteo lesion of R great toe on Xray, suspicious for OM. MRI ordered, pending official read.     Other Study Results Review: EKG was personally reviewed and my  interpretation is: Personally Reviewed. NSR w HR of 60. Slight QRS widening, LVH ..    Currently Ordered Meds:   Current Facility-Administered Medications:     acetaminophen (TYLENOL) tablet 975 mg, Q8H PRN    atorvastatin (LIPITOR) tablet 40 mg, Daily With Dinner    ceFAZolin (ANCEF) IVPB (premix in dextrose) 2,000 mg 50 mL, Q8H, Last Rate: 2,000 mg (07/08/25 0550)    heparin (porcine) subcutaneous injection 5,000 Units, Q8H JOSÉ MIGUEL    HYDROmorphone HCl (DILAUDID) injection 0.2 mg, Q2H PRN    insulin lispro (HumALOG/ADMELOG) 100 units/mL subcutaneous injection 1-5 Units, 4x Daily (AC & HS) **AND** Fingerstick Glucose (POCT), 4x Daily AC and at bedtime    naloxone (NARCAN) 0.04 mg/mL syringe 0.04 mg, Q1MIN PRN    ondansetron (ZOFRAN) injection 4 mg, Q6H PRN    oxyCODONE (ROXICODONE) split tablet 2.5 mg, Q4H PRN **OR** oxyCODONE (ROXICODONE) IR tablet 5 mg, Q4H PRN    polyethylene glycol (MIRALAX) packet 17 g, Daily PRN    tamsulosin (FLOMAX) capsule 0.4 mg, Daily With Dinner    VTE Pharmacologic Prophylaxis: VTE covered by:  heparin (porcine), Subcutaneous, 5,000 Units at 07/07/25 1406     VTE Mechanical Prophylaxis: sequential compression device    Administrative Statements   I have spent a total time of 30 minutes in caring for this patient on the day of the visit/encounter including Diagnostic results, Risks and benefits of tx options, Instructions for management, Patient and family education, Importance of tx compliance, Risk factor reductions, Counseling / Coordination of care, Documenting in the medical record, Reviewing/placing orders in the medical record (including tests, medications, and/or procedures), Obtaining or reviewing history  , and Communicating with other healthcare professionals .    Portions of the record may have been created with voice recognition software.      Loi Suh MD

## 2025-07-08 NOTE — CASE MANAGEMENT
Case Management Discharge Planning Note    Patient name River Pedro  Location [facility] 901/[facility] 901-01 MRN [id number]  : 1950 Date 2025       Current Admission Date: 2025  Current Admission Diagnosis:Cellulitis of right foot   Patient Active Problem List    Diagnosis Date Noted    Sepsis (HCC) 2025    Urinary retention 2025    Open wound bilateral 1st toes 2025    MSSA bacteremia 2025    Cellulitis of right foot 2025    CVA (cerebral vascular accident) ([facility]) 2025    Impaired cognition 2022    NORMA (acute kidney injury) ([facility]) 2022    History of transient ischemic attack (TIA) 2022    History of diabetes mellitus 2022      LOS (days): 3  Geometric Mean LOS (GMLOS) (days):   Days to GMLOS:     OBJECTIVE:  Risk of Unplanned Readmission Score: 13.76         Current admission status: Inpatient   Preferred Pharmacy:   PATIENT/FAMILY REPORTS NO PREFERRED PHARMACY  No address on file      Primary Care Provider: No primary care provider on file.    Primary Insurance: [employer] [facility]  Secondary Insurance:     DISCHARGE DETAILS:  Additional Comments: CM met with pt at bedside for continued discharge planning discussion. CM discussed STR rec and the need for IV abx on discharge for 6-weeks. Pt agreeable to blanket STR referral being made at this time.

## 2025-07-08 NOTE — PROGRESS NOTES
Pastoral Care Progress Note          Chaplaincy Interventions Utilized:   Empowerment: Encouraged assertiveness and Encouraged self-care    Exploration: Explored hope and Facilitated story telling     Relationship Building: Listened empathically, Hospitality, and Provided silent and supportive presence    Ritual: Provided prayer        Chaplaincy Outcomes Achieved:  Debriefed/defused experience, Progressed toward acceptance, Progressed toward focus on present, and Verbally processed emotions        Spiritual Coping Strategies Utilized:   Spiritual comfort, Spiritual gratitude, and Spiritual meaning       07/08/25 1700   Clinical Encounter Type   Visited With Patient and family together   Routine Visit Introduction   Referral From Nurse   Referral To    [facility] Encounters   [facility] Needs Prayer   Patient Spiritual Encounters   Spiritual Encounter Notes The Pt appeared anxious about the outcomes of his treatment. His wife was present at the bedside. The Pt vented his emotions and concerns. He trust God with his recovery. The PC provided emotional and spiritual support.

## 2025-07-08 NOTE — PLAN OF CARE
Problem: PAIN - ADULT  Goal: Verbalizes/displays adequate comfort level or baseline comfort level  Description: Interventions:  - Encourage patient to monitor pain and request assistance  - Assess pain using appropriate pain scale  - Administer analgesics as ordered based on type and severity of pain and evaluate response  - Implement non-pharmacological measures as appropriate and evaluate response  - Consider cultural and social influences on pain and pain management  - Notify physician/advanced practitioner if interventions unsuccessful or patient reports new pain  - Educate patient/family on pain management process including their role and importance of  reporting pain   - Provide non-pharmacologic/complimentary pain relief interventions  Outcome: Progressing     Problem: INFECTION - ADULT  Goal: Absence or prevention of progression during hospitalization  Description: INTERVENTIONS:  - Assess and monitor for signs and symptoms of infection  - Monitor lab/diagnostic results  - Monitor all insertion sites, i.e. indwelling lines, tubes, and drains  - Monitor endotracheal if appropriate and nasal secretions for changes in amount and color  - [place] appropriate cooling/warming therapies per order  - Administer medications as ordered  - Instruct and encourage patient and family to use good hand hygiene technique  - Identify and instruct in appropriate isolation precautions for identified infection/condition  Outcome: Progressing  Goal: Absence of fever/infection during neutropenic period  Description: INTERVENTIONS:  - Monitor WBC  - Perform strict hand hygiene  - Limit to healthy visitors only  - No plants, dried, fresh or silk flowers with hobbs in patient room  Outcome: Progressing     Problem: SAFETY ADULT  Goal: Patient will remain free of falls  Description: INTERVENTIONS:  - Educate patient/family on patient safety including physical limitations  - Instruct patient to call for assistance with activity   -  Consider consulting OT/PT to assist with strengthening/mobility based on AM PAC & JH-HLM score  - Consult OT/PT to assist with strengthening/mobility   - Keep Call bell within reach  - Keep bed low and locked with side rails adjusted as appropriate  - Keep care items and personal belongings within reach  - Initiate and maintain comfort rounds  - Make Fall Risk Sign visible to staff  - Apply yellow socks and bracelet for high fall risk patients  - Consider moving patient to room near nurses station  Outcome: Progressing  Goal: Maintain or return to baseline ADL function  Description: INTERVENTIONS:  -  Assess patient's ability to carry out ADLs; assess patient's baseline for ADL function and identify physical deficits which impact ability to perform ADLs (bathing, care of mouth/teeth, toileting, grooming, dressing, etc.)  - Assess/evaluate cause of self-care deficits   - Assess range of motion  - Assess patient's mobility; develop plan if impaired  - Assess patient's need for assistive devices and provide as appropriate  - Encourage maximum independence but intervene and supervise when necessary  - Involve family in performance of ADLs  - Assess for home care needs following discharge   - Consider OT consult to assist with ADL evaluation and planning for discharge  - Provide patient education as appropriate  - Monitor functional capacity and physical performance, use of AM PAC & JH-HLM   - Monitor gait, balance and fatigue with ambulation    Outcome: Progressing  Goal: Maintains/Returns to pre admission functional level  Description: INTERVENTIONS:  - Perform AM-PAC 6 Click Basic Mobility/ Daily Activity assessment daily.  - Set and communicate daily mobility goal to care team and patient/family/caregiver.   - Collaborate with rehabilitation services on mobility goals if consulted  - Out of bed for toileting  - Record patient progress and toleration of activity level   Outcome: Progressing     Problem: DISCHARGE  PLANNING  Goal: Discharge to home or other facility with appropriate resources  Description: INTERVENTIONS:  - Identify barriers to discharge w/patient and caregiver  - Arrange for needed discharge resources and transportation as appropriate  - Identify discharge learning needs (meds, wound care, etc.)  - Arrange for interpretive services to assist at discharge as needed  - Refer to Case Management Department for coordinating discharge planning if the patient needs post-hospital services based on physician/advanced practitioner order or complex needs related to functional status, cognitive ability, or social support system  Outcome: Progressing     Problem: Knowledge Deficit  Goal: Patient/family/caregiver demonstrates understanding of disease process, treatment plan, medications, and discharge instructions  Description: Complete learning assessment and assess knowledge base.  Interventions:  - Provide teaching at level of understanding  - Provide teaching via preferred learning methods  Outcome: Progressing

## 2025-07-08 NOTE — ASSESSMENT & PLAN NOTE
River Pedro is a 75 y.o. male admitted 7/5/2025 for right foot pain for the past few days.  Of note, patient has history of right foot cellulitis, admission at [facility] in January 2024.  Podiatry was consulted to evaluate the right foot pain.     Diagnostics:  7/6: Right foot X-Ray -No acute osseous abnormality.  7/6: Left foot X-Ray - No acute osseous abnormality.    Initial lab work/cultures  WBC: 10.78 (11.75)     Plan:    Plan to continue IV antibiotics management per infectious disease currently on [facility], and local wound care.  Will monitor the cellulitic changes on the right foot.  No podiatric surgical intervention needed at this time.  Recommend local wound care on the left great toe consisting of Betadine paint, 2 x 2 gauze, wrap with Mendez. Wound care instructions placed.  Elevation on green foam wedges or pillows when non-ambulatory.  Rest of care per primary team.    Weightbearing status: Weightbearing as tolerated to bilateral lower extremities

## 2025-07-08 NOTE — ASSESSMENT & PLAN NOTE
Blood cultures collected on 7/5 both positive for MSSA on 7/7. However both from venous line, colonization/contamination vs true infection  Echo in March 2022 revealed EF 55%, NWM, G1DD. Mild to mod AR. Mild TR and mild KY. Mildly dilated aortic root and ascending aorta.  Source is most likely from the open wound of bilateral first toes.  Splinter hemorrhage vs melanonychia noted on left 5th digit   Systolic murmurs noted on exam    Plan:  - Continue Cefazolin 2g Q8H   - Pending TTE    - F/u on new blood cultures collected don 7/7  - Pending MRI of R foot for concern of osteo  - If no infective endocarditis and no signs of ongoing systemic staphylococcal infection, patient would need to complete a 6-week course of Cefazolin following the first negative blood cultures

## 2025-07-08 NOTE — ASSESSMENT & PLAN NOTE
R foot pain x4 days. Inability to bear weight. Limited ROM. Endorses fatigue, fevers, chills, loss of appetite. Febrile on admissoin to 101.5. ED Workup significant for WBC of 15.99, stable Hb, mild thrombocytopenia, mild hyponatremia, NORMA w Cr of 1.95 (baseline of 1), glucose of 191, AST elevated at 55, T bili 1.48. Lactic 1.5, procal 4.06, INR 1.34. Bcx drawn, and patient started on Ancef    Of note, patient has hx of R foot cellulitis, admission at [facility] in Jan 2024. Received Abx but left AMA before recommended amputation vs. Debridement by care team there. Has questionable hx of diabetes. Patient denies any hx of diabetes.    Plan:  Continue Abx Tx w Ancef, as patient's Bcx have come back as S. Aureus, favor MSSA. This is Day 4 of IV Abx  Continue F/u Bcx for susceptibilities  Control glucose between 140-180 to promote healing  Encourage patient to eat/drink throughout day to promote healing  Podiatry consulted:  On 07/06, podiatry debrided distal hallux b/l. Open wound was found on distal L hallux  Trend WBC, fever  Given patient's prior admission and him leaving AMA, continue to explain importance of receiving Abx therapy  XR of b/l feet done.  No acute osseous abnormalities were visualized on the bilateral x-rays of the feet  R foot MRI ordered, f/u official read

## 2025-07-09 ENCOUNTER — APPOINTMENT (INPATIENT)
Dept: RADIOLOGY | Facility: HOSPITAL | Age: 75
DRG: 853 | End: 2025-07-09
Payer: MEDICARE

## 2025-07-09 PROBLEM — B35.1 ONYCHOMYCOSIS: Status: ACTIVE | Noted: 2025-07-09

## 2025-07-09 LAB
ANION GAP SERPL CALCULATED.3IONS-SCNC: 10 MMOL/L (ref 4–13)
BASOPHILS # BLD AUTO: 0.02 THOUSANDS/ÂΜL (ref 0–0.1)
BASOPHILS NFR BLD AUTO: 0 % (ref 0–1)
BUN SERPL-MCNC: 34 MG/DL (ref 5–25)
CALCIUM SERPL-MCNC: 8.6 MG/DL (ref 8.4–10.2)
CHLORIDE SERPL-SCNC: 103 MMOL/L (ref 96–108)
CO2 SERPL-SCNC: 20 MMOL/L (ref 21–32)
CREAT SERPL-MCNC: 1.25 MG/DL (ref 0.6–1.3)
EOSINOPHIL # BLD AUTO: 0.06 THOUSAND/ÂΜL (ref 0–0.61)
EOSINOPHIL NFR BLD AUTO: 1 % (ref 0–6)
ERYTHROCYTE [DISTWIDTH] IN BLOOD BY AUTOMATED COUNT: 14.7 % (ref 11.6–15.1)
GFR SERPL CREATININE-BSD FRML MDRD: 56 ML/MIN/1.73SQ M
GLUCOSE SERPL-MCNC: 205 MG/DL (ref 65–140)
HCT VFR BLD AUTO: 36.3 % (ref 36.5–49.3)
HGB BLD-MCNC: 12.6 G/DL (ref 12–17)
IMM GRANULOCYTES # BLD AUTO: 0.11 THOUSAND/UL (ref 0–0.2)
IMM GRANULOCYTES NFR BLD AUTO: 1 % (ref 0–2)
LYMPHOCYTES # BLD AUTO: 0.65 THOUSANDS/ÂΜL (ref 0.6–4.47)
LYMPHOCYTES NFR BLD AUTO: 6 % (ref 14–44)
MCH RBC QN AUTO: 30.7 PG (ref 26.8–34.3)
MCHC RBC AUTO-ENTMCNC: 34.7 G/DL (ref 31.4–37.4)
MCV RBC AUTO: 88 FL (ref 82–98)
MONOCYTES # BLD AUTO: 0.89 THOUSAND/ÂΜL (ref 0.17–1.22)
MONOCYTES NFR BLD AUTO: 8 % (ref 4–12)
NEUTROPHILS # BLD AUTO: 9.36 THOUSANDS/ÂΜL (ref 1.85–7.62)
NEUTS SEG NFR BLD AUTO: 84 % (ref 43–75)
NRBC BLD AUTO-RTO: 0 /100 WBCS
PLATELET # BLD AUTO: 193 THOUSANDS/UL (ref 149–390)
PMV BLD AUTO: 12.1 FL (ref 8.9–12.7)
POTASSIUM SERPL-SCNC: 3.4 MMOL/L (ref 3.5–5.3)
RBC # BLD AUTO: 4.11 MILLION/UL (ref 3.88–5.62)
SODIUM SERPL-SCNC: 133 MMOL/L (ref 135–147)
WBC # BLD AUTO: 11.09 THOUSAND/UL (ref 4.31–10.16)

## 2025-07-09 PROCEDURE — 80048 BASIC METABOLIC PNL TOTAL CA: CPT

## 2025-07-09 PROCEDURE — 99232 SBSQ HOSP IP/OBS MODERATE 35: CPT | Performed by: INTERNAL MEDICINE

## 2025-07-09 PROCEDURE — 92611 MOTION FLUOROSCOPY/SWALLOW: CPT

## 2025-07-09 PROCEDURE — G0545 PR INHERENT VISIT TO INPT: HCPCS | Performed by: INTERNAL MEDICINE

## 2025-07-09 PROCEDURE — 92526 ORAL FUNCTION THERAPY: CPT

## 2025-07-09 PROCEDURE — 97535 SELF CARE MNGMENT TRAINING: CPT

## 2025-07-09 PROCEDURE — 85025 COMPLETE CBC W/AUTO DIFF WBC: CPT

## 2025-07-09 PROCEDURE — 97116 GAIT TRAINING THERAPY: CPT

## 2025-07-09 PROCEDURE — 97530 THERAPEUTIC ACTIVITIES: CPT

## 2025-07-09 PROCEDURE — 74230 X-RAY XM SWLNG FUNCJ C+: CPT

## 2025-07-09 PROCEDURE — 71045 X-RAY EXAM CHEST 1 VIEW: CPT

## 2025-07-09 RX ORDER — ASPIRIN 81 MG/1
81 TABLET, CHEWABLE ORAL DAILY
Status: DISCONTINUED | OUTPATIENT
Start: 2025-07-09 | End: 2025-07-09

## 2025-07-09 RX ORDER — ITRACONAZOLE 10 MG/ML
100 SOLUTION ORAL EVERY 12 HOURS SCHEDULED
Status: DISCONTINUED | OUTPATIENT
Start: 2025-07-09 | End: 2025-07-10

## 2025-07-09 RX ORDER — ATORVASTATIN CALCIUM 20 MG/1
20 TABLET, FILM COATED ORAL
Status: DISCONTINUED | OUTPATIENT
Start: 2025-07-09 | End: 2025-07-10

## 2025-07-09 RX ORDER — ASPIRIN 81 MG/1
81 TABLET, CHEWABLE ORAL DAILY
Status: DISCONTINUED | OUTPATIENT
Start: 2025-07-09 | End: 2025-07-17 | Stop reason: HOSPADM

## 2025-07-09 RX ORDER — ITRACONAZOLE 10 MG/ML
100 SOLUTION ORAL EVERY 24 HOURS
Status: CANCELLED | OUTPATIENT
Start: 2025-07-10

## 2025-07-09 RX ORDER — POTASSIUM CHLORIDE 14.9 MG/ML
20 INJECTION INTRAVENOUS
Status: DISCONTINUED | OUTPATIENT
Start: 2025-07-09 | End: 2025-07-09

## 2025-07-09 RX ORDER — SENNOSIDES 8.6 MG
1 TABLET ORAL DAILY
Status: DISCONTINUED | OUTPATIENT
Start: 2025-07-09 | End: 2025-07-10

## 2025-07-09 RX ORDER — POTASSIUM CHLORIDE 1500 MG/1
20 TABLET, EXTENDED RELEASE ORAL ONCE
Status: COMPLETED | OUTPATIENT
Start: 2025-07-09 | End: 2025-07-09

## 2025-07-09 RX ADMIN — SENNOSIDES 8.6 MG: 8.6 TABLET, FILM COATED ORAL at 10:08

## 2025-07-09 RX ADMIN — Medication 3 MG: at 22:00

## 2025-07-09 RX ADMIN — POTASSIUM CHLORIDE 20 MEQ: 1500 TABLET, EXTENDED RELEASE ORAL at 19:54

## 2025-07-09 RX ADMIN — HEPARIN SODIUM 5000 UNITS: 5000 INJECTION INTRAVENOUS; SUBCUTANEOUS at 16:56

## 2025-07-09 RX ADMIN — ATORVASTATIN CALCIUM 20 MG: 20 TABLET, FILM COATED ORAL at 15:39

## 2025-07-09 RX ADMIN — POLYETHYLENE GLYCOL 3350 17 G: 17 POWDER, FOR SOLUTION ORAL at 07:23

## 2025-07-09 RX ADMIN — CEFAZOLIN SODIUM 2000 MG: 2 SOLUTION INTRAVENOUS at 15:39

## 2025-07-09 RX ADMIN — ASPIRIN 81 MG CHEWABLE TABLET 81 MG: 81 TABLET CHEWABLE at 07:31

## 2025-07-09 RX ADMIN — CEFAZOLIN SODIUM 2000 MG: 2 SOLUTION INTRAVENOUS at 06:32

## 2025-07-09 RX ADMIN — TAMSULOSIN HYDROCHLORIDE 0.4 MG: 0.4 CAPSULE ORAL at 15:39

## 2025-07-09 RX ADMIN — HEPARIN SODIUM 5000 UNITS: 5000 INJECTION INTRAVENOUS; SUBCUTANEOUS at 22:00

## 2025-07-09 RX ADMIN — POTASSIUM CHLORIDE 20 MEQ: 14.9 INJECTION, SOLUTION INTRAVENOUS at 16:34

## 2025-07-09 NOTE — QUICK NOTE
Went back to patient's room, as I noticed patient's point of contact, his ex-wife, was in the room.  Checked in with patient following his PT evaluation and obtained additional history from patient's ex-wife regarding his baseline neurostatus/ deficits.  Consistent with what the treatment team noticed yesterday, his mild left-sided facial droop is new to his ex-wife as well.  Physical therapy also saw the patient today and noted that he has a left upper visual deficit as well as he was leaning to the left while ambulating/working with them.  I inquired about this to ask if this was his baseline, and the patient's ex-wife said this was also new.  I explained that we obtained CT imaging of his head which was negative for any new stroke/bleeding.  I also explained that we are getting an MRI of the brain to confirm that there is no acute intracranial pathology.  Also provided medical update regarding patient's cellulitis.  Explained that the erythema is decreasing, but there is still persistent swelling of the right lower extremity.  Explained that the patient is receiving IV antibiotics and is having daily monitoring of his temperature/other vitals and his white blood cell count, marker of infection.  Also explained that our plan includes getting MRI imaging of his bilateral feet to rule out osteomyelitis.  Reviewed chest x-ray finding with patient and his ex-wife, stating that we did not notice any acute cardiopulmonary findings and that we ordered this because we were present in his room yesterday when he had an aspiration event.  Also reviewed echocardiogram findings with patient and his ex-wife, stating that no vegetation was visualized and his ejection fraction was normal.  Shared that I spoke with speech therapy again to reevaluate the patient to safely give him a diet that he can tolerate.    Loi Suh MD

## 2025-07-09 NOTE — PLAN OF CARE
Problem: PHYSICAL THERAPY ADULT  Goal: Performs mobility at highest level of function for planned discharge setting.  See evaluation for individualized goals.  Description: Treatment/Interventions: ADL retraining, Functional transfer training, LE strengthening/ROM, Elevations, Therapeutic exercise, Endurance training, Patient/family training, Equipment eval/education, Bed mobility, Gait training, Spoke to nursing, Spoke to case management, OT          See flowsheet documentation for full assessment, interventions and recommendations.  Outcome: Progressing  Note: Prognosis: Fair  Problem List: Decreased strength, Impaired balance, Decreased mobility, Decreased endurance, Decreased cognition, Impaired judgement, Decreased safety awareness, Pain, Decreased coordination (impaired motor planning)  Assessment: Pt participated in skilled PT treatmtent session w/ interventions consisting of t/f training + gait training. Pt w/ L lateral lean while sitting in the chair. More pronounced L lateral lean w/ standing + ambulation. Increased time for processing required for instructions w/ verbal + manual cues. Manual cues required initially for sequencing w/ RW. With time pt able to move RW on his own and ambulate w/ correct sequencing. Cues required throughout for larger step length on LLE to meet RLE. Family Medicine updated re: concerns. Continue to recommend rehab upon d/c.  Barriers to Discharge: Decreased caregiver support, Inaccessible home environment     Rehab Resource Intensity Level, PT: II (Moderate Resource Intensity)    See flowsheet documentation for full assessment.

## 2025-07-09 NOTE — ASSESSMENT & PLAN NOTE
Presented to the ED with 4-day history of worsening R foot pain with associated erythema, edema, trouble with weightbearing, fever, chills, and decreased PO intake.   History of R foot cellulitis requiring hospitalization in Jan 2024. MRI that time was negative for osteomyelitis. Treated with IV Ancef. Plan was for OR debridement 1/18 however patient left AMA On 1/17. He was given 10-day course of Cefadroxil.    XR of both feet revealed no acute osseous abnormality.  WBC 15.9>>12.5>>11.7>>10.7  Afebrile in the past 24 hours  Improved in the size of erythema and edema  Cellulitis most likely from open wounds and onychomycosis +/- tinea pedis     Plan:  - Continue Cefazolin D5, would continue for at least 7 days  - Elevation of feet while at rest   - Recommend oral terbinafine or itraconazole for onychomycosis

## 2025-07-09 NOTE — ASSESSMENT & PLAN NOTE
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 Unable to see in medical record if patient had residual deficits following diagnosis. Only + neuro findings are mild word finding difficulty    Plan:  Will contact family to obtain more hx and ask about word finding difficulty  Patient has mentioned that he has not been eating or drinking for the past 4 days. May be contributory to word finding difficulty  Continue ASA 81 mg daily. Patient was not taking this as an outpatient, although it is listed in home meds  Continue Lipitor 20 mg daily

## 2025-07-09 NOTE — PLAN OF CARE
Problem: PAIN - ADULT  Goal: Verbalizes/displays adequate comfort level or baseline comfort level  Description: Interventions:  - Encourage patient to monitor pain and request assistance  - Assess pain using appropriate pain scale  - Administer analgesics as ordered based on type and severity of pain and evaluate response  - Implement non-pharmacological measures as appropriate and evaluate response  - Consider cultural and social influences on pain and pain management  - Notify physician/advanced practitioner if interventions unsuccessful or patient reports new pain  - Educate patient/family on pain management process including their role and importance of  reporting pain   - Provide non-pharmacologic/complimentary pain relief interventions  Outcome: Progressing     Problem: INFECTION - ADULT  Goal: Absence or prevention of progression during hospitalization  Description: INTERVENTIONS:  - Assess and monitor for signs and symptoms of infection  - Monitor lab/diagnostic results  - Monitor all insertion sites, i.e. indwelling lines, tubes, and drains  - Monitor endotracheal if appropriate and nasal secretions for changes in amount and color  - [place] appropriate cooling/warming therapies per order  - Administer medications as ordered  - Instruct and encourage patient and family to use good hand hygiene technique  - Identify and instruct in appropriate isolation precautions for identified infection/condition  Outcome: Progressing  Goal: Absence of fever/infection during neutropenic period  Description: INTERVENTIONS:  - Monitor WBC  - Perform strict hand hygiene  - Limit to healthy visitors only  - No plants, dried, fresh or silk flowers with hobbs in patient room  Outcome: Progressing     Problem: SAFETY ADULT  Goal: Patient will remain free of falls  Description: INTERVENTIONS:  - Educate patient/family on patient safety including physical limitations  - Instruct patient to call for assistance with activity   -  Consider consulting OT/PT to assist with strengthening/mobility based on AM PAC & JH-HLM score  - Consult OT/PT to assist with strengthening/mobility   - Keep Call bell within reach  - Keep bed low and locked with side rails adjusted as appropriate  - Keep care items and personal belongings within reach  - Initiate and maintain comfort rounds  - Make Fall Risk Sign visible to staff  - Apply yellow socks and bracelet for high fall risk patients  - Consider moving patient to room near nurses station  Outcome: Progressing  Goal: Maintain or return to baseline ADL function  Description: INTERVENTIONS:  -  Assess patient's ability to carry out ADLs; assess patient's baseline for ADL function and identify physical deficits which impact ability to perform ADLs (bathing, care of mouth/teeth, toileting, grooming, dressing, etc.)  - Assess/evaluate cause of self-care deficits   - Assess range of motion  - Assess patient's mobility; develop plan if impaired  - Assess patient's need for assistive devices and provide as appropriate  - Encourage maximum independence but intervene and supervise when necessary  - Involve family in performance of ADLs  - Assess for home care needs following discharge   - Consider OT consult to assist with ADL evaluation and planning for discharge  - Provide patient education as appropriate  - Monitor functional capacity and physical performance, use of AM PAC & JH-HLM   - Monitor gait, balance and fatigue with ambulation    Outcome: Not Progressing  Goal: Maintains/Returns to pre admission functional level  Description: INTERVENTIONS:  - Perform AM-PAC 6 Click Basic Mobility/ Daily Activity assessment daily.  - Set and communicate daily mobility goal to care team and patient/family/caregiver.   - Collaborate with rehabilitation services on mobility goals if consulted  - Out of bed for toileting  - Record patient progress and toleration of activity level   Outcome: Not Progressing     Problem:  DISCHARGE PLANNING  Goal: Discharge to home or other facility with appropriate resources  Description: INTERVENTIONS:  - Identify barriers to discharge w/patient and caregiver  - Arrange for needed discharge resources and transportation as appropriate  - Identify discharge learning needs (meds, wound care, etc.)  - Arrange for interpretive services to assist at discharge as needed  - Refer to Case Management Department for coordinating discharge planning if the patient needs post-hospital services based on physician/advanced practitioner order or complex needs related to functional status, cognitive ability, or social support system  Outcome: Not Progressing     Problem: Knowledge Deficit  Goal: Patient/family/caregiver demonstrates understanding of disease process, treatment plan, medications, and discharge instructions  Description: Complete learning assessment and assess knowledge base.  Interventions:  - Provide teaching at level of understanding  - Provide teaching via preferred learning methods  Outcome: Not Progressing     Problem: Nutrition/Hydration-ADULT  Goal: Nutrient/Hydration intake appropriate for improving, restoring or maintaining nutritional needs  Description: Monitor and assess patient's nutrition/hydration status for malnutrition. Collaborate with interdisciplinary team and initiate plan and interventions as ordered.  Monitor patient's weight and dietary intake as ordered or per policy. Utilize nutrition screening tool and intervene as necessary. Determine patient's food preferences and provide high-protein, high-caloric foods as appropriate.     INTERVENTIONS:  - Monitor oral intake, urinary output, labs, and treatment plans  - Assess nutrition and hydration status and recommend course of action  - Evaluate amount of meals eaten  - Assist patient with eating if necessary   - Allow adequate time for meals  - Recommend/ encourage appropriate diets, oral nutritional supplements, and  vitamin/mineral supplements  - Order, calculate, and assess calorie counts as needed  - Recommend, monitor, and adjust tube feedings and TPN/PPN based on assessed needs  - Assess need for intravenous fluids  - Provide specific nutrition/hydration education as appropriate  - Include patient/family/caregiver in decisions related to nutrition  Outcome: Progressing

## 2025-07-09 NOTE — ASSESSMENT & PLAN NOTE
River Pedro is a 75 y.o. male admitted 7/5/2025 for right foot pain for the past few days.  Of note, patient has history of right foot cellulitis, admission at [facility] in January 2024.  Podiatry was consulted to evaluate the right foot pain.     Diagnostics:  7/6: Right foot X-Ray - no acute osseous abnormality   7/6: Left foot X-Ray - no acute osseous abnormality  7/8: Left foot MRI: pending   7/8: Right foot MRI: pending    Initial lab work/cultures  WBC: 10.78 (12.51)     Plan:    Wound trimmed on distal hallux left foot.  Open wound found on distal left hallux.   Pending MRI: Discussed with MRI team, possibly will be done overnight.   Plan to continue IV antibiotics management per internal medicine and local wound care. Will monitor the cellulitic changes on the right foot.  No podiatric surgical intervention needed at this time.  Recommend local wound care on the left great toe consisting of Betadine paint, 2 x 2 gauze, wrap with Mendez. Wound care instructions placed.  Elevation on green foam wedges or pillows when non-ambulatory.  Rest of care per primary team.    Weightbearing status: Weightbearing as tolerated to bilateral lower extremities

## 2025-07-09 NOTE — CASE MANAGEMENT
Case Management Discharge Planning Note    Patient name River Pdero  Location [facility] 901/[facility] 901-01 MRN [id number]  : 1950 Date 2025       Current Admission Date: 2025  Current Admission Diagnosis:Cellulitis of right foot   Patient Active Problem List    Diagnosis Date Noted    Onychomycosis 2025    Stroke-like symptoms 2025    Sepsis (HCC) 2025    Urinary retention 2025    Open wound bilateral 1st toes 2025    MSSA bacteremia 2025    Cellulitis of right foot 2025    CVA (cerebral vascular accident) ([facility]) 2025    Impaired cognition 2022    NORMA (acute kidney injury) ([facility]) 2022    History of transient ischemic attack (TIA) 2022    History of diabetes mellitus 2022      LOS (days): 4  Geometric Mean LOS (GMLOS) (days):   Days to GMLOS:     OBJECTIVE:  Risk of Unplanned Readmission Score: 9.92         Current admission status: Inpatient   Preferred Pharmacy:   PATIENT/FAMILY REPORTS NO PREFERRED PHARMACY  No address on file      Primary Care Provider: No primary care provider on file.    Primary Insurance: [employer] [facility]  Secondary Insurance:     DISCHARGE DETAILS:    Additional Comments: CM met with pt and his ex-spouse Gillian, at bedside for continued discharge planning discussion. CM discussed with pt the STR rec and provided STR pt choice list. Pt now stating he does not want STR and wants to go home. Gillian reports she is willing to stay with pt for 6-weeks to assist with his IV abx. CM to send blanket HHC referral for SN for IV abx, PT, and OT. CM to provide HHC choices when available. CM also [facility] for daniela [facility] abx.

## 2025-07-09 NOTE — PROCEDURES
Speech Pathology - Modified Barium Swallow Study    Patient Name: River Pedro    Today's Date: 7/9/2025     Problem List  Principal Problem:    Cellulitis of right foot  Active Problems:    NORMA (acute kidney injury) ([facility])    History of diabetes mellitus    CVA (cerebral vascular accident) ([facility])    Open wound bilateral 1st toes    MSSA bacteremia    Sepsis ([facility])    Urinary retention    Stroke-like symptoms    Onychomycosis      Past Medical History  Past Medical History[1]    Past Surgical History  Past Surgical History[2]    Assessment Summary:    Pt presents with mild-moderate oral, mild pharyngeal dysphagia characterized by reduced transfers, reduced lingual motion and repetitive movement w/ poor clearing at times.  He has oral retention w/ puree and solids w/ difficulty clearing and piecemeal transfers.  The pt also has a mod swallow delay with all material on today's study.  The hyoid movement is reduced with no epiglottic inversion, mildly reduced airway closure as well with the different textures when trying to clear the pharynx.   There was only aspiration of thin by straw during the swallow with attempts to clear the solids.     Note: Images are available for review in [employer] as desired.    Recommendations:   Recommended Diet: soft/level 3 diet and thin liquids -no straws, cup only for now  Recommended Form of Medications: crushed with puree   Aspiration precautions and compensatory swallowing strategies: upright posture, only feed when fully alert, slow rate of feeding, small bites/sips, no straws, and alternating bites and sips  Consider referral to:  RD  SLP Dysphagia therapy recommended: will follow    Results Reviewed with: patient, RN, and MD   Pt/Family Education: initiated. Pt and caregivers would benefit from/require continued education.        General Information;  Pt is a 75 y.o. male with a PMH remarkable for R foot swelling along with word finding difficulty, decreased weight bearing and  reduced ROM.     Pt with cellulitis and sepsis.    The pt had a new L facial droop noted on 7/8/25, speech was consulted.    Current concerns for dysphagia include pocketing noted by nursing as well as coughing.  MBS was recommended to assess oropharyngeal stage swallowing skills at this time.     Prior MBS:-    Oral Mechanism Exam  Facial: left facial droop  Labial: bilateral decreased ROM and decreased strength  Lingual: left sided tongue deviation  Velum: unable to visualize  Mandible: adequate ROM  Dentition: adequate  Vocal quality: weak and hoarse   Volitional Cough: unable to initiate volitional cough   Respiratory Status: on RA    Pt was viewed sitting upright in the lateral and AP positions. Due to concerns for patient safety / patient refusal, trials provided deviated from the MBSImP Validated Protocol. Pt was given 5-mL thin liquid x2, 20-mL cup sip thin, 40-mL sequential swallow thin, 5-mL nectar thick, 20-mL cup sip nectar thick, 5-mL honey thick, 5-mL pudding, ½ cookie coated with 3-mL pudding, 5-mL nectar thick in the AP position, and 5-mL pudding in the AP position. Pt was also given thin liquids by straw, as well as a barium tablet with pudding- with attempts to clear with puree.  The pt was not offered sequential nectar as he was gagging on the contrast.      Initial view observations/comments: Clear view of the upper airway      8-Point Penetration-Aspiration Scale   Thin liquid 8 - Material enters the airway, passes below the vocal folds, and no effort is made to eject  -with the straw sip attempting to clear the solids only, no aspiration with cup sips   Nectar thick liquid 2 - Material enters the airway, remains above the vocal folds, and is ejected  from the airway-trace epiglottic undercoating.    Honey thick liquid 1 - Material does not enter the airway   Puree (pudding) 1 - Material does not enter the airway   Solid 1 - Material does not enter the airway     Strategies and Efficacy: pt has  difficulty completing, cued to swallow immediately and use chin down however he was unable to complete to attempt to clear the pill in the valleculae.     Aspiration Response and Efficacy:  immediately coughs, cleared the straw sip.  Aspiration that was noted with attempts to clear the pill and gagging he partially cleared.     MBS IMP Rating    ORAL Impairment  Compinent 1--Lip Closure  Judged at any point during the swallow.  0 - No labial escape    Component 2--Tongue Control During Bolus Hold  Judged on held liquid boluses only and prior to productive tongue movement.   0 - Cohesive bolus between tongue to palatal seal    Component 3--Bolus Preparation/Mastication  Judged only during presentation of 1/2 shortbread cookie coated in pudding.   1 - Slow prolonged chewing/mashing with complete re-collection    Component 4--Bolus Transport/Lingual Motion  Judged after first productive tongue movement for oral bolus transport.  3 - Repetitive/disorganized tongue motion    Component 5--Oral Residue  Judged after first swallow or after the last swallow of the sequential swallow task.  3 - Majority of bolus remaining   Location   C - Tongue-pt with piecemeal transfers of most material.     Component 6--Initiation of Pharyngeal Swallow  Judged at first movement of the brisk superior-anterior hyoid trajectory.  3 - Bolus head in pyriforms      PHARYNGEAL Impairment  Component 7--Soft Palate Elevation  Judged during maximum displacement of soft palate.  0 - No bolus between the soft palate (SP)/pharyngeal wall (PW)    Component 8--Laryngeal Elevation  Judged when epiglottis is in its most horizontal position.  1 - Partial superior movement of thyroid cartilage/partial approximation of arytenoids to epiglottic petiole    Component 9--Anterior Hyoid Excursion  Judged at height of swallow/maximal anterior hyoid displacement.  1 - Partial anterior movement    Component 10--Epiglottic Movement  Judged at height of  swallow/maximal anterior hyoid displacement.  2 - No inversion    Component 11--Laryngeal Vestibular Closure  Judged at height of swallow/maximal anterior hyoid displacement.  1 - Incomplete; narrow column air/contrast in laryngeal vestibule    Component 12--Pharyngeal Stripping Wave  Judged during the full duration of the pharyngeal swallow.  1 - Present - diminished    Component 13--Pharyngeal Contraction  Judged in AP view at rest and throughout maximum movement of structures.  0 - Complete    Component 14--Pharyngoesophageal Segment Opening  Judged during maximum distension of PES and throughout opening and closure.  1 - Partial distension/partial duration; partial obstruction to flow    Component 15--Tongue Base (TB) Retraction  Judged during maximum retraction of the tongue base.  2 - Narrow column of contrast or air between TB and PW    Component 16--Pharyngeal Residue  Judged after first swallow or after the last swallow of the sequential swallow task.  3 - Majority of contrast within or on pharyngeal structures   Location   F - Diffuse (>3 areas)  Pill stuck in the valleculae as well as noted retention of the pudding in the pharynx.        ESOPHAGEAL Impairment  Component 17--Esophageal Clearance Upright Position  Judged in AP view during bolus transit through the oral cavity to the LES  0 - Complete clearance; esophageal coating                                                 [1] No past medical history on file.  [2] No past surgical history on file.

## 2025-07-09 NOTE — PHYSICAL THERAPY NOTE
Physical Therapy Treatment Note    Patient's Name: River Pedro  : 1950     07/09/25 1058   PT Last Visit   PT Visit Date 25   Note Type   Note Type Treatment   Pain Assessment   Pain Assessment Tool 0-10   Pain Score 7  (0/10 pain at rest)   Pain Location/Orientation Orientation: Right;Location: [facility] Pain Intervention(s) Elevated   Restrictions/Precautions   Weight Bearing Precautions Per Order Yes   RLE Weight Bearing Per Order WBAT   LLE Weight Bearing Per Order WBAT   Other Precautions Cognitive;Chair Alarm;Bed Alarm;Aspiration;Multiple lines;Fall Risk;Pain   General   Chart Reviewed Yes   Response to Previous Treatment Patient with no complaints from previous session.   Cognition   Comments increased time for processing, impaired motor planning   Subjective   Subjective Agreeable to mobilize.   Transfers   Sit to Stand 3  Moderate assistance   Additional items Assist x 2;Armrests;Increased time required;Verbal cues  (cues to fully extend B knees during sit>stand, to increase Glenis + knee flexion prior to standing, for hand placement)   Stand to Sit 3  Moderate assistance   Additional items Assist x 1;Increased time required;Verbal cues  (cues to step back to chair + reach back for armrests to sit down, decreased eccentric control)   Additional Comments RW   Ambulation/Elevation   Gait pattern Excessively slow;Short stride;Decreased R stance;Decreased foot clearance;L Foot drag;Improper Weight shift;Antalgic  (L lateral lean, decreased L step length)   Gait Assistance 2  Maximal assist  (varied between Mod + MaxA)   Additional items Assist x 1;Verbal cues;Tactile cues  (+ 2nd for chair follow; cues for sequencing, for increased step length LLE)   Assistive Device Rolling walker   Distance 7' + 18' + 20'   Stair Management Assistance Not tested   Balance   Static Sitting Fair   Dynamic Sitting Fair -   Static Standing Poor   Dynamic Standing Poor -   Ambulatory Poor -  (RW)   Endurance  Deficit   Endurance Deficit Yes   Endurance Deficit Description fatigue   Activity Tolerance   Activity Tolerance Patient limited by fatigue   Medical Staff Made Aware OT Sheyla, jeff re: L lateral lean, impaired motor planning, + L visual field cut communicated w/ Family Medicine   Nurse Made Aware yes - cleared + updated   Assessment   Prognosis Fair   Problem List Decreased strength;Impaired balance;Decreased mobility;Decreased endurance;Decreased cognition;Impaired judgement;Decreased safety awareness;Pain;Decreased coordination  (impaired motor planning)   Assessment Pt participated in skilled PT treatmtent session w/ interventions consisting of t/f training + gait training. Pt w/ L lateral lean while sitting in the chair. More pronounced L lateral lean w/ standing + ambulation. Increased time for processing required for instructions w/ verbal + manual cues. Manual cues required initially for sequencing w/ RW. With time pt able to move RW on his own and ambulate w/ correct sequencing. Cues required throughout for larger step length on LLE to meet RLE. Family Medicine updated re: concerns. Continue to recommend rehab upon d/c.   Barriers to Discharge Decreased caregiver support;Inaccessible home environment   Goals   Patient Goals to walk   Plan   Treatment/Interventions Functional transfer training;LE strengthening/ROM;Elevations;Therapeutic exercise;Endurance training;Patient/family training;Equipment eval/education;Bed mobility;Gait training;Compensatory technique education;Spoke to MD;Spoke to nursing;OT;Spoke to case management;Family   Progress Progressing toward goals   PT Frequency 3-5x/wk   Discharge Recommendation   Rehab Resource Intensity Level, PT II (Moderate Resource Intensity)   AM-PAC Basic Mobility Inpatient   Turning in Flat Bed Without Bedrails 3   Lying on Back to Sitting on Edge of Flat Bed Without Bedrails 2   Moving Bed to Chair 2   Standing Up From Chair Using Arms 1   Walk in Room 2    Climb 3-5 Stairs With Railing 1   Basic Mobility Inpatient Raw Score 11   Basic Mobility Standardized Score 30.25   [facility] Highest Level Of Mobility   -[facility] Goal 4: Move to chair/commode   -HL Achieved 7: Walk 25 feet or more   Education   Education Provided Mobility training;Assistive device   Patient Demonstrates acceptance/verbal understanding;Reinforcement needed   End of Consult   Patient Position at End of Consult Bedside chair;Bed/Chair alarm activated;All needs within reach  (on waffle cushion, all lines in tact, BLE elevated, Family Medicine resident + ex-spouse at bedside)       Kristen Dash, PT, DPT

## 2025-07-09 NOTE — ASSESSMENT & PLAN NOTE
My exam suggest this patient has onychomycosis on both the right and left feet    Plan:  Starting itraconazole 100 mg twice daily.  Patient should be on treatment for onychomycosis for 12 consecutive weeks.Given  interaction with atorvastatin, will reduce dose from 40 mg daily to 20 mg daily spoke with pharmacy about this

## 2025-07-09 NOTE — ASSESSMENT & PLAN NOTE
Cr of 1.95 on admission. BUN:Cr ratio >20. Given this and patient's hx of not eating/drinking much for past 4 days along w end organ damage on other labs, suggest prerenal insult  Patient with urinary retention, reports waking up two times a night to go to the bathroom.    Creatinine yesterday 1.38.  Patient refusing any labs today despite conversation with nursing staff, myself, and senior resident.  Will continue to encourage patient to get morning labs.    Plan:  Avoid NSAIDs for pain mgmt  Given 1L bolus isolyte at admission, patient eating well and hydrating well, no need for fluids at this time  Continue to trend Cr daily

## 2025-07-09 NOTE — ASSESSMENT & PLAN NOTE
Patient's blood cultures 2/2 for gram positive cocci in clusters. Escalated antibiotics to vancomycin     Plan:   ID consulted, appreciate recs:  Recommended TTE for possible IE, given positive blood culture for typical IE organism and fever  Echo showing ejection fraction of 60%, mild right atrial dilation, mild aortic valve regurgitation, no echocardiographic evidence of valvular vegetation affecting the mitral, aortic, or tricuspid valves.  Pulmonic valve not adequately visualized to assess for endocarditis. Overall, no significant changes noted compared to the prior study.  Also recommended 6 weeks of IV Abx tx  Pending final blood cultures

## 2025-07-09 NOTE — PROGRESS NOTES
Progress Note - Podiatry   Name: River Pedro 75 y.o. male I MRN: [id number]  [facility] Date of Admission: 7/5/2025   Date of Service: 7/9/2025 I Hospital Day: 4    Assessment & Plan  Cellulitis of right foot  Open wound bilateral 1st toes  River Pedro is a 75 y.o. male admitted 7/5/2025 for right foot pain for the past few days.  Of note, patient has history of right foot cellulitis, admission at [facility] in January 2024.  Podiatry was consulted to evaluate the right foot pain.     Diagnostics:  7/6: Right foot X-Ray - no acute osseous abnormality   7/6: Left foot X-Ray - no acute osseous abnormality  7/8: Left foot MRI: pending   7/8: Right foot MRI: pending    Initial lab work/cultures  WBC: 10.78 (12.51)     Plan:    Wound trimmed on distal hallux left foot.  Open wound found on distal left hallux.   Pending MRI: Discussed with MRI team, possibly will be done overnight.   Plan to continue IV antibiotics management per internal medicine and local wound care. Will monitor the cellulitic changes on the right foot.  No podiatric surgical intervention needed at this time.  Recommend local wound care on the left great toe consisting of Betadine paint, 2 x 2 gauze, wrap with Mendez. Wound care instructions placed.  Elevation on green foam wedges or pillows when non-ambulatory.  Rest of care per primary team.    Weightbearing status: Weightbearing as tolerated to bilateral lower extremities    History of diabetes mellitus  Barrier for wound healing.  Management per primary team.  NORMA (acute kidney injury) ([facility])  CVA (cerebral vascular accident) ([facility])  Urinary retention  Sepsis ([facility])  MSSA bacteremia  Stroke-like symptoms  Management per primary team      Subjective : Patient seen and evaluated bedside. Patient is awake, alert, and oriented.  Patient reports the pain has subsided significantly. Patient denies f/c/n/v/sob. Patient denies other podiatric complaints at this time.     Objective :  Temp:  [97.9  °F (36.6 °C)-99.6 °F (37.6 °C)] 97.9 °F (36.6 °C)  HR:  [66-79] 66  BP: (144-160)/(75-76) 160/76  Resp:  [17-18] 17  SpO2:  [91 %-93 %] 93 %  O2 Device: None (Room air)    Physical Exam    General:  Alert, cooperative, and in no distress.  Lower extremity exam:  Cardiovascular status at baseline.  Neurological status at baseline.  Musculoskeletal status at baseline. No calf tenderness noted.     Lower extremity wound(s) as noted below:  Wound #: 1  Location: left hallux distal tuft  Length .2cm: Width .2cm: Depth .2cm:   Deepest Tissue Noted in Base: subcutaneous  Probe to Bone: No  Peripheral Skin Description: Attached  Granulation: 100% Fibrotic Tissue: 0% Necrotic Tissue: 0%   Drainage Amount: minimal  Signs of Infection: purulence    Clinical Images 07/09/25:            Additional Data:     Labs:    Results from last 7 days   Lab Units 07/08/25  0808   WBC Thousand/uL 10.78*   HEMOGLOBIN g/dL 12.4   HEMATOCRIT % 36.6   PLATELETS Thousands/uL 147*   SEGS PCT % 84*   LYMPHO PCT % 6*   MONO PCT % 8   EOS PCT % 1     Results from last 7 days   Lab Units 07/08/25  0808   POTASSIUM mmol/L 3.8   CHLORIDE mmol/L 101   CO2 mmol/L 21   BUN mg/dL 42*   CREATININE mg/dL 1.38*   CALCIUM mg/dL 8.3*   ALK PHOS U/L 78   ALT U/L 12   AST U/L 24     Results from last 7 days   Lab Units 07/05/25  1453   INR  1.34*       * I Have Reviewed All Lab Data Listed Above.    Recent Cultures (last 7 days):     Results from last 7 days   Lab Units 07/07/25  1359 07/05/25  1453   BLOOD CULTURE  No Growth at 24 hrs.  No Growth at 24 hrs. Staphylococcus aureus*  Staphylococcus aureus*   GRAM STAIN RESULT   --  Gram positive cocci in clusters*  Gram positive cocci in clusters*           Imaging: I have personally reviewed pertinent films in PACS  EKG, Pathology, and Other Studies: I have personally reviewed pertinent reports.

## 2025-07-09 NOTE — SPEECH THERAPY NOTE
"Speech Language/Pathology  Speech-Language Pathology Progress Note      Patient Name: River Pedro    Today's Date: 7/9/2025      Subjective:  Pt was awake. He was sitting up in chair at bedside. Patient stated \"I don't have room for anything\".  His vocal quality is hoarse, low volume.      Objective:  Pt was seen today for dysphagia therapy. Secure chat received- pt coughing with the thin today.  Current diet is puree with thin liquids. Pt was on room air.  Focus of today's session was to reassess with textures for safest diet and to offer new recs if needed. Textures offered today included puree, mech soft solid, regular solid, thin liquid via cup and via straw, and nectar thick liquid via cup and via straw.  Swallow function:   The pt was able to self feed the offered material.  Fair retrieval from the tsp, adequate draw from the straw as well as cup rim seal. He has inconsistent transfer of the bolus with some oral holding at times with the material. He also is grinding his teeth (which he states is not new).  He is possibly piecemeal transferring all the material but oumar with straw sips.  Noted mult swallows and a suspected delay in the swallow initiation.   +coughing x1 with the straw sip thin.  No overt coughing with the nectar but the pt stated immediate dislike.    Fair mastication with solids today.  Able to clear the oral cavity with the soft and the harder solid.  Needed a liquid wash- offered with cup sips thin.  He tolerated sips thin from the cup.  Instructed to sip and swallow immediately.    Assessment:  Pt w/ ongoing oropharyngeal dysphagia with oral holding, suspected piecemeal transfers and possible loss as well as possible swallow delay.  His vocal quality is poor.  He stated \"I will not eat the puree, I don't like it\"   Plan:  Consider changing diet to level 3 with thin by single cup sips only for now   Offer the pt oral care often MBS when able for full assessment  Will check MRI  "

## 2025-07-09 NOTE — PROGRESS NOTES
Progress Note - Internal Medicine   Name: River Pedro 75 y.o. male I MRN: [id number]  Unit/Bed#: [place] 901-01 I Date of Admission: 7/5/2025   Date of Service: 7/9/2025 I Hospital Day: 4    Assessment & Plan  Sepsis (HCC)  See cellulitis of R foot below  Cellulitis of right foot  R foot pain x4 days. Inability to bear weight. Limited ROM. Endorses fatigue, fevers, chills, loss of appetite. Febrile on admissoin to 101.5. ED Workup significant for WBC of 15.99, stable Hb, mild thrombocytopenia, mild hyponatremia, NORMA w Cr of 1.95 (baseline of 1), glucose of 191, AST elevated at 55, T bili 1.48. Lactic 1.5, procal 4.06, INR 1.34. Bcx drawn, and patient started on Ancef    Of note, patient has hx of R foot cellulitis, admission at [facility] in Jan 2024. Received Abx but left AMA before recommended amputation vs. Debridement by care team there. Has questionable hx of diabetes. Patient denies any hx of diabetes.    XR of b/l feet done this admission.  No acute osseous abnormalities were visualized on the bilateral x-rays of the feet    Plan:  Continue Abx Tx w Ancef, as patient's Bcx have come back as S. Aureus, favor MSSA. This is Day 5 of IV Abx  B/l feet MRI ordered  Continue F/u Bcx for susceptibilities.  No growth to date on second set of blood cultures  Control glucose between 140-180 to promote healing  Encourage patient to eat/drink throughout day to promote healing  Podiatry consulted:  On 07/06, podiatry debrided distal hallux b/l. Open wound was found on distal L hallux  Trend WBC, fever  Given patient's prior admission and him leaving AMA, continue to explain importance of receiving Abx therapy  Continue work with PT and OT  PT team informed me after their assessment of the patient that he was leaning towards the left when ambulating.  In my opinion, the patient has significant pain on his right foot, which she has told me since I met him on the day of admission and difficulty with weightbearing.  I feel that this  is expected as he recovers from his cellulitis.  Neuroexam is stable compared to my findings yesterday.  MSSA bacteremia  Patient's blood cultures 2/2 for gram positive cocci in clusters. Escalated antibiotics to vancomycin     Plan:   ID consulted, appreciate recs:  Recommended TTE for possible IE, given positive blood culture for typical IE organism and fever  Echo showing ejection fraction of 60%, mild right atrial dilation, mild aortic valve regurgitation, no echocardiographic evidence of valvular vegetation affecting the mitral, aortic, or tricuspid valves.  Pulmonic valve not adequately visualized to assess for endocarditis. Overall, no significant changes noted compared to the prior study.  Also recommended 6 weeks of IV Abx tx  Pending final blood cultures   Stroke-like symptoms  This morning, nursing staff alerted our team that patient had left-sided facial droop, around the corner of his mouth.  While examining him, this was noted, but the rest of his neuroexam was benign.  When taking his morning aspirin, staff noticed that he started to choke.    STAT CT head was ordered with the following findings: No hemorrhage or evidence of acute cortical infarct.  Chronic infarct in the left basal ganglia was noted.    Plan:  Bibasilar crackles on exam today.  Obtained chest x-ray.  No acute cardiopulmonary process. patient satting 94% on room air.  Has not required any supplemental oxygen  Speech therapy saw the patient yesterday and was recommending dysphagia diet, puréed solids and thin liquids.  Speech therapy to reevaluate patient today, as there was concern for thin liquids  MRI of the brain was also ordered.  Follow-up official read  Continue aspirin and statin  Urinary retention  Urology consulted for retention and re-colored urine in Hanks bag    Plan:  Continue to monitor for further hematuria. Favor traumatic hanks insertion  Continue flomax 0.4 mg daily  Avoid medications that cause urinary retention  such as antihistamines and anticholinergics  Bowel regimen: Patient now has scheduled miralax.  Will follow-up to determine if patient had bowel movement tomorrow during examination  Continue hanks until patient is ambulating and able to use bathroom. Will plan for void trial at this time  Urinary retention protocol, patient may require urology follow up outpatient  History of diabetes mellitus  Remote hx of T2DM. Patient states that he does not take any medications and that he is not diabetics, although this is mentioned in prior documentation  Hemoglobin 6.7    Plan:  Sugars are well-controlled at this time, we will monitor sugars on daily BMP.  Will discontinue sliding scale insulin at this time, as patient has had minimal to no requirements of insulin.  NORMA (acute kidney injury) (HCC)  Cr of 1.95 on admission. BUN:Cr ratio >20. Given this and patient's hx of not eating/drinking much for past 4 days along w end organ damage on other labs, suggest prerenal insult  Patient with urinary retention, reports waking up two times a night to go to the bathroom.    Creatinine yesterday 1.38.  Patient refusing any labs today despite conversation with nursing staff, myself, and senior resident.  Will continue to encourage patient to get morning labs.    Plan:  Avoid NSAIDs for pain mgmt  Given 1L bolus isolyte at admission, patient eating well and hydrating well, no need for fluids at this time  Continue to trend Cr daily  CVA (cerebral vascular accident) (HCC)  Hx of L basal ganglia infarct in 2012 and TIA in 2019. Unable to see in medical record if patient had residual deficits following diagnosis. Only + neuro findings are mild word finding difficulty    Plan:  Will contact family to obtain more hx and ask about word finding difficulty  Patient has mentioned that he has not been eating or drinking for the past 4 days. May be contributory to word finding difficulty  Continue ASA 81 mg daily. Patient was not taking this as  an outpatient, although it is listed in home meds  Continue Lipitor 20 mg daily  Open wound bilateral 1st toes  Podiatry consulted, appreciate recs  Onychomycosis  My exam suggest this patient has onychomycosis on both the right and left feet    Plan:  Starting itraconazole 100 mg twice daily.  Patient should be on treatment for onychomycosis for 12 consecutive weeks.Given  interaction with atorvastatin, will reduce dose from 40 mg daily to 20 mg daily spoke with pharmacy about this    Disposition: Continue inpatient stay for MRI of brain and MRI of bilateral feet.  Monitoring of daily labs, particularly BMP to trend acute kidney injury and WBC to trend marker of infection.  Also trending of vital signs, particularly fever    Team: SOD TEAM A    Subjective   Patient seen and examined. No acute events overnight.  This morning, Mr. Pedro states that he feels sweaty.  He also endorses mild bodyaches.  He does endorse decreased appetite. he continues to deny nausea, vomiting, diarrhea, chest pain, palpitations, orthopnea, shortness of breath.  He refuses morning labs, despite conversation I had with him to explain why they are important, but he says he may reconsider once his ex-wife comes to see him.    Objective :  Temp:  [97.9 °F (36.6 °C)-99.6 °F (37.6 °C)] 97.9 °F (36.6 °C)  HR:  [66-79] 66  BP: (141-160)/(64-76) 160/76  Resp:  [17-18] 17  SpO2:  [91 %-93 %] 93 %  O2 Device: None (Room air)    I/O         07/07 0701  07/08 0700 07/08 0701  07/09 0700    P.O.  420    Total Intake(mL/kg)  420 (5.1)    Urine (mL/kg/hr) 625 (0.3) 825 (0.4)    Stool 0     Total Output 625 825    Net -625 -405          Unmeasured Stool Occurrence 1 x           Weights:   IBW (Ideal Body Weight): 75.3 kg    Body mass index is 25.52 kg/m².  Weight (last 2 days)       Date/Time Weight    07/08/25 1032 83 (183)            Physical Exam  Constitutional:       General: He is not in acute distress.  HENT:      Head: Normocephalic and  atraumatic.      Right Ear: External ear normal.      Left Ear: External ear normal.      Nose: Nose normal.      Mouth/Throat:      Mouth: Mucous membranes are dry.      Pharynx: No oropharyngeal exudate.     Eyes:      General: No scleral icterus.        Right eye: No discharge.         Left eye: No discharge.      Extraocular Movements: Extraocular movements intact.      Conjunctiva/sclera: Conjunctivae normal.      Pupils: Pupils are equal, round, and reactive to light.      Comments: No visual field deficit present     Cardiovascular:      Rate and Rhythm: Normal rate and regular rhythm.      Pulses: Normal pulses.      Heart sounds: Normal heart sounds. No murmur heard.     No friction rub. No gallop.   Pulmonary:      Effort: Pulmonary effort is normal. No respiratory distress.      Breath sounds: Normal breath sounds. No wheezing.      Comments: Bibasilar crackles on exam right greater than left   Abdominal:      General: Abdomen is flat. There is no distension.      Palpations: Abdomen is soft.      Tenderness: There is no abdominal tenderness.   Genitourinary:     Comments: 200 cc of dark orange appearing urine present in patient's Manuel bag    Musculoskeletal:         General: Swelling (Swelling of right foot, unchanged from yesterday), tenderness (Tenderness to palpation of patient's right foot) and signs of injury (Wound of right and left great toes) present.      Right lower leg: No edema.      Left lower leg: No edema.     Skin:     General: Skin is warm and dry.      Capillary Refill: Capillary refill takes less than 2 seconds.      Findings: Erythema (Erythema of the patient's right foot improved compared to exam yesterday.  Extending less proximal compared to days prior) present. No rash.     Neurological:      Mental Status: He is alert and oriented to person, place, and time.      Cranial Nerves: Cranial nerve deficit present.      Sensory: No sensory deficit.      Motor: Weakness (No focal  weakness, generalized.) present.      Comments: Strength symmetrical in bilateral upper and lower extremities.  Dysarthria still present, improved word finding difficulties compared to prior exams.  Flattening of left nasal labial fold/drooping of the left corner of patient's mouth   Psychiatric:         Mood and Affect: Mood normal.         Behavior: Behavior normal.         Thought Content: Thought content normal.         Judgment: Judgment normal.           Lab Results: I have reviewed the following results:  Recent Labs     07/08/25  0808   WBC 10.78*   HGB 12.4   HCT 36.6   *   SODIUM 131*   K 3.8      CO2 21   BUN 42*   CREATININE 1.38*   GLUC 166*   AST 24   ALT 12   ALB 3.1*   TBILI 0.84   ALKPHOS 78     Imaging Results Review: I personally reviewed the following image studies in PACS and associated radiology reports: XR L and R foot. My interpretation of the radiology images/reports is: osteo lesion of R great toe on Xray, suspicious for OM. MRI ordered, pending official read.     Other Study Results Review: EKG was personally reviewed and my interpretation is: Personally Reviewed. NSR w HR of 60. Slight QRS widening, LVH ..    Currently Ordered Meds:   Current Facility-Administered Medications:     acetaminophen (TYLENOL) tablet 975 mg, Q8H PRN    aspirin chewable tablet 81 mg, Daily    atorvastatin (LIPITOR) tablet 40 mg, Daily With Dinner    ceFAZolin (ANCEF) IVPB (premix in dextrose) 2,000 mg 50 mL, Q8H, Last Rate: 2,000 mg (07/09/25 0632)    heparin (porcine) subcutaneous injection 5,000 Units, Q8H JOSÉ MIGUEL    HYDROmorphone HCl (DILAUDID) injection 0.2 mg, Q2H PRN    naloxone (NARCAN) 0.04 mg/mL syringe 0.04 mg, Q1MIN PRN    ondansetron (ZOFRAN) injection 4 mg, Q6H PRN    oxyCODONE (ROXICODONE) split tablet 2.5 mg, Q4H PRN **OR** oxyCODONE (ROXICODONE) IR tablet 5 mg, Q4H PRN    polyethylene glycol (MIRALAX) packet 17 g, Daily    senna (SENOKOT) tablet 8.6 mg, HS PRN    tamsulosin (FLOMAX)  capsule 0.4 mg, Daily With Dinner    VTE Pharmacologic Prophylaxis: VTE covered by:  heparin (porcine), Subcutaneous, 5,000 Units at 07/08/25 2201      VTE Mechanical Prophylaxis: sequential compression device    Administrative Statements   I have spent a total time of 40 minutes in caring for this patient on the day of the visit/encounter including Diagnostic results, Risks and benefits of tx options, Patient and family education, Importance of tx compliance, Risk factor reductions, Counseling / Coordination of care, Documenting in the medical record, Reviewing/placing orders in the medical record (including tests, medications, and/or procedures), Obtaining or reviewing history  , and Communicating with other healthcare professionals .  Portions of the record may have been created with voice recognition software.      Loi Suh MD

## 2025-07-09 NOTE — ASSESSMENT & PLAN NOTE
R foot pain x4 days. Inability to bear weight. Limited ROM. Endorses fatigue, fevers, chills, loss of appetite. Febrile on admissoin to 101.5. ED Workup significant for WBC of 15.99, stable Hb, mild thrombocytopenia, mild hyponatremia, NORMA w Cr of 1.95 (baseline of 1), glucose of 191, AST elevated at 55, T bili 1.48. Lactic 1.5, procal 4.06, INR 1.34. Bcx drawn, and patient started on Ancef    Of note, patient has hx of R foot cellulitis, admission at [facility] in Jan 2024. Received Abx but left AMA before recommended amputation vs. Debridement by care team there. Has questionable hx of diabetes. Patient denies any hx of diabetes.    XR of b/l feet done this admission.  No acute osseous abnormalities were visualized on the bilateral x-rays of the feet    Plan:  Continue Abx Tx w Ancef, as patient's Bcx have come back as S. Aureus, favor MSSA. This is Day 5 of IV Abx  B/l feet MRI ordered  Continue F/u Bcx for susceptibilities.  No growth to date on second set of blood cultures  Control glucose between 140-180 to promote healing  Encourage patient to eat/drink throughout day to promote healing  Podiatry consulted:  On 07/06, podiatry debrided distal hallux b/l. Open wound was found on distal L hallux  Trend WBC, fever  Given patient's prior admission and him leaving AMA, continue to explain importance of receiving Abx therapy  Continue work with PT and OT  PT team informed me after their assessment of the patient that he was leaning towards the left when ambulating.  In my opinion, the patient has significant pain on his right foot, which she has told me since I met him on the day of admission and difficulty with weightbearing.  I feel that this is expected as he recovers from his cellulitis.  Neuroexam is stable compared to my findings yesterday.

## 2025-07-09 NOTE — PLAN OF CARE
Problem: OCCUPATIONAL THERAPY ADULT  Goal: Performs self-care activities at highest level of function for planned discharge setting.  See evaluation for individualized goals.  Description: Treatment Interventions: ADL retraining, Functional transfer training, Endurance training, Cognitive reorientation, Patient/family training, Compensatory technique education, Continued evaluation, Energy conservation, Activityengagement          See flowsheet documentation for full assessment, interventions and recommendations.   Outcome: Progressing  Note: Limitation: Decreased ADL status, Decreased endurance, Decreased self-care trans, Decreased high-level ADLs  Prognosis: Fair  Assessment: Pt is a 76 y.o. YO  female admitted to [facility] on 2025 w/ chronic diverticular disease, now s/p robotic converted to hand-assisted sigmoid resection 25. Pt  has a past medical history of Actinic keratosis, Basal cell carcinoma, Disease of thyroid gland, Diverticulitis of colon, Diverticulosis, Heart murmur, Hyperlipidemia, and Hypertension. Pt with active OT orders and ambulate  orders. . Pt resides in a house with spouse. Pt was I w/  ADLS and IADLS, (+) drove, & required no use of DME PTA. Currently pt is min a for adls and supervision for mobility. Pt is limited at this time 2*: endurance, activity tolerance, functional mobility, and decreased I w/ ADLS/IADLS.The following Occupational Performance Areas to address include: grooming, bathing/shower, toilet hygiene, dressing, and household maintenance. Based on the aforementioned OT evaluation, functional performance deficits, and assessments, pt has been identified as a high complexity evaluation. From OT standpoint, anticipate d/c  level II discharge resources . Pt to continue to benefit from acute immediate OT services to address the following goals 2-3x/week to  w/in 10-14 days:.   The patient's raw score on the AM-PAC Daily Activity Inpatient Short Form is 21. A raw score of  less than 19 suggests the patient may benefit from discharge to home. Please refer to the recommendation of the Occupational Therapist for safe discharge planning.     Rehab Resource Intensity Level, OT: II (Moderate Resource Intensity)

## 2025-07-09 NOTE — ASSESSMENT & PLAN NOTE
River Pedro is a 75 y.o. male admitted 7/5/2025 for right foot pain for the past few days.  Of note, patient has history of right foot cellulitis, admission at [facility] in January 2024.  Podiatry was consulted to evaluate the right foot pain.     Diagnostics:  7/6: Right foot X-Ray - no acute osseous abnormality   7/6: Left foot X-Ray - no acute osseous abnormality  7/8: Left foot MRI: pending   7/8: Right foot MRI: pending    Initial lab work/cultures  WBC: 10.78 (12.51)     Plan:    Wound trimmed on distal hallux left foot.  Open wound found on distal left hallux.   Pending MRI: Discussed with MRI team, possibly will be done overnight.   Plan to continue IV antibiotics management per internal medicine and local wound care. Will monitor the cellulitic changes on the right foot.  No podiatric surgical intervention needed at this time.  Recommend local wound care on the left great toe consisting of Betadine paint, 2 x 2 gauze, wrap with Mendez. Wound care instructions placed.  Elevation on green foam wedges or pillows when non-ambulatory.  Rest of care per primary team.    Weightbearing status: Weightbearing as tolerated to bilateral lower extremities

## 2025-07-09 NOTE — OCCUPATIONAL THERAPY NOTE
"  Occupational Therapy Progress Note     Patient Name: River Pedro  Today's Date: 7/9/2025  Problem List  Principal Problem:    Cellulitis of right foot  Active Problems:    NORMA (acute kidney injury) ([facility])    History of diabetes mellitus    CVA (cerebral vascular accident) ([facility])    Open wound bilateral 1st toes    MSSA bacteremia    Sepsis ([facility])    Urinary retention    Stroke-like symptoms    Onychomycosis          07/09/25 1057   OT Last Visit   OT Visit Date 07/09/25   Note Type   Note Type Treatment   Pain Assessment   Pain Assessment Tool 0-10   Pain Score No Pain   Restrictions/Precautions   Weight Bearing Precautions Per Order Yes   RLE Weight Bearing Per Order WBAT   LLE Weight Bearing Per Order WBAT   Other Precautions Cognitive;Chair Alarm;Telemetry;Fall Risk   Lifestyle   Autonomy Pta pt ind. w/ adls and iadls, functional mobility, and (-) . Utilizes public transportation.   Reciprocal Relationships Supportive brother   Service to Others Retired   ADL   Where Assessed Chair   LB Bathing Assistance 2  Maximal Assistance   LB Bathing Deficit Buttocks   UB Dressing Assistance 4  Minimal Assistance   UB Dressing Deficit Thread RUE;Thread LUE   UB Dressing Comments don/doff robe   Bed Mobility   Supine to Sit 3  Moderate assistance   Additional items Assist x 1;Increased time required   Transfers   Sit to Stand 3  Moderate assistance   Additional items Assist x 2;Increased time required;Verbal cues   Stand to Sit 3  Moderate assistance   Additional items Assist x 2;Increased time required;Verbal cues   Functional Mobility   Functional Mobility 2  Maximal assistance   Additional items Rolling walker   Coordination   Gross Motor slighty dysdiachokinesia, needed increased time for motor planning   Subjective   Subjective \"It is harder to talk\"   Cognition   Overall Cognitive Status Impaired   Arousal/Participation Responsive   Attention Attends with cues to redirect   Orientation Level Oriented X4   Memory " Decreased recall of precautions   Following Commands Follows one step commands with increased time or repetition   Comments increasing time for processing   Vision   Visual Field Cut Comments Decreased right upper quadrant visual field during testing   Activity Tolerance   Medical Staff Made Aware Reported to RN and Dr Bates findings from tx session   Assessment   Assessment Pt is a 76 y.o. YO  female admitted to [facility] on 2025 w/ chronic diverticular disease, now s/p robotic converted to hand-assisted sigmoid resection 25. Pt  has a past medical history of Actinic keratosis, Basal cell carcinoma, Disease of thyroid gland, Diverticulitis of colon, Diverticulosis, Heart murmur, Hyperlipidemia, and Hypertension. Pt with active OT orders and ambulate  orders. . Pt resides in a house with spouse. Pt was I w/  ADLS and IADLS, (+) drove, & required no use of DME PTA. Currently pt is min a for adls and supervision for mobility. Pt is limited at this time 2*: endurance, activity tolerance, functional mobility, and decreased I w/ ADLS/IADLS.The following Occupational Performance Areas to address include: grooming, bathing/shower, toilet hygiene, dressing, and household maintenance. Based on the aforementioned OT evaluation, functional performance deficits, and assessments, pt has been identified as a high complexity evaluation. From OT standpoint, anticipate d/c  level II discharge resources . Pt to continue to benefit from acute immediate OT services to address the following goals 2-3x/week to  w/in 10-14 days:.   The patient's raw score on the AM-PAC Daily Activity Inpatient Short Form is 21. A raw score of less than 19 suggests the patient may benefit from discharge to home. Please refer to the recommendation of the Occupational Therapist for safe discharge planning.   Plan   Treatment Interventions ADL retraining;Functional transfer training;Endurance training;Cognitive reorientation;Patient/family  training;Compensatory technique education   Goal Expiration Date 07/21/25   OT Treatment Day 1   OT Frequency 2-3x/wk   Discharge Recommendation   Rehab Resource Intensity Level, OT II (Moderate Resource Intensity)   AM-PAC Daily Activity Inpatient   Lower Body Dressing 3   Bathing 3   Toileting 2   Upper Body Dressing 3   Grooming 3   Eating 3   Daily Activity Raw Score 17   Daily Activity Standardized Score (Calc for Raw Score >=11) 37.26   AM-PAC Applied Cognition Inpatient   Following a Speech/Presentation 3   Understanding Ordinary Conversation 3   Taking Medications 2   Remembering Where Things Are Placed or Put Away 2   Remembering List of 4-5 Errands 2   Taking Care of Complicated Tasks 2   Applied Cognition Raw Score 14   Applied Cognition Standardized Score 32.02   Barthel Index   Feeding 5   Bathing 0   Grooming Score 0   Dressing Score 5   Bladder Score 5   Bowels Score 5   Toilet Use Score 5   Transfers (Bed/Chair) Score 5   Mobility (Level Surface) Score 0   Stairs Score 0   Barthel Index Score 30   Modified [place] Scale   Modified [place] Scale 4       Sheyla Terrell MS, OTR/L

## 2025-07-09 NOTE — ASSESSMENT & PLAN NOTE
This morning, nursing staff alerted our team that patient had left-sided facial droop, around the corner of his mouth.  While examining him, this was noted, but the rest of his neuroexam was benign.  When taking his morning aspirin, staff noticed that he started to choke.    STAT CT head was ordered with the following findings: No hemorrhage or evidence of acute cortical infarct.  Chronic infarct in the left basal ganglia was noted.    Plan:  Bibasilar crackles on exam today.  Obtained chest x-ray.  No acute cardiopulmonary process. patient satting 94% on room air.  Has not required any supplemental oxygen  Speech therapy saw the patient yesterday and was recommending dysphagia diet, puréed solids and thin liquids.  Speech therapy to reevaluate patient today, as there was concern for thin liquids  MRI of the brain was also ordered.  Follow-up official read  Continue aspirin and statin

## 2025-07-09 NOTE — PLAN OF CARE
Problem: PAIN - ADULT  Goal: Verbalizes/displays adequate comfort level or baseline comfort level  Description: Interventions:  - Encourage patient to monitor pain and request assistance  - Assess pain using appropriate pain scale  - Administer analgesics as ordered based on type and severity of pain and evaluate response  - Implement non-pharmacological measures as appropriate and evaluate response  - Consider cultural and social influences on pain and pain management  - Notify physician/advanced practitioner if interventions unsuccessful or patient reports new pain  - Educate patient/family on pain management process including their role and importance of  reporting pain   - Provide non-pharmacologic/complimentary pain relief interventions  Outcome: Progressing     Problem: INFECTION - ADULT  Goal: Absence or prevention of progression during hospitalization  Description: INTERVENTIONS:  - Assess and monitor for signs and symptoms of infection  - Monitor lab/diagnostic results  - Monitor all insertion sites, i.e. indwelling lines, tubes, and drains  - Monitor endotracheal if appropriate and nasal secretions for changes in amount and color  - Termo appropriate cooling/warming therapies per order  - Administer medications as ordered  - Instruct and encourage patient and family to use good hand hygiene technique  - Identify and instruct in appropriate isolation precautions for identified infection/condition  Outcome: Progressing  Goal: Absence of fever/infection during neutropenic period  Description: INTERVENTIONS:  - Monitor WBC  - Perform strict hand hygiene  - Limit to healthy visitors only  - No plants, dried, fresh or silk flowers with hobbs in patient room  Outcome: Progressing     Problem: SAFETY ADULT  Goal: Patient will remain free of falls  Description: INTERVENTIONS:  - Educate patient/family on patient safety including physical limitations  - Instruct patient to call for assistance with activity   -  Consider consulting OT/PT to assist with strengthening/mobility based on AM PAC & JH-HLM score  - Consult OT/PT to assist with strengthening/mobility   - Keep Call bell within reach  - Keep bed low and locked with side rails adjusted as appropriate  - Keep care items and personal belongings within reach  - Initiate and maintain comfort rounds  - Make Fall Risk Sign visible to staff  - Apply yellow socks and bracelet for high fall risk patients  - Consider moving patient to room near nurses station  Outcome: Progressing  Goal: Maintain or return to baseline ADL function  Description: INTERVENTIONS:  -  Assess patient's ability to carry out ADLs; assess patient's baseline for ADL function and identify physical deficits which impact ability to perform ADLs (bathing, care of mouth/teeth, toileting, grooming, dressing, etc.)  - Assess/evaluate cause of self-care deficits   - Assess range of motion  - Assess patient's mobility; develop plan if impaired  - Assess patient's need for assistive devices and provide as appropriate  - Encourage maximum independence but intervene and supervise when necessary  - Involve family in performance of ADLs  - Assess for home care needs following discharge   - Consider OT consult to assist with ADL evaluation and planning for discharge  - Provide patient education as appropriate  - Monitor functional capacity and physical performance, use of AM PAC & JH-HLM   - Monitor gait, balance and fatigue with ambulation    Outcome: Progressing  Goal: Maintains/Returns to pre admission functional level  Description: INTERVENTIONS:  - Perform AM-PAC 6 Click Basic Mobility/ Daily Activity assessment daily.  - Set and communicate daily mobility goal to care team and patient/family/caregiver.   - Collaborate with rehabilitation services on mobility goals if consulted  - Out of bed for toileting  - Record patient progress and toleration of activity level   Outcome: Progressing     Problem: DISCHARGE  PLANNING  Goal: Discharge to home or other facility with appropriate resources  Description: INTERVENTIONS:  - Identify barriers to discharge w/patient and caregiver  - Arrange for needed discharge resources and transportation as appropriate  - Identify discharge learning needs (meds, wound care, etc.)  - Arrange for interpretive services to assist at discharge as needed  - Refer to Case Management Department for coordinating discharge planning if the patient needs post-hospital services based on physician/advanced practitioner order or complex needs related to functional status, cognitive ability, or social support system  Outcome: Progressing     Problem: Knowledge Deficit  Goal: Patient/family/caregiver demonstrates understanding of disease process, treatment plan, medications, and discharge instructions  Description: Complete learning assessment and assess knowledge base.  Interventions:  - Provide teaching at level of understanding  - Provide teaching via preferred learning methods  Outcome: Progressing     Problem: Nutrition/Hydration-ADULT  Goal: Nutrient/Hydration intake appropriate for improving, restoring or maintaining nutritional needs  Description: Monitor and assess patient's nutrition/hydration status for malnutrition. Collaborate with interdisciplinary team and initiate plan and interventions as ordered.  Monitor patient's weight and dietary intake as ordered or per policy. Utilize nutrition screening tool and intervene as necessary. Determine patient's food preferences and provide high-protein, high-caloric foods as appropriate.     INTERVENTIONS:  - Monitor oral intake, urinary output, labs, and treatment plans  - Assess nutrition and hydration status and recommend course of action  - Evaluate amount of meals eaten  - Assist patient with eating if necessary   - Allow adequate time for meals  - Recommend/ encourage appropriate diets, oral nutritional supplements, and vitamin/mineral supplements  -  Order, calculate, and assess calorie counts as needed  - Recommend, monitor, and adjust tube feedings and TPN/PPN based on assessed needs  - Assess need for intravenous fluids  - Provide specific nutrition/hydration education as appropriate  - Include patient/family/caregiver in decisions related to nutrition  Outcome: Progressing

## 2025-07-09 NOTE — ASSESSMENT & PLAN NOTE
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 Unable to see in medical record if patient had residual deficits following diagnosis.  Patient has not been taking his ASA 81 mg since 2022  New left facial droop noted today with CT scan revealed no acute infarct. Speech difficulty noted on exam as well.   Positive neuro findings are left facial droop with mild speech difficulty.  MRI halley pending.  Managed by primary team

## 2025-07-09 NOTE — ASSESSMENT & PLAN NOTE
Blood cultures collected on 7/5 both positive for MSSA on 7/7. However both from venous line, colonization/contamination vs true infection  Echo in March 2022 revealed EF 55%, NWM, G1DD. Mild to mod AR. Mild TR and mild WI. Mildly dilated aortic root and ascending aorta.  Source is most likely from the open wound of bilateral first toes.  Splinter hemorrhage vs melanonychia noted on left 5th digit   Systolic murmurs noted on exam    Echo: EF 60%, NWM, normal systolic and diastolic function. No echocardiographic evidence of valvular vegetation affecting mitral, aortic or tricuspid valves.     New blood cultures collected on 7/7 no growth at 24 hours    Plan:  - Continue Cefazolin 2g Q8H while pending MRI of both feet

## 2025-07-09 NOTE — PROGRESS NOTES
"Progress Note - Infectious Disease   Name: River Pedro 75 y.o. male I MRN: [id number]  Unit/Bed#: [facility] 901-01 I Date of Admission: 7/5/2025   Date of Service: 7/9/2025 I Hospital Day: 4    Assessment & Plan  Sepsis (HCC)  Sepsis 2/2 R foot cellulitis c/b MSSA bacteremia   Continue abx as below  Cellulitis of right foot  Presented to the ED with 4-day history of worsening R foot pain with associated erythema, edema, trouble with weightbearing, fever, chills, and decreased PO intake.   History of R foot cellulitis requiring hospitalization in Jan 2024. MRI that time was negative for osteomyelitis. Treated with IV Ancef. Plan was for OR debridement 1/18 however patient left AMA On 1/17. He was given 10-day course of Cefadroxil.    XR of both feet revealed no acute osseous abnormality.  WBC 15.9>>12.5>>11.7>>10.7  Afebrile in the past 24 hours  Improved in the size of erythema and edema  Cellulitis most likely from open wounds and onychomycosis +/- tinea pedis     Plan:  - Continue Cefazolin D5, would continue for at least 7 days  - Elevation of feet while at rest   - Recommend oral terbinafine or itraconazole for onychomycosis   Open wound bilateral 1st toes  See \"cellulitis of right foot\"   MSSA bacteremia  Blood cultures collected on 7/5 both positive for MSSA on 7/7. However both from venous line, colonization/contamination vs true infection  Echo in March 2022 revealed EF 55%, NWM, G1DD. Mild to mod AR. Mild TR and mild DC. Mildly dilated aortic root and ascending aorta.  Source is most likely from the open wound of bilateral first toes.  Splinter hemorrhage vs melanonychia noted on left 5th digit   Systolic murmurs noted on exam    Echo: EF 60%, NWM, normal systolic and diastolic function. No echocardiographic evidence of valvular vegetation affecting mitral, aortic or tricuspid valves.     New blood cultures collected on 7/7 no growth at 24 hours    Plan:  - Continue Cefazolin 2g Q8H while pending MRI of both " feet  NORMA (acute kidney injury) ([facility])  Cr of 1.95 on admission, baseline 0.8-1.1. BUN:Cr ratio >20, likely pre-renal from decreased PO intake.   Cr improved to 1.38 this AM  Managed by primary team  History of diabetes mellitus  A1c 6.7 this admission, no change since Jan 2024  Managed by primary team  CVA (cerebral vascular accident) ([facility])  Hx of L basal ganglia infarct in 2012 and TIA in 2019. Unable to see in medical record if patient had residual deficits following diagnosis.  Patient has not been taking his ASA 81 mg since 2022  New left facial droop noted today with CT scan revealed no acute infarct. Speech difficulty noted on exam as well.   Positive neuro findings are left facial droop with mild speech difficulty.  MRI halley pending.  Managed by primary team      Antibiotics:  Cefazolin #5    Subjective   Patient seen and examined at bedside. Patient is resting on recliner chair. He reports that he did not sleep well. He states that his R foot swelling and pain much improved. His ex-wife was here yesterday and massaged his right foot. He felt great with that and no pain. He does not remember his last bowel movement. Otherwise, no fever, chills, cough, SOB, chest pain/discomfort, abdominal pain, nausea, and vomiting.      Objective :  Temp:  [97.9 °F (36.6 °C)-99.6 °F (37.6 °C)] 97.9 °F (36.6 °C)  HR:  [66-79] 66  BP: (141-160)/(64-76) 160/76  Resp:  [17-18] 17  SpO2:  [91 %-93 %] 93 %  O2 Device: None (Room air)    Physical Exam  Vitals and nursing note reviewed.   Constitutional:       General: He is not in acute distress.     Appearance: He is well-developed.   HENT:      Head: Normocephalic and atraumatic.     Eyes:      Conjunctiva/sclera: Conjunctivae normal.       Cardiovascular:      Rate and Rhythm: Normal rate and regular rhythm.      Heart sounds: Murmur (systolic) heard.   Pulmonary:      Effort: Pulmonary effort is normal. No respiratory distress.      Breath sounds: Normal breath sounds.    Abdominal:      Palpations: Abdomen is soft.      Tenderness: There is no abdominal tenderness.     Musculoskeletal:         General: No swelling.      Cervical back: Neck supple.      Right lower leg: Edema present.      Left lower leg: No edema.      Comments: Improved in the size of erythema and edema in right foot. Erythema down below the ankle.   Warmth appreciated on R > L foot     Skin:     General: Skin is warm and dry.      Capillary Refill: Capillary refill takes less than 2 seconds.      Findings: Erythema present.     Neurological:      Mental Status: He is alert and oriented to person, place, and time.      Comments: Slight L facial droop with speech difficulty   CN 2-12 grossly intact  Muscle strength 5/5 in all extremities   Psychiatric:         Mood and Affect: Mood normal.             Lab Results: I have reviewed the following results:  Results from last 7 days   Lab Units 07/08/25  0808 07/07/25 0445 07/06/25  0540   WBC Thousand/uL 10.78* 11.75* 12.51*   HEMOGLOBIN g/dL 12.4 11.7* 13.2   PLATELETS Thousands/uL 147* 114* 102*     Results from last 7 days   Lab Units 07/08/25  0808 07/07/25  0445 07/06/25  0540 07/05/25  1453   SODIUM mmol/L 131* 133* 134* 132*   POTASSIUM mmol/L 3.8 3.7 3.3* 3.5   CHLORIDE mmol/L 101 104 102 100   CO2 mmol/L 21 23 21 22   BUN mg/dL 42* 43* 51* 42*   CREATININE mg/dL 1.38* 1.53* 1.87* 1.95*   EGFR ml/min/1.73sq m 50 44 34 32   CALCIUM mg/dL 8.3* 7.9* 8.2* 8.7   AST U/L 24  --   --  55*   ALT U/L 12  --   --  30   ALK PHOS U/L 78  --   --  69   ALBUMIN g/dL 3.1*  --   --  3.7     Results from last 7 days   Lab Units 07/07/25  1359 07/05/25  1453   BLOOD CULTURE  No Growth at 24 hrs.  No Growth at 24 hrs. Staphylococcus aureus*  Staphylococcus aureus*   GRAM STAIN RESULT   --  Gram positive cocci in clusters*  Gram positive cocci in clusters*     Results from last 7 days   Lab Units 07/05/25  1453   PROCALCITONIN ng/ml 4.06*

## 2025-07-10 ENCOUNTER — APPOINTMENT (INPATIENT)
Dept: RADIOLOGY | Facility: HOSPITAL | Age: 75
DRG: 853 | End: 2025-07-10
Payer: MEDICARE

## 2025-07-10 PROBLEM — N17.9 AKI (ACUTE KIDNEY INJURY) (HCC): Status: RESOLVED | Noted: 2022-03-16 | Resolved: 2025-07-10

## 2025-07-10 LAB
ANION GAP SERPL CALCULATED.3IONS-SCNC: 5 MMOL/L (ref 4–13)
BASOPHILS # BLD AUTO: 0.01 THOUSANDS/ÂΜL (ref 0–0.1)
BASOPHILS NFR BLD AUTO: 0 % (ref 0–1)
BUN SERPL-MCNC: 34 MG/DL (ref 5–25)
CALCIUM SERPL-MCNC: 8.5 MG/DL (ref 8.4–10.2)
CHLORIDE SERPL-SCNC: 104 MMOL/L (ref 96–108)
CO2 SERPL-SCNC: 25 MMOL/L (ref 21–32)
CREAT SERPL-MCNC: 1.13 MG/DL (ref 0.6–1.3)
EOSINOPHIL # BLD AUTO: 0.04 THOUSAND/ÂΜL (ref 0–0.61)
EOSINOPHIL NFR BLD AUTO: 1 % (ref 0–6)
ERYTHROCYTE [DISTWIDTH] IN BLOOD BY AUTOMATED COUNT: 14.6 % (ref 11.6–15.1)
GFR SERPL CREATININE-BSD FRML MDRD: 63 ML/MIN/1.73SQ M
GLUCOSE SERPL-MCNC: 260 MG/DL (ref 65–140)
HCT VFR BLD AUTO: 34.7 % (ref 36.5–49.3)
HGB BLD-MCNC: 11.8 G/DL (ref 12–17)
IMM GRANULOCYTES # BLD AUTO: 0.07 THOUSAND/UL (ref 0–0.2)
IMM GRANULOCYTES NFR BLD AUTO: 1 % (ref 0–2)
LYMPHOCYTES # BLD AUTO: 0.46 THOUSANDS/ÂΜL (ref 0.6–4.47)
LYMPHOCYTES NFR BLD AUTO: 5 % (ref 14–44)
MCH RBC QN AUTO: 30 PG (ref 26.8–34.3)
MCHC RBC AUTO-ENTMCNC: 34 G/DL (ref 31.4–37.4)
MCV RBC AUTO: 88 FL (ref 82–98)
MONOCYTES # BLD AUTO: 0.92 THOUSAND/ÂΜL (ref 0.17–1.22)
MONOCYTES NFR BLD AUTO: 11 % (ref 4–12)
NEUTROPHILS # BLD AUTO: 7.11 THOUSANDS/ÂΜL (ref 1.85–7.62)
NEUTS SEG NFR BLD AUTO: 82 % (ref 43–75)
NRBC BLD AUTO-RTO: 0 /100 WBCS
PLATELET # BLD AUTO: 232 THOUSANDS/UL (ref 149–390)
PMV BLD AUTO: 11.6 FL (ref 8.9–12.7)
POTASSIUM SERPL-SCNC: 3 MMOL/L (ref 3.5–5.3)
RBC # BLD AUTO: 3.93 MILLION/UL (ref 3.88–5.62)
SODIUM SERPL-SCNC: 134 MMOL/L (ref 135–147)
WBC # BLD AUTO: 8.61 THOUSAND/UL (ref 4.31–10.16)

## 2025-07-10 PROCEDURE — C1751 CATH, INF, PER/CENT/MIDLINE: HCPCS

## 2025-07-10 PROCEDURE — 99232 SBSQ HOSP IP/OBS MODERATE 35: CPT | Performed by: INTERNAL MEDICINE

## 2025-07-10 PROCEDURE — 36569 INSJ PICC 5 YR+ W/O IMAGING: CPT

## 2025-07-10 PROCEDURE — 72125 CT NECK SPINE W/O DYE: CPT

## 2025-07-10 PROCEDURE — G0545 PR INHERENT VISIT TO INPT: HCPCS | Performed by: INTERNAL MEDICINE

## 2025-07-10 PROCEDURE — 80048 BASIC METABOLIC PNL TOTAL CA: CPT

## 2025-07-10 PROCEDURE — 92526 ORAL FUNCTION THERAPY: CPT

## 2025-07-10 PROCEDURE — 02HV33Z INSERTION OF INFUSION DEVICE INTO SUPERIOR VENA CAVA, PERCUTANEOUS APPROACH: ICD-10-PCS | Performed by: INTERNAL MEDICINE

## 2025-07-10 PROCEDURE — 70551 MRI BRAIN STEM W/O DYE: CPT

## 2025-07-10 PROCEDURE — 70450 CT HEAD/BRAIN W/O DYE: CPT

## 2025-07-10 PROCEDURE — NC001 PR NO CHARGE: Performed by: EMERGENCY MEDICINE

## 2025-07-10 PROCEDURE — 85025 COMPLETE CBC W/AUTO DIFF WBC: CPT

## 2025-07-10 PROCEDURE — 99223 1ST HOSP IP/OBS HIGH 75: CPT | Performed by: PSYCHIATRY & NEUROLOGY

## 2025-07-10 RX ORDER — SODIUM CHLORIDE, SODIUM GLUCONATE, SODIUM ACETATE, POTASSIUM CHLORIDE, MAGNESIUM CHLORIDE, SODIUM PHOSPHATE, DIBASIC, AND POTASSIUM PHOSPHATE .53; .5; .37; .037; .03; .012; .00082 G/100ML; G/100ML; G/100ML; G/100ML; G/100ML; G/100ML; G/100ML
100 INJECTION, SOLUTION INTRAVENOUS CONTINUOUS
Status: DISPENSED | OUTPATIENT
Start: 2025-07-10 | End: 2025-07-11

## 2025-07-10 RX ORDER — ITRACONAZOLE 10 MG/ML
100 SOLUTION ORAL DAILY
Status: DISCONTINUED | OUTPATIENT
Start: 2025-07-11 | End: 2025-07-17 | Stop reason: HOSPADM

## 2025-07-10 RX ORDER — POTASSIUM CHLORIDE 20MEQ/15ML
40 LIQUID (ML) ORAL ONCE
Status: DISCONTINUED | OUTPATIENT
Start: 2025-07-10 | End: 2025-07-10

## 2025-07-10 RX ORDER — POTASSIUM CHLORIDE 14.9 MG/ML
20 INJECTION INTRAVENOUS
Status: DISCONTINUED | OUTPATIENT
Start: 2025-07-10 | End: 2025-07-10

## 2025-07-10 RX ORDER — ATORVASTATIN CALCIUM 40 MG/1
40 TABLET, FILM COATED ORAL
Status: DISCONTINUED | OUTPATIENT
Start: 2025-07-11 | End: 2025-07-17 | Stop reason: HOSPADM

## 2025-07-10 RX ORDER — POTASSIUM CHLORIDE 14.9 MG/ML
20 INJECTION INTRAVENOUS
Status: COMPLETED | OUTPATIENT
Start: 2025-07-10 | End: 2025-07-10

## 2025-07-10 RX ORDER — AMLODIPINE BESYLATE 5 MG/1
5 TABLET ORAL DAILY
Status: DISCONTINUED | OUTPATIENT
Start: 2025-07-10 | End: 2025-07-17 | Stop reason: HOSPADM

## 2025-07-10 RX ADMIN — POTASSIUM CHLORIDE 20 MEQ: 14.9 INJECTION, SOLUTION INTRAVENOUS at 17:05

## 2025-07-10 RX ADMIN — AMLODIPINE BESYLATE 5 MG: 5 TABLET ORAL at 16:54

## 2025-07-10 RX ADMIN — SODIUM CHLORIDE, SODIUM GLUCONATE, SODIUM ACETATE, POTASSIUM CHLORIDE, MAGNESIUM CHLORIDE, SODIUM PHOSPHATE, DIBASIC, AND POTASSIUM PHOSPHATE 100 ML/HR: .53; .5; .37; .037; .03; .012; .00082 INJECTION, SOLUTION INTRAVENOUS at 16:55

## 2025-07-10 RX ADMIN — HEPARIN SODIUM 5000 UNITS: 5000 INJECTION INTRAVENOUS; SUBCUTANEOUS at 21:13

## 2025-07-10 RX ADMIN — CEFAZOLIN SODIUM 2000 MG: 2 SOLUTION INTRAVENOUS at 21:14

## 2025-07-10 RX ADMIN — POTASSIUM CHLORIDE 20 MEQ: 14.9 INJECTION, SOLUTION INTRAVENOUS at 20:46

## 2025-07-10 RX ADMIN — Medication 3 MG: at 21:14

## 2025-07-10 RX ADMIN — ATORVASTATIN CALCIUM 20 MG: 20 TABLET, FILM COATED ORAL at 16:54

## 2025-07-10 RX ADMIN — TAMSULOSIN HYDROCHLORIDE 0.4 MG: 0.4 CAPSULE ORAL at 16:53

## 2025-07-10 RX ADMIN — CEFAZOLIN SODIUM 2000 MG: 2 SOLUTION INTRAVENOUS at 06:19

## 2025-07-10 RX ADMIN — ASPIRIN 81 MG CHEWABLE TABLET 81 MG: 81 TABLET CHEWABLE at 09:25

## 2025-07-10 RX ADMIN — HEPARIN SODIUM 5000 UNITS: 5000 INJECTION INTRAVENOUS; SUBCUTANEOUS at 13:57

## 2025-07-10 RX ADMIN — CEFAZOLIN SODIUM 2000 MG: 2 SOLUTION INTRAVENOUS at 13:57

## 2025-07-10 RX ADMIN — HEPARIN SODIUM 5000 UNITS: 5000 INJECTION INTRAVENOUS; SUBCUTANEOUS at 06:19

## 2025-07-10 NOTE — ASSESSMENT & PLAN NOTE
My exam suggest this patient has onychomycosis on both the right and left feet    Plan:  itraconazole 100 mg twice daily.  Patient should be on treatment for onychomycosis for 12 consecutive weeks.Given  interaction with atorvastatin, will reduce dose from 40 mg daily to 20 mg daily spoke with pharmacy about this

## 2025-07-10 NOTE — ASSESSMENT & PLAN NOTE
75 y.o male with PMHx of CVA of the L basal ganglia in 2012 with residual word finding difficulty, TIA in 2019, T2DM, and HTN who we were consulted for stroke-like symptoms. He was noted to have left sided facial droop, slurred speech, and dysphagia. He has some word finding difficulties at baseline following previous CVA.     Relevant PMH:  Stroke Risk Factors - Previous CVA, previous TIA, HTN  Anticoagulation/Antiplatelets - Aspirin    Workup:  NC CTH: No intracranial hemorrhage, chronic left basal ganglia infarction  MRI Brain w/o contrast: Acute/recent infarct involving the right gangliocapsular region, with associated cytotoxic edema. Old infarct involving the left basal ganglia and periventricular left frontal lobe white matter. Old lacunar infarcts involving the bilateral cerebellar hemispheres.   Echo: EF 60% RoPE score 5, 34% chance stroke is from PFO.  ANSON: Pending  Labs: Hemoglobin A1C 6.7% (07/05/25) LDL 66 (07/05/25)    Impression:  Patient's left facial droop, dysphagia, and mild left sided weakness are in the setting of noncompliance with home aspirin and statin and now with MRI findings significant for a right gangliocapsular region infarct with associated cytotoxic edema. Given the subcortical nature of the infarct suspect that etiology is small vessel disease vs less likely a cardioembolic source of any etiology including secondary to endocarditis as this typically causes cortical infarcts in one or multiple regions, regardless no indication for AC at this time & already on abx.    Plan:  Discussed plan with neurology attending, Dr. Flores  Frequent Neuro Checks - STAT CTH for change in exam  BP Goals: Pursue normotension, >24 hours since LKW  AP/AC: Continue home ASA 81 mg daily.  High Intensity Statin: Continue Atorvastatin 40 mg QHS   Imaging/Diagnostics: CTA head  Euglycemia (-180) and Euthermia   DVT PPx: Heparin, SCDs  PT/OT/ST/PMR, evaluated w/ level 2 recs  Stroke education and  counseling  Rest of other care per primary team appreciated

## 2025-07-10 NOTE — PHYSICAL THERAPY NOTE
Physical Therapy Cancellation Note           07/10/25 1229   PT Last Visit   PT Visit Date 07/10/25   Note Type   Note Type Cancelled Session   Cancel Reasons Patient off floor/test       PT orders received, pt chart reviewed. Pt currently off floor for test and unable to participate in session . PT to continue to follow and see pt as appropriate and able. Thank you.     Shauna Prado, PT, DPT

## 2025-07-10 NOTE — RAPID RESPONSE
Rapid Response Note  River Pedro 75 y.o. male MRN: [id number]  Unit/Bed#: [facility] 901-01 Encounter: [id number]    Rapid Response Notification(s):   Response called date/time:  7/10/2025 5:11 AM  Response team arrival date/time:  7/10/2025 5:12 AM  Response end date/time:  7/10/2025 5:25 AM  Level of care:  Medsur  Primary reason for rapid response call:  Fall    Rapid Response Intervention(s):   Airway:  None  Breathing:  None  Circulation:  None  Fluids administered:  None  Medications administered:  None       Assessment:   Fall with head strike    Plan:   C-collar placed  State CT head and CT-spine ordered     Rapid Response Outcome:   Transfer:  Remain on floor  Code Status: Level 1 (Full Code)      Family notified: Primary team to notify Family Member       Background/Situation:   River Pedro is a 75 y.o. male who is admitted for MSSA bacteremia hospital course complicated by stroke-like -symptoms. RR called for mechanical fall while getting up from toilet seat and hit his head. He did not loose consciousness.No headache, N/V, SOB, visual trouble.    Review of Systems   All other systems reviewed and are negative.      Objective:   Vitals:    07/09/25 0710 07/09/25 1540 07/09/25 1630 07/09/25 2145   BP: 160/76 (!) 183/100 151/76 165/88   Pulse: 66   80   Resp: 17 16     Temp: 97.9 °F (36.6 °C) 97.5 °F (36.4 °C)  97.7 °F (36.5 °C)   TempSrc:       SpO2: 93%   94%   Weight:       Height:         Physical Exam  Constitutional:       Appearance: Normal appearance.   HENT:      Mouth/Throat:      Mouth: Mucous membranes are moist.     Eyes:      Pupils: Pupils are equal, round, and reactive to light.       Cardiovascular:      Rate and Rhythm: Normal rate and regular rhythm.      Pulses: Normal pulses.      Heart sounds: Normal heart sounds.   Pulmonary:      Effort: Pulmonary effort is normal.      Breath sounds: Normal breath sounds.   Abdominal:      Palpations: Abdomen is soft.     Musculoskeletal:      Right  lower leg: Edema present.      Left lower leg: No edema.     Skin:     General: Skin is warm.     Neurological:      Mental Status: He is alert and oriented to person, place, and time.

## 2025-07-10 NOTE — ASSESSMENT & PLAN NOTE
Patient's blood cultures 2/2 for gram positive cocci in clusters. Escalated antibiotics to vancomycin     Plan:   ID consulted, appreciate recs:  Recommended TTE for possible IE, given positive blood culture for typical IE organism and fever  Echo showing ejection fraction of 60%, mild right atrial dilation, mild aortic valve regurgitation, no echocardiographic evidence of valvular vegetation affecting the mitral, aortic, or tricuspid valves.  Pulmonic valve not adequately visualized to assess for endocarditis. Overall, no significant changes noted compared to the prior study.  Also recommended 6 weeks of IV Abx tx  Pending final blood cultures  Placed order for ANSON to rule out infective endocarditis, given poor visualization of some valves on TTE and patient's new stroke.  He has risk factors for IE, including poor dentition, gram-positive bacteremia

## 2025-07-10 NOTE — ASSESSMENT & PLAN NOTE
"This morning, nursing staff alerted our team that patient had left-sided facial droop, around the corner of his mouth.  While examining him, this was noted, but the rest of his neuroexam was benign.  When taking his morning aspirin, staff noticed that he started to choke.    STAT CT head was ordered with the following findings: No hemorrhage or evidence of acute cortical infarct.  Chronic infarct in the left basal ganglia was noted.    Findings of brain MRI stated below in this note, showing new infarct \"Acute/recent infarct involving the right gangliocapsular region, with associated cytotoxic edema\"    Plan:  Patient's rope score is a 5, indicating that there is a 34% chance that a new stroke is due to a patent PFO.  He did not have bubble study on TTE, and some valves were poorly visualized.  Placed order for ANSON with bubble study to rule out PFO and IE as potential etiologies of his new stroke  Bibasilar crackles on exam today, better than yesterday.  Obtained chest x-ray.  No acute cardiopulmonary process. patient satting 94% on room air.  Has not required any supplemental oxygen  Speech therapy saw the patient yesterday and was recommending dysphagia diet, puréed solids and thin liquids.  Speech therapy to reevaluate patient today, as there was concern for thin liquids.  Placed patient on thick liquids yesterday  Continue aspirin and statin  "

## 2025-07-10 NOTE — ASSESSMENT & PLAN NOTE
Cr of 1.95 on admission. BUN:Cr ratio >20. Given this and patient's hx of not eating/drinking much for past 4 days along w end organ damage on other labs, suggest prerenal insult  Patient with urinary retention, reports waking up two times a night to go to the bathroom.    Creatinine improving    Plan:  Starting Isolyte 100 milliliters per hour for 10 hours, as patient has poor p.o. intake  Avoid NSAIDs for pain mgmt  Given 1L bolus isolyte at admission, patient eating well and hydrating well, no need for fluids at this time  Continue to trend Cr daily

## 2025-07-10 NOTE — ASSESSMENT & PLAN NOTE
"Noted on exam since arrival. Per family, patient has been having \"fungal infection\" for years.   Currently on Itraconazole, managed by primary team.   "

## 2025-07-10 NOTE — PROCEDURES
Insert Complex Venous Access Line    Date/Time: 7/10/2025 3:48 PM    Performed by: Cathryn Smiley RN  Authorized by: Micheline Barrett DO    Patient location:  Bedside  Other Assisting Provider: Yes (comment) (Duyen WALKER, [facility])    Consent:     Consent obtained:  Written    Consent given by:  Healthcare agent (Ex-wife)    Risks discussed:  Arterial puncture, incorrect placement, nerve damage, bleeding, infection and pneumothorax    Alternatives discussed:  No treatment  Universal protocol:     Procedure explained and questions answered to patient or proxy's satisfaction: yes      Immediately prior to procedure, a time out was called: yes      Site/side marked: yes      Patient identity confirmed:  Verbally with patient, arm band, provided demographic data and hospital-assigned identification number  Pre-procedure details:     Hand hygiene: Hand hygiene performed prior to insertion      Sterile barrier technique: All elements of maximal sterile technique followed      Skin preparation:  ChloraPrep    Skin preparation agent: Skin preparation agent completely dried prior to procedure    Procedure details:     Complex Venous Access Line Type: PICC      Complex Venous Access Line Indications: long term antibiotics      Catheter tip vessel location: atriocaval junction      Orientation:  Right    Location:  Basilic    Procedural supplies:  Single lumen    Catheter size:  4 Fr    Total catheter length (cm):  42    Catheter out on skin (cm):  0    Max flow rate:  999    Arm circumference:  28    Patient evaluated for contraindications to access (i.e. fistula, thrombosis, etc): Yes      Site selection rationale:  Left arm weakness    Approach: percutaneous technique used      Patient position:  Flat    Ultrasound image availability:  Not saved    Sterile ultrasound techniques: Sterile gel and sterile probe covers were used      Number of attempts:  1    Successful placement: yes      Landmarks identified: yes       Vessel of catheter tip end:  Sherlock 3CG confirmed  Anesthesia (see MAR for exact dosages):     Anesthesia method:  Local infiltration    Local anesthetic:  Lidocaine 1% w/o epi (2 ml)  Post-procedure details:     Post-procedure:  Dressing applied and securement device placed    Assessment:  Blood return through all ports and free fluid flow    Post-procedure complications: none      Patient tolerance of procedure:  Tolerated well, no immediate complications

## 2025-07-10 NOTE — PROGRESS NOTES
"Progress Note - Infectious Disease   Name: River Pedro 75 y.o. male I MRN: [id number]  Unit/Bed#: [facility] 901-01 I Date of Admission: 7/5/2025   Date of Service: 7/10/2025 I Hospital Day: 5    Assessment & Plan  Sepsis (HCC)  Sepsis 2/2 R foot cellulitis c/b MSSA bacteremia   Continue abx as below  Cellulitis of right foot  Presented to the ED with 4-day history of worsening R foot pain with associated erythema, edema, trouble with weightbearing, fever, chills, and decreased PO intake.   History of R foot cellulitis requiring hospitalization in Jan 2024. MRI that time was negative for osteomyelitis. Treated with IV Ancef. Plan was for OR debridement 1/18 however patient left AMA On 1/17. He was given 10-day course of Cefadroxil.    XR of both feet revealed no acute osseous abnormality.  WBC 15.9>>12.5>>11.7>>10.7>>11  Afebrile in the past 24 hours  Improved in the size of erythema and edema  Cellulitis most likely from open wounds and onychomycosis +/- tinea pedis     Plan:  - Continue Cefazolin D6, would continue for at least 7 days  - Elevation of feet while at rest   Open wound bilateral 1st toes  See \"cellulitis of right foot\"   MSSA bacteremia  Blood cultures collected on 7/5 both positive for MSSA on 7/7. However both from venous line, colonization/contamination vs true infection  Echo in March 2022 revealed EF 55%, NWM, G1DD. Mild to mod AR. Mild TR and mild IN. Mildly dilated aortic root and ascending aorta.  Source is most likely from the open wound of bilateral first toes.  Splinter hemorrhage vs melanonychia noted on left 5th digit   Systolic murmurs noted on exam    Possible infective endocarditis based on BCX2 and febrile    Echo: EF 60%, NWM, normal systolic and diastolic function. No echocardiographic evidence of valvular vegetation affecting mitral, aortic or tricuspid valves.     New blood cultures collected on 7/7 no growth at 48 hours    Plan:  - Continue Cefazolin 2g Q8H while pending MRI of both " "feet  - 6-week course from the first set of negative blood cultures  NORMA (acute kidney injury) (HCC) (Resolved: 7/10/2025)  Cr of 1.95 on admission, baseline 0.8-1.1. BUN:Cr ratio >20, likely pre-renal from decreased PO intake.   Cr improved to 1.25 this AM  Managed by primary team  History of diabetes mellitus  A1c 6.7 this admission, no change since Jan 2024  Managed by primary team  CVA (cerebral vascular accident) (HCC)  Hx of L basal ganglia infarct in 2012 and TIA in 2019. Unable to see in medical record if patient had residual deficits following diagnosis.  Patient has not been taking his ASA 81 mg since 2022  New left facial droop noted today with CT scan revealed no acute infarct. Speech difficulty noted on exam as well.   Positive neuro findings are left facial droop with mild speech difficulty.  MRI halley pending.  Managed by primary team  Onychomycosis  Noted on exam since arrival. Per family, patient has been having \"fungal infection\" for years.   Currently on Itraconazole, managed by primary team.       Antibiotics:  Cefazolin #6    Subjective   Patient had a RR called last night as he fell while getting up from toilet seat without LOC. CT head no acute findings. Patient seen and examined at bedside. Ex-wife presents as well. Patient reports that pain and swelling in R foot greatly improved, he was able to walk down the hallway. Otherwise, no fever, chills, cough, SOB, chest pain/discomfort, abdominal pain, nausea, and vomiting.      Objective :  Temp:  [97.2 °F (36.2 °C)-97.7 °F (36.5 °C)] 97.2 °F (36.2 °C)  HR:  [63-80] 63  BP: (151-183)/() 168/81  Resp:  [16-20] 20  SpO2:  [92 %-94 %] 92 %  O2 Device: None (Room air)    Physical Exam  Vitals and nursing note reviewed.   Constitutional:       General: He is not in acute distress.     Appearance: He is well-developed.   HENT:      Head: Normocephalic and atraumatic.     Eyes:      Conjunctiva/sclera: Conjunctivae normal.       Cardiovascular:    "   Rate and Rhythm: Normal rate and regular rhythm.      Heart sounds: Murmur (systolic) heard.   Pulmonary:      Effort: Pulmonary effort is normal. No respiratory distress.      Breath sounds: Normal breath sounds.   Abdominal:      Palpations: Abdomen is soft.      Tenderness: There is no abdominal tenderness.     Musculoskeletal:         General: No swelling.      Cervical back: Neck supple.      Right lower leg: Edema present.      Left lower leg: No edema.      Comments: Improved in the size of erythema and edema in right foot. Erythema down below the ankle, around forefoot.   Warmth appreciated on R > L foot     Skin:     General: Skin is warm and dry.      Capillary Refill: Capillary refill takes less than 2 seconds.      Findings: Erythema present.     Neurological:      Mental Status: He is alert and oriented to person, place, and time.      Comments: Slight L facial droop. Speech difficulty improved.   CN 2-12 grossly intact  Muscle strength 5/5 in all extremities   Psychiatric:         Mood and Affect: Mood normal.             Lab Results: I have reviewed the following results:  Results from last 7 days   Lab Units 07/09/25  1106 07/08/25  0808 07/07/25  0445   WBC Thousand/uL 11.09* 10.78* 11.75*   HEMOGLOBIN g/dL 12.6 12.4 11.7*   PLATELETS Thousands/uL 193 147* 114*     Results from last 7 days   Lab Units 07/09/25  1106 07/08/25  0808 07/07/25  0445 07/06/25  0540 07/05/25  1453   SODIUM mmol/L 133* 131* 133*   < > 132*   POTASSIUM mmol/L 3.4* 3.8 3.7   < > 3.5   CHLORIDE mmol/L 103 101 104   < > 100   CO2 mmol/L 20* 21 23   < > 22   BUN mg/dL 34* 42* 43*   < > 42*   CREATININE mg/dL 1.25 1.38* 1.53*   < > 1.95*   EGFR ml/min/1.73sq m 56 50 44   < > 32   CALCIUM mg/dL 8.6 8.3* 7.9*   < > 8.7   AST U/L  --  24  --   --  55*   ALT U/L  --  12  --   --  30   ALK PHOS U/L  --  78  --   --  69   ALBUMIN g/dL  --  3.1*  --   --  3.7    < > = values in this interval not displayed.     Results from last 7  days   Lab Units 07/07/25  1359 07/05/25  1453   BLOOD CULTURE  No Growth at 48 hrs.  No Growth at 48 hrs. Staphylococcus aureus*  Staphylococcus aureus*   GRAM STAIN RESULT   --  Gram positive cocci in clusters*  Gram positive cocci in clusters*     Results from last 7 days   Lab Units 07/05/25  1453   PROCALCITONIN ng/ml 4.06*

## 2025-07-10 NOTE — ASSESSMENT & PLAN NOTE
R foot pain x4 days. Inability to bear weight. Limited ROM. Endorses fatigue, fevers, chills, loss of appetite. Febrile on admissoin to 101.5. ED Workup significant for WBC of 15.99, stable Hb, mild thrombocytopenia, mild hyponatremia, NORMA w Cr of 1.95 (baseline of 1), glucose of 191, AST elevated at 55, T bili 1.48. Lactic 1.5, procal 4.06, INR 1.34. Bcx drawn, and patient started on Ancef    Of note, patient has hx of R foot cellulitis, admission at [facility] in Jan 2024. Received Abx but left AMA before recommended amputation vs. Debridement by care team there. Has questionable hx of diabetes. Patient denies any hx of diabetes.    XR of b/l feet done this admission.  No acute osseous abnormalities were visualized on the bilateral x-rays of the feet    Plan:  Continue Abx Tx w Ancef, as patient's Bcx have come back as S. Aureus, favor MSSA. This is Day 5 of IV Abx  B/l feet MRI ordered  Patient had very large, watery bowel movement yesterday evening.  Should he continue to have large, loose stools, we will order C. difficile stool study, placed patient on contact precautions.  This is likely in the setting of constipation for many days.  We will hold his bowel regimen at this time  Continue F/u Bcx for susceptibilities.  No growth to date on second set of blood cultures  Control glucose between 140-180 to promote healing  Encourage patient to eat/drink throughout day to promote healing  Podiatry consulted:  On 07/06, podiatry debrided distal hallux b/l. Open wound was found on distal L hallux  Trend WBC, fever  Given patient's prior admission and him leaving AMA, continue to explain importance of receiving Abx therapy  Continue work with PT and OT

## 2025-07-10 NOTE — CONSULTS
Consultation - Neurology   Name: River Pedro 75 y.o. male I MRN: [id number]  [facility] I Date of Admission: 7/5/2025   Date of Service: 7/10/2025 I Hospital Day: 5   Inpatient consult to Neurology  Consult performed by: Mike Evans DO  Consult ordered by: Micheline Barrett DO        Physician Requesting Evaluation: Lb Ambrocio MD   Reason for Evaluation / Principal Problem: Stroke-like Symptoms    Assessment & Plan  Stroke-like symptoms  75 y.o male with PMHx of CVA of the L basal ganglia in 2012 with residual word finding difficulty, TIA in 2019, T2DM, and HTN who we were consulted for stroke-like symptoms. He was noted to have left sided facial droop, slurred speech, and dysphagia. He has some word finding difficulties at baseline following previous CVA.     Relevant PMH:  Stroke Risk Factors - Previous CVA, previous TIA, HTN  Anticoagulation/Antiplatelets - Aspirin    Workup:  NC CTH: No intracranial hemorrhage, chronic left basal ganglia infarction  MRI Brain w/o contrast: Acute/recent infarct involving the right gangliocapsular region, with associated cytotoxic edema. Old infarct involving the left basal ganglia and periventricular left frontal lobe white matter. Old lacunar infarcts involving the bilateral cerebellar hemispheres.   Echo: EF 60% RoPE score 5, 34% chance stroke is from PFO.  ANSON: Pending  Labs: Hemoglobin A1C 6.7% (07/05/25) LDL 66 (07/05/25)    Impression:  Patient's left facial droop, dysphagia, and mild left sided weakness are in the setting of noncompliance with home aspirin and statin and now with MRI findings significant for a right gangliocapsular region infarct with associated cytotoxic edema. Given the subcortical nature of the infarct suspect that etiology is small vessel disease vs less likely a cardioembolic source of any etiology including secondary to endocarditis as this typically causes cortical infarcts in one or multiple regions, regardless no  indication for AC at this time & already on abx.    Plan:  Discussed plan with neurology attending, Dr. Flores  Frequent Neuro Checks - STAT CTH for change in exam  BP Goals: Pursue normotension, >24 hours since LKW  AP/AC: Continue home ASA 81 mg daily.  High Intensity Statin: Continue Atorvastatin 40 mg QHS   Imaging/Diagnostics: CTA head  Euglycemia (-180) and Euthermia   DVT PPx: Heparin, SCDs  PT/OT/ST/PMR, evaluated w/ level 2 recs  Stroke education and counseling  Rest of other care per primary team appreciated       Recommendations for outpatient neurological follow up have yet to be determined.    History of Present Illness   River Pedro is a 75 y.o. male with PMHx of CVA of the L basal ganglia in 2012 with residual word finding difficulty, TIA in 2019, T2DM, and HTN who presented on 07/05/25 due to right foot pain and difficulty walking. He also had mild word finding difficulty at time of presentation and a known history of ambulatory dysfunction. In the ED he was diagnosed with cellulitis of the right foot, MSSA bacteremia, and an NORMA. He was started on antibiotics and continued to progress. However, per IM attending on 07/08/25 a slight flattening of the nasolabial fold overnight in addition to some slurred speech that improved with continued questioning was present on exam. He was taken for a stat CT head w/o contrast that showed no acute bleeds and redemonstrated his chronic infarct. Patient was made NPO due to concern of dysphagia and evaluated by speech therapy. Rapid response was called for the patient secondary to a fall with headstrike. CT head/ wo contrast was negative for acute bleed and MRI brain w/o contrast which indicated acute infarct of the right gangliocapsular region with associated cytotoxic edema.   On exam today patient was evaluated resting comfortably with wife at bedside. Per patient's wife his word finding difficulty is near baseline since his prior stroke. Additionally,  she notes prior to admission patient has increased episodes of forgetfulness and lethargy. She first noticed the left facial droop on 07/08/25 and believes the patient had a left gaze preference at that time. She also believes the patient was favoring his left side at this time with PT noting he was consistently leaning left while walking. The patient does endorse subjective weakness but does not believe one side is worse compared to the other. He is still noting dysphagia that was not present prior to this hospitalization. He is on home aspirin and statin but had not been compliant with these medications prior to presentation.    ROS:  Negative for syncope, paralysis, numbness or tingling of hands, numbness or tingling of feet, involuntary movements, paresis, spasticity  Medical History Review: I have reviewed the patient's PMH, PSH, Social History, Family History, Meds, and Allergies     Objective :  Temp:  [97.2 °F (36.2 °C)-97.7 °F (36.5 °C)] 97.2 °F (36.2 °C)  HR:  [63-80] 70  BP: (151-183)/() 168/81  Resp:  [16-20] 20  SpO2:  [92 %-94 %] 92 %  O2 Device: None (Room air)    Physical Exam  Constitutional:       General: He is not in acute distress.  HENT:      Head: Normocephalic.      Right Ear: External ear normal.      Left Ear: External ear normal.      Nose: Nose normal.      Mouth/Throat:      Pharynx: Oropharynx is clear.     Eyes:      General: Lids are normal. No scleral icterus.        Right eye: No discharge.         Left eye: No discharge.      Extraocular Movements: Extraocular movements intact.      Conjunctiva/sclera: Conjunctivae normal.      Pupils: Pupils are equal, round, and reactive to light.       Neurological:      Cranial Nerves: Dysarthria present.      Deep Tendon Reflexes:      Reflex Scores:       Bicep reflexes are 2+ on the right side and 2+ on the left side.       Brachioradialis reflexes are 2+ on the right side and 2+ on the left side.       Patellar reflexes are 2+ on the  right side and 2+ on the left side.       Achilles reflexes are 2+ on the right side and 2+ on the left side.    Neurological Exam  Mental Status  Awake, alert and oriented to person, place and time. Mild dysarthria present. Language is fluent with no aphasia.    Cranial Nerves  CN II: Visual acuity is normal. Right normal visual field. Left normal visual field.  CN III, IV, VI: Extraocular movements intact bilaterally. Normal lids and orbits bilaterally. Pupils equal round and reactive to light bilaterally.  CN V: Facial sensation is normal.  CN VII:  Right: There is no facial weakness.  Left: There is central facial weakness.  CN VIII: Hearing is normal.  CN XI: Shoulder shrug strength is normal.  CN XII: Tongue midline without atrophy or fasciculations.    Motor  Normal muscle bulk throughout. No fasciculations present. Normal muscle tone. No abnormal involuntary movements.                                               Right                     Left   Shoulder abduction               4                          4   Shoulder adduction               4                          4  Elbow flexion                         4                          4-  Elbow extension                    4                          4-  Wrist flexion                           4                          4-  Wrist extension                      4                          4-  Finger abduction                    4                          4-  Hip flexion                              4                          4  Knee extension                      4                          4  Toe flexion                             4                          4  Toe extension                        4                          4    Sensory  Temperature is normal in upper and lower extremities. Vibration is normal in upper and lower extremities.     Reflexes                                            Right                      Left  Brachioradialis                     2+                         2+  Biceps                                 2+                         2+  Patellar                                2+                         2+  Achilles                                2+                         2+    Right pathological reflexes: Tamanna's absent. Ankle clonus absent.  Left pathological reflexes: Tamanna's absent. Ankle clonus absent.    Coordination  Right: Finger-to-nose normal. Rapid alternating movement normal.Left: Finger-to-nose normal. Rapid alternating movement normal.    Gait    Deferred.        Lab Results: I have reviewed the following results:CBC:   Results from last 7 days   Lab Units 07/10/25  1048 07/09/25  1106 07/08/25  0808   WBC Thousand/uL 8.61 11.09* 10.78*   RBC Million/uL 3.93 4.11 4.07   HEMOGLOBIN g/dL 11.8* 12.6 12.4   HEMATOCRIT % 34.7* 36.3* 36.6   MCV fL 88 88 90   PLATELETS Thousands/uL 232 193 147*   , BMP/CMP:   Results from last 7 days   Lab Units 07/10/25  1048 07/09/25  1106 07/08/25  0808 07/06/25  0540 07/05/25  1453   SODIUM mmol/L 134* 133* 131*   < > 132*   POTASSIUM mmol/L 3.0* 3.4* 3.8   < > 3.5   CHLORIDE mmol/L 104 103 101   < > 100   CO2 mmol/L 25 20* 21   < > 22   BUN mg/dL 34* 34* 42*   < > 42*   CREATININE mg/dL 1.13 1.25 1.38*   < > 1.95*   CALCIUM mg/dL 8.5 8.6 8.3*   < > 8.7   AST U/L  --   --  24  --  55*   ALT U/L  --   --  12  --  30   ALK PHOS U/L  --   --  78  --  69   EGFR ml/min/1.73sq m 63 56 50   < > 32    < > = values in this interval not displayed.   , HgBA1C:   Results from last 7 days   Lab Units 07/05/25  1453   HEMOGLOBIN A1C % 6.7*   , TSH:   , Lipid Profile:   Results from last 7 days   Lab Units 07/05/25  1453   HDL mg/dL 47   LDL CALC mg/dL 66   TRIGLYCERIDES mg/dL 69   , Urinalysis:   Results from last 7 days   Lab Units 07/06/25  0309   COLOR UA  Yellow   CLARITY UA  Turbid   SPEC GRAV UA  1.026   PH UA  6.5   LEUKOCYTES UA  Trace*   NITRITE UA  Negative   GLUCOSE UA mg/dl Negative   KETONES UA  mg/dl Negative   BILIRUBIN UA  Negative   BLOOD UA  Large*     Recent Labs     07/10/25  1048   WBC 8.61   HGB 11.8*   HCT 34.7*      SODIUM 134*   K 3.0*      CO2 25   BUN 34*   CREATININE 1.13   GLUC 260*     Imaging Results Review: I reviewed radiology reports from this admission including: CT head and MRI brain.  Other Study Results Review: EKG was reviewed.     VTE Prophylaxis: VTE covered by:  heparin (porcine), Subcutaneous, 5,000 Units at 07/10/25 1357       Administrative Statements   I have spent a total time of 60 minutes in caring for this patient on the day of the visit/encounter including Diagnostic results, Risks and benefits of tx options, Patient and family education, Risk factor reductions, Impressions, Counseling / Coordination of care, Documenting in the medical record, Reviewing/placing orders in the medical record (including tests, medications, and/or procedures), Obtaining or reviewing history  , and Communicating with other healthcare professionals .

## 2025-07-10 NOTE — SPEECH THERAPY NOTE
"Speech Language/Pathology  Speech-Language Pathology Progress Note      Patient Name: River Pedro    Today's Date: 7/10/2025      Subjective:  Pt was awake and alert. He was sitting upright in bed. Patient stated \" I don't like that thick water.  Why is it so hard for me to eat this?\".    Objective:  Pt was seen today for dysphagia therapy. Current diet is dysphagia 3 dental soft with nectar thick liquids. Thin by cup was recommended following MBS yesterday.  The pt apparently had coughing with thin and was put on nectar thick.  I arrived in room and he is drinking thin coffee from home.  Pt was on room air. Oral care had already been completed. Focus of today's session was to re assess with thin by cup and educate on levels.  The pt is currently c/o not liking the thick liquids and wife is concerned with his dark urine. Textures offered today included thin liquid via cup, nectar by cup and straw (straw was in the nectar upon arrival), several bites of a tuna sandwich.   Swallow function:   Adequate retrieval from the cup, pt cued to take small single sips.  No cough for the initial 3/3 cup sips thin.  Pt took excessive, prolonged time masticating the sandwich, difficulty forming the bolus and had difficulty transferring.  Attempted cup sips thin, noted one cough w the attempt to clear the solids with it.  Offered tsps pureed peaches and he was able to clear.    Assessment:  The pt continues with oral dysphagia w/ prolonged breakdown and reduced transfers.  He c/o being tired and needing a break.  He is able to tolerate small cup sips thin w/o difficulty. Based on research re: negative outcomes of aspirating thickened liquids, as well as increased dehydration would  consider resuming thin liquids with use of safe swallow strategies and frequent/thorough oral care to minimize risks.    Plan:  Diet as per physician but would rec'd to resume thin when able by small cup sips  The pt does not like the thick water- at " the very least should be able to have thin water by cup sips  Oral care often as well as mobility when able. All factors will reduce risk of aspiration event leading to any pna

## 2025-07-10 NOTE — PROGRESS NOTES
Progress Note - Internal Medicine   Name: River Pedro 75 y.o. male I MRN: [id number]  Unit/Bed#: [facility] 901-01 I Date of Admission: 7/5/2025   Date of Service: 7/10/2025 I Hospital Day: 5    Assessment & Plan  Sepsis (HCC)  See cellulitis of R foot below  Cellulitis of right foot  R foot pain x4 days. Inability to bear weight. Limited ROM. Endorses fatigue, fevers, chills, loss of appetite. Febrile on admissoin to 101.5. ED Workup significant for WBC of 15.99, stable Hb, mild thrombocytopenia, mild hyponatremia, NORMA w Cr of 1.95 (baseline of 1), glucose of 191, AST elevated at 55, T bili 1.48. Lactic 1.5, procal 4.06, INR 1.34. Bcx drawn, and patient started on Ancef    Of note, patient has hx of R foot cellulitis, admission at [facility] in Jan 2024. Received Abx but left AMA before recommended amputation vs. Debridement by care team there. Has questionable hx of diabetes. Patient denies any hx of diabetes.    XR of b/l feet done this admission.  No acute osseous abnormalities were visualized on the bilateral x-rays of the feet    Plan:  Continue Abx Tx w Ancef, as patient's Bcx have come back as S. Aureus, favor MSSA. This is Day 5 of IV Abx  B/l feet MRI ordered  Patient had very large, watery bowel movement yesterday evening.  Should he continue to have large, loose stools, we will order C. difficile stool study, placed patient on contact precautions.  This is likely in the setting of constipation for many days.  We will hold his bowel regimen at this time  Continue F/u Bcx for susceptibilities.  No growth to date on second set of blood cultures  Control glucose between 140-180 to promote healing  Encourage patient to eat/drink throughout day to promote healing  Podiatry consulted:  On 07/06, podiatry debrided distal hallux b/l. Open wound was found on distal L hallux  Trend WBC, fever  Given patient's prior admission and him leaving AMA, continue to explain importance of receiving Abx therapy  Continue work with PT  "and OT    MSSA bacteremia  Patient's blood cultures 2/2 for gram positive cocci in clusters. Escalated antibiotics to vancomycin     Plan:   ID consulted, appreciate recs:  Recommended TTE for possible IE, given positive blood culture for typical IE organism and fever  Echo showing ejection fraction of 60%, mild right atrial dilation, mild aortic valve regurgitation, no echocardiographic evidence of valvular vegetation affecting the mitral, aortic, or tricuspid valves.  Pulmonic valve not adequately visualized to assess for endocarditis. Overall, no significant changes noted compared to the prior study.  Also recommended 6 weeks of IV Abx tx  Pending final blood cultures  Placed order for ANSON to rule out infective endocarditis, given poor visualization of some valves on TTE and patient's new stroke.  He has risk factors for IE, including poor dentition, gram-positive bacteremia  Stroke-like symptoms  This morning, nursing staff alerted our team that patient had left-sided facial droop, around the corner of his mouth.  While examining him, this was noted, but the rest of his neuroexam was benign.  When taking his morning aspirin, staff noticed that he started to choke.    STAT CT head was ordered with the following findings: No hemorrhage or evidence of acute cortical infarct.  Chronic infarct in the left basal ganglia was noted.    Findings of brain MRI stated below in this note, showing new infarct \"Acute/recent infarct involving the right gangliocapsular region, with associated cytotoxic edema\"    Plan:  Patient's rope score is a 5, indicating that there is a 34% chance that a new stroke is due to a patent PFO.  He did not have bubble study on TTE, and some valves were poorly visualized.  Placed order for ANSON with bubble study to rule out PFO and IE as potential etiologies of his new stroke  Bibasilar crackles on exam today, better than yesterday.  Obtained chest x-ray.  No acute cardiopulmonary process. patient " satting 94% on room air.  Has not required any supplemental oxygen  Speech therapy saw the patient yesterday and was recommending dysphagia diet, puréed solids and thin liquids.  Speech therapy to reevaluate patient today, as there was concern for thin liquids.  Placed patient on thick liquids yesterday  Continue aspirin and statin  Urinary retention  Urology consulted for retention and re-colored urine in Hanks bag    Plan:  Continue to monitor for further hematuria. Favor traumatic hanks insertion  Continue flomax 0.4 mg daily  Avoid medications that cause urinary retention such as antihistamines and anticholinergics  Bowel regimen: Patient now has scheduled miralax.  Will follow-up to determine if patient had bowel movement tomorrow during examination  Continue hanks until patient is ambulating and able to use bathroom. Will plan for void trial at this time  Urinary retention protocol, patient may require urology follow up outpatient  History of diabetes mellitus  Remote hx of T2DM. Patient states that he does not take any medications and that he is not diabetics, although this is mentioned in prior documentation  Hemoglobin 6.7    Plan:  Sugars are well-controlled at this time, we will monitor sugars on daily BMP.  Will discontinue sliding scale insulin at this time, as patient has had minimal to no requirements of insulin.  NORMA (acute kidney injury) (HCC)  Cr of 1.95 on admission. BUN:Cr ratio >20. Given this and patient's hx of not eating/drinking much for past 4 days along w end organ damage on other labs, suggest prerenal insult  Patient with urinary retention, reports waking up two times a night to go to the bathroom.    Creatinine improving    Plan:  Starting Isolyte 100 milliliters per hour for 10 hours, as patient has poor p.o. intake  Avoid NSAIDs for pain mgmt  Given 1L bolus isolyte at admission, patient eating well and hydrating well, no need for fluids at this time  Continue to trend Cr daily  CVA  (cerebral vascular accident) (HCC)  Hx of L basal ganglia infarct in 2012 and TIA in 2019. Unable to see in medical record if patient had residual deficits following diagnosis. Only + neuro findings are mild word finding difficulty    Plan:  Continue ASA 81 mg daily. Patient was not taking this as an outpatient, although it is listed in home meds  Continue Lipitor 20 mg daily  Open wound bilateral 1st toes  Podiatry consulted, appreciate recs  Onychomycosis  My exam suggest this patient has onychomycosis on both the right and left feet    Plan:  itraconazole 100 mg twice daily.  Patient should be on treatment for onychomycosis for 12 consecutive weeks.Given  interaction with atorvastatin, will reduce dose from 40 mg daily to 20 mg daily spoke with pharmacy about this    Disposition: Continue inpatient stay for workup of new stroke, PICC line placement, IV antibiotics    Team: SOD TEAM A    Subjective   Patient seen and examined.  Overnight, rapid response was called on River.  He was having a bowel movement, stood up, turned around, and fell down, hitting his head.  At this time, responders stated that he did not have any worsening of his neurodeficits.  No worsening of his dysarthria, dysphagia, focal motor weakness/deficits in sensation.  Stat CT head was performed that did not reveal any new findings.  This morning, River says that he feels bad about his large bowel movement and the rapid response that was called yesterday.  He denies any headache, changes in vision, weakness in any extremity, differences in any sensation, chest pain, shortness of breath, palpitations, fevers, chills, nausea, vomiting.  He does endorse diarrhea.    Objective :  Temp:  [97.5 °F (36.4 °C)-97.9 °F (36.6 °C)] 97.7 °F (36.5 °C)  HR:  [66-80] 80  BP: (151-183)/() 165/88  Resp:  [16-17] 16  SpO2:  [93 %-94 %] 94 %  O2 Device: None (Room air)    I/O         07/08 0701 07/09 0700 07/09 0701  07/10 0700    P.O. 420     Total  Intake(mL/kg) 420 (5.1)     Urine (mL/kg/hr) 825 (0.4) 500 (0.3)    Total Output 825 500    Net -405 -500                Weights:   IBW (Ideal Body Weight): 75.3 kg    Body mass index is 25.52 kg/m².  Weight (last 2 days)       Date/Time Weight    07/08/25 1032 83 (183)            Physical Exam  Constitutional:       Appearance: Normal appearance. He is not ill-appearing.   HENT:      Head: Normocephalic and atraumatic.      Nose: Nose normal.      Mouth/Throat:      Mouth: Mucous membranes are moist.     Eyes:      General: No visual field deficit.      Cardiovascular:      Rate and Rhythm: Normal rate and regular rhythm.      Pulses: Normal pulses.      Heart sounds: Normal heart sounds.   Pulmonary:      Effort: Pulmonary effort is normal.      Breath sounds: Normal breath sounds.   Abdominal:      General: Abdomen is flat. There is no distension.      Palpations: Abdomen is soft.     Musculoskeletal:         General: Swelling and tenderness present.      Right lower leg: Edema present.      Left lower leg: No edema.      Comments: Swelling of RLE improved from yesterday. Erythema remains. Left foot wrapped per podiatry     Skin:     General: Skin is warm and dry.     Neurological:      Mental Status: He is alert and oriented to person, place, and time.      Cranial Nerves: Cranial nerve deficit present.      Motor: Weakness present.      Gait: Gait abnormal.      Comments: Strength symmetrical in bilateral upper and lower extremities.  Dysarthria remains. Left facial droop present. Patient with decreased SCM strength on left as well as slightly diminished hearing. No fasciculations noted on tongue, no signs of deviation.    Psychiatric:         Mood and Affect: Mood normal.         Behavior: Behavior normal.           Lab Results: I have reviewed the following results:  Recent Labs     07/08/25  0808 07/09/25  1106   WBC 10.78* 11.09*   HGB 12.4 12.6   HCT 36.6 36.3*   * 193   SODIUM 131* 133*   K 3.8  "3.4*    103   CO2 21 20*   BUN 42* 34*   CREATININE 1.38* 1.25   GLUC 166* 205*   AST 24  --    ALT 12  --    ALB 3.1*  --    TBILI 0.84  --    ALKPHOS 78  --      Imaging Results Review: I personally reviewed the following image studies in PACS and associated radiology reports: XR L and R foot. My interpretation of the radiology images/reports is: osteo lesion of R great toe on Xray, suspicious for OM. MRI ordered, pending official read.  MRI of brain done on 7/10 showing \"Acute/recent infarct involving the right gangliocapsular region, with associated cytotoxic edema. Old infarct involving the left basal ganglia and periventricular left frontal lobe white matter. Old lacunar infarcts involving the bilateral cerebellar hemispheres. Mild chronic white matter microangiopathic changes involving both cerebral hemispheres\"      Other Study Results Review: EKG was personally reviewed and my interpretation is: Personally Reviewed. NSR w HR of 60. Slight QRS widening, LVH ..    Currently Ordered Meds:   Current Facility-Administered Medications:     acetaminophen (TYLENOL) tablet 975 mg, Q8H PRN    aspirin chewable tablet 81 mg, Daily    atorvastatin (LIPITOR) tablet 20 mg, Daily With Dinner    ceFAZolin (ANCEF) IVPB (premix in dextrose) 2,000 mg 50 mL, Q8H, Last Rate: 2,000 mg (07/09/25 1539)    heparin (porcine) subcutaneous injection 5,000 Units, Q8H JOSÉ MIGUEL    itraconazole (SPORANOX) oral solution 100 mg, Q12H JOSÉ MIGUEL    melatonin tablet 3 mg, HS    ondansetron (ZOFRAN) injection 4 mg, Q6H PRN    [Held by provider] polyethylene glycol (MIRALAX) packet 17 g, Daily    senna (SENOKOT) tablet 8.6 mg, HS PRN    [Held by provider] senna (SENOKOT) tablet 8.6 mg, Daily    tamsulosin (FLOMAX) capsule 0.4 mg, Daily With Dinner    VTE Pharmacologic Prophylaxis: VTE covered by:  heparin (porcine), Subcutaneous, 5,000 Units at 07/10/25 1357     VTE Mechanical Prophylaxis: reason for no mechanical VTE prophylaxis right foot " cellulitis    Administrative Statements   I have spent a total time of 45 minutes in caring for this patient on the day of the visit/encounter including Diagnostic results, Instructions for management, Patient and family education, Importance of tx compliance, Risk factor reductions, Impressions, Counseling / Coordination of care, Documenting in the medical record, Reviewing/placing orders in the medical record (including tests, medications, and/or procedures), Obtaining or reviewing history  , and Communicating with other healthcare professionals .    Portions of the record may have been created with voice recognition software.      Kashmir Carlin, MS4  [facility]

## 2025-07-10 NOTE — ASSESSMENT & PLAN NOTE
Presented to the ED with 4-day history of worsening R foot pain with associated erythema, edema, trouble with weightbearing, fever, chills, and decreased PO intake.   History of R foot cellulitis requiring hospitalization in Jan 2024. MRI that time was negative for osteomyelitis. Treated with IV Ancef. Plan was for OR debridement 1/18 however patient left AMA On 1/17. He was given 10-day course of Cefadroxil.    XR of both feet revealed no acute osseous abnormality.  WBC 15.9>>12.5>>11.7>>10.7>>11  Afebrile in the past 24 hours  Improved in the size of erythema and edema  Cellulitis most likely from open wounds and onychomycosis +/- tinea pedis     Plan:  - Continue Cefazolin D6, would continue for at least 7 days  - Elevation of feet while at rest

## 2025-07-10 NOTE — PLAN OF CARE
Problem: PAIN - ADULT  Goal: Verbalizes/displays adequate comfort level or baseline comfort level  Description: Interventions:  - Encourage patient to monitor pain and request assistance  - Assess pain using appropriate pain scale  - Administer analgesics as ordered based on type and severity of pain and evaluate response  - Implement non-pharmacological measures as appropriate and evaluate response  - Consider cultural and social influences on pain and pain management  - Notify physician/advanced practitioner if interventions unsuccessful or patient reports new pain  - Educate patient/family on pain management process including their role and importance of  reporting pain   - Provide non-pharmacologic/complimentary pain relief interventions  7/10/2025 0054 by Kristen Unger RN  Outcome: Progressing  7/10/2025 0054 by Kristen Unger RN  Outcome: Progressing     Problem: SAFETY ADULT  Goal: Patient will remain free of falls  Description: INTERVENTIONS:  - Educate patient/family on patient safety including physical limitations  - Instruct patient to call for assistance with activity   - Consider consulting OT/PT to assist with strengthening/mobility based on AM PAC & JH-HLM score  - Consult OT/PT to assist with strengthening/mobility   - Keep Call bell within reach  - Keep bed low and locked with side rails adjusted as appropriate  - Keep care items and personal belongings within reach  - Initiate and maintain comfort rounds  - Make Fall Risk Sign visible to staff  - Initiate/Maintain bed alarm  - Obtain necessary fall risk management equipment: non-slip socks  - Apply yellow socks and bracelet for high fall risk patients  - Patient in bed near nurses station  Outcome: Progressing

## 2025-07-10 NOTE — ASSESSMENT & PLAN NOTE
Blood cultures collected on 7/5 both positive for MSSA on 7/7. However both from venous line, colonization/contamination vs true infection  Echo in March 2022 revealed EF 55%, NWM, G1DD. Mild to mod AR. Mild TR and mild OK. Mildly dilated aortic root and ascending aorta.  Source is most likely from the open wound of bilateral first toes.  Splinter hemorrhage vs melanonychia noted on left 5th digit   Systolic murmurs noted on exam    Possible infective endocarditis based on BCX2 and febrile    Echo: EF 60%, NWM, normal systolic and diastolic function. No echocardiographic evidence of valvular vegetation affecting mitral, aortic or tricuspid valves.     New blood cultures collected on 7/7 no growth at 48 hours    Plan:  - Continue Cefazolin 2g Q8H while pending MRI of both feet  - 6-week course from the first set of negative blood cultures

## 2025-07-10 NOTE — ASSESSMENT & PLAN NOTE
Cr of 1.95 on admission, baseline 0.8-1.1. BUN:Cr ratio >20, likely pre-renal from decreased PO intake.   Cr improved to 1.25 this AM  Managed by primary team

## 2025-07-10 NOTE — ASSESSMENT & PLAN NOTE
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 Unable to see in medical record if patient had residual deficits following diagnosis. Only + neuro findings are mild word finding difficulty    Plan:  Continue ASA 81 mg daily. Patient was not taking this as an outpatient, although it is listed in home meds  Continue Lipitor 20 mg daily

## 2025-07-10 NOTE — QUICK NOTE
"Patient's nurse, Kristen, informed the team that patient was refusing antibiotic treatment because it \"makes him too sweaty\" and antifungal treatment because he did not want to take liquid itraconazole. We reached out to pharmacy for the potential to put itraconazole liquid into pudding, which they stated is not recommended. I went to discuss with the patient the necessity of these medications to prevent further progression of infection. I informed him that we could turn the temperature down in the room and give him ice packs for antibiotic treatment, to which he denied. After discussion of the potential risks of forgoing treatment, he still wished to decline medication at this time. I discussed with his nurse and the rest of the night team.   "

## 2025-07-11 ENCOUNTER — ANESTHESIA (INPATIENT)
Dept: NON INVASIVE DIAGNOSTICS | Facility: HOSPITAL | Age: 75
DRG: 853 | End: 2025-07-11
Payer: MEDICARE

## 2025-07-11 ENCOUNTER — APPOINTMENT (INPATIENT)
Dept: RADIOLOGY | Facility: HOSPITAL | Age: 75
DRG: 853 | End: 2025-07-11
Payer: MEDICARE

## 2025-07-11 ENCOUNTER — TELEPHONE (OUTPATIENT)
Age: 75
End: 2025-07-11

## 2025-07-11 LAB
ANION GAP SERPL CALCULATED.3IONS-SCNC: 6 MMOL/L (ref 4–13)
AORTIC ROOT: 4.4 CM
ASCENDING AORTA: 4.1 CM
BASOPHILS # BLD AUTO: 0.02 THOUSANDS/ÂΜL (ref 0–0.1)
BASOPHILS NFR BLD AUTO: 0 % (ref 0–1)
BUN SERPL-MCNC: 29 MG/DL (ref 5–25)
CALCIUM SERPL-MCNC: 7.9 MG/DL (ref 8.4–10.2)
CHLORIDE SERPL-SCNC: 106 MMOL/L (ref 96–108)
CO2 SERPL-SCNC: 23 MMOL/L (ref 21–32)
CREAT SERPL-MCNC: 1.02 MG/DL (ref 0.6–1.3)
DME PARACHUTE DELIVERY DATE REQUESTED: NORMAL
DME PARACHUTE ITEM DESCRIPTION: NORMAL
DME PARACHUTE ITEM DESCRIPTION: NORMAL
DME PARACHUTE ORDER STATUS: NORMAL
DME PARACHUTE SUPPLIER NAME: NORMAL
DME PARACHUTE SUPPLIER PHONE: NORMAL
EOSINOPHIL # BLD AUTO: 0.15 THOUSAND/ÂΜL (ref 0–0.61)
EOSINOPHIL NFR BLD AUTO: 2 % (ref 0–6)
ERYTHROCYTE [DISTWIDTH] IN BLOOD BY AUTOMATED COUNT: 14.7 % (ref 11.6–15.1)
GFR SERPL CREATININE-BSD FRML MDRD: 72 ML/MIN/1.73SQ M
GLUCOSE SERPL-MCNC: 160 MG/DL (ref 65–140)
GLUCOSE SERPL-MCNC: 163 MG/DL (ref 65–140)
GLUCOSE SERPL-MCNC: 173 MG/DL (ref 65–140)
GLUCOSE SERPL-MCNC: 177 MG/DL (ref 65–140)
HCT VFR BLD AUTO: 31.9 % (ref 36.5–49.3)
HGB BLD-MCNC: 11 G/DL (ref 12–17)
IMM GRANULOCYTES # BLD AUTO: 0.13 THOUSAND/UL (ref 0–0.2)
IMM GRANULOCYTES NFR BLD AUTO: 2 % (ref 0–2)
LYMPHOCYTES # BLD AUTO: 0.6 THOUSANDS/ÂΜL (ref 0.6–4.47)
LYMPHOCYTES NFR BLD AUTO: 9 % (ref 14–44)
MCH RBC QN AUTO: 30.3 PG (ref 26.8–34.3)
MCHC RBC AUTO-ENTMCNC: 34.5 G/DL (ref 31.4–37.4)
MCV RBC AUTO: 88 FL (ref 82–98)
MONOCYTES # BLD AUTO: 0.82 THOUSAND/ÂΜL (ref 0.17–1.22)
MONOCYTES NFR BLD AUTO: 12 % (ref 4–12)
NEUTROPHILS # BLD AUTO: 5.12 THOUSANDS/ÂΜL (ref 1.85–7.62)
NEUTS SEG NFR BLD AUTO: 75 % (ref 43–75)
NRBC BLD AUTO-RTO: 0 /100 WBCS
PLATELET # BLD AUTO: 243 THOUSANDS/UL (ref 149–390)
PMV BLD AUTO: 11.4 FL (ref 8.9–12.7)
POTASSIUM SERPL-SCNC: 3.4 MMOL/L (ref 3.5–5.3)
RBC # BLD AUTO: 3.63 MILLION/UL (ref 3.88–5.62)
SL CV LV EF: 55
SODIUM SERPL-SCNC: 135 MMOL/L (ref 135–147)
WBC # BLD AUTO: 6.84 THOUSAND/UL (ref 4.31–10.16)

## 2025-07-11 PROCEDURE — 85025 COMPLETE CBC W/AUTO DIFF WBC: CPT

## 2025-07-11 PROCEDURE — 93325 DOPPLER ECHO COLOR FLOW MAPG: CPT | Performed by: INTERNAL MEDICINE

## 2025-07-11 PROCEDURE — 99232 SBSQ HOSP IP/OBS MODERATE 35: CPT | Performed by: PSYCHIATRY & NEUROLOGY

## 2025-07-11 PROCEDURE — 93320 DOPPLER ECHO COMPLETE: CPT | Performed by: INTERNAL MEDICINE

## 2025-07-11 PROCEDURE — 80048 BASIC METABOLIC PNL TOTAL CA: CPT

## 2025-07-11 PROCEDURE — 70498 CT ANGIOGRAPHY NECK: CPT

## 2025-07-11 PROCEDURE — G0545 PR INHERENT VISIT TO INPT: HCPCS | Performed by: INTERNAL MEDICINE

## 2025-07-11 PROCEDURE — 99232 SBSQ HOSP IP/OBS MODERATE 35: CPT | Performed by: INTERNAL MEDICINE

## 2025-07-11 PROCEDURE — 93312 ECHO TRANSESOPHAGEAL: CPT

## 2025-07-11 PROCEDURE — 93312 ECHO TRANSESOPHAGEAL: CPT | Performed by: INTERNAL MEDICINE

## 2025-07-11 PROCEDURE — 70496 CT ANGIOGRAPHY HEAD: CPT

## 2025-07-11 PROCEDURE — 82948 REAGENT STRIP/BLOOD GLUCOSE: CPT

## 2025-07-11 RX ORDER — POTASSIUM CHLORIDE 29.8 MG/ML
40 INJECTION INTRAVENOUS ONCE
Status: COMPLETED | OUTPATIENT
Start: 2025-07-11 | End: 2025-07-11

## 2025-07-11 RX ORDER — PROPOFOL 10 MG/ML
INJECTION, EMULSION INTRAVENOUS AS NEEDED
Status: DISCONTINUED | OUTPATIENT
Start: 2025-07-11 | End: 2025-07-11

## 2025-07-11 RX ORDER — LORAZEPAM 2 MG/ML
0.5 INJECTION INTRAMUSCULAR ONCE AS NEEDED
Status: DISCONTINUED | OUTPATIENT
Start: 2025-07-11 | End: 2025-07-17 | Stop reason: HOSPADM

## 2025-07-11 RX ORDER — SODIUM CHLORIDE 9 MG/ML
INJECTION, SOLUTION INTRAVENOUS CONTINUOUS PRN
Status: DISCONTINUED | OUTPATIENT
Start: 2025-07-11 | End: 2025-07-11

## 2025-07-11 RX ORDER — INSULIN LISPRO 100 [IU]/ML
1-5 INJECTION, SOLUTION INTRAVENOUS; SUBCUTANEOUS EVERY 6 HOURS SCHEDULED
Status: DISCONTINUED | OUTPATIENT
Start: 2025-07-11 | End: 2025-07-12

## 2025-07-11 RX ADMIN — HEPARIN SODIUM 5000 UNITS: 5000 INJECTION INTRAVENOUS; SUBCUTANEOUS at 21:27

## 2025-07-11 RX ADMIN — INSULIN LISPRO 1 UNITS: 100 INJECTION, SOLUTION INTRAVENOUS; SUBCUTANEOUS at 21:28

## 2025-07-11 RX ADMIN — PROPOFOL 100 MCG/KG/MIN: 10 INJECTION, EMULSION INTRAVENOUS at 10:42

## 2025-07-11 RX ADMIN — SODIUM CHLORIDE: 9 INJECTION, SOLUTION INTRAVENOUS at 10:37

## 2025-07-11 RX ADMIN — HEPARIN SODIUM 5000 UNITS: 5000 INJECTION INTRAVENOUS; SUBCUTANEOUS at 05:08

## 2025-07-11 RX ADMIN — PROPOFOL 50 MG: 10 INJECTION, EMULSION INTRAVENOUS at 10:39

## 2025-07-11 RX ADMIN — IOHEXOL 85 ML: 350 INJECTION, SOLUTION INTRAVENOUS at 18:44

## 2025-07-11 RX ADMIN — PROPOFOL 20 MG: 10 INJECTION, EMULSION INTRAVENOUS at 10:49

## 2025-07-11 RX ADMIN — NOREPINEPHRINE BITARTRATE 8 MCG: 1 INJECTION, SOLUTION, CONCENTRATE INTRAVENOUS at 10:45

## 2025-07-11 RX ADMIN — HEPARIN SODIUM 5000 UNITS: 5000 INJECTION INTRAVENOUS; SUBCUTANEOUS at 15:07

## 2025-07-11 RX ADMIN — POTASSIUM CHLORIDE 40 MEQ: 29.8 INJECTION, SOLUTION INTRAVENOUS at 08:10

## 2025-07-11 RX ADMIN — PROPOFOL 20 MG: 10 INJECTION, EMULSION INTRAVENOUS at 10:56

## 2025-07-11 RX ADMIN — NOREPINEPHRINE BITARTRATE 16 MCG: 1 INJECTION, SOLUTION, CONCENTRATE INTRAVENOUS at 11:02

## 2025-07-11 RX ADMIN — NOREPINEPHRINE BITARTRATE 8 MCG: 1 INJECTION, SOLUTION, CONCENTRATE INTRAVENOUS at 10:53

## 2025-07-11 RX ADMIN — NOREPINEPHRINE BITARTRATE 8 MCG: 1 INJECTION, SOLUTION, CONCENTRATE INTRAVENOUS at 11:13

## 2025-07-11 RX ADMIN — CEFAZOLIN SODIUM 2000 MG: 2 SOLUTION INTRAVENOUS at 22:26

## 2025-07-11 RX ADMIN — NOREPINEPHRINE BITARTRATE 8 MCG: 1 INJECTION, SOLUTION, CONCENTRATE INTRAVENOUS at 11:09

## 2025-07-11 RX ADMIN — CEFAZOLIN SODIUM 2000 MG: 2 SOLUTION INTRAVENOUS at 15:07

## 2025-07-11 RX ADMIN — NOREPINEPHRINE BITARTRATE 8 MCG: 1 INJECTION, SOLUTION, CONCENTRATE INTRAVENOUS at 11:17

## 2025-07-11 RX ADMIN — PROPOFOL 70 MG: 10 INJECTION, EMULSION INTRAVENOUS at 10:38

## 2025-07-11 RX ADMIN — NOREPINEPHRINE BITARTRATE 8 MCG: 1 INJECTION, SOLUTION, CONCENTRATE INTRAVENOUS at 10:49

## 2025-07-11 RX ADMIN — CEFAZOLIN SODIUM 2000 MG: 2 SOLUTION INTRAVENOUS at 05:09

## 2025-07-11 RX ADMIN — Medication 3 MG: at 21:27

## 2025-07-11 RX ADMIN — PROPOFOL 40 MG: 10 INJECTION, EMULSION INTRAVENOUS at 10:41

## 2025-07-11 NOTE — QUICK NOTE
Attempted to call patient's ex-wife, Emily, patient's person of contact.  Unfortunately, mailbox was full, so I will try again tomorrow morning/afternoon to provide brief medical update.    Loi Suh MD

## 2025-07-11 NOTE — PROGRESS NOTES
Patient:    MRN:  [id number]    Perri Request ID:  [id number]    Level of care reserved:  Home Health Agency    Partner Reserved:  [facility] [place] [address] [phone]    Clinical needs requested:    Geography searched:  [phone]    Start of Service:    Request sent:  12:02pm EDT on 7/9/2025 by Hillary Carter    Partner reserved:  7:45am EDT on 7/11/2025 by Radha Pretty    Choice list shared:  6:51am EDT on 7/11/2025 by Radha Pretty

## 2025-07-11 NOTE — CASE MANAGEMENT
Case Management Discharge Planning Note    Patient name River Pedro  Location [facility] 901/[facility] 901-01 MRN [id number]  : 1950 Date 2025       Current Admission Date: 2025  Current Admission Diagnosis:Cellulitis of right foot   Patient Active Problem List    Diagnosis Date Noted    Onychomycosis 2025    Stroke-like symptoms 2025    Sepsis (HCC) 2025    Urinary retention 2025    Open wound bilateral 1st toes 2025    MSSA bacteremia 2025    Cellulitis of right foot 2025    CVA (cerebral vascular accident) (HCC) 2025    Impaired cognition 2022    History of transient ischemic attack (TIA) 2022    History of diabetes mellitus 2022      LOS (days): 6  Geometric Mean LOS (GMLOS) (days): 4.9  Days to GMLOS:-0.8     OBJECTIVE:  Risk of Unplanned Readmission Score: 11.35         Current admission status: Inpatient   Preferred Pharmacy:   PATIENT/FAMILY REPORTS NO PREFERRED PHARMACY  No address on file      Primary Care Provider: No primary care provider on file.    Primary Insurance: [employer] REP  Secondary Insurance:     DISCHARGE DETAILS:                   Other Referral/Resources/Interventions Provided:  Referral Comments: No accepting infusion d/t [facility] not accepting insurance. Expanded infusion request.

## 2025-07-11 NOTE — ASSESSMENT & PLAN NOTE
River Pedro is a 75 y.o. male admitted 7/5/2025 for right foot pain for the past few days.  Of note, patient has history of right foot cellulitis, admission at [facility] in January 2024.  Podiatry was consulted to evaluate the right foot pain.     Diagnostics:  7/6: Right foot X-Ray - no acute osseous abnormality   7/6: Left foot X-Ray - no acute osseous abnormality  7/8: Right foot MRI: pending    Initial lab work/cultures  WBC: 6.84 (8.61)       Plan:    Plan to continue IV antibiotics management per internal medicine and local wound care. Will continue to monitor the cellulitic changes on the right foot as it has been decreasing.  No podiatric surgical intervention needed at this time.  Patient is stable from podiatric standpoint, will continue to see weekly while patient is in house  Right foot MRI still is pending  Recommend local wound care on the left great toe consisting of Betadine paint, 2 x 2 gauze, wrap with Mendez. Wound care instructions placed.  Elevation on green foam wedges or pillows when non-ambulatory.  Rest of care per primary team.    Weightbearing status: Weightbearing as tolerated to bilateral lower extremities

## 2025-07-11 NOTE — CASE MANAGEMENT
Case Management Discharge Planning Note    Patient name River Pedro  Location [facility] 901/[facility] 901-01 MRN [id number]  : 1950 Date 2025       Current Admission Date: 2025  Current Admission Diagnosis:Cellulitis of right foot   Patient Active Problem List    Diagnosis Date Noted    Onychomycosis 2025    Stroke-like symptoms 2025    Sepsis (HCC) 2025    Urinary retention 2025    Open wound bilateral 1st toes 2025    MSSA bacteremia 2025    Cellulitis of right foot 2025    CVA (cerebral vascular accident) (HCC) 2025    Impaired cognition 2022    History of transient ischemic attack (TIA) 2022    History of diabetes mellitus 2022      LOS (days): 6  Geometric Mean LOS (GMLOS) (days): 4.9  Days to GMLOS:-1     OBJECTIVE:  Risk of Unplanned Readmission Score: 13.63         Current admission status: Inpatient   Preferred Pharmacy:   PATIENT/FAMILY REPORTS NO PREFERRED PHARMACY  No address on file      Primary Care Provider: No primary care provider on file.    Primary Insurance: [employer] REP  Secondary Insurance:     DISCHARGE DETAILS:                                          Other Referral/Resources/Interventions Provided:  Referral Comments: S/w ex-wife Emily in room & confirmed pt does not want STR & she is staying with him until & will be giving him iv abx for the 6 week course & will be the one being taught. She is asking for rw & commode to be ordered since pt declines STR. Requested via parachute. Aware [facility] reserved. Awaiting for infusion acceptance. [facility] can accept, but has co-pays. Need to discuss with pt & Emily. She is out of room at this time.

## 2025-07-11 NOTE — ASSESSMENT & PLAN NOTE
"This morning, nursing staff alerted our team that patient had left-sided facial droop, around the corner of his mouth.  While examining him, this was noted, but the rest of his neuroexam was benign.  When taking his morning aspirin, staff noticed that he started to choke.    Obtained chest x-ray.  No acute cardiopulmonary process. patient satting 94% on room air.  Has not required any supplemental oxygen    STAT CT head was ordered with the following findings: No hemorrhage or evidence of acute cortical infarct.  Chronic infarct in the left basal ganglia was noted.    Findings of brain MRI stated below in this note, showing new infarct \"Acute/recent infarct involving the right gangliocapsular region, with associated cytotoxic edema\"    Plan:  Neurology consulted yesterday afternoon recommended CTA Head/Neck   Patient's rope score is a 5, indicating that there is a 34% chance that a new stroke is due to a patent PFO.  He did not have bubble study on TTE, and some valves were poorly visualized.  Placed order for ANSON with bubble study to rule out PFO and IE as potential etiologies of his new stroke  Speech therapy saw the patient Wednesday and was recommending dysphagia diet, puréed solids and thin liquids.  Speech therapy to reevaluate patient today, as there was concern for thin liquids.  Placed patient on thick liquids   Continue aspirin and statin  "

## 2025-07-11 NOTE — ASSESSMENT & PLAN NOTE
Patient's blood cultures 2/2 for gram positive cocci in clusters. Escalated antibiotics to vancomycin     Plan:   ID consulted, appreciate recs:  Recommended TTE for possible IE, given positive blood culture for typical IE organism and fever  Echo showing ejection fraction of 60%, mild right atrial dilation, mild aortic valve regurgitation, no echocardiographic evidence of valvular vegetation affecting the mitral, aortic, or tricuspid valves.  Pulmonic valve not adequately visualized to assess for endocarditis. Overall, no significant changes noted compared to the prior study.  Also recommended 6 weeks of IV Abx tx  Pending final blood cultures  ANSON being done today to rule out infective endocarditis, given poor visualization of some valves on TTE and patient's new stroke.  He has risk factors for IE, including poor dentition, gram-positive bacteremia

## 2025-07-11 NOTE — TELEPHONE ENCOUNTER
STILL ADMITTED;7/5/2025 - present (6 days)  [facility]          HFU/ SL DESIRAE/ Small vessel lacunar infarct     ----- Message from Mike Evans DO sent at 7/11/2025  3:30 PM EDT -----  Regarding: HFU  River Pedro will need follow-up in in 6 weeks with neurovascular team for Small vessel lacunar infarct in 60 minute appointment. They will not require outpatient neurological testing

## 2025-07-11 NOTE — CASE MANAGEMENT
Case Management Discharge Planning Note    Patient name River Pedro  Location [facility]-01 MRN [id number]  : 1950 Date 2025       Current Admission Date: 2025  Current Admission Diagnosis:Cellulitis of right foot   Patient Active Problem List    Diagnosis Date Noted    Onychomycosis 2025    Stroke-like symptoms 2025    Sepsis (HCC) 2025    Urinary retention 2025    Open wound bilateral 1st toes 2025    MSSA bacteremia 2025    Cellulitis of right foot 2025    CVA (cerebral vascular accident) (HCC) 2025    Impaired cognition 2022    History of transient ischemic attack (TIA) 2022    History of diabetes mellitus 2022      LOS (days): 6  Geometric Mean LOS (GMLOS) (days): 4.9  Days to GMLOS:-0.8     OBJECTIVE:  Risk of Unplanned Readmission Score: 11.35         Current admission status: Inpatient   Preferred Pharmacy:   PATIENT/FAMILY REPORTS NO PREFERRED PHARMACY  No address on file      Primary Care Provider: No primary care provider on file.    Primary Insurance: [employer] REP  Secondary Insurance:     DISCHARGE DETAILS:                                Requested Home Health Care         Is the patient interested in HHC at discharge?: Yes  Home Health Discipline requested:: Nursing, Occupational Therapy, Physical Therapy  [facility] Agency Name:: [facility]  Home Health Follow-Up Provider:: PCP  Home Health Services Needed:: Administration of IV, IM or SC Medications, Evaluate Functional Status and Safety, Gait/ADL Training, Strengthening/Theraputic Exercises to Improve Function  Homebound Criteria Met:: Requires the Assistance of Another Person for Safe Ambulation or to Leave the Home  Supporting Clincal Findings:: Limited Endurance         Other Referral/Resources/Interventions Provided:  Referral Comments: [facility] [place] able to accept. Pt agreeable. Reserved in aidin & updated DCI. Message sent to SOD A. Pt not  medically cleared until likely mid next week. Has diagnostic testing tbd. DCP is home with ex-wife staying with pt & will be taught to give IV abx. Declines STR.                Need accepting infusion agency.    Need rx uploaded for iv abx when available.

## 2025-07-11 NOTE — PROGRESS NOTES
"Progress Note - Infectious Disease   Name: River Pedro 75 y.o. male I MRN: [id number]  Unit/Bed#: [facility] 901-01 I Date of Admission: 7/5/2025   Date of Service: 7/11/2025 I Hospital Day: 6    Assessment & Plan  Sepsis (HCC)  Sepsis 2/2 R foot cellulitis c/b MSSA bacteremia   Continue abx as below  Cellulitis of right foot  Presented to the ED with 4-day history of worsening R foot pain with associated erythema, edema, trouble with weightbearing, fever, chills, and decreased PO intake.   History of R foot cellulitis requiring hospitalization in Jan 2024. MRI that time was negative for osteomyelitis. Treated with IV Ancef. Plan was for OR debridement 1/18 however patient left AMA On 1/17. He was given 10-day course of Cefadroxil.    XR of both feet revealed no acute osseous abnormality.  WBC 15.9 >>6.84  Afebrile since 7/7  Improved in the size of erythema and edema  Cellulitis most likely from open wounds and onychomycosis +/- tinea pedis     Plan:  - Continue Cefazolin D7  - Elevation of feet while at rest   Open wound bilateral 1st toes  See \"cellulitis of right foot\"   MSSA bacteremia  Blood cultures collected on 7/5 both positive for MSSA on 7/7. However both from venous line, colonization/contamination vs true infection  Echo in March 2022 revealed EF 55%, NWM, G1DD. Mild to mod AR. Mild TR and mild MS. Mildly dilated aortic root and ascending aorta.  Source is most likely from the open wound of bilateral first toes.  Splinter hemorrhage vs melanonychia noted on left 5th digit   Systolic murmurs noted on exam    Possible infective endocarditis based on BCX2 and febrile    Echo: EF 60%, NWM, normal systolic and diastolic function. No echocardiographic evidence of valvular vegetation affecting mitral, aortic or tricuspid valves.     New blood cultures collected on 7/7 no growth at 72 hours    Plan:  - Continue Cefazolin 2g Q8H while pending MRI of R foot  - 6-week course from the first set of negative blood " "cultures through 8/18/25  History of diabetes mellitus  A1c 6.7 this admission, no change since Jan 2024  Managed by primary team  CVA (cerebral vascular accident) (HCC)  Hx of L basal ganglia infarct in 2012 and TIA in 2019. Unable to see in medical record if patient had residual deficits following diagnosis.  Patient has not been taking his ASA 81 mg since 2022  New left facial droop noted today with CT scan revealed no acute infarct. Speech difficulty noted on exam as well.   Positive neuro findings are left facial droop with mild speech difficulty.  MRI halley pending.  Managed by primary team  Onychomycosis  Noted on exam since arrival. Per family, patient has been having \"fungal infection\" for years.   Currently on Itraconazole, managed by primary team.       Antibiotics:  Cefazolin #7    Subjective   Patient reports no pain in his R foot, ROM improved. He was able to walk down the hallway. Otherwise, no fever, chills, cough, SOB, chest pain/discomfort, abdominal pain, nausea, and vomiting.      Objective :  Temp:  [97.7 °F (36.5 °C)-99 °F (37.2 °C)] 97.7 °F (36.5 °C)  HR:  [65-77] 65  BP: (149-157)/(77-80) 149/77  Resp:  [18] 18  SpO2:  [92 %-95 %] 95 %  O2 Device: None (Room air)    Physical Exam  Vitals and nursing note reviewed.   Constitutional:       General: He is not in acute distress.     Appearance: He is well-developed.   HENT:      Head: Normocephalic and atraumatic.     Eyes:      Conjunctiva/sclera: Conjunctivae normal.       Cardiovascular:      Rate and Rhythm: Normal rate and regular rhythm.      Heart sounds: Murmur (systolic) heard.   Pulmonary:      Effort: Pulmonary effort is normal. No respiratory distress.      Breath sounds: Normal breath sounds.   Abdominal:      Palpations: Abdomen is soft.      Tenderness: There is no abdominal tenderness.     Musculoskeletal:         General: No swelling.      Cervical back: Neck supple.      Right lower leg: Edema present.      Left lower leg: No " edema.      Comments: Improved in the size of erythema and edema in right foot. Erythema down to forefoot.        Skin:     General: Skin is warm and dry.      Capillary Refill: Capillary refill takes less than 2 seconds.      Findings: Erythema present.     Neurological:      Mental Status: He is alert and oriented to person, place, and time.      Comments: Slight L facial droop. Speech difficulty improved.   CN 2-12 grossly intact  Muscle strength 5/5 in all extremities   Psychiatric:         Mood and Affect: Mood normal.             Lab Results: I have reviewed the following results:  Results from last 7 days   Lab Units 07/11/25  0619 07/10/25  1048 07/09/25  1106   WBC Thousand/uL 6.84 8.61 11.09*   HEMOGLOBIN g/dL 11.0* 11.8* 12.6   PLATELETS Thousands/uL 243 232 193     Results from last 7 days   Lab Units 07/11/25  0619 07/10/25  1048 07/09/25  1106 07/08/25  0808 07/06/25  0540 07/05/25  1453   SODIUM mmol/L 135 134* 133* 131*   < > 132*   POTASSIUM mmol/L 3.4* 3.0* 3.4* 3.8   < > 3.5   CHLORIDE mmol/L 106 104 103 101   < > 100   CO2 mmol/L 23 25 20* 21   < > 22   BUN mg/dL 29* 34* 34* 42*   < > 42*   CREATININE mg/dL 1.02 1.13 1.25 1.38*   < > 1.95*   EGFR ml/min/1.73sq m 72 63 56 50   < > 32   CALCIUM mg/dL 7.9* 8.5 8.6 8.3*   < > 8.7   AST U/L  --   --   --  24  --  55*   ALT U/L  --   --   --  12  --  30   ALK PHOS U/L  --   --   --  78  --  69   ALBUMIN g/dL  --   --   --  3.1*  --  3.7    < > = values in this interval not displayed.     Results from last 7 days   Lab Units 07/07/25  1359 07/05/25  1453   BLOOD CULTURE  No Growth at 72 hrs.  No Growth at 72 hrs. Staphylococcus aureus*  Staphylococcus aureus*   GRAM STAIN RESULT   --  Gram positive cocci in clusters*  Gram positive cocci in clusters*     Results from last 7 days   Lab Units 07/05/25  1453   PROCALCITONIN ng/ml 4.06*

## 2025-07-11 NOTE — PROGRESS NOTES
Progress Note - Internal Medicine   Name: River Pedro 75 y.o. male I MRN: [id number]  Unit/Bed#: [facility] 901-01 I Date of Admission: 7/5/2025   Date of Service: 7/11/2025 I Hospital Day: 6    Assessment & Plan  Sepsis (HCC)  See cellulitis of R foot below  Cellulitis of right foot  R foot pain x4 days. Inability to bear weight. Limited ROM. Endorses fatigue, fevers, chills, loss of appetite. Febrile on admissoin to 101.5. ED Workup significant for WBC of 15.99, stable Hb, mild thrombocytopenia, mild hyponatremia, NORMA w Cr of 1.95 (baseline of 1), glucose of 191, AST elevated at 55, T bili 1.48. Lactic 1.5, procal 4.06, INR 1.34. Bcx drawn, and patient started on Ancef    Of note, patient has hx of R foot cellulitis, admission at [facility] in Jan 2024. Received Abx but left AMA before recommended amputation vs. Debridement by care team there. Has questionable hx of diabetes. Patient denies any hx of diabetes.    XR of b/l feet done this admission.  No acute osseous abnormalities were visualized on the bilateral x-rays of the feet    Plan:  Continue Abx Tx w Ancef, as patient's Bcx have come back as S. Aureus, favor MSSA. This is Day 5 of IV Abx  B/l feet MRI being done today  Patient had very large, watery bowel movement Wednesday evening.  Should he continue to have large, loose stools, we will order C. difficile stool study, placed patient on contact precautions.  This is likely in the setting of constipation for many days.  We will hold his bowel regimen at this time  Continue F/u Bcx for susceptibilities.  No growth to date on second set of blood cultures  Control glucose between 140-180 to promote healing  Encourage patient to eat/drink throughout day to promote healing  Podiatry consulted:  On 07/06, podiatry debrided distal hallux b/l. Open wound was found on distal L hallux  Trend WBC, fever  Given patient's prior admission and him leaving AMA, continue to explain importance of receiving Abx therapy  Continue work  "with PT and OT    MSSA bacteremia  Patient's blood cultures 2/2 for gram positive cocci in clusters. Escalated antibiotics to vancomycin     Plan:   ID consulted, appreciate recs:  Recommended TTE for possible IE, given positive blood culture for typical IE organism and fever  Echo showing ejection fraction of 60%, mild right atrial dilation, mild aortic valve regurgitation, no echocardiographic evidence of valvular vegetation affecting the mitral, aortic, or tricuspid valves.  Pulmonic valve not adequately visualized to assess for endocarditis. Overall, no significant changes noted compared to the prior study.  Also recommended 6 weeks of IV Abx tx  Pending final blood cultures  ANSON being done today to rule out infective endocarditis, given poor visualization of some valves on TTE and patient's new stroke.  He has risk factors for IE, including poor dentition, gram-positive bacteremia  Stroke-like symptoms  This morning, nursing staff alerted our team that patient had left-sided facial droop, around the corner of his mouth.  While examining him, this was noted, but the rest of his neuroexam was benign.  When taking his morning aspirin, staff noticed that he started to choke.    Obtained chest x-ray.  No acute cardiopulmonary process. patient satting 94% on room air.  Has not required any supplemental oxygen    STAT CT head was ordered with the following findings: No hemorrhage or evidence of acute cortical infarct.  Chronic infarct in the left basal ganglia was noted.    Findings of brain MRI stated below in this note, showing new infarct \"Acute/recent infarct involving the right gangliocapsular region, with associated cytotoxic edema\"    Plan:  Neurology consulted yesterday afternoon recommended CTA Head/Neck   Patient's rope score is a 5, indicating that there is a 34% chance that a new stroke is due to a patent PFO.  He did not have bubble study on TTE, and some valves were poorly visualized.  Placed order for " ANSON with bubble study to rule out PFO and IE as potential etiologies of his new stroke  Speech therapy saw the patient Wednesday and was recommending dysphagia diet, puréed solids and thin liquids.  Speech therapy to reevaluate patient today, as there was concern for thin liquids.  Placed patient on thick liquids   Continue aspirin and statin  Urinary retention  Urology consulted for retention and re-colored urine in Hanks bag    Plan:  Continue to monitor for further hematuria. Favor traumatic hanks insertion  Continue flomax 0.4 mg daily  Avoid medications that cause urinary retention such as antihistamines and anticholinergics  Bowel regimen: Patient now has scheduled miralax.  Will follow-up to determine if patient had bowel movement tomorrow during examination  Continue hanks until patient is ambulating and able to use bathroom. Will plan for void trial at this time  Urinary retention protocol, patient may require urology follow up outpatient  40 mEq IV potassium chloride ordered for patient given recent level of 3.4  History of diabetes mellitus  Remote hx of T2DM. Patient states that he does not take any medications and that he is not diabetics, although this is mentioned in prior documentation  Hemoglobin 6.7    Plan:  Algorithm 2 initiated   After patient resumes diet will consider algorithm 3   CVA (cerebral vascular accident) (HCC)  Hx of L basal ganglia infarct in 2012 and TIA in 2019. Unable to see in medical record if patient had residual deficits following diagnosis. Only + neuro findings are mild word finding difficulty    Plan:  Continue ASA 81 mg daily. Patient was not taking this as an outpatient, although it is listed in home meds  Continue Lipitor 20 mg daily  Open wound bilateral 1st toes  Podiatry consulted, appreciate recs  Onychomycosis  My exam suggest this patient has onychomycosis on both the right and left feet    Plan:  itraconazole 100 mg twice daily.  Patient should be on treatment  for onychomycosis for 12 consecutive weeks.Given  interaction with atorvastatin, will reduce dose from 40 mg daily to 20 mg daily spoke with pharmacy about this      Team: SOD TEAM A    Subjective   Patient seen and examined. No acute events overnight. Patient has no concerns or unmet needs, he feels a bit frustrated and feels ready to leave the hospital. He endorses increased energy and strength today compared to yesterday. He also states he is ambulate easier today than previous days. He also endorses difficulty initiating swallowing.     Objective :  Temp:  [97.2 °F (36.2 °C)-99 °F (37.2 °C)] 98.2 °F (36.8 °C)  HR:  [63-77] 77  BP: (149-168)/(79-81) 149/80  Resp:  [18-20] 18  SpO2:  [92 %] 92 %  O2 Device: None (Room air)    I/O         07/09 0701  07/10 0700 07/10 0701  07/11 0700    P.O.  120    IV Piggyback 50 150    Total Intake(mL/kg) 50 (0.6) 270 (3.3)    Urine (mL/kg/hr) 500 (0.3) 675 (0.3)    Stool  0    Total Output 500 675    Net -450 -405          Unmeasured Stool Occurrence  1 x          Weights:   IBW (Ideal Body Weight): 75.3 kg    Body mass index is 25.52 kg/m².  Weight (last 2 days)       None            Physical Exam  Vitals and nursing note reviewed.   Constitutional:       General: He is not in acute distress.     Appearance: He is not ill-appearing.   HENT:      Head: Normocephalic.      Mouth/Throat:      Pharynx: Oropharynx is clear.     Eyes:      Extraocular Movements: Extraocular movements intact.      Conjunctiva/sclera: Conjunctivae normal.      Pupils: Pupils are equal, round, and reactive to light.       Cardiovascular:      Rate and Rhythm: Normal rate and regular rhythm.      Pulses: Normal pulses.      Heart sounds: Normal heart sounds. No murmur heard.     No friction rub. No gallop.   Pulmonary:      Effort: Pulmonary effort is normal.      Breath sounds: Normal breath sounds.     Skin:     General: Skin is warm and dry.     Neurological:      Mental Status: He is alert and  "oriented to person, place, and time.      Cranial Nerves: Cranial nerve deficit and dysarthria present. Facial asymmetry: slight weakness of left SCM and left trapezius.     Motor: Weakness (left side slightly weaker than right with CN XI) present.      Comments: slight weakness on left SCM and left trapezius, bilateral hearing deficits   Psychiatric:         Mood and Affect: Mood normal.           Lab Results: I have reviewed the following results:  Recent Labs     07/08/25  0808 07/09/25  1106 07/10/25  1048   WBC 10.78*   < > 8.61   HGB 12.4   < > 11.8*   HCT 36.6   < > 34.7*   *   < > 232   SODIUM 131*   < > 134*   K 3.8   < > 3.0*      < > 104   CO2 21   < > 25   BUN 42*   < > 34*   CREATININE 1.38*   < > 1.13   GLUC 166*   < > 260*   AST 24  --   --    ALT 12  --   --    ALB 3.1*  --   --    TBILI 0.84  --   --    ALKPHOS 78  --   --     < > = values in this interval not displayed.       Imaging Results Review: I personally reviewed the following image studies in PACS and associated radiology reports: XR L and R foot. My interpretation of the radiology images/reports is: osteo lesion of R great toe on Xray, suspicious for OM. MRI ordered, pending official read.  MRI of brain done on 7/10 showing \"Acute/recent infarct involving the right gangliocapsular region, with associated cytotoxic edema. Old infarct involving the left basal ganglia and periventricular left frontal lobe white matter. Old lacunar infarcts involving the bilateral cerebellar hemispheres. Mild chronic white matter microangiopathic changes involving both cerebral hemispheres\"      Other Study Results Review: EKG was personally reviewed and my interpretation is: Personally Reviewed. NSR w HR of 60. Slight QRS widening, LVH ..    Currently Ordered Meds:   Current Facility-Administered Medications:     acetaminophen (TYLENOL) tablet 975 mg, Q8H PRN    amLODIPine (NORVASC) tablet 5 mg, Daily    aspirin chewable tablet 81 mg, Daily    " atorvastatin (LIPITOR) tablet 40 mg, Daily With Dinner    ceFAZolin (ANCEF) IVPB (premix in dextrose) 2,000 mg 50 mL, Q8H, Last Rate: 2,000 mg (07/11/25 0509)    heparin (porcine) subcutaneous injection 5,000 Units, Q8H JOSÉ MIGUEL    itraconazole (SPORANOX) oral solution 100 mg, Daily    melatonin tablet 3 mg, HS    ondansetron (ZOFRAN) injection 4 mg, Q6H PRN    senna (SENOKOT) tablet 8.6 mg, HS PRN    tamsulosin (FLOMAX) capsule 0.4 mg, Daily With Dinner    VTE Pharmacologic Prophylaxis: VTE covered by:  heparin (porcine), Subcutaneous, 5,000 Units at 07/11/25 0508      VTE Mechanical Prophylaxis: reason for no mechanical VTE prophylaxis due to right foot cellulitis    Administrative Statements   I have spent a total time of 45 minutes in caring for this patient on the day of the visit/encounter including Importance of tx compliance, Impressions, Documenting in the medical record, Obtaining or reviewing history  , and Communicating with other healthcare professionals .    Portions of the record may have been created with voice recognition software.      Kashmir Carlin, MS4  [facility]

## 2025-07-11 NOTE — ASSESSMENT & PLAN NOTE
Presented to the ED with 4-day history of worsening R foot pain with associated erythema, edema, trouble with weightbearing, fever, chills, and decreased PO intake.   History of R foot cellulitis requiring hospitalization in Jan 2024. MRI that time was negative for osteomyelitis. Treated with IV Ancef. Plan was for OR debridement 1/18 however patient left AMA On 1/17. He was given 10-day course of Cefadroxil.    XR of both feet revealed no acute osseous abnormality.  WBC 15.9 >>6.84  Afebrile since 7/7  Improved in the size of erythema and edema  Cellulitis most likely from open wounds and onychomycosis +/- tinea pedis     Plan:  - Continue Cefazolin D7  - Elevation of feet while at rest

## 2025-07-11 NOTE — ASSESSMENT & PLAN NOTE
Remote hx of T2DM. Patient states that he does not take any medications and that he is not diabetics, although this is mentioned in prior documentation  Hemoglobin 6.7    Plan:  Algorithm 2 initiated   After patient resumes diet will consider algorithm 3

## 2025-07-11 NOTE — ANESTHESIA POSTPROCEDURE EVALUATION
Post-Op Assessment Note            No anethesia notable event occurred.            Last Filed PACU Vitals:  Vitals Value Taken Time   Temp     Pulse 69 07/11/25 15:41   BP     Resp     SpO2 93 % 07/11/25 15:41   Vitals shown include unfiled device data.

## 2025-07-11 NOTE — ASSESSMENT & PLAN NOTE
Urology consulted for retention and re-colored urine in Hanks bag    Plan:  Continue to monitor for further hematuria. Favor traumatic hanks insertion  Continue flomax 0.4 mg daily  Avoid medications that cause urinary retention such as antihistamines and anticholinergics  Bowel regimen: Patient now has scheduled miralax.  Will follow-up to determine if patient had bowel movement tomorrow during examination  Continue hanks until patient is ambulating and able to use bathroom. Will plan for void trial at this time  Urinary retention protocol, patient may require urology follow up outpatient  40 mEq IV potassium chloride ordered for patient given recent level of 3.4

## 2025-07-11 NOTE — ASSESSMENT & PLAN NOTE
River Pedro is a 75 y.o. male admitted 7/5/2025 for right foot pain for the past few days.  Of note, patient has history of right foot cellulitis, admission at [facility] in January 2024.  Podiatry was consulted to evaluate the right foot pain.     Diagnostics:  7/6: Right foot X-Ray - no acute osseous abnormality   7/6: Left foot X-Ray - no acute osseous abnormality  7/8: Right foot MRI: pending    Initial lab work/cultures  WBC: 6.84 (8.61)       Plan:    Plan to continue IV antibiotics management per internal medicine and local wound care. Will continue to monitor the cellulitic changes on the right foot as it has been decreasing.  No podiatric surgical intervention needed at this time.  Patient is stable from podiatric standpoint, will continue to see weekly while patient is in house  Right foot MRI still is pending  Recommend local wound care on the left great toe consisting of Betadine paint, 2 x 2 gauze, wrap with Mendez. Wound care instructions placed.  Elevation on green foam wedges or pillows when non-ambulatory.  Rest of care per primary team.    Weightbearing status: Weightbearing as tolerated to bilateral lower extremities

## 2025-07-11 NOTE — PROGRESS NOTES
Progress Note - Neurology   Name: River Pedro 75 y.o. male I MRN: [id number]  Unit/Bed#: [facility] 901-01 I Date of Admission: 7/5/2025   Date of Service: 7/11/2025 I Hospital Day: 6    Assessment & Plan  Stroke-like symptoms  75 y.o male with PMHx of CVA of the L basal ganglia in 2012 with residual word finding difficulty, TIA in 2019, T2DM, and HTN who we were consulted for stroke-like symptoms. He was noted to have left sided facial droop, slurred speech, and dysphagia. He has some word finding difficulties at baseline following previous CVA.     Relevant PMH:  Stroke Risk Factors - Previous CVA, previous TIA, HTN  Anticoagulation/Antiplatelets - Aspirin    Workup:  NC CTH: No intracranial hemorrhage, chronic left basal ganglia infarction  MRI Brain w/o contrast: Acute/recent infarct involving the right gangliocapsular region, with associated cytotoxic edema. Old infarct involving the left basal ganglia and periventricular left frontal lobe white matter. Old lacunar infarcts involving the bilateral cerebellar hemispheres.   Echo: EF 60% RoPE score 5, 34% chance stroke is from PFO.  ANSON: Pending  Labs: Hemoglobin A1C 6.7% (07/05/25) LDL 66 (07/05/25)    Impression:  Patient's left facial droop, dysphagia, and mild left sided weakness are in the setting of noncompliance with home aspirin and statin and now with MRI findings significant for a right gangliocapsular region infarct with associated cytotoxic edema. Given the subcortical nature of the infarct suspect that etiology is small vessel disease vs less likely a cardioembolic source of any etiology including secondary to endocarditis as this typically causes cortical infarcts in one or multiple regions, regardless no indication for AC at this time & already on abx.    Plan:  Discussed plan with neurology attending, Dr. Flores  Frequent Neuro Checks - STAT CTH for change in exam  BP Goals: Pursue normotension, >24 hours since LKW  AP/AC: Continue home ASA 81 mg  daily.  High Intensity Statin: Continue Atorvastatin 40 mg QHS   Imaging/Diagnostics: CTA head / neck  Euglycemia (-180) and Euthermia   DVT PPx: Heparin, SCDs  PT/OT/ST/PMR, evaluated w/ level 2 recs  Stroke education and counseling  Rest of other care per primary team appreciated     River Pedro will need follow up in in 6 weeks with neurovascular attending or advance practitioner. He will not require outpatient neurological testing. Msg sent.      Subjective   NAEOVN. Patient evaluated resting comfortably in bed this morning. He continues to have left sided facial droop visible on exam. He believes his speech has improved mildly since yesterday but continues to endorse dysphagia which was not present prior to admission. Patient ambulated from bed to the bathroom yesterday w/o noticeable weakness or side preference. We discussed possible etiologies of his stroke and highlighted the importance of continued medication compliance.    Review of Systems   Neurological:  Positive for facial asymmetry and weakness. Negative for dizziness, tremors, seizures, syncope, speech difficulty, light-headedness, numbness and headaches.     Negative for migraine headaches, paralysis, numbness or tingling of hands, numbness or tingling of feet, involuntary movements, tremor, paresis, spasticity    Objective :  Temp:  [97.7 °F (36.5 °C)-99 °F (37.2 °C)] 97.7 °F (36.5 °C)  HR:  [65-77] 65  BP: (149-157)/(77-80) 149/77  Resp:  [18] 18  SpO2:  [92 %-95 %] 95 %  O2 Device: None (Room air)    Physical Exam  Vitals reviewed.   Constitutional:       General: He is not in acute distress.     Appearance: He is not ill-appearing, toxic-appearing or diaphoretic.   HENT:      Head: Normocephalic and atraumatic.      Right Ear: External ear normal.      Left Ear: External ear normal.      Nose: Nose normal.      Mouth/Throat:      Pharynx: Oropharynx is clear.     Eyes:      General: Lids are normal. No scleral icterus.        Right eye:  No discharge.         Left eye: No discharge.      Extraocular Movements: Extraocular movements intact.      Conjunctiva/sclera: Conjunctivae normal.      Pupils: Pupils are equal, round, and reactive to light.       Neurological:      Cranial Nerves: No dysarthria.      Deep Tendon Reflexes:      Reflex Scores:       Bicep reflexes are 2+ on the right side and 2+ on the left side.       Brachioradialis reflexes are 2+ on the right side and 2+ on the left side.       Patellar reflexes are 2+ on the right side and 2+ on the left side.       Achilles reflexes are 2+ on the right side and 2+ on the left side.    Neurological Exam  Mental Status  Awake, alert and oriented to person, place and time. no dysarthria present. Able to name objects and repeat. Follows three-step commands.    Cranial Nerves  CN II: Visual acuity is normal. Visual fields full to confrontation.  CN III, IV, VI: Extraocular movements intact bilaterally. Normal lids and orbits bilaterally. Pupils equal round and reactive to light bilaterally.  CN V: Facial sensation is normal.  CN VII:  Right: There is no facial weakness.  Left: There is central facial weakness.  CN VIII: Hearing is normal.  CN IX, X: Palate elevates symmetrically. Normal gag reflex.  CN XI:  Right: Sternocleidomastoid strength is normal. Trapezius strength is normal.  Left: Sternocleidomastoid strength is normal. Trapezius strength is normal.  CN XII: Tongue midline without atrophy or fasciculations.    Motor  Normal muscle bulk throughout. No fasciculations present. Normal muscle tone. No abnormal involuntary movements.                                               Right                     Left   Shoulder abduction               5                          5  Elbow flexion                         5                          5  Elbow extension                    5                          5  Finger abduction                    5                          5  Hip flexion                               5                          5  Knee extension                      5                          5  Toe flexion                             5                          5  Toe extension                        5                          5    Sensory  Temperature is normal in upper and lower extremities. Vibration is normal in upper and lower extremities.     Reflexes                                            Right                      Left  Brachioradialis                    2+                         2+  Biceps                                 2+                         2+  Patellar                                2+                         2+  Achilles                                2+                         2+    Right pathological reflexes: Ankle clonus absent.  Left pathological reflexes: Ankle clonus absent.    Coordination  Right: Finger-to-nose normal. Rapid alternating movement normal.Left: Finger-to-nose normal. Rapid alternating movement normal.    Gait    Deferred.        Lab Results: I have reviewed the following results:  Imaging Results Review: I reviewed radiology reports from this admission including: CT head and MRI brain.  Other Study Results Review: EKG was reviewed.     VTE Pharmacologic Prophylaxis: VTE covered by:  heparin (porcine), Subcutaneous, 5,000 Units at 07/11/25 0501       Administrative Statements   I have spent a total time of 40 minutes in caring for this patient on the day of the visit/encounter including Diagnostic results, Risks and benefits of tx options, Patient and family education, Impressions, Counseling / Coordination of care, Documenting in the medical record, Reviewing/placing orders in the medical record (including tests, medications, and/or procedures), Obtaining or reviewing history  , and Communicating with other healthcare professionals .

## 2025-07-11 NOTE — ANESTHESIA POSTPROCEDURE EVALUATION
Post-Op Assessment Note    CV Status:  Stable    Pain management: adequate       Mental Status:  Alert and awake   Hydration Status:  Stable   PONV Controlled:  None     Post Op Vitals Reviewed: Yes    No anethesia notable event occurred.    Staff: Anesthesiologist, CRNA           Last Filed PACU Vitals:  Vitals Value Taken Time   Temp     Pulse 57    /59    Resp 16    SpO2 96

## 2025-07-11 NOTE — ASSESSMENT & PLAN NOTE
75 y.o male with PMHx of CVA of the L basal ganglia in 2012 with residual word finding difficulty, TIA in 2019, T2DM, and HTN who we were consulted for stroke-like symptoms. He was noted to have left sided facial droop, slurred speech, and dysphagia. He has some word finding difficulties at baseline following previous CVA.     Relevant PMH:  Stroke Risk Factors - Previous CVA, previous TIA, HTN  Anticoagulation/Antiplatelets - Aspirin    Workup:  NC CTH: No intracranial hemorrhage, chronic left basal ganglia infarction  MRI Brain w/o contrast: Acute/recent infarct involving the right gangliocapsular region, with associated cytotoxic edema. Old infarct involving the left basal ganglia and periventricular left frontal lobe white matter. Old lacunar infarcts involving the bilateral cerebellar hemispheres.   Echo: EF 60% RoPE score 5, 34% chance stroke is from PFO.  ANSON: Pending  Labs: Hemoglobin A1C 6.7% (07/05/25) LDL 66 (07/05/25)    Impression:  Patient's left facial droop, dysphagia, and mild left sided weakness are in the setting of noncompliance with home aspirin and statin and now with MRI findings significant for a right gangliocapsular region infarct with associated cytotoxic edema. Given the subcortical nature of the infarct suspect that etiology is small vessel disease vs less likely a cardioembolic source of any etiology including secondary to endocarditis as this typically causes cortical infarcts in one or multiple regions, regardless no indication for AC at this time & already on abx.    Plan:  Discussed plan with neurology attending, Dr. Flores  Frequent Neuro Checks - STAT CTH for change in exam  BP Goals: Pursue normotension, >24 hours since LKW  AP/AC: Continue home ASA 81 mg daily.  High Intensity Statin: Continue Atorvastatin 40 mg QHS   Imaging/Diagnostics: CTA head / neck  Euglycemia (-180) and Euthermia   DVT PPx: Heparin, SCDs  PT/OT/ST/PMR, evaluated w/ level 2 recs  Stroke education  and counseling  Rest of other care per primary team appreciated

## 2025-07-11 NOTE — ANESTHESIA PREPROCEDURE EVALUATION
Procedure:  ANSON    Relevant Problems   NEURO/PSYCH   (+) CVA (cerebral vascular accident) ([facility])      Consent obtained from Hugh Care proxy , pt experiencing difficulty with word finding and difficulty with answering questions.   Physical Exam    Airway     Mallampati score: II  TM Distance: >3 FB  Neck ROM: full  Mouth opening: >= 4 cm      Cardiovascular      Dental   No notable dental hx     Pulmonary      Neurological      Other Findings        Anesthesia Plan  ASA Score- 3     Anesthesia Type- IV sedation with anesthesia with ASA Monitors.         Additional Monitors:     Airway Plan: natural airway.           Plan Factors-    Chart reviewed.    Patient summary reviewed.                  Induction- intravenous.    Postoperative Plan- .   Monitoring Plan - Monitoring plan - standard ASA monitoring      Perioperative Resuscitation Plan - Level 1 - Full Code.       Informed Consent- Anesthetic plan and risks discussed with patient and healthcare power of .  I personally reviewed this patient with the CRNA. Discussed and agreed on the Anesthesia Plan with the CRNA..      NPO Status:  Vitals Value Taken Time   Date of last liquid 07/10/25 07/11/25 10:28   Time of last liquid 2359 07/11/25 10:28   Date of last solid 07/10/25 07/11/25 10:28   Time of last solid 2359 07/11/25 10:28

## 2025-07-11 NOTE — PROGRESS NOTES
Progress Note - Podiatry   Name: River Pedro 75 y.o. male I MRN: [id number]  [facility] I Date of Admission: 7/5/2025   Date of Service: 7/11/2025 I Hospital Day: 6    Assessment & Plan  Cellulitis of right foot  Open wound bilateral 1st toes  River Pedro is a 75 y.o. male admitted 7/5/2025 for right foot pain for the past few days.  Of note, patient has history of right foot cellulitis, admission at [facility] in January 2024.  Podiatry was consulted to evaluate the right foot pain.     Diagnostics:  7/6: Right foot X-Ray - no acute osseous abnormality   7/6: Left foot X-Ray - no acute osseous abnormality  7/8: Right foot MRI: pending    Initial lab work/cultures  WBC: 6.84 (8.61)       Plan:    Plan to continue IV antibiotics management per internal medicine and local wound care. Will continue to monitor the cellulitic changes on the right foot as it has been decreasing.  No podiatric surgical intervention needed at this time.  Patient is stable from podiatric standpoint, will continue to see weekly while patient is in house  Right foot MRI still is pending  Recommend local wound care on the left great toe consisting of Betadine paint, 2 x 2 gauze, wrap with Mendez. Wound care instructions placed.  Elevation on green foam wedges or pillows when non-ambulatory.  Rest of care per primary team.    Weightbearing status: Weightbearing as tolerated to bilateral lower extremities    History of diabetes mellitus  Barrier for wound healing.  Management per primary team.  CVA (cerebral vascular accident) (HCC)  Urinary retention  Sepsis ([facility])  MSSA bacteremia  Stroke-like symptoms  Management per primary team  Onychomycosis      24 Hour Events : None  Subjective :   The patient was seen, evaluated, and assessed at bedside today. The patient was awake, alert, and in no acute distress. No acute events overnight. The patient reports pain to the right foot when pressure is applied. He notes that the pain has been  decreasing since his admission. Patient denies N/V/F/chills/SOB/CP.      Objective :  Temp:  [97.7 °F (36.5 °C)-99 °F (37.2 °C)] 98.3 °F (36.8 °C)  HR:  [65-77] 65  BP: (148-172)/(73-88) 148/73  Resp:  [18] 18  SpO2:  [92 %-95 %] 95 %  O2 Device: None (Room air)    Physical Exam  Physical Exam:     General:  Alert, cooperative, and in no distress.  Lower extremity exam:  Cardiovascular status at baseline.  Neurological status at baseline.  Musculoskeletal status at baseline. No calf tenderness noted.     Musculoskeletal: pain on palpation of right dorsal foot, diffusely    Lower extremity wound(s) as noted below:  Right and left distal hallux wounds are stable, no drainage nor purulence and erythema was noted from them. Both are closed. No signs of local infection noted.     Wound #: 1  Location: left distal hallux  Length .2cm: Width .2cm: Depth 0cm:   Deepest Tissue Noted in Base: subcutaneous  Probe to Bone: No  Peripheral Skin Description: Attached  Granulation: 100% Fibrotic Tissue: 0% Necrotic Tissue: 0%   Drainage Amount: None  Signs of Infection: none        Clinical Images 07/11/25:                    Additional Data:     Labs:    Results from last 7 days   Lab Units 07/11/25  0619   WBC Thousand/uL 6.84   HEMOGLOBIN g/dL 11.0*   HEMATOCRIT % 31.9*   PLATELETS Thousands/uL 243   SEGS PCT % 75   LYMPHO PCT % 9*   MONO PCT % 12   EOS PCT % 2     Results from last 7 days   Lab Units 07/11/25  0619 07/09/25  1106 07/08/25  0808   POTASSIUM mmol/L 3.4*   < > 3.8   CHLORIDE mmol/L 106   < > 101   CO2 mmol/L 23   < > 21   BUN mg/dL 29*   < > 42*   CREATININE mg/dL 1.02   < > 1.38*   CALCIUM mg/dL 7.9*   < > 8.3*   ALK PHOS U/L  --   --  78   ALT U/L  --   --  12   AST U/L  --   --  24    < > = values in this interval not displayed.     Results from last 7 days   Lab Units 07/05/25  1453   INR  1.34*       * I Have Reviewed All Lab Data Listed Above.    Recent Cultures (last 7 days):     Results from last 7 days    Lab Units 07/07/25  1359 07/05/25  1453   BLOOD CULTURE  No Growth at 72 hrs.  No Growth at 72 hrs. Staphylococcus aureus*  Staphylococcus aureus*   GRAM STAIN RESULT   --  Gram positive cocci in clusters*  Gram positive cocci in clusters*           Imaging: I have personally reviewed pertinent films in PACS  EKG, Pathology, and Other Studies: I have personally reviewed pertinent reports.      VTE Pharmacologic Prophylaxis: Heparin  VTE Mechanical Prophylaxis: sequential compression device

## 2025-07-11 NOTE — ASSESSMENT & PLAN NOTE
Blood cultures collected on 7/5 both positive for MSSA on 7/7. However both from venous line, colonization/contamination vs true infection  Echo in March 2022 revealed EF 55%, NWM, G1DD. Mild to mod AR. Mild TR and mild NY. Mildly dilated aortic root and ascending aorta.  Source is most likely from the open wound of bilateral first toes.  Splinter hemorrhage vs melanonychia noted on left 5th digit   Systolic murmurs noted on exam    Possible infective endocarditis based on BCX2 and febrile    Echo: EF 60%, NWM, normal systolic and diastolic function. No echocardiographic evidence of valvular vegetation affecting mitral, aortic or tricuspid valves.     New blood cultures collected on 7/7 no growth at 72 hours    Plan:  - Continue Cefazolin 2g Q8H while pending MRI of R foot  - 6-week course from the first set of negative blood cultures through 8/18/25

## 2025-07-11 NOTE — ASSESSMENT & PLAN NOTE
R foot pain x4 days. Inability to bear weight. Limited ROM. Endorses fatigue, fevers, chills, loss of appetite. Febrile on admissoin to 101.5. ED Workup significant for WBC of 15.99, stable Hb, mild thrombocytopenia, mild hyponatremia, NORMA w Cr of 1.95 (baseline of 1), glucose of 191, AST elevated at 55, T bili 1.48. Lactic 1.5, procal 4.06, INR 1.34. Bcx drawn, and patient started on Ancef    Of note, patient has hx of R foot cellulitis, admission at [facility] in Jan 2024. Received Abx but left AMA before recommended amputation vs. Debridement by care team there. Has questionable hx of diabetes. Patient denies any hx of diabetes.    XR of b/l feet done this admission.  No acute osseous abnormalities were visualized on the bilateral x-rays of the feet    Plan:  Continue Abx Tx w Ancef, as patient's Bcx have come back as S. Aureus, favor MSSA. This is Day 5 of IV Abx  B/l feet MRI being done today  Patient had very large, watery bowel movement Wednesday evening.  Should he continue to have large, loose stools, we will order C. difficile stool study, placed patient on contact precautions.  This is likely in the setting of constipation for many days.  We will hold his bowel regimen at this time  Continue F/u Bcx for susceptibilities.  No growth to date on second set of blood cultures  Control glucose between 140-180 to promote healing  Encourage patient to eat/drink throughout day to promote healing  Podiatry consulted:  On 07/06, podiatry debrided distal hallux b/l. Open wound was found on distal L hallux  Trend WBC, fever  Given patient's prior admission and him leaving AMA, continue to explain importance of receiving Abx therapy  Continue work with PT and OT

## 2025-07-11 NOTE — PLAN OF CARE
Problem: PAIN - ADULT  Goal: Verbalizes/displays adequate comfort level or baseline comfort level  Description: Interventions:  - Encourage patient to monitor pain and request assistance  - Assess pain using appropriate pain scale  - Administer analgesics as ordered based on type and severity of pain and evaluate response  - Implement non-pharmacological measures as appropriate and evaluate response  - Consider cultural and social influences on pain and pain management  - Notify physician/advanced practitioner if interventions unsuccessful or patient reports new pain  - Educate patient/family on pain management process including their role and importance of  reporting pain   - Provide non-pharmacologic/complimentary pain relief interventions  Outcome: Progressing     Problem: INFECTION - ADULT  Goal: Absence or prevention of progression during hospitalization  Description: INTERVENTIONS:  - Assess and monitor for signs and symptoms of infection  - Monitor lab/diagnostic results  - Monitor all insertion sites, i.e. indwelling lines, tubes, and drains  - Monitor endotracheal if appropriate and nasal secretions for changes in amount and color  - Dawson appropriate cooling/warming therapies per order  - Administer medications as ordered  - Instruct and encourage patient and family to use good hand hygiene technique  - Identify and instruct in appropriate isolation precautions for identified infection/condition  Outcome: Progressing  Goal: Absence of fever/infection during neutropenic period  Description: INTERVENTIONS:  - Monitor WBC  - Perform strict hand hygiene  - Limit to healthy visitors only  - No plants, dried, fresh or silk flowers with hobbs in patient room  Outcome: Progressing     Problem: SAFETY ADULT  Goal: Patient will remain free of falls  Description: INTERVENTIONS:  - Educate patient/family on patient safety including physical limitations  - Instruct patient to call for assistance with activity   -  Consider consulting OT/PT to assist with strengthening/mobility based on AM PAC & JH-HLM score  - Consult OT/PT to assist with strengthening/mobility   - Keep Call bell within reach  - Keep bed low and locked with side rails adjusted as appropriate  - Keep care items and personal belongings within reach  - Initiate and maintain comfort rounds  - Make Fall Risk Sign visible to staff  - Offer Toileting every *** Hours, in advance of need  - Initiate/Maintain ***alarm  - Obtain necessary fall risk management equipment: ***  - Apply yellow socks and bracelet for high fall risk patients  - Consider moving patient to room near nurses station  Outcome: Progressing  Goal: Maintain or return to baseline ADL function  Description: INTERVENTIONS:  -  Assess patient's ability to carry out ADLs; assess patient's baseline for ADL function and identify physical deficits which impact ability to perform ADLs (bathing, care of mouth/teeth, toileting, grooming, dressing, etc.)  - Assess/evaluate cause of self-care deficits   - Assess range of motion  - Assess patient's mobility; develop plan if impaired  - Assess patient's need for assistive devices and provide as appropriate  - Encourage maximum independence but intervene and supervise when necessary  - Involve family in performance of ADLs  - Assess for home care needs following discharge   - Consider OT consult to assist with ADL evaluation and planning for discharge  - Provide patient education as appropriate  - Monitor functional capacity and physical performance, use of AM PAC & JH-HLM   - Monitor gait, balance and fatigue with ambulation    Outcome: Progressing  Goal: Maintains/Returns to pre admission functional level  Description: INTERVENTIONS:  - Perform AM-PAC 6 Click Basic Mobility/ Daily Activity assessment daily.  - Set and communicate daily mobility goal to care team and patient/family/caregiver.   - Collaborate with rehabilitation services on mobility goals if  consulted  - Perform Range of Motion *** times a day.  - Reposition patient every *** hours.  - Dangle patient *** times a day  - Stand patient *** times a day  - Ambulate patient *** times a day  - Out of bed to chair *** times a day   - Out of bed for meals *** times a day  - Out of bed for toileting  - Record patient progress and toleration of activity level   Outcome: Progressing     Problem: DISCHARGE PLANNING  Goal: Discharge to home or other facility with appropriate resources  Description: INTERVENTIONS:  - Identify barriers to discharge w/patient and caregiver  - Arrange for needed discharge resources and transportation as appropriate  - Identify discharge learning needs (meds, wound care, etc.)  - Arrange for interpretive services to assist at discharge as needed  - Refer to Case Management Department for coordinating discharge planning if the patient needs post-hospital services based on physician/advanced practitioner order or complex needs related to functional status, cognitive ability, or social support system  Outcome: Progressing     Problem: Knowledge Deficit  Goal: Patient/family/caregiver demonstrates understanding of disease process, treatment plan, medications, and discharge instructions  Description: Complete learning assessment and assess knowledge base.  Interventions:  - Provide teaching at level of understanding  - Provide teaching via preferred learning methods  Outcome: Progressing     Problem: Nutrition/Hydration-ADULT  Goal: Nutrient/Hydration intake appropriate for improving, restoring or maintaining nutritional needs  Description: Monitor and assess patient's nutrition/hydration status for malnutrition. Collaborate with interdisciplinary team and initiate plan and interventions as ordered.  Monitor patient's weight and dietary intake as ordered or per policy. Utilize nutrition screening tool and intervene as necessary. Determine patient's food preferences and provide high-protein,  high-caloric foods as appropriate.     INTERVENTIONS:  - Monitor oral intake, urinary output, labs, and treatment plans  - Assess nutrition and hydration status and recommend course of action  - Evaluate amount of meals eaten  - Assist patient with eating if necessary   - Allow adequate time for meals  - Recommend/ encourage appropriate diets, oral nutritional supplements, and vitamin/mineral supplements  - Order, calculate, and assess calorie counts as needed  - Recommend, monitor, and adjust tube feedings and TPN/PPN based on assessed needs  - Assess need for intravenous fluids  - Provide specific nutrition/hydration education as appropriate  - Include patient/family/caregiver in decisions related to nutrition  Outcome: Progressing

## 2025-07-12 ENCOUNTER — APPOINTMENT (INPATIENT)
Dept: RADIOLOGY | Facility: HOSPITAL | Age: 75
DRG: 853 | End: 2025-07-12
Payer: MEDICARE

## 2025-07-12 LAB
ANION GAP SERPL CALCULATED.3IONS-SCNC: 5 MMOL/L (ref 4–13)
BACTERIA BLD CULT: NORMAL
BACTERIA BLD CULT: NORMAL
BASOPHILS # BLD AUTO: 0.02 THOUSANDS/ÂΜL (ref 0–0.1)
BASOPHILS NFR BLD AUTO: 0 % (ref 0–1)
BUN SERPL-MCNC: 29 MG/DL (ref 5–25)
CALCIUM SERPL-MCNC: 8.2 MG/DL (ref 8.4–10.2)
CHLORIDE SERPL-SCNC: 108 MMOL/L (ref 96–108)
CO2 SERPL-SCNC: 24 MMOL/L (ref 21–32)
CREAT SERPL-MCNC: 1.16 MG/DL (ref 0.6–1.3)
EOSINOPHIL # BLD AUTO: 0.16 THOUSAND/ÂΜL (ref 0–0.61)
EOSINOPHIL NFR BLD AUTO: 4 % (ref 0–6)
ERYTHROCYTE [DISTWIDTH] IN BLOOD BY AUTOMATED COUNT: 15.2 % (ref 11.6–15.1)
GFR SERPL CREATININE-BSD FRML MDRD: 61 ML/MIN/1.73SQ M
GLUCOSE SERPL-MCNC: 119 MG/DL (ref 65–140)
GLUCOSE SERPL-MCNC: 122 MG/DL (ref 65–140)
GLUCOSE SERPL-MCNC: 127 MG/DL (ref 65–140)
GLUCOSE SERPL-MCNC: 164 MG/DL (ref 65–140)
GLUCOSE SERPL-MCNC: 171 MG/DL (ref 65–140)
GLUCOSE SERPL-MCNC: 188 MG/DL (ref 65–140)
HCT VFR BLD AUTO: 32.6 % (ref 36.5–49.3)
HGB BLD-MCNC: 10.8 G/DL (ref 12–17)
IMM GRANULOCYTES # BLD AUTO: 0.06 THOUSAND/UL (ref 0–0.2)
IMM GRANULOCYTES NFR BLD AUTO: 1 % (ref 0–2)
LYMPHOCYTES # BLD AUTO: 0.78 THOUSANDS/ÂΜL (ref 0.6–4.47)
LYMPHOCYTES NFR BLD AUTO: 17 % (ref 14–44)
MCH RBC QN AUTO: 30.3 PG (ref 26.8–34.3)
MCHC RBC AUTO-ENTMCNC: 33.1 G/DL (ref 31.4–37.4)
MCV RBC AUTO: 92 FL (ref 82–98)
MONOCYTES # BLD AUTO: 0.73 THOUSAND/ÂΜL (ref 0.17–1.22)
MONOCYTES NFR BLD AUTO: 16 % (ref 4–12)
NEUTROPHILS # BLD AUTO: 2.74 THOUSANDS/ÂΜL (ref 1.85–7.62)
NEUTS SEG NFR BLD AUTO: 62 % (ref 43–75)
NRBC BLD AUTO-RTO: 0 /100 WBCS
PLATELET # BLD AUTO: 230 THOUSANDS/UL (ref 149–390)
PMV BLD AUTO: 11.4 FL (ref 8.9–12.7)
POTASSIUM SERPL-SCNC: 4 MMOL/L (ref 3.5–5.3)
RBC # BLD AUTO: 3.56 MILLION/UL (ref 3.88–5.62)
SODIUM SERPL-SCNC: 137 MMOL/L (ref 135–147)
WBC # BLD AUTO: 4.49 THOUSAND/UL (ref 4.31–10.16)

## 2025-07-12 PROCEDURE — 73720 MRI LWR EXTREMITY W/O&W/DYE: CPT

## 2025-07-12 PROCEDURE — 99232 SBSQ HOSP IP/OBS MODERATE 35: CPT | Performed by: INTERNAL MEDICINE

## 2025-07-12 PROCEDURE — 80048 BASIC METABOLIC PNL TOTAL CA: CPT

## 2025-07-12 PROCEDURE — 85025 COMPLETE CBC W/AUTO DIFF WBC: CPT

## 2025-07-12 PROCEDURE — 82948 REAGENT STRIP/BLOOD GLUCOSE: CPT

## 2025-07-12 PROCEDURE — A9585 GADOBUTROL INJECTION: HCPCS | Performed by: INTERNAL MEDICINE

## 2025-07-12 RX ORDER — INSULIN LISPRO 100 [IU]/ML
1-5 INJECTION, SOLUTION INTRAVENOUS; SUBCUTANEOUS
Status: DISCONTINUED | OUTPATIENT
Start: 2025-07-12 | End: 2025-07-17 | Stop reason: HOSPADM

## 2025-07-12 RX ORDER — LIDOCAINE HYDROCHLORIDE 20 MG/ML
1 JELLY TOPICAL ONCE
Status: COMPLETED | OUTPATIENT
Start: 2025-07-12 | End: 2025-07-12

## 2025-07-12 RX ORDER — GADOBUTROL 604.72 MG/ML
8 INJECTION INTRAVENOUS
Status: COMPLETED | OUTPATIENT
Start: 2025-07-12 | End: 2025-07-12

## 2025-07-12 RX ADMIN — INSULIN LISPRO 1 UNITS: 100 INJECTION, SOLUTION INTRAVENOUS; SUBCUTANEOUS at 12:07

## 2025-07-12 RX ADMIN — CEFAZOLIN SODIUM 2000 MG: 2 SOLUTION INTRAVENOUS at 22:07

## 2025-07-12 RX ADMIN — TAMSULOSIN HYDROCHLORIDE 0.4 MG: 0.4 CAPSULE ORAL at 18:27

## 2025-07-12 RX ADMIN — INSULIN LISPRO 1 UNITS: 100 INJECTION, SOLUTION INTRAVENOUS; SUBCUTANEOUS at 00:32

## 2025-07-12 RX ADMIN — CEFAZOLIN SODIUM 2000 MG: 2 SOLUTION INTRAVENOUS at 15:26

## 2025-07-12 RX ADMIN — Medication 3 MG: at 22:07

## 2025-07-12 RX ADMIN — HEPARIN SODIUM 5000 UNITS: 5000 INJECTION INTRAVENOUS; SUBCUTANEOUS at 06:00

## 2025-07-12 RX ADMIN — Medication 2.5 MG: at 18:52

## 2025-07-12 RX ADMIN — HEPARIN SODIUM 5000 UNITS: 5000 INJECTION INTRAVENOUS; SUBCUTANEOUS at 15:26

## 2025-07-12 RX ADMIN — GADOBUTROL 8 ML: 604.72 INJECTION INTRAVENOUS at 10:21

## 2025-07-12 RX ADMIN — LIDOCAINE HYDROCHLORIDE 1 APPLICATION: 20 JELLY TOPICAL at 23:18

## 2025-07-12 RX ADMIN — AMLODIPINE BESYLATE 5 MG: 5 TABLET ORAL at 09:28

## 2025-07-12 RX ADMIN — CEFAZOLIN SODIUM 2000 MG: 2 SOLUTION INTRAVENOUS at 06:00

## 2025-07-12 RX ADMIN — ASPIRIN 81 MG CHEWABLE TABLET 81 MG: 81 TABLET CHEWABLE at 09:27

## 2025-07-12 RX ADMIN — ATORVASTATIN CALCIUM 40 MG: 40 TABLET, FILM COATED ORAL at 18:27

## 2025-07-12 RX ADMIN — HEPARIN SODIUM 5000 UNITS: 5000 INJECTION INTRAVENOUS; SUBCUTANEOUS at 22:08

## 2025-07-12 RX ADMIN — ITRACONAZOLE 100 MG: 10 SOLUTION ORAL at 12:00

## 2025-07-12 RX ADMIN — INSULIN LISPRO 1 UNITS: 100 INJECTION, SOLUTION INTRAVENOUS; SUBCUTANEOUS at 18:27

## 2025-07-12 NOTE — ASSESSMENT & PLAN NOTE
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 Unable to see in medical record if patient had residual deficits following diagnosis. Only + neuro findings are mild word finding difficulty    Plan:  Continue ASA 81 mg daily. Patient was not taking this as an outpatient, although it is listed in home meds  Continue Lipitor 20 mg daily, dose reduced w itraconazole

## 2025-07-12 NOTE — ASSESSMENT & PLAN NOTE
My exam suggest this patient has onychomycosis on both the right and left feet    Plan:  itraconazole 100 mg twice daily.  Patient should be on treatment for onychomycosis for 12 consecutive weeks. Given interaction with atorvastatin, will reduce dose from 40 mg daily to 20 mg daily spoke with pharmacy about this

## 2025-07-12 NOTE — ASSESSMENT & PLAN NOTE
Remote hx of T2DM. Patient states that he does not take any medications and that he is not diabetics, although this is mentioned in prior documentation  Hemoglobin 6.7    Plan:  Algorithm 2 initiated   After patient resumes more regular will consider algorithm 3

## 2025-07-12 NOTE — QUICK NOTE
CTAHN completed & reviewed w/ no significant relevant findings.    Plans as per note from yesterday.    Pt & family aware of need to be on daily statin & ASA.    Neuro follow up will be arranged by our team.

## 2025-07-12 NOTE — PLAN OF CARE
Problem: PAIN - ADULT  Goal: Verbalizes/displays adequate comfort level or baseline comfort level  Description: Interventions:  - Encourage patient to monitor pain and request assistance  - Assess pain using appropriate pain scale  - Administer analgesics as ordered based on type and severity of pain and evaluate response  - Implement non-pharmacological measures as appropriate and evaluate response  - Consider cultural and social influences on pain and pain management  - Notify physician/advanced practitioner if interventions unsuccessful or patient reports new pain  - Educate patient/family on pain management process including their role and importance of  reporting pain   - Provide non-pharmacologic/complimentary pain relief interventions  Outcome: Progressing     Problem: INFECTION - ADULT  Goal: Absence or prevention of progression during hospitalization  Description: INTERVENTIONS:  - Assess and monitor for signs and symptoms of infection  - Monitor lab/diagnostic results  - Monitor all insertion sites, i.e. indwelling lines, tubes, and drains  - Monitor endotracheal if appropriate and nasal secretions for changes in amount and color  - [place] appropriate cooling/warming therapies per order  - Administer medications as ordered  - Instruct and encourage patient and family to use good hand hygiene technique  - Identify and instruct in appropriate isolation precautions for identified infection/condition  Outcome: Progressing  Goal: Absence of fever/infection during neutropenic period  Description: INTERVENTIONS:  - Monitor WBC  - Perform strict hand hygiene  - Limit to healthy visitors only  - No plants, dried, fresh or silk flowers with hobbs in patient room  Outcome: Progressing     Problem: SAFETY ADULT  Goal: Patient will remain free of falls  Description: INTERVENTIONS:  - Educate patient/family on patient safety including physical limitations  - Instruct patient to call for assistance with activity   -  Consider consulting OT/PT to assist with strengthening/mobility based on AM PAC & JH-HLM score  - Consult OT/PT to assist with strengthening/mobility   - Keep Call bell within reach  - Keep bed low and locked with side rails adjusted as appropriate  - Keep care items and personal belongings within reach  - Initiate and maintain comfort rounds  - Make Fall Risk Sign visible to staff  - Apply yellow socks and bracelet for high fall risk patients  - Consider moving patient to room near nurses station  Outcome: Progressing  Goal: Maintain or return to baseline ADL function  Description: INTERVENTIONS:  -  Assess patient's ability to carry out ADLs; assess patient's baseline for ADL function and identify physical deficits which impact ability to perform ADLs (bathing, care of mouth/teeth, toileting, grooming, dressing, etc.)  - Assess/evaluate cause of self-care deficits   - Assess range of motion  - Assess patient's mobility; develop plan if impaired  - Assess patient's need for assistive devices and provide as appropriate  - Encourage maximum independence but intervene and supervise when necessary  - Involve family in performance of ADLs  - Assess for home care needs following discharge   - Consider OT consult to assist with ADL evaluation and planning for discharge  - Provide patient education as appropriate  - Monitor functional capacity and physical performance, use of AM PAC & JH-HLM   - Monitor gait, balance and fatigue with ambulation    Outcome: Progressing  Goal: Maintains/Returns to pre admission functional level  Description: INTERVENTIONS:  - Perform AM-PAC 6 Click Basic Mobility/ Daily Activity assessment daily.  - Set and communicate daily mobility goal to care team and patient/family/caregiver.   - Collaborate with rehabilitation services on mobility goals if consulted  - Out of bed for toileting  - Record patient progress and toleration of activity level   Outcome: Progressing     Problem: DISCHARGE  PLANNING  Goal: Discharge to home or other facility with appropriate resources  Description: INTERVENTIONS:  - Identify barriers to discharge w/patient and caregiver  - Arrange for needed discharge resources and transportation as appropriate  - Identify discharge learning needs (meds, wound care, etc.)  - Arrange for interpretive services to assist at discharge as needed  - Refer to Case Management Department for coordinating discharge planning if the patient needs post-hospital services based on physician/advanced practitioner order or complex needs related to functional status, cognitive ability, or social support system  Outcome: Progressing     Problem: Knowledge Deficit  Goal: Patient/family/caregiver demonstrates understanding of disease process, treatment plan, medications, and discharge instructions  Description: Complete learning assessment and assess knowledge base.  Interventions:  - Provide teaching at level of understanding  - Provide teaching via preferred learning methods  Outcome: Progressing     Problem: Nutrition/Hydration-ADULT  Goal: Nutrient/Hydration intake appropriate for improving, restoring or maintaining nutritional needs  Description: Monitor and assess patient's nutrition/hydration status for malnutrition. Collaborate with interdisciplinary team and initiate plan and interventions as ordered.  Monitor patient's weight and dietary intake as ordered or per policy. Utilize nutrition screening tool and intervene as necessary. Determine patient's food preferences and provide high-protein, high-caloric foods as appropriate.     INTERVENTIONS:  - Monitor oral intake, urinary output, labs, and treatment plans  - Assess nutrition and hydration status and recommend course of action  - Evaluate amount of meals eaten  - Assist patient with eating if necessary   - Allow adequate time for meals  - Recommend/ encourage appropriate diets, oral nutritional supplements, and vitamin/mineral supplements  -  Order, calculate, and assess calorie counts as needed  - Recommend, monitor, and adjust tube feedings and TPN/PPN based on assessed needs  - Assess need for intravenous fluids  - Provide specific nutrition/hydration education as appropriate  - Include patient/family/caregiver in decisions related to nutrition  Outcome: Progressing     Problem: Prexisting or High Potential for Compromised Skin Integrity  Goal: Skin integrity is maintained or improved  Description: INTERVENTIONS:  - Identify patients at risk for skin breakdown  - Assess and monitor skin integrity including under and around medical devices   - Assess and monitor nutrition and hydration status  - Monitor labs  - Assess for incontinence   - Turn and reposition patient  - Assist with mobility/ambulation  - Relieve pressure over addy prominences   - Avoid friction and shearing  - Provide appropriate hygiene as needed including keeping skin clean and dry  - Evaluate need for skin moisturizer/barrier cream  - Collaborate with interdisciplinary team  - Patient/family teaching  - Consider wound care consult    Assess:  - Review Tony scale daily  - Inspect skin when repositioning, toileting, and assisting with ADLS  - Assess extremities for adequate circulation and sensation     Bed Management:  - Have minimal linens on bed & keep smooth, unwrinkled  - Change linens as needed when moist or perspiring  - Toileting:  - Offer bedside commode    Activity:  - Encourage activity and walks on unit  - Encourage or provide ROM exercises   - Use appropriate equipment to lift or move patient in bed    Skin Care:  - Avoid use of baby powder, tape, friction and shearing, hot water or constrictive clothing  - Do not massage red bony areas    Outcome: Progressing

## 2025-07-12 NOTE — ASSESSMENT & PLAN NOTE
"07/08 morning, nursing staff alerted our team that patient had left-sided facial droop, around the corner of his mouth.  While examining him, this was noted, but the rest of his neuroexam was benign.  When taking his morning aspirin, staff noticed that he started to choke.    Obtained chest x-ray.  No acute cardiopulmonary process. patient satting 94% on room air.  Has not required any supplemental oxygen    STAT CT head was ordered with the following findings: No hemorrhage or evidence of acute cortical infarct.  Chronic infarct in the left basal ganglia was noted.    Findings of brain MRI stated below in this note, showing new infarct \"Acute/recent infarct involving the right gangliocapsular region, with associated cytotoxic edema\"    Bubble study that was done during [facility] revealed small PFO.  No thrombus was visualized during the study  Patient's rope score is a 5, indicating that there is a 34% chance that a new stroke is due to a patent PFO.    Neurology consulted, recommended CTA head and neck.  No large vessel occlusion, high-grade stenosis, or intracranial aneurysm.    Plan:  Current recommendations from speech therapy are dysphagia 3 dental soft with nectar thick liquids, as patient was choking on thin liquids on Wednesday.  Per their recommendations, if the patient feels able, he can trial thin liquids. Trialed thin liquids at bedside w patient and he was coughing. Will continue thick liquids and have speech continue to eval patient  Continue aspirin and statin  Follow-up additional recommendations from neurology  "

## 2025-07-12 NOTE — PROGRESS NOTES
Progress Note - Internal Medicine   Name: River Pedro 75 y.o. male I MRN: [id number]  Unit/Bed#: [facility] 901-01 I Date of Admission: 7/5/2025   Date of Service: 7/12/2025 I Hospital Day: 7    Assessment & Plan  Cellulitis of right foot  Sepsis (HCC)  R foot pain x4 days. Inability to bear weight. Limited ROM. Endorses fatigue, fevers, chills, loss of appetite. Febrile on admissoin to 101.5. ED Workup significant for WBC of 15.99, stable Hb, mild thrombocytopenia, mild hyponatremia, NORMA w Cr of 1.95 (baseline of 1), glucose of 191, AST elevated at 55, T bili 1.48. Lactic 1.5, procal 4.06, INR 1.34. Bcx drawn, and patient started on Ancef    Of note, patient has hx of R foot cellulitis, admission at [facility] in Jan 2024. Received Abx but left AMA before recommended amputation vs. Debridement by care team there. Has questionable hx of diabetes. Patient denies any hx of diabetes.    XR of b/l feet done this admission.  No acute osseous abnormalities were visualized on the bilateral x-rays of the feet    Plan:  Continue Abx Tx w Ancef, as patient's Bcx have come back as S. Aureus, favor MSSA. This is Day 8 of IV Abx  F/u B/l feet MRI  Patient had very large, watery bowel movement Wednesday evening.  Should he continue to have large, loose stools, we will order C. difficile stool study, placed patient on contact precautions.  This is likely in the setting of constipation for many days.  We will hold his bowel regimen at this time  Continue F/u repeat Bcx from 07/07, currently no growth to date.  Control glucose between 140-180 to promote healing  Encourage patient to eat/drink throughout day to promote healing  Podiatry consulted:  On 07/06, podiatry debrided distal hallux b/l. Open wound was found on distal L hallux  Trend WBC, fever  Given patient's prior admission and him leaving AMA, continue to explain importance of receiving Abx therapy  Continue work with PT and OT    MSSA bacteremia  Patient's blood cultures 2/2 for  "gram positive cocci in clusters. Escalated antibiotics to vancomycin    ANSON did not reveal any vegetation     Plan:   ID consulted, appreciate recs:  Recommended TTE for possible IE, given positive blood culture for typical IE organism and fever  Echo showing ejection fraction of 60%, mild right atrial dilation, mild aortic valve regurgitation, no echocardiographic evidence of valvular vegetation affecting the mitral, aortic, or tricuspid valves.  Pulmonic valve not adequately visualized to assess for endocarditis. Overall, no significant changes noted compared to the prior study.  Also recommended 6 weeks of IV Abx tx  Pending final blood cultures  Stroke-like symptoms  07/08 morning, nursing staff alerted our team that patient had left-sided facial droop, around the corner of his mouth.  While examining him, this was noted, but the rest of his neuroexam was benign.  When taking his morning aspirin, staff noticed that he started to choke.    Obtained chest x-ray.  No acute cardiopulmonary process. patient satting 94% on room air.  Has not required any supplemental oxygen    STAT CT head was ordered with the following findings: No hemorrhage or evidence of acute cortical infarct.  Chronic infarct in the left basal ganglia was noted.    Findings of brain MRI stated below in this note, showing new infarct \"Acute/recent infarct involving the right gangliocapsular region, with associated cytotoxic edema\"    Bubble study that was done during ANSON revealed small PFO.  No thrombus was visualized during the study  Patient's rope score is a 5, indicating that there is a 34% chance that a new stroke is due to a patent PFO.    Neurology consulted, recommended CTA head and neck.  No large vessel occlusion, high-grade stenosis, or intracranial aneurysm.    Plan:  Current recommendations from speech therapy are dysphagia 3 dental soft with nectar thick liquids, as patient was choking on thin liquids on Wednesday.  Per their " recommendations, if the patient feels able, he can trial thin liquids. Trialed thin liquids at bedside w patient and he was coughing. Will continue thick liquids and have speech continue to eval patient  Continue aspirin and statin  Follow-up additional recommendations from neurology  Urinary retention  Urology consulted for retention and re-colored urine in Hanks bag    Plan:  Continue to monitor for further hematuria. Favor traumatic hanks insertion  Continue flomax 0.4 mg daily  Avoid medications that cause urinary retention such as antihistamines and anticholinergics  Bowel regimen: Held at this time, as patient had large watery bowel movement on Tuesday night, and has been having bowel movements since then without laxatives  Voiding trial today, as patient is able to ambulate and has had all of his imaging studies, aside from MRI of his foot, which does not yet have a time  Urinary retention protocol, patient may require urology follow up outpatient  History of diabetes mellitus  Remote hx of T2DM. Patient states that he does not take any medications and that he is not diabetics, although this is mentioned in prior documentation  Hemoglobin 6.7    Plan:  Algorithm 2 initiated   After patient resumes more regular will consider algorithm 3   CVA (cerebral vascular accident) (HCC)  Hx of L basal ganglia infarct in 2012 and TIA in 2019. Unable to see in medical record if patient had residual deficits following diagnosis. Only + neuro findings are mild word finding difficulty    Plan:  Continue ASA 81 mg daily. Patient was not taking this as an outpatient, although it is listed in home meds  Continue Lipitor 20 mg daily, dose reduced w itraconazole  Open wound bilateral 1st toes  Podiatry consulted, appreciate recs  Onychomycosis  My exam suggest this patient has onychomycosis on both the right and left feet    Plan:  itraconazole 100 mg twice daily.  Patient should be on treatment for onychomycosis for 12  consecutive weeks. Given interaction with atorvastatin, will reduce dose from 40 mg daily to 20 mg daily spoke with pharmacy about this    Disposition: Continue inpatient stay for MRI of right foot, wound care, IV antibiotics, monitoring of neurologic status    Team: SOD TEAM A    Subjective   Patient seen and examined. No acute events overnight.  This morning, River says that he feels all right.  He he states that he would like to try and drink thin liquids, but he says that he is still struggling with swallowing.  He continues to deny any fevers, chills, shortness of breath, orthopnea, chest pain, nausea/vomiting.  When asked if he is having diarrhea or bowel movements, he says that he is not sure since his large one on Tuesday night/Wednesday morning.  His ex-wife at the bedside states that he has been having bowel movements daily.  He and his ex-wife do not have any other questions at this time and are agreeable to stay in the hospital for IV antibiotics, work with PT/OT/speech, and MRI of patient's right foot    Objective :  Temp:  [98.3 °F (36.8 °C)-99.3 °F (37.4 °C)] 98.4 °F (36.9 °C)  HR:  [65-72] 66  BP: (132-172)/(72-88) 158/85  Resp:  [15-18] 17  SpO2:  [94 %] 94 %  O2 Device: None (Room air)    I/O         07/10 0701 07/11 0700 07/11 0701  07/12 0700 07/12 0701  07/13 0700    P.O. 120 0     I.V. (mL/kg)  350 (4.2)     IV Piggyback 150      Total Intake(mL/kg) 270 (3.3) 350 (4.2)     Urine (mL/kg/hr) 875 (0.4) 975 (0.5)     Stool 0      Total Output 875 975     Net -605 -625            Unmeasured Stool Occurrence 1 x            Weights:   IBW (Ideal Body Weight): 75.3 kg    Body mass index is 25.52 kg/m².  Weight (last 2 days)       Date/Time Weight    07/11/25 1035 83 (183)            Physical Exam  Vitals and nursing note reviewed.   Constitutional:       General: He is not in acute distress.     Appearance: Normal appearance. He is well-developed. He is not ill-appearing.   HENT:      Head:  Normocephalic and atraumatic.      Right Ear: External ear normal.      Left Ear: External ear normal.      Nose: Nose normal. No congestion.      Mouth/Throat:      Mouth: Mucous membranes are moist.      Pharynx: No oropharyngeal exudate.      Comments: Slight flattening of left nasolabial fold, improved from prior examinations    Eyes:      General: No scleral icterus.        Right eye: No discharge.         Left eye: No discharge.      Extraocular Movements: Extraocular movements intact.      Conjunctiva/sclera: Conjunctivae normal.      Pupils: Pupils are equal, round, and reactive to light.       Cardiovascular:      Rate and Rhythm: Normal rate and regular rhythm.      Pulses: Normal pulses.      Heart sounds: Normal heart sounds. No murmur heard.     No friction rub. No gallop.   Pulmonary:      Effort: Pulmonary effort is normal. No respiratory distress.      Breath sounds: Normal breath sounds. No wheezing, rhonchi or rales.   Abdominal:      General: Abdomen is flat. There is no distension.      Palpations: Abdomen is soft.      Tenderness: There is no abdominal tenderness.      Comments: Normoactive bowel sounds     Musculoskeletal:         General: Swelling (Slight swelling of patient's right foot, improved from prior examinations) and deformity (Erythema over patient's right foot, extending to most distal portion of right lower extremity.  Improved from yesterday's exam) present.      Right lower leg: No edema.      Left lower leg: No edema.      Comments: Range of motion of patient's right foot also improved     Skin:     General: Skin is warm and dry.      Capillary Refill: Capillary refill takes less than 2 seconds.      Findings: No rash.     Neurological:      General: No focal deficit present.      Mental Status: He is alert and oriented to person, place, and time.      Cranial Nerves: Cranial nerve deficit present.      Sensory: No sensory deficit.      Motor: No weakness.      Comments: Slight  "droop of patient's left mouth, much improved from yesterday.  Dysarthria also improved from yesterday.  Strength symmetrical in bilateral upper and bilateral lower extremities.  Sensation also intact bilaterally.  Shoulder strength also symmetric bilaterally.  No visual field deficit   Psychiatric:         Mood and Affect: Mood normal.         Behavior: Behavior normal.         Thought Content: Thought content normal.         Judgment: Judgment normal.           Lab Results: I have reviewed the following results:  Recent Labs     07/12/25  0601   WBC 4.49   HGB 10.8*   HCT 32.6*      SODIUM 137   K 4.0      CO2 24   BUN 29*   CREATININE 1.16   GLUC 122     Imaging Results Review: I personally reviewed the following image studies in PACS and associated radiology reports:  XR L and R foot. My interpretation of the radiology images/reports is: osteo lesion of R great toe on Xray, suspicious for OM.  CT head without contrast on 7/8 Showing no hemorrhage or CT evidence of acute cortical infarct  CT head without contrast on 7/10 showing no intracranial hemorrhage  MRI of brain done on 7/10 showing \"Acute/recent infarct involving the right gangliocapsular region, with associated cytotoxic edema. Old infarct involving the left basal ganglia and periventricular left frontal lobe white matter. Old lacunar infarcts involving the bilateral cerebellar hemispheres. Mild chronic white matter microangiopathic changes involving both cerebral hemispheres\"  ANSON: No thrombus visualized, no vegetation visualized, ejection fraction of 55%.  Small PFO visualized on bubble study  CTA head and neck.  No large vessel occlusion, high-grade stenosis, or intracranial aneurysm.     Other Study Results Review: EKG was personally reviewed and my interpretation is: Personally Reviewed. NSR w HR of 60. Slight QRS widening, LVH ..    Currently Ordered Meds:   Current Facility-Administered Medications:     acetaminophen (TYLENOL) tablet " 975 mg, Q8H PRN    amLODIPine (NORVASC) tablet 5 mg, Daily    aspirin chewable tablet 81 mg, Daily    atorvastatin (LIPITOR) tablet 40 mg, Daily With Dinner    ceFAZolin (ANCEF) IVPB (premix in dextrose) 2,000 mg 50 mL, Q8H, Last Rate: 2,000 mg (07/12/25 0600)    heparin (porcine) subcutaneous injection 5,000 Units, Q8H JOSÉM IGUEL    insulin lispro (HumALOG/ADMELOG) 100 units/mL subcutaneous injection 1-5 Units, Q6H JOSÉ MIGUEL **AND** Fingerstick Glucose (POCT), Q6H    itraconazole (SPORANOX) oral solution 100 mg, Daily    LORazepam (ATIVAN) injection 0.5 mg, Once PRN    melatonin tablet 3 mg, HS    ondansetron (ZOFRAN) injection 4 mg, Q6H PRN    senna (SENOKOT) tablet 8.6 mg, HS PRN    tamsulosin (FLOMAX) capsule 0.4 mg, Daily With Dinner    VTE Pharmacologic Prophylaxis: VTE covered by:  heparin (porcine), Subcutaneous, 5,000 Units at 07/12/25 0600      VTE Mechanical Prophylaxis: reason for no mechanical VTE prophylaxis right foot cellulitis and wound over patient's left lower extremity    Administrative Statements   I have spent a total time of 45 minutes in caring for this patient on the day of the visit/encounter including Diagnostic results, Risks and benefits of tx options, Instructions for management, Patient and family education, Importance of tx compliance, Risk factor reductions, Counseling / Coordination of care, Documenting in the medical record, Reviewing/placing orders in the medical record (including tests, medications, and/or procedures), Obtaining or reviewing history  , and Communicating with other healthcare professionals .    Portions of the record may have been created with voice recognition software.      Loi Suh MD

## 2025-07-12 NOTE — ASSESSMENT & PLAN NOTE
Urology consulted for retention and re-colored urine in Hanks bag    Plan:  Continue to monitor for further hematuria. Favor traumatic hanks insertion  Continue flomax 0.4 mg daily  Avoid medications that cause urinary retention such as antihistamines and anticholinergics  Bowel regimen: Held at this time, as patient had large watery bowel movement on Tuesday night, and has been having bowel movements since then without laxatives  Voiding trial today, as patient is able to ambulate and has had all of his imaging studies, aside from MRI of his foot, which does not yet have a time  Urinary retention protocol, patient may require urology follow up outpatient

## 2025-07-12 NOTE — ASSESSMENT & PLAN NOTE
R foot pain x4 days. Inability to bear weight. Limited ROM. Endorses fatigue, fevers, chills, loss of appetite. Febrile on admissoin to 101.5. ED Workup significant for WBC of 15.99, stable Hb, mild thrombocytopenia, mild hyponatremia, NORMA w Cr of 1.95 (baseline of 1), glucose of 191, AST elevated at 55, T bili 1.48. Lactic 1.5, procal 4.06, INR 1.34. Bcx drawn, and patient started on Ancef    Of note, patient has hx of R foot cellulitis, admission at [facility] in Jan 2024. Received Abx but left AMA before recommended amputation vs. Debridement by care team there. Has questionable hx of diabetes. Patient denies any hx of diabetes.    XR of b/l feet done this admission.  No acute osseous abnormalities were visualized on the bilateral x-rays of the feet    Plan:  Continue Abx Tx w Ancef, as patient's Bcx have come back as S. Aureus, favor MSSA. This is Day 8 of IV Abx  F/u B/l feet MRI  Patient had very large, watery bowel movement Wednesday evening.  Should he continue to have large, loose stools, we will order C. difficile stool study, placed patient on contact precautions.  This is likely in the setting of constipation for many days.  We will hold his bowel regimen at this time  Continue F/u repeat Bcx from 07/07, currently no growth to date.  Control glucose between 140-180 to promote healing  Encourage patient to eat/drink throughout day to promote healing  Podiatry consulted:  On 07/06, podiatry debrided distal hallux b/l. Open wound was found on distal L hallux  Trend WBC, fever  Given patient's prior admission and him leaving AMA, continue to explain importance of receiving Abx therapy  Continue work with PT and OT

## 2025-07-12 NOTE — CASE MANAGEMENT
Case Management Discharge Planning Note    Patient name River Pedro  Location [facility] 901/[facility] 901-01 MRN [id number]  : 1950 Date 2025       Current Admission Date: 2025  Current Admission Diagnosis:Cellulitis of right foot   Patient Active Problem List    Diagnosis Date Noted    Onychomycosis 2025    Stroke-like symptoms 2025    Sepsis (HCC) 2025    Urinary retention 2025    Open wound bilateral 1st toes 2025    MSSA bacteremia 2025    Cellulitis of right foot 2025    CVA (cerebral vascular accident) (HCC) 2025    Impaired cognition 2022    History of transient ischemic attack (TIA) 2022    History of diabetes mellitus 2022      LOS (days): 7  Geometric Mean LOS (GMLOS) (days): 4.9  Days to GMLOS:-2.1     OBJECTIVE:  Risk of Unplanned Readmission Score: 13.84         Current admission status: Inpatient   Preferred Pharmacy:   PATIENT/FAMILY REPORTS NO PREFERRED PHARMACY  No address on file      Primary Care Provider: No primary care provider on file.    Primary Insurance: [employer] REP  Secondary Insurance:     DISCHARGE DETAILS:    Discharge planning discussed with:: Emily  Freedom of Choice: Yes  Comments - Freedom of Choice: Agreeable to [facility] for home IV ABX.  CM contacted family/caregiver?: Yes  Were Treatment Team discharge recommendations reviewed with patient/caregiver?: Yes  Did patient/caregiver verbalize understanding of patient care needs?: Yes  Were patient/caregiver advised of the risks associated with not following Treatment Team discharge recommendations?: Yes    Contacts  Patient Contacts: Emily  Relationship to Patient:: Family  Contact Method: In Person  Reason/Outcome: Referral, Discharge Planning, Emergency Contact              Other Referral/Resources/Interventions Provided:  Interventions: Other (Specify)  Referral Comments: CM reviewed home infusion information with pt and family.  Pt is aware  that the cost for the IV ABX per 7 days is $50.40 and that per [facility] is covered at 80% until pt his OOP max of $5,900 after which it would be covered 100%. Family asked about a shower chair being ordered.  CM showed family the options through [employer], they will also look for options on [employer].  They will advise if they would like  to place order for shower chiar.

## 2025-07-12 NOTE — QUICK NOTE
Spoke with patient's ex-wife at the bedside during my rounds.  Explained findings from CTA head and neck, ANSON and bubble study.  Also went over patient's neuroexam with the patient and his ex-wife.  Reviewed morning blood work with them, and stated our plan to keep the patient in the hospital for IV antibiotics, MRI of his foot, wound care, physical therapy/Occupational Therapy/speech therapy.  Neither of them had any further questions at this time.    Loi Suh MD

## 2025-07-12 NOTE — ASSESSMENT & PLAN NOTE
R foot pain x4 days. Inability to bear weight. Limited ROM. Endorses fatigue, fevers, chills, loss of appetite. Febrile on admissoin to 101.5. ED Workup significant for WBC of 15.99, stable Hb, mild thrombocytopenia, mild hyponatremia, NORMA w Cr of 1.95 (baseline of 1), glucose of 191, AST elevated at 55, T bili 1.48. Lactic 1.5, procal 4.06, INR 1.34. Bcx drawn, and patient started on Ancef    Of note, patient has hx of R foot cellulitis, admission at [facility] in Jan 2024. Received Abx but left AMA before recommended amputation vs. Debridement by care team there. Has questionable hx of diabetes. Patient denies any hx of diabetes.    XR of b/l feet done this admission.  No acute osseous abnormalities were visualized on the bilateral x-rays of the feet    Plan:  Continue Abx Tx w Ancef, as patient's Bcx have come back as S. Aureus, favor MSSA. This is Day 8 of IV Abx  F/u B/l feet MRI  Patient had very large, watery bowel movement Wednesday evening.  Should he continue to have large, loose stools, we will order C. difficile stool study, placed patient on contact precautions.  This is likely in the setting of constipation for many days.  We will hold his bowel regimen at this time  Continue F/u repeat Bcx from 07/07, currently no growth to date.  Control glucose between 140-180 to promote healing  Encourage patient to eat/drink throughout day to promote healing  Podiatry consulted:  On 07/06, podiatry debrided distal hallux b/l. Open wound was found on distal L hallux  Trend WBC, fever  Given patient's prior admission and him leaving AMA, continue to explain importance of receiving Abx therapy  Continue work with PT and OT

## 2025-07-12 NOTE — ASSESSMENT & PLAN NOTE
Patient's blood cultures 2/2 for gram positive cocci in clusters. Escalated antibiotics to vancomycin    ANSON did not reveal any vegetation     Plan:   ID consulted, appreciate recs:  Recommended TTE for possible IE, given positive blood culture for typical IE organism and fever  Echo showing ejection fraction of 60%, mild right atrial dilation, mild aortic valve regurgitation, no echocardiographic evidence of valvular vegetation affecting the mitral, aortic, or tricuspid valves.  Pulmonic valve not adequately visualized to assess for endocarditis. Overall, no significant changes noted compared to the prior study.  Also recommended 6 weeks of IV Abx tx  Pending final blood cultures

## 2025-07-13 ENCOUNTER — TELEPHONE (OUTPATIENT)
Dept: OTHER | Facility: HOSPITAL | Age: 75
End: 2025-07-13

## 2025-07-13 LAB
ANION GAP SERPL CALCULATED.3IONS-SCNC: 5 MMOL/L (ref 4–13)
BASOPHILS # BLD AUTO: 0.03 THOUSANDS/ÂΜL (ref 0–0.1)
BASOPHILS NFR BLD AUTO: 1 % (ref 0–1)
BUN SERPL-MCNC: 24 MG/DL (ref 5–25)
CALCIUM SERPL-MCNC: 8.1 MG/DL (ref 8.4–10.2)
CHLORIDE SERPL-SCNC: 104 MMOL/L (ref 96–108)
CO2 SERPL-SCNC: 26 MMOL/L (ref 21–32)
CREAT SERPL-MCNC: 0.97 MG/DL (ref 0.6–1.3)
EOSINOPHIL # BLD AUTO: 0.14 THOUSAND/ÂΜL (ref 0–0.61)
EOSINOPHIL NFR BLD AUTO: 2 % (ref 0–6)
ERYTHROCYTE [DISTWIDTH] IN BLOOD BY AUTOMATED COUNT: 15.4 % (ref 11.6–15.1)
GFR SERPL CREATININE-BSD FRML MDRD: 76 ML/MIN/1.73SQ M
GLUCOSE SERPL-MCNC: 125 MG/DL (ref 65–140)
GLUCOSE SERPL-MCNC: 136 MG/DL (ref 65–140)
GLUCOSE SERPL-MCNC: 149 MG/DL (ref 65–140)
GLUCOSE SERPL-MCNC: 151 MG/DL (ref 65–140)
GLUCOSE SERPL-MCNC: 178 MG/DL (ref 65–140)
HCT VFR BLD AUTO: 32.1 % (ref 36.5–49.3)
HGB BLD-MCNC: 10.7 G/DL (ref 12–17)
IMM GRANULOCYTES # BLD AUTO: 0.06 THOUSAND/UL (ref 0–0.2)
IMM GRANULOCYTES NFR BLD AUTO: 1 % (ref 0–2)
LYMPHOCYTES # BLD AUTO: 1.04 THOUSANDS/ÂΜL (ref 0.6–4.47)
LYMPHOCYTES NFR BLD AUTO: 18 % (ref 14–44)
MCH RBC QN AUTO: 30.1 PG (ref 26.8–34.3)
MCHC RBC AUTO-ENTMCNC: 33.3 G/DL (ref 31.4–37.4)
MCV RBC AUTO: 90 FL (ref 82–98)
MONOCYTES # BLD AUTO: 0.61 THOUSAND/ÂΜL (ref 0.17–1.22)
MONOCYTES NFR BLD AUTO: 11 % (ref 4–12)
NEUTROPHILS # BLD AUTO: 3.89 THOUSANDS/ÂΜL (ref 1.85–7.62)
NEUTS SEG NFR BLD AUTO: 67 % (ref 43–75)
NRBC BLD AUTO-RTO: 0 /100 WBCS
PLATELET # BLD AUTO: 226 THOUSANDS/UL (ref 149–390)
PMV BLD AUTO: 11.8 FL (ref 8.9–12.7)
POTASSIUM SERPL-SCNC: 3.5 MMOL/L (ref 3.5–5.3)
RBC # BLD AUTO: 3.55 MILLION/UL (ref 3.88–5.62)
SODIUM SERPL-SCNC: 135 MMOL/L (ref 135–147)
WBC # BLD AUTO: 5.77 THOUSAND/UL (ref 4.31–10.16)

## 2025-07-13 PROCEDURE — 99232 SBSQ HOSP IP/OBS MODERATE 35: CPT | Performed by: INTERNAL MEDICINE

## 2025-07-13 PROCEDURE — 80048 BASIC METABOLIC PNL TOTAL CA: CPT

## 2025-07-13 PROCEDURE — NC001 PR NO CHARGE: Performed by: UROLOGY

## 2025-07-13 PROCEDURE — 99232 SBSQ HOSP IP/OBS MODERATE 35: CPT | Performed by: UROLOGY

## 2025-07-13 PROCEDURE — 85025 COMPLETE CBC W/AUTO DIFF WBC: CPT

## 2025-07-13 PROCEDURE — 82948 REAGENT STRIP/BLOOD GLUCOSE: CPT

## 2025-07-13 PROCEDURE — 51702 INSERT TEMP BLADDER CATH: CPT | Performed by: PHYSICIAN ASSISTANT

## 2025-07-13 RX ADMIN — ACETAMINOPHEN 975 MG: 325 TABLET ORAL at 07:03

## 2025-07-13 RX ADMIN — Medication 3 MG: at 21:00

## 2025-07-13 RX ADMIN — HEPARIN SODIUM 5000 UNITS: 5000 INJECTION INTRAVENOUS; SUBCUTANEOUS at 15:27

## 2025-07-13 RX ADMIN — ATORVASTATIN CALCIUM 40 MG: 40 TABLET, FILM COATED ORAL at 15:26

## 2025-07-13 RX ADMIN — INSULIN LISPRO 1 UNITS: 100 INJECTION, SOLUTION INTRAVENOUS; SUBCUTANEOUS at 17:57

## 2025-07-13 RX ADMIN — INSULIN LISPRO 1 UNITS: 100 INJECTION, SOLUTION INTRAVENOUS; SUBCUTANEOUS at 12:17

## 2025-07-13 RX ADMIN — CEFAZOLIN SODIUM 2000 MG: 2 SOLUTION INTRAVENOUS at 21:02

## 2025-07-13 RX ADMIN — CEFAZOLIN SODIUM 2000 MG: 2 SOLUTION INTRAVENOUS at 05:02

## 2025-07-13 RX ADMIN — TAMSULOSIN HYDROCHLORIDE 0.4 MG: 0.4 CAPSULE ORAL at 15:26

## 2025-07-13 RX ADMIN — CEFAZOLIN SODIUM 2000 MG: 2 SOLUTION INTRAVENOUS at 15:27

## 2025-07-13 RX ADMIN — ITRACONAZOLE 100 MG: 10 SOLUTION ORAL at 10:39

## 2025-07-13 RX ADMIN — HEPARIN SODIUM 5000 UNITS: 5000 INJECTION INTRAVENOUS; SUBCUTANEOUS at 05:02

## 2025-07-13 RX ADMIN — HEPARIN SODIUM 5000 UNITS: 5000 INJECTION INTRAVENOUS; SUBCUTANEOUS at 21:00

## 2025-07-13 RX ADMIN — ASPIRIN 81 MG CHEWABLE TABLET 81 MG: 81 TABLET CHEWABLE at 10:39

## 2025-07-13 RX ADMIN — AMLODIPINE BESYLATE 5 MG: 5 TABLET ORAL at 10:39

## 2025-07-13 NOTE — ASSESSMENT & PLAN NOTE
R foot pain x4 days. Inability to bear weight. Limited ROM. Endorses fatigue, fevers, chills, loss of appetite. Febrile on admissoin to 101.5. ED Workup significant for WBC of 15.99, stable Hb, mild thrombocytopenia, mild hyponatremia, NORMA w Cr of 1.95 (baseline of 1), glucose of 191, AST elevated at 55, T bili 1.48. Lactic 1.5, procal 4.06, INR 1.34. Bcx drawn, and patient started on Ancef    Of note, patient has hx of R foot cellulitis, admission at [facility] in Jan 2024. Received Abx but left AMA before recommended amputation vs. Debridement by care team there. Has questionable hx of diabetes. Patient denies any hx of diabetes.    XR of b/l feet done this admission.  No acute osseous abnormalities were visualized on the bilateral x-rays of the feet  MRI R Foot: concern for diffuse subcutaneous edema and two fluid collections suspicious for abscesses  Plan:  Continue Abx Tx w Ancef, as patient's Bcx have come back as S. Aureus, favor MSSA. This is Day 9 of IV Abx  Patient had very large, watery bowel movement Wednesday evening.  Should he continue to have large, loose stools, we will order C. difficile stool study, placed patient on contact precautions.  This is likely in the setting of constipation for many days.  We will hold his bowel regimen at this time  Continue F/u repeat Bcx from 07/07, currently no growth to date.  Control glucose between 140-180 to promote healing  Encourage patient to eat/drink throughout day to promote healing  Podiatry consulted:  On 07/06, podiatry debrided distal hallux b/l. Open wound was found on distal L hallux  Pending podiatry input for R foot abscesses for possible intervention  Trend WBC, fever  Given patient's prior admission and him leaving AMA, continue to explain importance of receiving Abx therapy  Continue work with PT and OT

## 2025-07-13 NOTE — PLAN OF CARE
Problem: PAIN - ADULT  Goal: Verbalizes/displays adequate comfort level or baseline comfort level  Description: Interventions:  - Encourage patient to monitor pain and request assistance  - Assess pain using appropriate pain scale  - Administer analgesics as ordered based on type and severity of pain and evaluate response  - Implement non-pharmacological measures as appropriate and evaluate response  - Consider cultural and social influences on pain and pain management  - Notify physician/advanced practitioner if interventions unsuccessful or patient reports new pain  - Educate patient/family on pain management process including their role and importance of  reporting pain   - Provide non-pharmacologic/complimentary pain relief interventions  Outcome: Progressing     Problem: INFECTION - ADULT  Goal: Absence or prevention of progression during hospitalization  Description: INTERVENTIONS:  - Assess and monitor for signs and symptoms of infection  - Monitor lab/diagnostic results  - Monitor all insertion sites, i.e. indwelling lines, tubes, and drains  - Monitor endotracheal if appropriate and nasal secretions for changes in amount and color  - Galt appropriate cooling/warming therapies per order  - Administer medications as ordered  - Instruct and encourage patient and family to use good hand hygiene technique  - Identify and instruct in appropriate isolation precautions for identified infection/condition  Outcome: Progressing  Goal: Absence of fever/infection during neutropenic period  Description: INTERVENTIONS:  - Monitor WBC  - Perform strict hand hygiene  - Limit to healthy visitors only  - No plants, dried, fresh or silk flowers with hobbs in patient room  Outcome: Progressing     Problem: SAFETY ADULT  Goal: Patient will remain free of falls  Description: INTERVENTIONS:  - Educate patient/family on patient safety including physical limitations  - Instruct patient to call for assistance with activity   -  Consider consulting OT/PT to assist with strengthening/mobility based on AM PAC & JH-HLM score  - Consult OT/PT to assist with strengthening/mobility   - Keep Call bell within reach  - Keep bed low and locked with side rails adjusted as appropriate  - Keep care items and personal belongings within reach  - Initiate and maintain comfort rounds  - Make Fall Risk Sign visible to staff  - Offer Toileting every 2 Hours, in advance of need  - Initiate/Maintain 2alarm  - Obtain necessary fall risk management equipment: 2  - Apply yellow socks and bracelet for high fall risk patients  - Consider moving patient to room near nurses station  Outcome: Progressing  Goal: Maintain or return to baseline ADL function  Description: INTERVENTIONS:  -  Assess patient's ability to carry out ADLs; assess patient's baseline for ADL function and identify physical deficits which impact ability to perform ADLs (bathing, care of mouth/teeth, toileting, grooming, dressing, etc.)  - Assess/evaluate cause of self-care deficits   - Assess range of motion  - Assess patient's mobility; develop plan if impaired  - Assess patient's need for assistive devices and provide as appropriate  - Encourage maximum independence but intervene and supervise when necessary  - Involve family in performance of ADLs  - Assess for home care needs following discharge   - Consider OT consult to assist with ADL evaluation and planning for discharge  - Provide patient education as appropriate  - Monitor functional capacity and physical performance, use of AM PAC & JH-HLM   - Monitor gait, balance and fatigue with ambulation    Outcome: Progressing  Goal: Maintains/Returns to pre admission functional level  Description: INTERVENTIONS:  - Perform AM-PAC 6 Click Basic Mobility/ Daily Activity assessment daily.  - Set and communicate daily mobility goal to care team and patient/family/caregiver.   - Collaborate with rehabilitation services on mobility goals if consulted  -  Perform Range of Motion 2 times a day.  - Reposition patient every 2 hours.  - Dangle patient 2 times a day  - Stand patient 2 times a day  - Ambulate patient 2 times a day  - Out of bed to chair 2 times a day   - Out of bed for meals 2 times a day  - Out of bed for toileting  - Record patient progress and toleration of activity level   Outcome: Progressing     Problem: DISCHARGE PLANNING  Goal: Discharge to home or other facility with appropriate resources  Description: INTERVENTIONS:  - Identify barriers to discharge w/patient and caregiver  - Arrange for needed discharge resources and transportation as appropriate  - Identify discharge learning needs (meds, wound care, etc.)  - Arrange for interpretive services to assist at discharge as needed  - Refer to Case Management Department for coordinating discharge planning if the patient needs post-hospital services based on physician/advanced practitioner order or complex needs related to functional status, cognitive ability, or social support system  Outcome: Progressing     Problem: Nutrition/Hydration-ADULT  Goal: Nutrient/Hydration intake appropriate for improving, restoring or maintaining nutritional needs  Description: Monitor and assess patient's nutrition/hydration status for malnutrition. Collaborate with interdisciplinary team and initiate plan and interventions as ordered.  Monitor patient's weight and dietary intake as ordered or per policy. Utilize nutrition screening tool and intervene as necessary. Determine patient's food preferences and provide high-protein, high-caloric foods as appropriate.     INTERVENTIONS:  - Monitor oral intake, urinary output, labs, and treatment plans  - Assess nutrition and hydration status and recommend course of action  - Evaluate amount of meals eaten  - Assist patient with eating if necessary   - Allow adequate time for meals  - Recommend/ encourage appropriate diets, oral nutritional supplements, and vitamin/mineral  supplements  - Order, calculate, and assess calorie counts as needed  - Recommend, monitor, and adjust tube feedings and TPN/PPN based on assessed needs  - Assess need for intravenous fluids  - Provide specific nutrition/hydration education as appropriate  - Include patient/family/caregiver in decisions related to nutrition  Outcome: Progressing     Problem: Prexisting or High Potential for Compromised Skin Integrity  Goal: Skin integrity is maintained or improved  Description: INTERVENTIONS:  - Identify patients at risk for skin breakdown  - Assess and monitor skin integrity including under and around medical devices   - Assess and monitor nutrition and hydration status  - Monitor labs  - Assess for incontinence   - Turn and reposition patient  - Assist with mobility/ambulation  - Relieve pressure over addy prominences   - Avoid friction and shearing  - Provide appropriate hygiene as needed including keeping skin clean and dry  - Evaluate need for skin moisturizer/barrier cream  - Collaborate with interdisciplinary team  - Patient/family teaching  - Consider wound care consult    Assess:  - Review Tony scale daily  - Clean and moisturize skin every 2  - Inspect skin when repositioning, toileting, and assisting with ADLS  - Assess under medical devices such as 2 every   - Assess extremities for adequate circulation and sensation     Bed Management:  - Have minimal linens on bed & keep smooth, unwrinkled  - Change linens as needed when moist or perspiring  - Avoid sitting or lying in one position for more than 2 hours while in bed?Keep HOB at 2degrees   - Toileting:  - Offer bedside commode  - Assess for incontinence every 2  - Use incontinent care products after each incontinent episode such as 2    Activity:  - Mobilize patient 2 times a day  - Encourage activity and walks on unit  - Encourage or provide ROM exercises   - Turn and reposition patient every 2 Hours  - Use appropriate equipment to lift or move  patient in bed  - Instruct/ Assist with weight shifting every 2 when out of bed in chair  - Consider limitation of chair time 2 hour intervals    Skin Care:  - Avoid use of baby powder, tape, friction and shearing, hot water or constrictive clothing  - Relieve pressure over bony prominences using 2  - Do not massage red bony areas    Next Steps:  - Teach patient strategies to minimize risks such as 2  - Consider consults to  interdisciplinary teams such as 2  Outcome: Progressing

## 2025-07-13 NOTE — PROGRESS NOTES
Progress Note - Internal Medicine   Name: River Pedro 75 y.o. male I MRN: [id number]  Unit/Bed#: [facility] 901-01 I Date of Admission: 7/5/2025   Date of Service: 7/13/2025 I Hospital Day: 8    Assessment & Plan  Cellulitis of right foot  Sepsis (HCC)  R foot pain x4 days. Inability to bear weight. Limited ROM. Endorses fatigue, fevers, chills, loss of appetite. Febrile on admissoin to 101.5. ED Workup significant for WBC of 15.99, stable Hb, mild thrombocytopenia, mild hyponatremia, NORMA w Cr of 1.95 (baseline of 1), glucose of 191, AST elevated at 55, T bili 1.48. Lactic 1.5, procal 4.06, INR 1.34. Bcx drawn, and patient started on Ancef    Of note, patient has hx of R foot cellulitis, admission at [facility] in Jan 2024. Received Abx but left AMA before recommended amputation vs. Debridement by care team there. Has questionable hx of diabetes. Patient denies any hx of diabetes.    XR of b/l feet done this admission.  No acute osseous abnormalities were visualized on the bilateral x-rays of the feet  MRI R Foot: concern for diffuse subcutaneous edema and two fluid collections suspicious for abscesses  Plan:  Continue Abx Tx w Ancef, as patient's Bcx have come back as S. Aureus, favor MSSA. This is Day 9 of IV Abx  Patient had very large, watery bowel movement Wednesday evening.  Should he continue to have large, loose stools, we will order C. difficile stool study, placed patient on contact precautions.  This is likely in the setting of constipation for many days.  We will hold his bowel regimen at this time  Continue F/u repeat Bcx from 07/07, currently no growth to date.  Control glucose between 140-180 to promote healing  Encourage patient to eat/drink throughout day to promote healing  Podiatry consulted:  On 07/06, podiatry debrided distal hallux b/l. Open wound was found on distal L hallux  Pending podiatry input for R foot abscesses for possible intervention  Trend WBC, fever  Given patient's prior admission and him  "leaving AMA, continue to explain importance of receiving Abx therapy  Continue work with PT and OT    MSSA bacteremia  Patient's blood cultures 2/2 for gram positive cocci in clusters. Escalated antibiotics to vancomycin    ANSON did not reveal any vegetation     Plan:   ID consulted, appreciate recs:  Recommended TTE for possible IE, given positive blood culture for typical IE organism and fever  Echo showing ejection fraction of 60%, mild right atrial dilation, mild aortic valve regurgitation, no echocardiographic evidence of valvular vegetation affecting the mitral, aortic, or tricuspid valves.  Pulmonic valve not adequately visualized to assess for endocarditis. Overall, no significant changes noted compared to the prior study.  Also recommended 6 weeks of IV Abx tx  Pending final blood cultures  Stroke-like symptoms  07/08 morning, nursing staff alerted our team that patient had left-sided facial droop, around the corner of his mouth.  While examining him, this was noted, but the rest of his neuroexam was benign.  When taking his morning aspirin, staff noticed that he started to choke.    Obtained chest x-ray.  No acute cardiopulmonary process. patient satting 94% on room air.  Has not required any supplemental oxygen    STAT CT head was ordered with the following findings: No hemorrhage or evidence of acute cortical infarct.  Chronic infarct in the left basal ganglia was noted.    Findings of brain MRI stated below in this note, showing new infarct \"Acute/recent infarct involving the right gangliocapsular region, with associated cytotoxic edema\"    Bubble study that was done during ANSON revealed small PFO.  No thrombus was visualized during the study  Patient's rope score is a 5, indicating that there is a 34% chance that a new stroke is due to a patent PFO.    Neurology consulted, recommended CTA head and neck.  No large vessel occlusion, high-grade stenosis, or intracranial aneurysm.    Plan:  Current " recommendations from speech therapy are dysphagia 3 dental soft with nectar thick liquids, as patient was choking on thin liquids on Wednesday.  Per their recommendations, if the patient feels able, he can trial thin liquids. Trialed thin liquids at bedside w patient and he was coughing. Will continue thick liquids and have speech continue to eval patient  Continue aspirin and statin  Follow-up additional recommendations from neurology  Urinary retention  Urology consulted for retention and re-colored urine in Hanks bag    Plan:  Continue to monitor for further hematuria. Favor traumatic hanks insertion  Continue flomax 0.4 mg daily  Avoid medications that cause urinary retention such as antihistamines and anticholinergics  Bowel regimen: Held at this time, as patient had large watery bowel movement on Tuesday night, and has been having bowel movements since then without laxatives  Patient did not tolerate voiding trial, urology had to come overnight and place Hanks.  Patient will remain with Hanks until he follows up with them outpatient  History of diabetes mellitus  Remote hx of T2DM. Patient states that he does not take any medications and that he is not diabetics, although this is mentioned in prior documentation  Hemoglobin 6.7    Plan:  Algorithm 2 initiated   After patient resumes more regular will consider algorithm 3   CVA (cerebral vascular accident) (HCC)  Hx of L basal ganglia infarct in 2012 and TIA in 2019. Unable to see in medical record if patient had residual deficits following diagnosis. Only + neuro findings are mild word finding difficulty    Plan:  Continue ASA 81 mg daily. Patient was not taking this as an outpatient, although it is listed in home meds  Continue Lipitor 20 mg daily, dose reduced w itraconazole  Open wound bilateral 1st toes  Podiatry consulted, appreciate recs  Onychomycosis  My exam suggest this patient has onychomycosis on both the right and left feet    Plan:  itraconazole  100 mg twice daily.  Patient should be on treatment for onychomycosis for 12 consecutive weeks. Given interaction with atorvastatin, will reduce dose from 40 mg daily to 20 mg daily spoke with pharmacy about this    Disposition: Remains inpatient for treatment of MSSA bacteremia, patient with finding on right lower extremity MRI for concern of 2 abscesses, will need podiatry's input for source control    Team: SOD TEAM A    Subjective   Patient seen and examined. No acute events overnight.  Patient states he is feeling well today.  Patient's dysarthria has improved.  Patient states that he is feeling quite tired as he got woken up throughout the night.  Patient was not able to tolerate Manuel voiding trial. patient has no acute concerns at this time.      Objective :  Temp:  [98 °F (36.7 °C)-99.1 °F (37.3 °C)] 98.3 °F (36.8 °C)  HR:  [58-67] 58  BP: (140-163)/(71-85) 160/75  Resp:  [17-20] 17  SpO2:  [92 %-94 %] 92 %  O2 Device: None (Room air)    I/O         07/11 0701 07/12 0700 07/12 0701  07/13 0700 07/13 0701  07/14 0700    P.O. 0 530 80    I.V. (mL/kg) 350 (4.2) 30 (0.4)     IV Piggyback  50 100    Total Intake(mL/kg) 350 (4.2) 610 (7.3) 180 (2.2)    Urine (mL/kg/hr) 975 (0.5) 855 (0.4)     Stool  0     Total Output 975 855     Net -625 -245 +180           Unmeasured Stool Occurrence  1 x 1 x          Weights:   IBW (Ideal Body Weight): 75.3 kg    Body mass index is 25.52 kg/m².  Weight (last 2 days)       Date/Time Weight    07/11/25 1035 83 (183)            Physical Exam  Constitutional:       Appearance: Normal appearance. He is not ill-appearing.   HENT:      Head: Normocephalic and atraumatic.      Nose: Nose normal.      Mouth/Throat:      Mouth: Mucous membranes are moist.     Cardiovascular:      Rate and Rhythm: Normal rate and regular rhythm.      Pulses: Normal pulses.      Heart sounds: Normal heart sounds.   Pulmonary:      Effort: Pulmonary effort is normal.      Breath sounds: Normal breath  sounds.   Abdominal:      General: Abdomen is flat. There is no distension.      Palpations: Abdomen is soft.      Tenderness: There is no abdominal tenderness.     Musculoskeletal:      Right lower leg: Edema present.     Skin:     General: Skin is warm and dry.     Neurological:      Mental Status: He is oriented to person, place, and time.      Cranial Nerves: Dysarthria and facial asymmetry present.      Comments: Facial asymmetry and dysarthria have improved.    Psychiatric:         Mood and Affect: Mood normal.           Lab Results: I have reviewed the following results:  Recent Labs     07/13/25  0444   WBC 5.77   HGB 10.7*   HCT 32.1*      SODIUM 135   K 3.5      CO2 26   BUN 24   CREATININE 0.97   GLUC 136             Currently Ordered Meds:   Current Facility-Administered Medications:     acetaminophen (TYLENOL) tablet 975 mg, Q8H PRN    amLODIPine (NORVASC) tablet 5 mg, Daily    aspirin chewable tablet 81 mg, Daily    atorvastatin (LIPITOR) tablet 40 mg, Daily With Dinner    ceFAZolin (ANCEF) IVPB (premix in dextrose) 2,000 mg 50 mL, Q8H, Last Rate: Stopped (07/13/25 0703)    heparin (porcine) subcutaneous injection 5,000 Units, Q8H JOSÉ MIGUEL    insulin lispro (HumALOG/ADMELOG) 100 units/mL subcutaneous injection 1-5 Units, 4x Daily (AC & HS) **AND** Fingerstick Glucose (POCT), 4x Daily AC and at bedtime    itraconazole (SPORANOX) oral solution 100 mg, Daily    LORazepam (ATIVAN) injection 0.5 mg, Once PRN    melatonin tablet 3 mg, HS    ondansetron (ZOFRAN) injection 4 mg, Q6H PRN    senna (SENOKOT) tablet 8.6 mg, HS PRN    tamsulosin (FLOMAX) capsule 0.4 mg, Daily With Dinner  VTE Pharmacologic Prophylaxis: VTE covered by:  heparin (porcine), Subcutaneous, 5,000 Units at 07/13/25 1527     VTE Mechanical Prophylaxis: sequential compression device    Administrative Statements     Portions of the record may have been created with voice recognition software.      Micheline Barrett DO   Internal  Medicine Residency PGY-2  [facility]

## 2025-07-13 NOTE — ASSESSMENT & PLAN NOTE
R foot pain x4 days. Inability to bear weight. Limited ROM. Endorses fatigue, fevers, chills, loss of appetite. Febrile on admissoin to 101.5. ED Workup significant for WBC of 15.99, stable Hb, mild thrombocytopenia, mild hyponatremia, NORMA w Cr of 1.95 (baseline of 1), glucose of 191, AST elevated at 55, T bili 1.48. Lactic 1.5, procal 4.06, INR 1.34. Bcx drawn, and patient started on Ancef    Of note, patient has hx of R foot cellulitis, admission at [facility] in Jan 2024. Received Abx but left AMA before recommended amputation vs. Debridement by care team there. Has questionable hx of diabetes. Patient denies any hx of diabetes.    XR of b/l feet done this admission.  No acute osseous abnormalities were visualized on the bilateral x-rays of the feet  MRI R Foot: concern for diffuse subcutaneous edema and two fluid collections suspicious for abscesses  Plan:  Continue Abx Tx w Ancef, as patient's Bcx have come back as S. Aureus, favor MSSA. This is Day 9 of IV Abx  Patient had very large, watery bowel movement Wednesday evening.  Should he continue to have large, loose stools, we will order C. difficile stool study, placed patient on contact precautions.  This is likely in the setting of constipation for many days.  We will hold his bowel regimen at this time  Continue F/u repeat Bcx from 07/07, currently no growth to date.  Control glucose between 140-180 to promote healing  Encourage patient to eat/drink throughout day to promote healing  Podiatry consulted:  On 07/06, podiatry debrided distal hallux b/l. Open wound was found on distal L hallux  Pending podiatry input for R foot abscesses for possible intervention  Trend WBC, fever  Given patient's prior admission and him leaving AMA, continue to explain importance of receiving Abx therapy  Continue work with PT and OT

## 2025-07-13 NOTE — CASE MANAGEMENT
Case Management Discharge Planning Note    Patient name River Pedro  Location [facility] 901/[facility] 901-01 MRN [id number]  : 1950 Date 2025       Current Admission Date: 2025  Current Admission Diagnosis:Cellulitis of right foot   Patient Active Problem List    Diagnosis Date Noted    Onychomycosis 2025    Stroke-like symptoms 2025    Sepsis (HCC) 2025    Urinary retention 2025    Open wound bilateral 1st toes 2025    MSSA bacteremia 2025    Cellulitis of right foot 2025    CVA (cerebral vascular accident) (HCC) 2025    Impaired cognition 2022    History of transient ischemic attack (TIA) 2022    History of diabetes mellitus 2022      LOS (days): 8  Geometric Mean LOS (GMLOS) (days): 4.9  Days to GMLOS:-3     OBJECTIVE:  Risk of Unplanned Readmission Score: 12.27         Current admission status: Inpatient   Preferred Pharmacy:   PATIENT/FAMILY REPORTS NO PREFERRED PHARMACY  No address on file      Primary Care Provider: No primary care provider on file.    Primary Insurance: [email or web address] MEDICARE REP  Secondary Insurance:     DISCHARGE DETAILS:    Discharge planning discussed with:: Patient and Emily  Freedom of Choice: Yes  Comments - Freedom of Choice: Pt and Emily requesting referral for STR be sent for options and to see if there is a co-pay for STR. CM sent referral.  CM contacted family/caregiver?: Yes  Were Treatment Team discharge recommendations reviewed with patient/caregiver?: Yes  Did patient/caregiver verbalize understanding of patient care needs?: N/A- going to facility  Were patient/caregiver advised of the risks associated with not following Treatment Team discharge recommendations?: Yes    Contacts  Patient Contacts: Emily  Relationship to Patient:: Family  Contact Method: In Person  Reason/Outcome: Referral, Discharge Planning, Emergency Contact              Other Referral/Resources/Interventions  Provided:  Interventions: Short Term Rehab  Referral Comments: CM sent referral for STR as previouly sent STR referral was canceled when pt and family declined it.

## 2025-07-13 NOTE — ASSESSMENT & PLAN NOTE
Urology consulted for retention and re-colored urine in Hanks bag    Plan:  Continue to monitor for further hematuria. Favor traumatic hanks insertion  Continue flomax 0.4 mg daily  Avoid medications that cause urinary retention such as antihistamines and anticholinergics  Bowel regimen: Held at this time, as patient had large watery bowel movement on Tuesday night, and has been having bowel movements since then without laxatives  Patient did not tolerate voiding trial, urology had to come overnight and place [facility].  Patient will remain with [facility] until he follows up with them outpatient

## 2025-07-13 NOTE — ASSESSMENT & PLAN NOTE
Likely multifactorial: Prior CVA, diabetes (highest documented A1c of 8.0), current medical condition  S/p placement of Manuel catheter 7/7 for 350 mL of urine; required straight cath for up to 600 mL previously...... underwent void trial 7/12.  Patient continuing to retain between 304 100 cc.  Nursing staff attempting straight catheterization with difficulty urology consulted for assistance with placement.  S/p placement of 18 Faroese coudé catheter 10 cc within balloon since 7/12/20/2025.  See procedure note in objective section recommend void trial on outpatient.    Urine microscopic with moderate bacteria and pyuria    Plan:    Medical optimization per primary team  Continue Flomax 0.4 mg daily  Patient currently on antibiotics requiring 6-week course per ID.  Avoid medications that can cause urinary retention such as antihistamines, anticholinergics and narcotic pain medication  Daily bowel regimen prevent constipation  Glucose control  Maintain urethral Manuel catheter, void trial in outpatient urology will arrange.

## 2025-07-13 NOTE — CONSULTS
See progress note from today, see original consultation performed this hospitalization 7/7    Alexandrea Malloy PA-C

## 2025-07-13 NOTE — PLAN OF CARE
Problem: INFECTION - ADULT  Goal: Absence or prevention of progression during hospitalization  Description: INTERVENTIONS:  - Assess and monitor for signs and symptoms of infection  - Monitor lab/diagnostic results  - Monitor all insertion sites, i.e. indwelling lines, tubes, and drains  - Monitor endotracheal if appropriate and nasal secretions for changes in amount and color  - [place] appropriate cooling/warming therapies per order  - Administer medications as ordered  - Instruct and encourage patient and family to use good hand hygiene technique  - Identify and instruct in appropriate isolation precautions for identified infection/condition  Outcome: Progressing

## 2025-07-13 NOTE — PROGRESS NOTES
Progress Note - Urology   Name: River Pedro 75 y.o. male I MRN: [id number]  Unit/Bed#: [facility] 901-01 I Date of Admission: 7/5/2025   Date of Service: 7/12/2025 I Hospital Day: 7     Assessment & Plan  Urinary retention  Likely multifactorial: Prior CVA, diabetes (highest documented A1c of 8.0), current medical condition  S/p placement of Manuel catheter 7/7 for 350 mL of urine; required straight cath for up to 600 mL previously...... underwent void trial 7/12.  Patient continuing to retain between 304 100 cc.  Nursing staff attempting straight catheterization with difficulty urology consulted for assistance with placement.  S/p placement of 18 English coudé catheter 10 cc within balloon since 7/12/20/2025.  See procedure note in objective section recommend void trial on outpatient.    Urine microscopic with moderate bacteria and pyuria    Plan:    Medical optimization per primary team  Continue Flomax 0.4 mg daily  Patient currently on antibiotics requiring 6-week course per ID.  Avoid medications that can cause urinary retention such as antihistamines, anticholinergics and narcotic pain medication  Daily bowel regimen prevent constipation  Glucose control  Maintain urethral Manuel catheter, void trial in outpatient urology will arrange.    History of diabetes mellitus  Last A1c 6.7 in January 2024  Management per primary team  CVA (cerebral vascular accident) ([facility])  Management per primary team  Open wound bilateral 1st toes  Management per primary team  Sepsis ([facility])  Management per primary team        Urology service signing off.    Subjective   Patient sitting comfortably in bed no acute distress.  He denies any abdominal pain or disc distention or the sensation that he needs to void.  Bladder scans around 400.  Voided approximately 50 cc of bloody urine in urinal.    Objective :  Temp:  [98 °F (36.7 °C)-99.1 °F (37.3 °C)] 99.1 °F (37.3 °C)  HR:  [65-67] 67  BP: (140-163)/(71-85) 163/74  Resp:  [17-20] 20  SpO2:   [93 %-94 %] 94 %  O2 Device: None (Room air)    I/O         07/11 0701  07/12 0700 07/12 0701  07/13 0700    P.O. 0 480    I.V. (mL/kg) 350 (4.2) 30 (0.4)    IV Piggyback  50    Total Intake(mL/kg) 350 (4.2) 560 (6.7)    Urine (mL/kg/hr) 975 (0.5) 305 (0.2)    Stool  0    Total Output 975 305    Net -625 +255          Unmeasured Stool Occurrence  1 x          Lines/Drains/Airways       Active Status       Name Placement date Placement time Site Days    PICC Line 07/10/25 Right Basilic 07/10/25  1549  Basilic  2                  BEDSIDE PROCEDURE  Indwelling Catheter PROCEDURE NOTE    Pre-operative Diagnosis: BPH, Urinary Retention        Post-operative Diagnosis: BPH urinary retention    INDICATION   75-year-old male with a history of BPH, recent hospitalization and urinary retention failing void trial difficulty with straight catheterizations. Consultation requested to perform Manuel Catheter Placement.     TIME OUT: Correct patient identity.    PROCEDURE   Consent: Patient was agreeable. Formal  Informed consent however, not applicable.    Under sterile conditions the patient was positioned. Betadine solution and sterile drapes were utilized.  Uro-Jet urethral lidocaine was used to lubricate urethral meatus insertion site.  Utilized 18 Malay coudé catheter, 10 cc in balloon. Urine was obtained without any difficulties and  Without evidence of hematuria or trauma.     Findings  400 ml of clear yellow urine was obtained.     Complications:  None; patient tolerated the procedure well.            Condition: stable    Plan  Plan above          Physical Exam  Constitutional:       General: He is not in acute distress.     Appearance: He is normal weight. He is not ill-appearing, toxic-appearing or diaphoretic.   HENT:      Head: Normocephalic and atraumatic.      Right Ear: External ear normal.      Left Ear: External ear normal.      Nose: Nose normal.      Mouth/Throat:      Pharynx: Oropharynx is clear.     Eyes:       General: No scleral icterus.     Conjunctiva/sclera: Conjunctivae normal.       Cardiovascular:      Rate and Rhythm: Normal rate and regular rhythm.   Pulmonary:      Effort: Pulmonary effort is normal. No respiratory distress.      Breath sounds: No wheezing, rhonchi or rales.   Abdominal:      General: There is no distension.      Tenderness: There is no abdominal tenderness.   Genitourinary:     Comments: 50 mL of bloody urine in urinal.    Neurological:      General: No focal deficit present.      Mental Status: He is alert and oriented to person, place, and time.           Lab Results: I have reviewed the following results:  Recent Labs     07/12/25  0601   WBC 4.49   HGB 10.8*   HCT 32.6*      SODIUM 137   K 4.0      CO2 24   BUN 29*   CREATININE 1.16   GLUC 122         VTE Pharmacologic Prophylaxis: Heparin  VTE Mechanical Prophylaxis: sequential compression device    Alexandrea Malloy PA-C

## 2025-07-13 NOTE — TELEPHONE ENCOUNTER
Patient with urinary retention.  Manuel catheter placed 7/7 underwent void trial 7/12 failed replaced.     Unfortunately patient failed void trial, most likely due to decreased ambulation current hospitalization, BPH, history of diabetes.    He will be going home most likely with visiting nurses for additional medical conditions during this hospitalization.    Please assist with scheduling void trial within 1 week.  May be able to have visiting nurse remove Manuel catheter in a.m. and follow-up appointment in afternoon with us.

## 2025-07-14 LAB
ANION GAP SERPL CALCULATED.3IONS-SCNC: 8 MMOL/L (ref 4–13)
BASOPHILS # BLD AUTO: 0.01 THOUSANDS/ÂΜL (ref 0–0.1)
BASOPHILS NFR BLD AUTO: 0 % (ref 0–1)
BUN SERPL-MCNC: 19 MG/DL (ref 5–25)
CALCIUM SERPL-MCNC: 7.7 MG/DL (ref 8.4–10.2)
CHLORIDE SERPL-SCNC: 101 MMOL/L (ref 96–108)
CO2 SERPL-SCNC: 24 MMOL/L (ref 21–32)
CREAT SERPL-MCNC: 0.86 MG/DL (ref 0.6–1.3)
EOSINOPHIL # BLD AUTO: 0.08 THOUSAND/ÂΜL (ref 0–0.61)
EOSINOPHIL NFR BLD AUTO: 1 % (ref 0–6)
ERYTHROCYTE [DISTWIDTH] IN BLOOD BY AUTOMATED COUNT: 14.9 % (ref 11.6–15.1)
GFR SERPL CREATININE-BSD FRML MDRD: 85 ML/MIN/1.73SQ M
GLUCOSE SERPL-MCNC: 112 MG/DL (ref 65–140)
GLUCOSE SERPL-MCNC: 137 MG/DL (ref 65–140)
GLUCOSE SERPL-MCNC: 142 MG/DL (ref 65–140)
GLUCOSE SERPL-MCNC: 153 MG/DL (ref 65–140)
GLUCOSE SERPL-MCNC: 190 MG/DL (ref 65–140)
HCT VFR BLD AUTO: 32.8 % (ref 36.5–49.3)
HGB BLD-MCNC: 11.1 G/DL (ref 12–17)
IMM GRANULOCYTES # BLD AUTO: 0.05 THOUSAND/UL (ref 0–0.2)
IMM GRANULOCYTES NFR BLD AUTO: 1 % (ref 0–2)
LYMPHOCYTES # BLD AUTO: 1.03 THOUSANDS/ÂΜL (ref 0.6–4.47)
LYMPHOCYTES NFR BLD AUTO: 18 % (ref 14–44)
MCH RBC QN AUTO: 30.2 PG (ref 26.8–34.3)
MCHC RBC AUTO-ENTMCNC: 33.8 G/DL (ref 31.4–37.4)
MCV RBC AUTO: 89 FL (ref 82–98)
MONOCYTES # BLD AUTO: 0.52 THOUSAND/ÂΜL (ref 0.17–1.22)
MONOCYTES NFR BLD AUTO: 9 % (ref 4–12)
NEUTROPHILS # BLD AUTO: 4.13 THOUSANDS/ÂΜL (ref 1.85–7.62)
NEUTS SEG NFR BLD AUTO: 71 % (ref 43–75)
NRBC BLD AUTO-RTO: 0 /100 WBCS
PLATELET # BLD AUTO: 210 THOUSANDS/UL (ref 149–390)
PMV BLD AUTO: 11.6 FL (ref 8.9–12.7)
POTASSIUM SERPL-SCNC: 3.5 MMOL/L (ref 3.5–5.3)
RBC # BLD AUTO: 3.67 MILLION/UL (ref 3.88–5.62)
SODIUM SERPL-SCNC: 133 MMOL/L (ref 135–147)
WBC # BLD AUTO: 5.82 THOUSAND/UL (ref 4.31–10.16)

## 2025-07-14 PROCEDURE — G0545 PR INHERENT VISIT TO INPT: HCPCS | Performed by: INTERNAL MEDICINE

## 2025-07-14 PROCEDURE — 85025 COMPLETE CBC W/AUTO DIFF WBC: CPT

## 2025-07-14 PROCEDURE — 97116 GAIT TRAINING THERAPY: CPT

## 2025-07-14 PROCEDURE — 80048 BASIC METABOLIC PNL TOTAL CA: CPT

## 2025-07-14 PROCEDURE — 97112 NEUROMUSCULAR REEDUCATION: CPT

## 2025-07-14 PROCEDURE — 92526 ORAL FUNCTION THERAPY: CPT

## 2025-07-14 PROCEDURE — 99232 SBSQ HOSP IP/OBS MODERATE 35: CPT | Performed by: INTERNAL MEDICINE

## 2025-07-14 PROCEDURE — 82948 REAGENT STRIP/BLOOD GLUCOSE: CPT

## 2025-07-14 PROCEDURE — 99232 SBSQ HOSP IP/OBS MODERATE 35: CPT | Performed by: STUDENT IN AN ORGANIZED HEALTH CARE EDUCATION/TRAINING PROGRAM

## 2025-07-14 PROCEDURE — 97530 THERAPEUTIC ACTIVITIES: CPT

## 2025-07-14 RX ADMIN — HEPARIN SODIUM 5000 UNITS: 5000 INJECTION INTRAVENOUS; SUBCUTANEOUS at 21:11

## 2025-07-14 RX ADMIN — Medication 3 MG: at 21:12

## 2025-07-14 RX ADMIN — ASPIRIN 81 MG CHEWABLE TABLET 81 MG: 81 TABLET CHEWABLE at 09:09

## 2025-07-14 RX ADMIN — HEPARIN SODIUM 5000 UNITS: 5000 INJECTION INTRAVENOUS; SUBCUTANEOUS at 05:00

## 2025-07-14 RX ADMIN — HEPARIN SODIUM 5000 UNITS: 5000 INJECTION INTRAVENOUS; SUBCUTANEOUS at 13:02

## 2025-07-14 RX ADMIN — AMLODIPINE BESYLATE 5 MG: 5 TABLET ORAL at 09:09

## 2025-07-14 RX ADMIN — CEFAZOLIN SODIUM 2000 MG: 2 SOLUTION INTRAVENOUS at 13:03

## 2025-07-14 RX ADMIN — CEFAZOLIN SODIUM 2000 MG: 2 SOLUTION INTRAVENOUS at 05:00

## 2025-07-14 RX ADMIN — ATORVASTATIN CALCIUM 40 MG: 40 TABLET, FILM COATED ORAL at 16:29

## 2025-07-14 RX ADMIN — TAMSULOSIN HYDROCHLORIDE 0.4 MG: 0.4 CAPSULE ORAL at 16:29

## 2025-07-14 RX ADMIN — INSULIN LISPRO 1 UNITS: 100 INJECTION, SOLUTION INTRAVENOUS; SUBCUTANEOUS at 17:12

## 2025-07-14 RX ADMIN — ITRACONAZOLE 100 MG: 10 SOLUTION ORAL at 09:09

## 2025-07-14 RX ADMIN — INSULIN LISPRO 1 UNITS: 100 INJECTION, SOLUTION INTRAVENOUS; SUBCUTANEOUS at 12:00

## 2025-07-14 RX ADMIN — CEFAZOLIN SODIUM 2000 MG: 2 SOLUTION INTRAVENOUS at 21:11

## 2025-07-14 NOTE — ASSESSMENT & PLAN NOTE
History of DM.  Patient is not on any medication.  Hemoglobin A1c elevated and stable.  This is risk factor for poor wound healing and infection above.  Management per primary service.

## 2025-07-14 NOTE — ASSESSMENT & PLAN NOTE
"07/08 morning, nursing staff alerted our team that patient had left-sided facial droop, around the corner of his mouth.  While examining him, this was noted, but the rest of his neuroexam was benign.  When taking his morning aspirin, staff noticed that he started to choke.    Obtained chest x-ray.  No acute cardiopulmonary process. patient satting 94% on room air.  Has not required any supplemental oxygen    STAT CT head was ordered with the following findings: No hemorrhage or evidence of acute cortical infarct.  Chronic infarct in the left basal ganglia was noted.    Findings of brain MRI stated below in this note, showing new infarct \"Acute/recent infarct involving the right gangliocapsular region, with associated cytotoxic edema\"    Bubble study that was done during ANSON revealed small PFO.  No thrombus was visualized during the study  Patient's rope score is a 5, indicating that there is a 34% chance that a new stroke is due to a patent PFO.    Neurology consulted, recommended CTA head and neck.  No large vessel occlusion, high-grade stenosis, or intracranial aneurysm.    Plan:  Current recommendations from speech therapy are dysphagia 3 dental soft with nectar thick liquids, as patient was choking on thin liquids on Wednesday.  Trialed thin liquids per SLP with coughing, recommended nectar thick.   Continue aspirin and statin  Follow-up additional recommendations from neurology  "

## 2025-07-14 NOTE — ASSESSMENT & PLAN NOTE
Urology consulted for retention and re-colored urine in [facility] bag    Plan:  Continue to monitor for further hematuria. Favor traumatic hanks insertion  Continue flomax 0.4 mg daily  Avoid medications that cause urinary retention such as antihistamines and anticholinergics  Bowel regimen: Held at this time, as patient had large watery bowel movement on Tuesday night, and has been having bowel movements since then without laxatives  Patient did not tolerate voiding trial, urology replaced on 7/12 .  Patient will remain with [facility] until he follows up with them outpatient

## 2025-07-14 NOTE — ASSESSMENT & PLAN NOTE
May resume aspirin unless another doctor asked you to hold it for another reason.    Right foot cellulitis.  Source is most likely the chronic right first toe ulcer.  This is likely source of bacteremia and sepsis above.  Foot x-ray is without obvious bony abnormalities.   Foot cellulitis is much improved.  However,  right foot MRI show 2 small soft tissue collections.  There was no obvious osteomyelitis.  Antibiotic as above.  Serial foot exam.  Podiatry follow-up to decide on need of I&D.

## 2025-07-14 NOTE — PLAN OF CARE
Problem: OCCUPATIONAL THERAPY ADULT  Goal: Performs self-care activities at highest level of function for planned discharge setting.  See evaluation for individualized goals.  Description: Treatment Interventions: ADL retraining, Functional transfer training, Endurance training, Cognitive reorientation, Patient/family training, Compensatory technique education, Continued evaluation, Energy conservation, Activityengagement          See flowsheet documentation for full assessment, interventions and recommendations.   7/14/2025 1527 by Aung Florentino  Outcome: Progressing  Note: Limitation: Decreased ADL status, Decreased endurance, Decreased self-care trans, Decreased high-level ADLs  Prognosis: Fair  Assessment: Pt seen for skilled OT treatment session from 12:49 to 13:20 w/ interventions focusing on ADL participation, activity tolerance, sitting tolerance, sitting balance, standing tolerance, standing balance, bed mobility , transfer skills, and fxnl mobility. Pt was agreeable and willing to participate in session. Pt engaged in the following tasks: Max A for LB dressing, Min Ax1 for supine to sit bed mobility, Mod Ax1-Mod Ax2 for STS transfers, Mod Ax1 for stand to sit transfers and fxnl mobility using RW for support + SBA of another and chair follow for safety. In comparison to previous session, pt demonstrated improvements in STS transfers, stand to sit transfers and fxnl mobility by requiring less physical assistance on this date. Pt required verbal cues for safety, verbal cues for correct technique, one step directives, and frequent rest periods. Pt continues to be functioning below baseline level as occupational performance remains limited by decreased ADL status, decreased activity tolerance, decreased endurance, decreased standing tolerance, decreased standing balance, decreased transfer skills, decreased fxnl mobility, and decreased safety awareness. From OT standpoint, recommend Level II (Moderate  Resource Intensity) at time of d/c. Pt will benefit from continued OT treatment while in acute care to address deficits as defined above and maximize level of functional independence with ADLs and functional mobility. Pt seated OOB in chair w/ alarm activated and all needs met at end of session.     Rehab Resource Intensity Level, OT: II (Moderate Resource Intensity)       7/14/2025 1527 by Aung Florentino  Note: Limitation: Decreased ADL status, Decreased endurance, Decreased self-care trans, Decreased high-level ADLs  Prognosis: Fair  Assessment: Pt seen for skilled OT treatment session from 12:49 to 13:20 w/ interventions focusing on ADL participation, activity tolerance, sitting tolerance, sitting balance, standing tolerance, standing balance, bed mobility , transfer skills, and fxnl mobility. Pt was agreeable and willing to participate in session. Pt engaged in the following tasks: Max A for LB dressing, Min Ax1 for supine to sit bed mobility, Mod Ax1-Mod Ax2 for STS transfers, Mod Ax1 for stand to sit transfers and fxnl mobility using RW for support + SBA of another and chair follow for safety. In comparison to previous session, pt demonstrated improvements in STS transfers, stand to sit transfers and fxnl mobility by requiring less physical assistance on this date. Pt required verbal cues for safety, verbal cues for correct technique, one step directives, and frequent rest periods. Pt continues to be functioning below baseline level as occupational performance remains limited by decreased ADL status, decreased activity tolerance, decreased endurance, decreased standing tolerance, decreased standing balance, decreased transfer skills, decreased fxnl mobility, and decreased safety awareness. From OT standpoint, recommend Level II (Moderate Resource Intensity) at time of d/c. Pt will benefit from continued OT treatment while in acute care to address deficits as defined above and maximize level of functional  independence with ADLs and functional mobility. Pt seated OOB in chair w/ alarm activated and all needs met at end of session.     Rehab Resource Intensity Level, OT: II (Moderate Resource Intensity)

## 2025-07-14 NOTE — PLAN OF CARE
Problem: DISCHARGE PLANNING  Goal: Discharge to home or other facility with appropriate resources  Description: INTERVENTIONS:  - Identify barriers to discharge w/patient and caregiver  - Arrange for needed discharge resources and transportation as appropriate  - Identify discharge learning needs (meds, wound care, etc.)  - Arrange for interpretive services to assist at discharge as needed  - Refer to Case Management Department for coordinating discharge planning if the patient needs post-hospital services based on physician/advanced practitioner order or complex needs related to functional status, cognitive ability, or social support system  7/14/2025 0504 by Estela Fisher, RN  Outcome: Progressing  7/13/2025 2003 by Estela Fisher, RN  Outcome: Progressing

## 2025-07-14 NOTE — DISCHARGE INSTR - OTHER ORDERS
Foot wound care instructions: Please apply Betadine paint. Then cover with 4x4 dry gauze and secure with rolled gauze and tape. Please change dressing every Monday, Wednesday, and Friday .

## 2025-07-14 NOTE — CASE MANAGEMENT
Case Management Discharge Planning Note    Patient name River Pedro  Location [facility] 901/[facility] 901-01 MRN [id number]  : 1950 Date 2025       Current Admission Date: 2025  Current Admission Diagnosis:Cellulitis of right foot   Patient Active Problem List    Diagnosis Date Noted    Onychomycosis 2025    Stroke-like symptoms 2025    Sepsis (HCC) 2025    Urinary retention 2025    Open wound bilateral 1st toes 2025    MSSA bacteremia 2025    Cellulitis of right foot 2025    CVA (cerebral vascular accident) (HCC) 2025    Impaired cognition 2022    History of transient ischemic attack (TIA) 2022    History of diabetes mellitus 2022      LOS (days): 9  Geometric Mean LOS (GMLOS) (days): 4.9  Days to GMLOS:-4     OBJECTIVE:  Risk of Unplanned Readmission Score: 12.19         Current admission status: Inpatient   Preferred Pharmacy:   PATIENT/FAMILY REPORTS NO PREFERRED PHARMACY  No address on file      Primary Care Provider: No primary care provider on file.    Primary Insurance: [employer] REP  Secondary Insurance:     DISCHARGE DETAILS:    Discharge planning discussed with:: Patient and Emily  Freedom of Choice: Yes  Comments - Freedom of Choice: Cm provided pt and Emily with the estimated cost of the per sadie nursing through [employer] ($159 for 7 wks).  CM also provided the overview of the STR facilites referrals were sent to.  They have not made a decision at this time.  CM contacted family/caregiver?: Yes  Were Treatment Team discharge recommendations reviewed with patient/caregiver?: Yes  Did patient/caregiver verbalize understanding of patient care needs?: N/A- going to facility  Were patient/caregiver advised of the risks associated with not following Treatment Team discharge recommendations?: Yes    Contacts  Relationship to Patient:: Family  Contact Method: In Person  Reason/Outcome: Referral, Discharge Planning, Emergency  Contact

## 2025-07-14 NOTE — SPEECH THERAPY NOTE
"Speech Language/Pathology    Speech/Language Pathology Progress Note    Patient Name: River Pedro  Today's Date: 7/14/2025     Problem List  Principal Problem:    Cellulitis of right foot  Active Problems:    History of diabetes mellitus    CVA (cerebral vascular accident) (HCC)    Open wound bilateral 1st toes    MSSA bacteremia    Sepsis (HCC)    Urinary retention    Stroke-like symptoms    Onychomycosis       Past Medical History  Past Medical History[1]     Past Surgical History  Past Surgical History[2]      Subjective:  \"I like this food\" Patient awake and alert.     Objective:  The patient is seen for dysphagia therapy at lunch meal. He reports enjoyment of level 3 solids and nectar thick liquids and ease of intake. He states he's been coughing a lot with regular, thin water. The patient is feeding himself level 3 pasta and carrots. Bite size and rate is adequate with timely mastication. He takes nectar thick liquids via cup. No overt s/s aspiration observed. Discussed diet with patient. He would like to continue current diet and address possible upgrade as able in rehab.     Assessment:  The patient is tolerating level 3 solids and nectar thick liquids well.     Plan/Recommendations:  Continue current diet. Continue ST in rehab to advance diet. No further ST warranted in acute care. Please re-consult as needed.           [1] No past medical history on file.  [2] No past surgical history on file.    "

## 2025-07-14 NOTE — ASSESSMENT & PLAN NOTE
MSSA bacteremia.  There was growth in both admission blood cultures, although obtain via peripheral IV.  Although site of blood draw was not ideal, given presence of clinical sepsis and soft tissue infection, this is most likely true MSSA bacteremia.  Given comorbid illnesses, patient will need long-term IV antibiotic.  TTE is without obvious vegetation.  Therefore, as long as bacteremia clears, ANSON will not be necessary since it does not alter treatment plan.  Repeat blood cultures have no growth thus far.  Continue high-dose IV cefazolin.  Follow-up repeat blood cultures.  Treat x 6 weeks IV antibiotic after clearance of bacteremia, through 8/18.

## 2025-07-14 NOTE — PLAN OF CARE
Problem: PHYSICAL THERAPY ADULT  Goal: Performs mobility at highest level of function for planned discharge setting.  See evaluation for individualized goals.  Description: Treatment/Interventions: ADL retraining, Functional transfer training, LE strengthening/ROM, Elevations, Therapeutic exercise, Endurance training, Patient/family training, Equipment eval/education, Bed mobility, Gait training, Spoke to nursing, Spoke to case management, OT          See flowsheet documentation for full assessment, interventions and recommendations.  Outcome: Progressing  Note: Prognosis: Good  Problem List: Decreased strength, Impaired balance, Decreased mobility, Decreased endurance, Decreased cognition, Impaired judgement, Decreased safety awareness, Impaired sensation  Assessment: Pt very pleasant and agreeable to participate in PT session. Completes mobility and therapeutic activity as outlined above. Pt able complete STS with mod Ax1-2, fluctuates based on fatigue. Pt noted to be slightly retropulsive during STS and unsteady when initially standing, taking a minute to catch his balance. Pt requires cures for hand placement during transfers. Pt noted to have 2 LOB during session requiring mod A to correct. Pt was able to improve ambulation distance with less physical assistance compared to previous session. Pt requires seated rest breaks throughout session due to fatigue. Pt demonstrates decreased foot clearance and step length with LLE. Pt is motivated to improve and walk more. Pt left upright in chair with chair alarm donned, call bell and personal items within reach and all needs met.  Barriers to Discharge: Inaccessible home environment, Decreased caregiver support     Rehab Resource Intensity Level, PT: (S) I (Maximum Resource Intensity)    See flowsheet documentation for full assessment.

## 2025-07-14 NOTE — ASSESSMENT & PLAN NOTE
CVA.  Patient with history of basal ganglia infarct.  New facial droop noted, with head CT, reviewed personally, showing no acute infarct.  However, brain MRI show acute infarct involving right ganglial capsular region.  Management per primary service.

## 2025-07-14 NOTE — OCCUPATIONAL THERAPY NOTE
Occupational Therapy Progress Note     Patient Name: River Pedro  Today's Date: 7/14/2025  Problem List  Principal Problem:    Cellulitis of right foot  Active Problems:    History of diabetes mellitus    CVA (cerebral vascular accident) (HCC)    Open wound bilateral 1st toes    MSSA bacteremia    Sepsis ([facility])    Urinary retention    Stroke-like symptoms    Onychomycosis        07/14/25 1320   OT Last Visit   OT Visit Date 07/14/25   Note Type   Note Type Treatment   Pain Assessment   Pain Assessment Tool 0-10   Pain Score No Pain   Restrictions/Precautions   Weight Bearing Precautions Per Order Yes   RLE Weight Bearing Per Order WBAT   LLE Weight Bearing Per Order WBAT   Other Precautions Cognitive;Chair Alarm;Bed Alarm;Fall Risk;Multiple lines   Lifestyle   Autonomy Pta pt ind. w/ adls and iadls, functional mobility, and (-) . Utilizes public transportation.   Reciprocal Relationships Supportive family   Service to Others Retired   ADL   LB Dressing Assistance 2  Maximal Assistance   LB Dressing Deficit Setup;Don/doff R sock;Don/doff L sock   Bed Mobility   Supine to Sit 4  Minimal assistance   Additional items Assist x 1;HOB elevated;Increased time required;Verbal cues   Sit to Supine Unable to assess   Additional Comments Pt lying supine in bed upon therapist's arrival and seated OOB in chair at end of OT tx session with alarm activated and all needs within reach.   Transfers   Sit to Stand 3  Moderate assistance  (Pt fluctuated between Mod Ax1 and Mod Ax2 for STS transfers throughout tx session)   Additional items Assist x 1;Assist x 2;Increased time required;Verbal cues  (VC to push off the arm rest with hand while holding the walker with the other)   Stand to Sit 3  Moderate assistance   Additional items Assist x 1;Increased time required;Verbal cues  (VC to reach back for arm rests before sitting down)   Additional Comments x4 STS with RW; retropulsive in stance   Functional Mobility   Functional  "Mobility 3  Moderate assistance   Additional Comments Pt able to ambulate long household distance with Mod Ax1 with RW for support + SBA of another and chair follow for safety.   Additional items Rolling walker   Subjective   Subjective \"I want to make it to that window.\"   Cognition   Overall Cognitive Status Impaired   Arousal/Participation Alert;Responsive;Cooperative   Attention Attends with cues to redirect   Orientation Level Oriented X4   Memory Decreased short term memory;Decreased recall of recent events   Following Commands Follows one step commands with increased time or repetition   Comments Pt pleasant and cooperative; requires frequent VC for safety, increased time for processing and response time for questions; difficulty with word production - states he knows the word in his head but has difficulty saying it.   Activity Tolerance   Activity Tolerance Patient tolerated treatment well   Medical Staff Made Aware OT, Nataly, and PTKhushbu due to pt's medical complexity and multiple comorbidities; RN clearance prior to session   Assessment   Assessment Pt seen for skilled OT treatment session from 12:49 to 13:20 w/ interventions focusing on ADL participation, activity tolerance, sitting tolerance, sitting balance, standing tolerance, standing balance, bed mobility , transfer skills, and fxnl mobility. Pt was agreeable and willing to participate in session. Pt engaged in the following tasks: Max A for LB dressing, Min Ax1 for supine to sit bed mobility, Mod Ax1-Mod Ax2 for STS transfers, Mod Ax1 for stand to sit transfers and fxnl mobility using RW for support + SBA of another and chair follow for safety. In comparison to previous session, pt demonstrated improvements in STS transfers, stand to sit transfers and fxnl mobility by requiring less physical assistance on this date. Pt required verbal cues for safety, verbal cues for correct technique, one step directives, and frequent rest periods. Pt " continues to be functioning below baseline level as occupational performance remains limited by decreased ADL status, decreased activity tolerance, decreased endurance, decreased standing tolerance, decreased standing balance, decreased transfer skills, decreased fxnl mobility, and decreased safety awareness. From OT standpoint, recommend Level II (Moderate Resource Intensity) at time of d/c. Pt will benefit from continued OT treatment while in acute care to address deficits as defined above and maximize level of functional independence with ADLs and functional mobility. Pt seated OOB in chair w/ alarm activated and all needs met at end of session.   Plan   Treatment Interventions ADL retraining;Functional transfer training;Endurance training;Patient/family training;Equipment evaluation/education;Compensatory technique education;Continued evaluation;Energy conservation;Activityengagement   Goal Expiration Date 07/21/25   OT Treatment Day 1   OT Frequency 2-3x/wk   Discharge Recommendation   Rehab Resource Intensity Level, OT II (Moderate Resource Intensity)   AM-PAC Daily Activity Inpatient   Lower Body Dressing 2   Bathing 2   Toileting 2   Upper Body Dressing 3   Grooming 3   Eating 4   Daily Activity Raw Score 16   Daily Activity Standardized Score (Calc for Raw Score >=11) 35.96   AM-PAC Applied Cognition Inpatient   Following a Speech/Presentation 3   Understanding Ordinary Conversation 4   Taking Medications 2   Remembering Where Things Are Placed or Put Away 2   Remembering List of 4-5 Errands 2   Taking Care of Complicated Tasks 2   Applied Cognition Raw Score 15   Applied Cognition Standardized Score 33.54   End of Consult   Education Provided Yes   Patient Position at End of Consult Bedside chair;Bed/Chair alarm activated;All needs within reach   Nurse Communication Nurse aware of consult     The patient's raw score on the AM-PAC Daily Activity Inpatient Short Form is 16. A raw score of less than 19  suggests the patient may benefit from discharge to post-acute rehabilitation services. Please refer to the recommendation of the Occupational Therapist for safe discharge planning.     ALEJO Oh

## 2025-07-14 NOTE — PLAN OF CARE
Problem: PAIN - ADULT  Goal: Verbalizes/displays adequate comfort level or baseline comfort level  Description: Interventions:  - Encourage patient to monitor pain and request assistance  - Assess pain using appropriate pain scale  - Administer analgesics as ordered based on type and severity of pain and evaluate response  - Implement non-pharmacological measures as appropriate and evaluate response  - Consider cultural and social influences on pain and pain management  - Notify physician/advanced practitioner if interventions unsuccessful or patient reports new pain  - Educate patient/family on pain management process including their role and importance of  reporting pain   - Provide non-pharmacologic/complimentary pain relief interventions  Outcome: Progressing     Problem: INFECTION - ADULT  Goal: Absence or prevention of progression during hospitalization  Description: INTERVENTIONS:  - Assess and monitor for signs and symptoms of infection  - Monitor lab/diagnostic results  - Monitor all insertion sites, i.e. indwelling lines, tubes, and drains  - Monitor endotracheal if appropriate and nasal secretions for changes in amount and color  - Stearns appropriate cooling/warming therapies per order  - Administer medications as ordered  - Instruct and encourage patient and family to use good hand hygiene technique  - Identify and instruct in appropriate isolation precautions for identified infection/condition  Outcome: Progressing

## 2025-07-14 NOTE — ASSESSMENT & PLAN NOTE
Sepsis, with fever and leukocytosis.  Source of sepsis is most likely right foot cellulitis, complicated by probable MSSA bacteremia.  Patient is clinically improved.  Temperature is down.  WBC has normalized.  Despite sepsis, he has remained systemically well, without toxicity and hemodynamically stable, without hypotension.  Antibiotic as below.  Monitor temperature/WBC.  Monitor hemodynamics.

## 2025-07-14 NOTE — PROGRESS NOTES
Progress Note - Infectious Disease   Name: River Pedro 75 y.o. male I MRN: [id number]  Unit/Bed#: [facility] 901-01 I Date of Admission: 7/5/2025   Date of Service: 7/14/2025 I Hospital Day: 9    Assessment & Plan  MSSA bacteremia  MSSA bacteremia.  There was growth in both admission blood cultures, although obtain via peripheral IV.  Although site of blood draw was not ideal, given presence of clinical sepsis and soft tissue infection, this is most likely true MSSA bacteremia.  Given comorbid illnesses, patient will need long-term IV antibiotic.  TTE is without obvious vegetation.  Therefore, as long as bacteremia clears, ANSON will not be necessary since it does not alter treatment plan.  Repeat blood cultures have no growth thus far.  Continue high-dose IV cefazolin.  Follow-up repeat blood cultures.  Treat x 6 weeks IV antibiotic after clearance of bacteremia, through 8/18.  Cellulitis of right foot  Right foot cellulitis.  Source is most likely the chronic right first toe ulcer.  This is likely source of bacteremia and sepsis above.  Foot x-ray is without obvious bony abnormalities.   Foot cellulitis is much improved.  However,  right foot MRI show 2 small soft tissue collections.  There was no obvious osteomyelitis.  Antibiotic as above.  Serial foot exam.  Podiatry follow-up to decide on need of I&D.   Sepsis (HCC)  Sepsis, with fever and leukocytosis.  Source of sepsis is most likely right foot cellulitis, complicated by probable MSSA bacteremia.  Patient is clinically improved.  Temperature is down.  WBC has normalized.  Despite sepsis, he has remained systemically well, without toxicity and hemodynamically stable, without hypotension.  Antibiotic as below.  Monitor temperature/WBC.  Monitor hemodynamics.  CVA (cerebral vascular accident) (HCC)  CVA.  Patient with history of basal ganglia infarct.  New facial droop noted, with head CT, reviewed personally, showing no acute infarct.  However, brain MRI show acute  infarct involving right ganglial capsular region.  Management per primary service.  History of diabetes mellitus  History of DM.  Patient is not on any medication.  Hemoglobin A1c elevated and stable.  This is risk factor for poor wound healing and infection above.  Management per primary service.    Discussed with patient in detail regarding the above plan.      Antibiotics:  Cefazolin  Antibiotic # 9  Last negative blood culture 7/7    Subjective   Patient feels well.  No further pain in right foot.  Temperature stays down.  No chills.  He is tolerating antibiotic well.  No nausea, vomiting or diarrhea.    Objective :  Temp:  [98 °F (36.7 °C)-99.7 °F (37.6 °C)] 98 °F (36.7 °C)  HR:  [65-70] 69  BP: (153-167)/(76-84) 167/82  Resp:  [17-22] 17  SpO2:  [91 %-95 %] 91 %  O2 Device: None (Room air)    Physical Exam:     General: Awake, alert, cooperative, no distress.   Neck:  Supple. No mass.  No lymphadenopathy.   Lungs: Expansion symmetric, no rales, no wheezing, respirations unlabored.   Heart:  Regular rate and rhythm, S1 and S2 normal, no murmur.   Abdomen: Soft, nondistended, non-tender, bowel sounds active all four quadrants, no masses, no organomegaly.   Extremities: Improved leg edema.  Much improved right foot erythema/warmth.  Minimal tenderness.  Stable bilateral superficial big toe ulcers, without purulence   Skin:  No rash.   Neuro: Moves all extremities.        Lab Results: I have reviewed the following results:  Results from last 7 days   Lab Units 07/14/25  0459 07/13/25  0444 07/12/25  0601   WBC Thousand/uL 5.82 5.77 4.49   HEMOGLOBIN g/dL 11.1* 10.7* 10.8*   PLATELETS Thousands/uL 210 226 230     Results from last 7 days   Lab Units 07/14/25  0459 07/13/25  0444 07/12/25  0601 07/09/25  1106 07/08/25  0808   SODIUM mmol/L 133* 135 137   < > 131*   POTASSIUM mmol/L 3.5 3.5 4.0   < > 3.8   CHLORIDE mmol/L 101 104 108   < > 101   CO2 mmol/L 24 26 24   < > 21   BUN mg/dL 19 24 29*   < > 42*    CREATININE mg/dL 0.86 0.97 1.16   < > 1.38*   EGFR ml/min/1.73sq m 85 76 61   < > 50   CALCIUM mg/dL 7.7* 8.1* 8.2*   < > 8.3*   AST U/L  --   --   --   --  24   ALT U/L  --   --   --   --  12   ALK PHOS U/L  --   --   --   --  78   ALBUMIN g/dL  --   --   --   --  3.1*    < > = values in this interval not displayed.     Results from last 7 days   Lab Units 07/07/25  5707   BLOOD CULTURE  No Growth After 5 Days.  No Growth After 5 Days.                     Imaging Results Review: I personally reviewed the following image studies in PACS and associated radiology reports: Right foot MRI. My interpretation of the radiology images/reports is: MRI showed 2 soft tissue collections.  No obvious osteomyelitis.

## 2025-07-14 NOTE — ASSESSMENT & PLAN NOTE
R foot pain x4 days. Inability to bear weight. Limited ROM. Endorses fatigue, fevers, chills, loss of appetite. Febrile on admissoin to 101.5. ED Workup significant for WBC of 15.99, stable Hb, mild thrombocytopenia, mild hyponatremia, NORMA w Cr of 1.95 (baseline of 1), glucose of 191, AST elevated at 55, T bili 1.48. Lactic 1.5, procal 4.06, INR 1.34. Bcx drawn, and patient started on Ancef    Of note, patient has hx of R foot cellulitis, admission at [facility] in Jan 2024. Received Abx but left AMA before recommended amputation vs. Debridement by care team there. Has questionable hx of diabetes. Patient denies any hx of diabetes.    XR of b/l feet done this admission.  No acute osseous abnormalities were visualized on the bilateral x-rays of the feet  MRI R Foot: concern for diffuse subcutaneous edema and two fluid collections suspicious for abscesses  Plan:  Continue Abx Tx w Ancef, as patient's Bcx have come back as S. Aureus, favor MSSA. This is Day 10 of IV Abx  Patient had very large, watery bowel movement Wednesday evening in the setting of constipation for many days with bowel regimen. No other episodes since.   Continue F/u repeat Bcx from 07/07, currently no growth to date.  Control glucose between 140-180 to promote healing  Encourage patient to eat/drink throughout day to promote healing  Podiatry consulted:  On 07/06, podiatry debrided distal hallux b/l. Open wound was found on distal L hallux  R foot abscesses- manage with ancef  Trend WBC, fever  Given patient's prior admission and him leaving AMA, continue to explain importance of receiving Abx therapy  Continue work with PT and OT

## 2025-07-14 NOTE — ASSESSMENT & PLAN NOTE
My exam suggest this patient has onychomycosis on both the right and left feet    Plan:  itraconazole 100 mg twice daily.  Patient should be on treatment for onychomycosis for 12 consecutive weeks. Pharmacy advised reduce atorvastatin dose from 40 mg daily to 20 mg daily, risk outweighed benefit

## 2025-07-14 NOTE — PHYSICAL THERAPY NOTE
"                                                                                  PHYSICAL THERAPY NOTE          Patient Name: River Pedro  Today's Date: 7/14/2025 07/14/25 1321   PT Last Visit   PT Visit Date 07/14/25   Note Type   Note Type Treatment   Pain Assessment   Pain Assessment Tool 0-10   Pain Score No Pain   Restrictions/Precautions   Weight Bearing Precautions Per Order Yes   RLE Weight Bearing Per Order WBAT   LLE Weight Bearing Per Order WBAT   Other Precautions Cognitive;Bed Alarm;Chair Alarm;Multiple lines;Fall Risk  (minimal L sided weakness and expressive aphasia)   General   Chart Reviewed Yes   Additional Pertinent History imaging completed on 7/10 revealed \"Acute/recent infarct involving the right gangliocapsular region, with associated cytotoxic edema\". pt noted to have sensory deficits LLE as well as mild LLE weakness compared to RLE   Response to Previous Treatment Patient with no complaints from previous session.   Family/Caregiver Present No  (ex-wife arrived at end of session)   Cognition   Overall Cognitive Status Impaired   Arousal/Participation Alert;Responsive;Cooperative   Attention Attends with cues to redirect   Orientation Level Oriented X4   Memory Decreased recall of precautions   Following Commands Follows one step commands with increased time or repetition   Comments pt pleasant and cooperative   Subjective   Subjective pt agreeable to mobilize   Bed Mobility   Supine to Sit 4  Minimal assistance   Additional items Assist x 1;Increased time required;Verbal cues   Sit to Supine Unable to assess   Additional Comments pt OOB in chair at end of session   Transfers   Sit to Stand 3  Moderate assistance   Additional items Assist x 1;Increased time required;Verbal cues;Armrests   Stand to Sit 3  Moderate assistance   Additional items Assist x 1;Armrests;Increased time required;Verbal cues   Additional Comments c RW. x5 STS throughout session. pt fluctuates between mod Ax 1-2 "   Ambulation/Elevation   Gait pattern L Foot drag;Improper Weight shift;Decreased foot clearance;Inconsistent blue;Short stride;Excessively slow   Gait Assistance 3  Moderate assist  (+SBA of 2nd with chair follow)   Additional items Verbal cues;Assist x 1   Assistive Device Rolling walker   Distance 40'+80'+60'+60'   Stair Management Assistance Not tested   Ambulation/Elevation Additional Comments 4 seated rest breaks throughout   Balance   Static Sitting Fair   Dynamic Sitting Fair -   Static Standing Poor   Dynamic Standing Poor   Ambulatory Poor -   Endurance Deficit   Endurance Deficit Yes   Endurance Deficit Description pt limited by weakness, fatigue adn decreased activity tolerance   Activity Tolerance   Activity Tolerance Patient limited by fatigue   Medical Staff Made Aware ALEJO Mack   Nurse Made Aware yes-RN cleared   Exercises   Knee AROM Long Arc Quad Sitting;10 reps;AROM;Bilateral   Assessment   Prognosis Good   Problem List Decreased strength;Impaired balance;Decreased mobility;Decreased endurance;Decreased cognition;Impaired judgement;Decreased safety awareness;Impaired sensation   Assessment Pt very pleasant and agreeable to participate in PT session. Completes mobility and therapeutic activity as outlined above. Pt able complete STS with mod Ax1-2, fluctuates based on fatigue. Pt noted to be slightly retropulsive during STS and unsteady when initially standing, taking a minute to catch his balance. Pt requires cures for hand placement during transfers. Pt noted to have 2 LOB during session requiring mod A to correct. Pt was able to improve ambulation distance with less physical assistance compared to previous session. Pt requires seated rest breaks throughout session due to fatigue. Pt demonstrates decreased foot clearance and step length with LLE. Pt is motivated to improve and walk more. Pt left upright in chair with chair alarm donned, call bell and personal items within reach  and all needs met.   Barriers to Discharge Inaccessible home environment;Decreased caregiver support   Goals   Patient Goals to walk more   STG Expiration Date 07/21/25   PT Treatment Day 1   Plan   Treatment/Interventions Functional transfer training;LE strengthening/ROM;Therapeutic exercise;Endurance training;Elevations;Bed mobility;Patient/family training;Equipment eval/education;Gait training;Compensatory technique education;Continued evaluation;Spoke to nursing;OT   Progress Progressing toward goals   PT Frequency 3-5x/wk   Discharge Recommendation   Rehab Resource Intensity Level, PT (S)  I (Maximum Resource Intensity)   Equipment Recommended Walker   Walker Package Recommended Wheeled walker   AM-PAC Basic Mobility Inpatient   Turning in Flat Bed Without Bedrails 3   Lying on Back to Sitting on Edge of Flat Bed Without Bedrails 2   Moving Bed to Chair 2   Standing Up From Chair Using Arms 2   Walk in Room 2   Climb 3-5 Stairs With Railing 1   Basic Mobility Inpatient Raw Score 12   Basic Mobility Standardized Score 32.23   [facility] Highest Level Of Mobility   -[facility] Goal 4: Move to chair/commode   -HL Achieved 7: Walk 25 feet or more   Education   Education Provided Mobility training;Assistive device;Home exercise program   Patient Demonstrates acceptance/verbal understanding   End of Consult   Patient Position at End of Consult Bedside chair;Bed/Chair alarm activated;All needs within reach   hKushbu Jimenez DPT

## 2025-07-14 NOTE — DISCHARGE INSTR - DIET
Level 3 with nectar thick liquids     [facility] 7/9/25:  Assessment Summary:    Pt presents with mild-moderate oral, mild pharyngeal dysphagia characterized by reduced transfers, reduced lingual motion and repetitive movement w/ poor clearing at times.  He has oral retention w/ puree and solids w/ difficulty clearing and piecemeal transfers.  The pt also has a mod swallow delay with all material on today's study.  The hyoid movement is reduced with no epiglottic inversion, mildly reduced airway closure as well with the different textures when trying to clear the pharynx.   There was only aspiration of thin by straw during the swallow with attempts to clear the solids.     Note: Images are available for review in [facility] as desired.     Recommendations:   Recommended Diet: soft/level 3 diet and thin liquids -no straws, cup only for now  Recommended Form of Medications: crushed with puree   Aspiration precautions and compensatory swallowing strategies: upright posture, only feed when fully alert, slow rate of feeding, small bites/sips, no straws, and alternating bites and sips  Consider referral to:  RD  SLP Dysphagia therapy recommended: will follow     Results Reviewed with: patient, RN, and MD   Pt/Family Education: initiated. Pt and caregivers would benefit from/require continued education.RAE

## 2025-07-14 NOTE — ASSESSMENT & PLAN NOTE
River Pedro is a 75 y.o. male admitted 7/5/2025 for right foot pain for the past few days.  Of note, patient has history of right foot cellulitis, admission at [facility] in January 2024.  Podiatry was consulted to evaluate the right foot pain.     Diagnostics:  7/6: Right foot X-Ray - no acute osseous abnormality   7/6: Left foot X-Ray - no acute osseous abnormality  7/8: Right foot MRI: -Diffuse subcutaneous edema with 1.6 x 0.6 x 1.0 cm subcutaneous fluid collection dorsal to the first tarsometatarsal joint, and another similar 1.2 x 0.6 x 1.0 cm subcutaneous fluid collection medial to the first tarsometatarsal joint, suspicious of abscess.  No evidence of osteomyelitis.    Initial lab work/cultures  WBC: 5.82 (5.77)       Plan:    Patient's MRI shows 2 possible abscess to the right midfoot.  However, clinically the erythema and edema to the right lower extremity has significantly improved compared to the last evaluation 7/11/2025.  The bilateral halluces wounds were also stable with no signs of infection.  Patient also reports significant improvement of the right lower extremity pain.  Based on these, there is no immediate indication for surgery from podiatry standpoint.  Plan to continue IV antibiotics management per infectious disease team and local wound care. Will continue to monitor the cellulitic changes on the right foot.  Patient is stable to be discharged from podiatric standpoint, will continue to see weekly while patient is in house.  Antibiotic plans on discharge per ID team.  Local wound care on the left great toe consisting of Betadine paint, 2 x 2 gauze, wrap with Mendez. Wound care instructions placed.  Elevation on green foam wedges or pillows when non-ambulatory.  Rest of care per primary team.    Weightbearing status: Weightbearing as tolerated to bilateral lower extremities

## 2025-07-14 NOTE — ASSESSMENT & PLAN NOTE
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 Unable to see in medical record if patient had residual deficits following diagnosis. Only + neuro findings are mild word finding difficulty    Plan:  Continue ASA 81 mg daily. Patient was not taking this as an outpatient, although it is listed in home meds  Continue Lipitor 40 mg daily,

## 2025-07-14 NOTE — ASSESSMENT & PLAN NOTE
R foot pain x4 days. Inability to bear weight. Limited ROM. Endorses fatigue, fevers, chills, loss of appetite. Febrile on admissoin to 101.5. ED Workup significant for WBC of 15.99, stable Hb, mild thrombocytopenia, mild hyponatremia, NORMA w Cr of 1.95 (baseline of 1), glucose of 191, AST elevated at 55, T bili 1.48. Lactic 1.5, procal 4.06, INR 1.34. Bcx drawn, and patient started on Ancef    Of note, patient has hx of R foot cellulitis, admission at [facility] in Jan 2024. Received Abx but left AMA before recommended amputation vs. Debridement by care team there. Has questionable hx of diabetes. Patient denies any hx of diabetes.    XR of b/l feet done this admission.  No acute osseous abnormalities were visualized on the bilateral x-rays of the feet  MRI R Foot: concern for diffuse subcutaneous edema and two fluid collections suspicious for abscesses  Plan:  Continue Abx Tx w Ancef, as patient's Bcx have come back as S. Aureus, favor MSSA. This is Day 10 of IV Abx  Patient had very large, watery bowel movement Wednesday evening in the setting of constipation for many days with bowel regimen. No other episodes since.   Continue F/u repeat Bcx from 07/07, currently no growth to date.  Control glucose between 140-180 to promote healing  Encourage patient to eat/drink throughout day to promote healing  Podiatry consulted:  On 07/06, podiatry debrided distal hallux b/l. Open wound was found on distal L hallux  R foot abscesses- manage with ancef  Trend WBC, fever  Given patient's prior admission and him leaving AMA, continue to explain importance of receiving Abx therapy  Continue work with PT and OT

## 2025-07-14 NOTE — ASSESSMENT & PLAN NOTE
River Pedro is a 75 y.o. male admitted 7/5/2025 for right foot pain for the past few days.  Of note, patient has history of right foot cellulitis, admission at [facility] in January 2024.  Podiatry was consulted to evaluate the right foot pain.     Diagnostics:  7/6: Right foot X-Ray - no acute osseous abnormality   7/6: Left foot X-Ray - no acute osseous abnormality  7/8: Right foot MRI: -Diffuse subcutaneous edema with 1.6 x 0.6 x 1.0 cm subcutaneous fluid collection dorsal to the first tarsometatarsal joint, and another similar 1.2 x 0.6 x 1.0 cm subcutaneous fluid collection medial to the first tarsometatarsal joint, suspicious of abscess.  No evidence of osteomyelitis.    Initial lab work/cultures  WBC: 5.82 (5.77)       Plan:    Patient's MRI shows 2 possible abscess to the right midfoot.  However, clinically the erythema and edema to the right lower extremity has significantly improved compared to the last evaluation 7/11/2025.  The bilateral halluces wounds were also stable with no signs of infection.  Patient also reports significant improvement of the right lower extremity pain.  Based on these, there is no immediate indication for surgery from podiatry standpoint.  Plan to continue IV antibiotics management per infectious disease team and local wound care. Will continue to monitor the cellulitic changes on the right foot.  Patient is stable to be discharged from podiatric standpoint, will continue to see weekly while patient is in house.  Antibiotic plans on discharge per ID team.  Local wound care on the left great toe consisting of Betadine paint, 2 x 2 gauze, wrap with Mendez. Wound care instructions placed.  Elevation on green foam wedges or pillows when non-ambulatory.  Rest of care per primary team.    Weightbearing status: Weightbearing as tolerated to bilateral lower extremities

## 2025-07-14 NOTE — PROGRESS NOTES
Progress Note - Internal Medicine   Name: River Pedro 75 y.o. male I MRN: [id number]  Unit/Bed#: [facility] 901-01 I Date of Admission: 7/5/2025   Date of Service: 7/14/2025 I Hospital Day: 9     Assessment & Plan  Cellulitis of right foot  Sepsis (HCC)  R foot pain x4 days. Inability to bear weight. Limited ROM. Endorses fatigue, fevers, chills, loss of appetite. Febrile on admissoin to 101.5. ED Workup significant for WBC of 15.99, stable Hb, mild thrombocytopenia, mild hyponatremia, NORMA w Cr of 1.95 (baseline of 1), glucose of 191, AST elevated at 55, T bili 1.48. Lactic 1.5, procal 4.06, INR 1.34. Bcx drawn, and patient started on Ancef    Of note, patient has hx of R foot cellulitis, admission at [facility] in Jan 2024. Received Abx but left AMA before recommended amputation vs. Debridement by care team there. Has questionable hx of diabetes. Patient denies any hx of diabetes.    XR of b/l feet done this admission.  No acute osseous abnormalities were visualized on the bilateral x-rays of the feet  MRI R Foot: concern for diffuse subcutaneous edema and two fluid collections suspicious for abscesses  Plan:  Continue Abx Tx w Ancef, as patient's Bcx have come back as S. Aureus, favor MSSA. This is Day 10 of IV Abx  Patient had very large, watery bowel movement Wednesday evening in the setting of constipation for many days with bowel regimen. No other episodes since.   Continue F/u repeat Bcx from 07/07, currently no growth to date.  Control glucose between 140-180 to promote healing  Encourage patient to eat/drink throughout day to promote healing  Podiatry consulted:  On 07/06, podiatry debrided distal hallux b/l. Open wound was found on distal L hallux  R foot abscesses- manage with ancef  Trend WBC, fever  Given patient's prior admission and him leaving AMA, continue to explain importance of receiving Abx therapy  Continue work with PT and OT    MSSA bacteremia  Patient's blood cultures 2/2 for gram positive cocci in  "clusters. Escalated antibiotics to vancomycin    ANSON did not reveal any vegetation     Plan:   ID consulted, appreciate recs:  Recommended TTE for possible IE, given positive blood culture for typical IE organism and fever  Echo showing ejection fraction of 60%, mild right atrial dilation, mild aortic valve regurgitation, no echocardiographic evidence of valvular vegetation affecting the mitral, aortic, or tricuspid valves.  Pulmonic valve not adequately visualized to assess for endocarditis. Overall, no significant changes noted compared to the prior study.  Also recommended 6 weeks of IV Abx tx  Pending final blood cultures  Stroke-like symptoms  07/08 morning, nursing staff alerted our team that patient had left-sided facial droop, around the corner of his mouth.  While examining him, this was noted, but the rest of his neuroexam was benign.  When taking his morning aspirin, staff noticed that he started to choke.    Obtained chest x-ray.  No acute cardiopulmonary process. patient satting 94% on room air.  Has not required any supplemental oxygen    STAT CT head was ordered with the following findings: No hemorrhage or evidence of acute cortical infarct.  Chronic infarct in the left basal ganglia was noted.    Findings of brain MRI stated below in this note, showing new infarct \"Acute/recent infarct involving the right gangliocapsular region, with associated cytotoxic edema\"    Bubble study that was done during ANSON revealed small PFO.  No thrombus was visualized during the study  Patient's rope score is a 5, indicating that there is a 34% chance that a new stroke is due to a patent PFO.    Neurology consulted, recommended CTA head and neck.  No large vessel occlusion, high-grade stenosis, or intracranial aneurysm.    Plan:  Current recommendations from speech therapy are dysphagia 3 dental soft with nectar thick liquids, as patient was choking on thin liquids on Wednesday.  Trialed thin liquids per SLP with " coughing, recommended nectar thick.   Continue aspirin and statin  Follow-up additional recommendations from neurology  Urinary retention  Urology consulted for retention and re-colored urine in [facility] bag    Plan:  Continue to monitor for further hematuria. Favor traumatic hanks insertion  Continue flomax 0.4 mg daily  Avoid medications that cause urinary retention such as antihistamines and anticholinergics  Bowel regimen: Held at this time, as patient had large watery bowel movement on Tuesday night, and has been having bowel movements since then without laxatives  Patient did not tolerate voiding trial, urology replaced on 7/12 .  Patient will remain with [facility] until he follows up with them outpatient   History of diabetes mellitus  Remote hx of T2DM. Patient states that he does not take any medications and that he is not diabetics, although this is mentioned in prior documentation  Hemoglobin 6.7    Plan:  Algorithm 2 initiated   After patient resumes more regular will consider algorithm 3   CVA (cerebral vascular accident) (HCC)  Hx of L basal ganglia infarct in 2012 and TIA in 2019. Unable to see in medical record if patient had residual deficits following diagnosis. Only + neuro findings are mild word finding difficulty    Plan:  Continue ASA 81 mg daily. Patient was not taking this as an outpatient, although it is listed in home meds  Continue Lipitor 40 mg daily,   Open wound bilateral 1st toes  Podiatry consulted, appreciate recs  Onychomycosis  My exam suggest this patient has onychomycosis on both the right and left feet    Plan:  itraconazole 100 mg twice daily.  Patient should be on treatment for onychomycosis for 12 consecutive weeks. Pharmacy advised reduce atorvastatin dose from 40 mg daily to 20 mg daily, risk outweighed benefit     Disposition: inpatient     Team: SOD TEAM A    Subjective   Patient seen and examined. No acute events overnight. Admits to being tired. Denies chest pain, SOB,  abdominal pain, nausea, vomiting, fever, chills.    Objective :  Temp:  [98 °F (36.7 °C)-98.8 °F (37.1 °C)] 98 °F (36.7 °C)  HR:  [65-95] 95  BP: (141-167)/(80-84) 141/80  Resp:  [17-18] 18  SpO2:  [91 %-95 %] 95 %  O2 Device: None (Room air)    I/O         07/12 0701 07/13 0700 07/13 0701 07/14 0700 07/14 0701  07/15 0700    P.O. 530 1200     I.V. (mL/kg) 30 (0.4) 30 (0.4)     IV Piggyback 50 200     Total Intake(mL/kg) 610 (7.3) 1430 (17.2)     Urine (mL/kg/hr) 855 (0.4) 1520 (0.8) 700 (0.8)    Stool 0 0     Total Output 855 1520 700    Net -245 -90 -700           Unmeasured Stool Occurrence 1 x 1 x           Weights:   IBW (Ideal Body Weight): 75.3 kg    Body mass index is 25.52 kg/m².  Weight (last 2 days)       None            Physical Exam  Vitals and nursing note reviewed.   Constitutional:       General: He is not in acute distress.     Appearance: He is well-developed.   HENT:      Head: Normocephalic and atraumatic.     Eyes:      Conjunctiva/sclera: Conjunctivae normal.       Cardiovascular:      Rate and Rhythm: Normal rate and regular rhythm.      Heart sounds: No murmur heard.  Pulmonary:      Effort: Pulmonary effort is normal. No respiratory distress.      Breath sounds: Normal breath sounds.   Abdominal:      Palpations: Abdomen is soft.      Tenderness: There is no abdominal tenderness.     Musculoskeletal:         General: No swelling.      Cervical back: Neck supple.     Skin:     General: Skin is warm and dry.      Capillary Refill: Capillary refill takes less than 2 seconds.     Neurological:      Mental Status: He is alert. Mental status is at baseline.      Cranial Nerves: No cranial nerve deficit.      Motor: No weakness.      Comments: L facial droop at baseline   Psychiatric:         Mood and Affect: Mood normal.           Lab Results: I have reviewed the following results:  Recent Labs     07/14/25  0459   WBC 5.82   HGB 11.1*   HCT 32.8*      SODIUM 133*   K 3.5      CO2  24   BUN 19   CREATININE 0.86   GLUC 112             Currently Ordered Meds:   Current Facility-Administered Medications:     acetaminophen (TYLENOL) tablet 975 mg, Q8H PRN    amLODIPine (NORVASC) tablet 5 mg, Daily    aspirin chewable tablet 81 mg, Daily    atorvastatin (LIPITOR) tablet 40 mg, Daily With Dinner    ceFAZolin (ANCEF) IVPB (premix in dextrose) 2,000 mg 50 mL, Q8H, Last Rate: Stopped (07/14/25 1352)    heparin (porcine) subcutaneous injection 5,000 Units, Q8H JOSÉ MIGUEL    insulin lispro (HumALOG/ADMELOG) 100 units/mL subcutaneous injection 1-5 Units, 4x Daily (AC & HS) **AND** Fingerstick Glucose (POCT), 4x Daily AC and at bedtime    itraconazole (SPORANOX) oral solution 100 mg, Daily    LORazepam (ATIVAN) injection 0.5 mg, Once PRN    melatonin tablet 3 mg, HS    ondansetron (ZOFRAN) injection 4 mg, Q6H PRN    senna (SENOKOT) tablet 8.6 mg, HS PRN    tamsulosin (FLOMAX) capsule 0.4 mg, Daily With Dinner  VTE Pharmacologic Prophylaxis: VTE covered by:  heparin (porcine), Subcutaneous, 5,000 Units at 07/14/25 1302     VTE Mechanical Prophylaxis: sequential compression device    Administrative Statements     Portions of the record may have been created with voice recognition software.

## 2025-07-14 NOTE — PROGRESS NOTES
Progress Note - Podiatry   Name: River Pedro 75 y.o. male I MRN: [id number]  [facility] I Date of Admission: 7/5/2025   Date of Service: 7/14/2025 I Hospital Day: 9    Assessment & Plan  Cellulitis of right foot  Open wound bilateral 1st toes  River Pedro is a 75 y.o. male admitted 7/5/2025 for right foot pain for the past few days.  Of note, patient has history of right foot cellulitis, admission at [facility] in January 2024.  Podiatry was consulted to evaluate the right foot pain.     Diagnostics:  7/6: Right foot X-Ray - no acute osseous abnormality   7/6: Left foot X-Ray - no acute osseous abnormality  7/8: Right foot MRI: -Diffuse subcutaneous edema with 1.6 x 0.6 x 1.0 cm subcutaneous fluid collection dorsal to the first tarsometatarsal joint, and another similar 1.2 x 0.6 x 1.0 cm subcutaneous fluid collection medial to the first tarsometatarsal joint, suspicious of abscess.  No evidence of osteomyelitis.    Initial lab work/cultures  WBC: 5.82 (5.77)       Plan:    Patient's MRI shows 2 possible abscess to the right midfoot.  However, clinically the erythema and edema to the right lower extremity has significantly improved compared to the last evaluation 7/11/2025.  The bilateral halluces wounds were also stable with no signs of infection.  Patient also reports significant improvement of the right lower extremity pain.  Based on these, there is no immediate indication for surgery from podiatry standpoint.  Plan to continue IV antibiotics management per infectious disease team and local wound care. Will continue to monitor the cellulitic changes on the right foot.  Patient is stable to be discharged from podiatric standpoint, will continue to see weekly while patient is in house.  Antibiotic plans on discharge per ID team.  Local wound care on the left great toe consisting of Betadine paint, 2 x 2 gauze, wrap with Mendez. Wound care instructions placed.  Elevation on green foam wedges or pillows  when non-ambulatory.  Rest of care per primary team.    Weightbearing status: Weightbearing as tolerated to bilateral lower extremities    History of diabetes mellitus  Barrier for wound healing.  Management per primary team.  CVA (cerebral vascular accident) (HCC)  Urinary retention  Sepsis (HCC)  MSSA bacteremia  Stroke-like symptoms  Management per primary team  Onychomycosis      24 Hour Events : None  Subjective : The patient was seen, evaluated, and assessed at bedside today. The patient was awake, alert, and in no acute distress. No acute events overnight. The patient reports significantly reduced pain to the right foot. Patient denies N/V/F/chills/SOB/CP.     Objective :  Temp:  [98 °F (36.7 °C)-99.7 °F (37.6 °C)] 98 °F (36.7 °C)  HR:  [65-70] 69  BP: (153-167)/(76-84) 167/82  Resp:  [17-22] 17  SpO2:  [91 %-95 %] 91 %  O2 Device: None (Room air)    Physical Exam  Physical Exam:     General:  Alert, cooperative, and in no distress.  Lower extremity exam:  Cardiovascular status at baseline.  Neurological status at baseline.  Musculoskeletal status at baseline. No calf tenderness noted.     Lower extremity wound(s) as noted below:  Wound #: 1  Location: Left distal hallux  Length 0.2cm: Width 0.2cm: Depth 0.1cm:   Deepest Tissue Noted in Base: Cutaneous  Probe to Bone: No  Peripheral Skin Description: Attached  Granulation: 100% Fibrotic Tissue: 0% Necrotic Tissue: 0%   Drainage Amount: none  Signs of Infection: none    Left dorsal foot erythema and edema significantly improved with only residue small area of minor erythema, edema and tenderness to the dorsal medial midfoot.    Clinical Images 07/14/25:                Additional Data:     Labs:    Results from last 7 days   Lab Units 07/14/25  0459   WBC Thousand/uL 5.82   HEMOGLOBIN g/dL 11.1*   HEMATOCRIT % 32.8*   PLATELETS Thousands/uL 210   SEGS PCT % 71   LYMPHO PCT % 18   MONO PCT % 9   EOS PCT % 1     Results from last 7 days   Lab Units 07/14/25  0459  07/09/25  1106 07/08/25  0808   POTASSIUM mmol/L 3.5   < > 3.8   CHLORIDE mmol/L 101   < > 101   CO2 mmol/L 24   < > 21   BUN mg/dL 19   < > 42*   CREATININE mg/dL 0.86   < > 1.38*   CALCIUM mg/dL 7.7*   < > 8.3*   ALK PHOS U/L  --   --  78   ALT U/L  --   --  12   AST U/L  --   --  24    < > = values in this interval not displayed.           * I Have Reviewed All Lab Data Listed Above.    Recent Cultures (last 7 days):     Results from last 7 days   Lab Units 07/07/25  1359   BLOOD CULTURE  No Growth After 5 Days.  No Growth After 5 Days.           Imaging: I have personally reviewed pertinent films in PACS  EKG, Pathology, and Other Studies: I have personally reviewed pertinent reports.      Lab Results: I have reviewed the following results:

## 2025-07-15 LAB
ANION GAP SERPL CALCULATED.3IONS-SCNC: 3 MMOL/L (ref 4–13)
BUN SERPL-MCNC: 16 MG/DL (ref 5–25)
CALCIUM SERPL-MCNC: 8.1 MG/DL (ref 8.4–10.2)
CHLORIDE SERPL-SCNC: 104 MMOL/L (ref 96–108)
CO2 SERPL-SCNC: 27 MMOL/L (ref 21–32)
CREAT SERPL-MCNC: 0.93 MG/DL (ref 0.6–1.3)
ERYTHROCYTE [DISTWIDTH] IN BLOOD BY AUTOMATED COUNT: 14.9 % (ref 11.6–15.1)
GFR SERPL CREATININE-BSD FRML MDRD: 80 ML/MIN/1.73SQ M
GLUCOSE SERPL-MCNC: 121 MG/DL (ref 65–140)
GLUCOSE SERPL-MCNC: 135 MG/DL (ref 65–140)
GLUCOSE SERPL-MCNC: 151 MG/DL (ref 65–140)
GLUCOSE SERPL-MCNC: 152 MG/DL (ref 65–140)
GLUCOSE SERPL-MCNC: 152 MG/DL (ref 65–140)
HCT VFR BLD AUTO: 33.7 % (ref 36.5–49.3)
HGB BLD-MCNC: 11.1 G/DL (ref 12–17)
MCH RBC QN AUTO: 29.8 PG (ref 26.8–34.3)
MCHC RBC AUTO-ENTMCNC: 32.9 G/DL (ref 31.4–37.4)
MCV RBC AUTO: 91 FL (ref 82–98)
PLATELET # BLD AUTO: 235 THOUSANDS/UL (ref 149–390)
PMV BLD AUTO: 11.8 FL (ref 8.9–12.7)
POTASSIUM SERPL-SCNC: 4 MMOL/L (ref 3.5–5.3)
RBC # BLD AUTO: 3.72 MILLION/UL (ref 3.88–5.62)
SODIUM SERPL-SCNC: 134 MMOL/L (ref 135–147)
WBC # BLD AUTO: 6.07 THOUSAND/UL (ref 4.31–10.16)

## 2025-07-15 PROCEDURE — 99232 SBSQ HOSP IP/OBS MODERATE 35: CPT | Performed by: INTERNAL MEDICINE

## 2025-07-15 PROCEDURE — G0545 PR INHERENT VISIT TO INPT: HCPCS | Performed by: INTERNAL MEDICINE

## 2025-07-15 PROCEDURE — 80048 BASIC METABOLIC PNL TOTAL CA: CPT

## 2025-07-15 PROCEDURE — 99232 SBSQ HOSP IP/OBS MODERATE 35: CPT | Performed by: STUDENT IN AN ORGANIZED HEALTH CARE EDUCATION/TRAINING PROGRAM

## 2025-07-15 PROCEDURE — 85027 COMPLETE CBC AUTOMATED: CPT

## 2025-07-15 PROCEDURE — 82948 REAGENT STRIP/BLOOD GLUCOSE: CPT

## 2025-07-15 RX ADMIN — ITRACONAZOLE 100 MG: 10 SOLUTION ORAL at 09:21

## 2025-07-15 RX ADMIN — CEFAZOLIN SODIUM 2000 MG: 2 SOLUTION INTRAVENOUS at 21:12

## 2025-07-15 RX ADMIN — ASPIRIN 81 MG CHEWABLE TABLET 81 MG: 81 TABLET CHEWABLE at 09:20

## 2025-07-15 RX ADMIN — HEPARIN SODIUM 5000 UNITS: 5000 INJECTION INTRAVENOUS; SUBCUTANEOUS at 21:13

## 2025-07-15 RX ADMIN — INSULIN LISPRO 1 UNITS: 100 INJECTION, SOLUTION INTRAVENOUS; SUBCUTANEOUS at 21:13

## 2025-07-15 RX ADMIN — TAMSULOSIN HYDROCHLORIDE 0.4 MG: 0.4 CAPSULE ORAL at 17:29

## 2025-07-15 RX ADMIN — INSULIN LISPRO 1 UNITS: 100 INJECTION, SOLUTION INTRAVENOUS; SUBCUTANEOUS at 11:51

## 2025-07-15 RX ADMIN — Medication 3 MG: at 21:13

## 2025-07-15 RX ADMIN — CEFAZOLIN SODIUM 2000 MG: 2 SOLUTION INTRAVENOUS at 13:35

## 2025-07-15 RX ADMIN — HEPARIN SODIUM 5000 UNITS: 5000 INJECTION INTRAVENOUS; SUBCUTANEOUS at 06:07

## 2025-07-15 RX ADMIN — ATORVASTATIN CALCIUM 40 MG: 40 TABLET, FILM COATED ORAL at 17:29

## 2025-07-15 RX ADMIN — HEPARIN SODIUM 5000 UNITS: 5000 INJECTION INTRAVENOUS; SUBCUTANEOUS at 13:35

## 2025-07-15 RX ADMIN — INSULIN LISPRO 1 UNITS: 100 INJECTION, SOLUTION INTRAVENOUS; SUBCUTANEOUS at 17:29

## 2025-07-15 RX ADMIN — CEFAZOLIN SODIUM 2000 MG: 2 SOLUTION INTRAVENOUS at 06:07

## 2025-07-15 RX ADMIN — AMLODIPINE BESYLATE 5 MG: 5 TABLET ORAL at 09:20

## 2025-07-15 NOTE — PROGRESS NOTES
Progress Note - Internal Medicine   Name: River Pedro 75 y.o. male I MRN: [id number]  Unit/Bed#: [facility] 901-01 I Date of Admission: 7/5/2025   Date of Service: 7/15/2025 I Hospital Day: 10     Assessment & Plan  Cellulitis of right foot  Sepsis (HCC)  R foot pain x4 days. Inability to bear weight. Limited ROM. Endorses fatigue, fevers, chills, loss of appetite. Febrile on admissoin to 101.5. ED Workup significant for WBC of 15.99, stable Hb, mild thrombocytopenia, mild hyponatremia, NORMA w Cr of 1.95 (baseline of 1), glucose of 191, AST elevated at 55, T bili 1.48. Lactic 1.5, procal 4.06, INR 1.34. Bcx drawn, and patient started on Ancef    Of note, patient has hx of R foot cellulitis, admission at [facility] in Jan 2024. Received Abx but left AMA before recommended amputation vs. Debridement by care team there. Has questionable hx of diabetes. Patient denies any hx of diabetes.    XR of b/l feet done this admission.  No acute osseous abnormalities were visualized on the bilateral x-rays of the feet  MRI R Foot: concern for diffuse subcutaneous edema and two fluid collections suspicious for abscesses  Plan:  Continue Abx Tx w Ancef, as patient's Bcx have come back as S. Aureus, favor MSSA. This is Day 11 of IV Abx, needs 6 wks per ID through 8/18  Patient had very large, watery bowel movement Wednesday evening in the setting of constipation for many days with bowel regimen. No other episodes since.   Continue F/u repeat Bcx from 07/07, currently no growth to date.  Control glucose between 140-180 to promote healing  Encourage patient to eat/drink throughout day to promote healing  Podiatry consulted:  On 07/06, podiatry debrided distal hallux b/l. Open wound was found on distal L hallux  R foot abscesses improving with IV abx, no surgical intervention  Trend WBC, fever  Given patient's prior admission and him leaving AMA, continue to explain importance of receiving Abx therapy  Continue work with PT and OT    MSSA  "bacteremia  Patient's blood cultures 2/2 for gram positive cocci in clusters. Escalated antibiotics to vancomycin    ANSON did not reveal any vegetation     Plan:   ID consulted, appreciate recs:  Recommended TTE for possible IE, given positive blood culture for typical IE organism and fever  Echo showing ejection fraction of 60%, mild right atrial dilation, mild aortic valve regurgitation, no echocardiographic evidence of valvular vegetation affecting the mitral, aortic, or tricuspid valves.  Pulmonic valve not adequately visualized to assess for endocarditis. Overall, no significant changes noted compared to the prior study.  Also recommended 6 weeks of IV Abx tx  Pending final blood cultures  Stroke-like symptoms  07/08 morning, nursing staff alerted our team that patient had left-sided facial droop, around the corner of his mouth.  While examining him, this was noted, but the rest of his neuroexam was benign.  When taking his morning aspirin, staff noticed that he started to choke.    Obtained chest x-ray.  No acute cardiopulmonary process. patient satting 94% on room air.  Has not required any supplemental oxygen    STAT CT head was ordered with the following findings: No hemorrhage or evidence of acute cortical infarct.  Chronic infarct in the left basal ganglia was noted.    Findings of brain MRI stated below in this note, showing new infarct \"Acute/recent infarct involving the right gangliocapsular region, with associated cytotoxic edema\"    Bubble study that was done during ANSON revealed small PFO.  No thrombus was visualized during the study  Patient's rope score is a 5, indicating that there is a 34% chance that a new stroke is due to a patent PFO.    Neurology consulted, recommended CTA head and neck.  No large vessel occlusion, high-grade stenosis, or intracranial aneurysm.    Plan:  Current recommendations from speech therapy are dysphagia 3 dental soft with nectar thick liquids, as patient was choking on " thin liquids on Wednesday.  Trialed thin liquids per SLP with coughing, recommended nectar thick.   Continue aspirin and statin  Follow-up additional recommendations from neurology  Urinary retention  Urology consulted for retention and re-colored urine in [facility] bag    Plan:  Continue to monitor for further hematuria. Favor traumatic hanks insertion  Continue flomax 0.4 mg daily  Avoid medications that cause urinary retention such as antihistamines and anticholinergics  Bowel regimen: Held at this time, as patient had large watery bowel movement on Tuesday night, and has been having bowel movements since then without laxatives  Patient did not tolerate voiding trial, urology replaced on 7/12 .  Patient will remain with [facility] until he follows up with them outpatient   History of diabetes mellitus  Remote hx of T2DM. Patient states that he does not take any medications and that he is not diabetics, although this is mentioned in prior documentation  Hemoglobin 6.7    Plan:  Algorithm 2 initiated   After patient resumes more regular will consider algorithm 3   CVA (cerebral vascular accident) (HCC)  Hx of L basal ganglia infarct in 2012 and TIA in 2019. Unable to see in medical record if patient had residual deficits following diagnosis. Only + neuro findings are mild word finding difficulty    Plan:  Continue ASA 81 mg daily. Patient was not taking this as an outpatient, although it is listed in home meds  Continue Lipitor 40 mg daily,   Open wound bilateral 1st toes  Podiatry consulted, appreciate recs  Onychomycosis  My exam suggest this patient has onychomycosis on both the right and left feet    Plan:  itraconazole 100 mg twice daily.  Patient should be on treatment for onychomycosis for 12 consecutive weeks. Pharmacy advised reduce atorvastatin dose from 40 mg daily to 20 mg daily, risk outweighed benefit     Disposition: inpatient     Team: SOD TEAM A    Subjective   Patient seen and examined. No acute events  overnight. Denies chest pain, SOB, abdominal pain, fever, chills. States he has no pain in his foot. Educated about importance of continuing antibiotics and waiting for placement. Updated wife at baseline.    Objective :  Temp:  [97.2 °F (36.2 °C)-98.5 °F (36.9 °C)] 97.2 °F (36.2 °C)  HR:  [67-95] 67  BP: (141-144)/(75-80) 144/79  Resp:  [18] 18  SpO2:  [91 %-95 %] 93 %  O2 Device: None (Room air)    I/O         07/13 0701  07/14 0700 07/14 0701  07/15 0700 07/15 0701  07/16 0700    P.O. 1200 742 220    I.V. (mL/kg) 30 (0.4) 30 (0.4)     IV Piggyback 200 50     Total Intake(mL/kg) 1430 (17.2) 822 (9.9) 220 (2.7)    Urine (mL/kg/hr) 1520 (0.8) 3250 (1.6)     Stool 0 0     Total Output 1520 3250     Net -90 -2428 +220           Unmeasured Stool Occurrence 1 x 1 x           Weights:   IBW (Ideal Body Weight): 75.3 kg    Body mass index is 25.52 kg/m².  Weight (last 2 days)       None            Physical Exam  Vitals and nursing note reviewed.   Constitutional:       General: He is not in acute distress.     Appearance: He is well-developed.   HENT:      Head: Normocephalic and atraumatic.     Eyes:      Extraocular Movements: Extraocular movements intact.      Conjunctiva/sclera: Conjunctivae normal.      Pupils: Pupils are equal, round, and reactive to light.       Cardiovascular:      Rate and Rhythm: Normal rate and regular rhythm.      Heart sounds: No murmur heard.  Pulmonary:      Effort: Pulmonary effort is normal. No respiratory distress.      Breath sounds: Normal breath sounds.   Abdominal:      Palpations: Abdomen is soft.      Tenderness: There is no abdominal tenderness.     Musculoskeletal:         General: No swelling.      Cervical back: Neck supple.     Skin:     General: Skin is warm and dry.      Capillary Refill: Capillary refill takes less than 2 seconds.     Neurological:      Mental Status: He is alert and oriented to person, place, and time. Mental status is at baseline.      Sensory: No sensory  deficit.      Comments: L facial droop and dysarthria at baseline, stable   Psychiatric:         Mood and Affect: Mood normal.           Lab Results: I have reviewed the following results:  Recent Labs     07/15/25  0611   WBC 6.07   HGB 11.1*   HCT 33.7*      SODIUM 134*   K 4.0      CO2 27   BUN 16   CREATININE 0.93   GLUC 121             Currently Ordered Meds:   Current Facility-Administered Medications:     acetaminophen (TYLENOL) tablet 975 mg, Q8H PRN    amLODIPine (NORVASC) tablet 5 mg, Daily    aspirin chewable tablet 81 mg, Daily    atorvastatin (LIPITOR) tablet 40 mg, Daily With Dinner    ceFAZolin (ANCEF) IVPB (premix in dextrose) 2,000 mg 50 mL, Q8H, Last Rate: 2,000 mg (07/15/25 0607)    heparin (porcine) subcutaneous injection 5,000 Units, Q8H JOSÉ MIGUEL    insulin lispro (HumALOG/ADMELOG) 100 units/mL subcutaneous injection 1-5 Units, 4x Daily (AC & HS) **AND** Fingerstick Glucose (POCT), 4x Daily AC and at bedtime    itraconazole (SPORANOX) oral solution 100 mg, Daily    LORazepam (ATIVAN) injection 0.5 mg, Once PRN    melatonin tablet 3 mg, HS    ondansetron (ZOFRAN) injection 4 mg, Q6H PRN    tamsulosin (FLOMAX) capsule 0.4 mg, Daily With Dinner  VTE Pharmacologic Prophylaxis: VTE covered by:  heparin (porcine), Subcutaneous, 5,000 Units at 07/15/25 0607     VTE Mechanical Prophylaxis: sequential compression device    Administrative Statements     Portions of the record may have been created with voice recognition software.

## 2025-07-16 PROBLEM — A41.9 SEPSIS (HCC): Status: RESOLVED | Noted: 2025-07-07 | Resolved: 2025-07-16

## 2025-07-16 PROBLEM — R78.81 MSSA BACTEREMIA: Status: RESOLVED | Noted: 2025-07-06 | Resolved: 2025-07-16

## 2025-07-16 PROBLEM — B95.61 MSSA BACTEREMIA: Status: RESOLVED | Noted: 2025-07-06 | Resolved: 2025-07-16

## 2025-07-16 LAB
ANION GAP SERPL CALCULATED.3IONS-SCNC: 8 MMOL/L (ref 4–13)
BUN SERPL-MCNC: 14 MG/DL (ref 5–25)
CALCIUM SERPL-MCNC: 8.1 MG/DL (ref 8.4–10.2)
CHLORIDE SERPL-SCNC: 105 MMOL/L (ref 96–108)
CO2 SERPL-SCNC: 22 MMOL/L (ref 21–32)
CREAT SERPL-MCNC: 0.89 MG/DL (ref 0.6–1.3)
ERYTHROCYTE [DISTWIDTH] IN BLOOD BY AUTOMATED COUNT: 15.3 % (ref 11.6–15.1)
GFR SERPL CREATININE-BSD FRML MDRD: 84 ML/MIN/1.73SQ M
GLUCOSE SERPL-MCNC: 109 MG/DL (ref 65–140)
GLUCOSE SERPL-MCNC: 122 MG/DL (ref 65–140)
GLUCOSE SERPL-MCNC: 155 MG/DL (ref 65–140)
GLUCOSE SERPL-MCNC: 166 MG/DL (ref 65–140)
GLUCOSE SERPL-MCNC: 183 MG/DL (ref 65–140)
HCT VFR BLD AUTO: 33.5 % (ref 36.5–49.3)
HGB BLD-MCNC: 11.2 G/DL (ref 12–17)
MCH RBC QN AUTO: 30.1 PG (ref 26.8–34.3)
MCHC RBC AUTO-ENTMCNC: 33.4 G/DL (ref 31.4–37.4)
MCV RBC AUTO: 90 FL (ref 82–98)
PLATELET # BLD AUTO: 248 THOUSANDS/UL (ref 149–390)
PMV BLD AUTO: 12.1 FL (ref 8.9–12.7)
POTASSIUM SERPL-SCNC: 3.9 MMOL/L (ref 3.5–5.3)
RBC # BLD AUTO: 3.72 MILLION/UL (ref 3.88–5.62)
SODIUM SERPL-SCNC: 135 MMOL/L (ref 135–147)
WBC # BLD AUTO: 4.74 THOUSAND/UL (ref 4.31–10.16)

## 2025-07-16 PROCEDURE — 97116 GAIT TRAINING THERAPY: CPT

## 2025-07-16 PROCEDURE — 97535 SELF CARE MNGMENT TRAINING: CPT

## 2025-07-16 PROCEDURE — 80048 BASIC METABOLIC PNL TOTAL CA: CPT

## 2025-07-16 PROCEDURE — 99232 SBSQ HOSP IP/OBS MODERATE 35: CPT | Performed by: INTERNAL MEDICINE

## 2025-07-16 PROCEDURE — 82948 REAGENT STRIP/BLOOD GLUCOSE: CPT

## 2025-07-16 PROCEDURE — 99232 SBSQ HOSP IP/OBS MODERATE 35: CPT | Performed by: STUDENT IN AN ORGANIZED HEALTH CARE EDUCATION/TRAINING PROGRAM

## 2025-07-16 PROCEDURE — G0545 PR INHERENT VISIT TO INPT: HCPCS | Performed by: INTERNAL MEDICINE

## 2025-07-16 PROCEDURE — 97530 THERAPEUTIC ACTIVITIES: CPT

## 2025-07-16 PROCEDURE — 85027 COMPLETE CBC AUTOMATED: CPT

## 2025-07-16 RX ADMIN — TAMSULOSIN HYDROCHLORIDE 0.4 MG: 0.4 CAPSULE ORAL at 17:55

## 2025-07-16 RX ADMIN — AMLODIPINE BESYLATE 5 MG: 5 TABLET ORAL at 08:17

## 2025-07-16 RX ADMIN — INSULIN LISPRO 1 UNITS: 100 INJECTION, SOLUTION INTRAVENOUS; SUBCUTANEOUS at 21:56

## 2025-07-16 RX ADMIN — HEPARIN SODIUM 5000 UNITS: 5000 INJECTION INTRAVENOUS; SUBCUTANEOUS at 21:56

## 2025-07-16 RX ADMIN — Medication 3 MG: at 21:56

## 2025-07-16 RX ADMIN — INSULIN LISPRO 1 UNITS: 100 INJECTION, SOLUTION INTRAVENOUS; SUBCUTANEOUS at 17:57

## 2025-07-16 RX ADMIN — ASPIRIN 81 MG CHEWABLE TABLET 81 MG: 81 TABLET CHEWABLE at 08:17

## 2025-07-16 RX ADMIN — HEPARIN SODIUM 5000 UNITS: 5000 INJECTION INTRAVENOUS; SUBCUTANEOUS at 14:47

## 2025-07-16 RX ADMIN — CEFAZOLIN SODIUM 2000 MG: 2 SOLUTION INTRAVENOUS at 14:48

## 2025-07-16 RX ADMIN — CEFAZOLIN SODIUM 2000 MG: 2 SOLUTION INTRAVENOUS at 21:56

## 2025-07-16 RX ADMIN — ATORVASTATIN CALCIUM 40 MG: 40 TABLET, FILM COATED ORAL at 17:56

## 2025-07-16 RX ADMIN — INSULIN LISPRO 1 UNITS: 100 INJECTION, SOLUTION INTRAVENOUS; SUBCUTANEOUS at 12:16

## 2025-07-16 RX ADMIN — CEFAZOLIN SODIUM 2000 MG: 2 SOLUTION INTRAVENOUS at 05:53

## 2025-07-16 NOTE — PHYSICAL THERAPY NOTE
PHYSICAL THERAPY NOTE          Patient Name: River Pedro  Today's Date: 7/16/2025 07/16/25 0944   PT Last Visit   PT Visit Date 07/16/25   Note Type   Note Type Treatment   Pain Assessment   Pain Assessment Tool 0-10   Pain Score No Pain   Restrictions/Precautions   Weight Bearing Precautions Per Order Yes   RLE Weight Bearing Per Order WBAT   LLE Weight Bearing Per Order WBAT   Other Precautions Cognitive;Chair Alarm;Bed Alarm;Multiple lines;Fall Risk  (expressive aphasia)   General   Chart Reviewed Yes   Response to Previous Treatment Patient with no complaints from previous session.   Family/Caregiver Present No   Cognition   Overall Cognitive Status Impaired  (improving)   Arousal/Participation Alert;Responsive;Cooperative   Attention Attends with cues to redirect   Orientation Level Oriented X4   Memory Decreased recall of precautions   Following Commands Follows multistep commands with increased time or repetition   Comments pt very pleasant and cooperative   Subjective   Subjective pt agreeable to mobilize   Bed Mobility   Supine to Sit 4  Minimal assistance   Additional items Assist x 1;HOB elevated;Increased time required;Verbal cues;LE management   Sit to Supine Unable to assess   Additional Comments pt OOB in chair at end of session   Transfers   Sit to Stand 4  Minimal assistance   Additional items Assist x 1;Armrests;Increased time required;Verbal cues   Stand to Sit 4  Minimal assistance   Additional items Assist x 1;Armrests;Increased time required;Verbal cues   Additional Comments c RW. x3 STS total. initally requires mod A however progresses to min A   Ambulation/Elevation   Gait pattern Improper Weight shift;Decreased foot clearance;Inconsistent blue;Short stride   Gait Assistance 4  Minimal assist  (+chair follow)   Additional items Assist x 1;Verbal cues   Assistive Device Rolling walker   Distance  10'+10'+120'+120'   Stair Management Assistance Not tested   Ambulation/Elevation Additional Comments 3 LOB requiring mod A to correct.   Balance   Static Sitting Fair +   Dynamic Sitting Fair   Static Standing Fair -   Dynamic Standing Poor +   Ambulatory Poor +   Endurance Deficit   Endurance Deficit Yes   Endurance Deficit Description pt limited by fatigue and decreased activity tolerance   Activity Tolerance   Activity Tolerance Patient tolerated treatment well   Medical Staff Made Aware ALEJO Mack   Nurse Made Aware yes-RN cleared   Exercises   Balance training  pt standing at sink for 8 minutes with CS   Assessment   Prognosis Good   Problem List Decreased strength;Decreased endurance;Impaired balance;Decreased coordination;Decreased mobility;Impaired judgement;Decreased safety awareness;Decreased cognition   Assessment Pt very pleasant and cooperative. Completes mobility and therapeutic activity as outlined above. Pt able to improve mobility ocmpared to previous session. Initially require mod A for transfers progressing to min A. Able to improve hand placement today during transfers without as many verbal cues. Able to ambulate with min A and RW for support. Pt demonstrates less ataxic gait and improved L foot clearance with less seated rest breaks throughout ambulation. However, pt does demonstrates LOB x3 throughout session requiring mod A to correct. Most unsteady when turning, negotiating obstacles and attempting to walk backwards out of bathroom with RW. Pt is progressing well towards his goals. Pt left upright in chair with chair alarm donned, call bell and personal items within reach and all needs met.   Barriers to Discharge Inaccessible home environment;Decreased caregiver support   Goals   Patient Goals to walk and improve   STG Expiration Date 07/21/25   PT Treatment Day 2   Plan   Treatment/Interventions Functional transfer training;LE strengthening/ROM;Therapeutic  exercise;Elevations;Patient/family training;Endurance training;Equipment eval/education;Bed mobility;Compensatory technique education;Continued evaluation;Gait training;Spoke to nursing;OT   Progress Progressing toward goals   PT Frequency 3-5x/wk   Discharge Recommendation   Rehab Resource Intensity Level, PT I (Maximum Resource Intensity)   Equipment Recommended Walker   Walker Package Recommended Wheeled walker   AM-PAC Basic Mobility Inpatient   Turning in Flat Bed Without Bedrails 3   Lying on Back to Sitting on Edge of Flat Bed Without Bedrails 3   Moving Bed to Chair 3   Standing Up From Chair Using Arms 3   Walk in Room 2   Climb 3-5 Stairs With Railing 2   Basic Mobility Inpatient Raw Score 16   Basic Mobility Standardized Score 38.32   [facility] Highest Level Of Mobility   -[facility] Goal 5: Stand one or more mins   -[facility] Achieved 7: Walk 25 feet or more   Education   Education Provided Mobility training;Assistive device   Patient Demonstrates acceptance/verbal understanding   End of Consult   Patient Position at End of Consult Bedside chair;Bed/Chair alarm activated;All needs within reach   Khushbu Jimenez DPT

## 2025-07-16 NOTE — DISCHARGE SUPPORT
Case Management Assessment & Discharge Planning Note    Patient name River Pedro  Location [facility] 901/[facility] 901-01 MRN [id number]  : 1950 Date 2025       Current Admission Date: 2025  Current Admission Diagnosis:Cellulitis of right foot   Patient Active Problem List    Diagnosis Date Noted    Onychomycosis 2025    Stroke-like symptoms 2025    Urinary retention 2025    Open wound bilateral 1st toes 2025    Cellulitis of right foot 2025    CVA (cerebral vascular accident) (HCC) 2025    Impaired cognition 2022    History of transient ischemic attack (TIA) 2022    History of diabetes mellitus 2022      LOS (days): 11  Geometric Mean LOS (GMLOS) (days): 4.9  Days to GMLOS:-6   Facility Authorization Initiated  [facility] received request for auth from : Radha WU  Authorization Request Submitted for: SNF  Requested Start of Care Date: 25  Facility Name: [facility]  Facility NPI: [id number]  Facility MD: Dr. Thornton  Facility MD NPI: [id number]  Authorization initiated by contacting insurance: [employer]  Via: Fax  Clinicals submitted via: Fax ([phone])   notified: Radha WU     Updates to authorization status will be noted in chart.    Please reach out to CM for updates on any clinical information.

## 2025-07-16 NOTE — PROGRESS NOTES
Progress Note - Podiatry   Name: River Pedro 75 y.o. male I MRN: [id number]  [facility] I Date of Admission: 7/5/2025   Date of Service: 7/16/2025 I Hospital Day: 11    Assessment & Plan  Cellulitis of right foot  Open wound bilateral 1st toes  River Pedro is a 75 y.o. male admitted 7/5/2025 for right foot pain for the past few days.  Of note, patient has history of right foot cellulitis, admission at [facility] in January 2024.  Podiatry was consulted to evaluate the right foot pain.     Diagnostics:  7/6: Right foot X-Ray - no acute osseous abnormality   7/6: Left foot X-Ray - no acute osseous abnormality  7/8: Right foot MRI: -Diffuse subcutaneous edema with 1.6 x 0.6 x 1.0 cm subcutaneous fluid collection dorsal to the first tarsometatarsal joint, and another similar 1.2 x 0.6 x 1.0 cm subcutaneous fluid collection medial to the first tarsometatarsal joint, suspicious of abscess.  No evidence of osteomyelitis.    Initial lab work/cultures  WBC: 4.74       Plan:    Patient seen and evaluated bedside.  Of note, patient's MRI shows 2 possible abscess to the right midfoot.  However, clinically the erythema and edema to the right lower extremity has significantly improved compared to the last evaluation 7/14/2025.  The bilateral halluces wounds were also stable with no signs of infection.  Patient also reports significant improvement of the right lower extremity pain.  No podiatric surgical intervention and patient stable for discharge from podiatry standpoint and can follow-up outpatient. Plan to continue IV antibiotics management per infectious disease team and local wound care. Will continue to monitor the cellulitic changes on the right foot.  We will continue to observe weekly while patient is in house.  Antibiotic plans on discharge per ID team.  Local wound care on the left great toe consisting of Betadine paint, 2 x 2 gauze, wrap with Mendez. Wound care instructions placed.  Elevation on green foam  wedges or pillows when non-ambulatory.  Rest of care per primary team.    Weightbearing status: Weightbearing as tolerated to bilateral lower extremities    History of diabetes mellitus  Barrier for wound healing.  Management per primary team.  CVA (cerebral vascular accident) (HCC)  Urinary retention  Sepsis (HCC)  MSSA bacteremia  Stroke-like symptoms  Management per primary team  Onychomycosis      24 Hour Events : None  Subjective : The patient was seen, evaluated, and assessed at bedside today. The patient was awake, alert, and in no acute distress. No acute events overnight. The patient reports improvement and decrease pain in the right lower extremity. Patient denies N/V/F/chills/SOB/CP.     Objective :  Temp:  [98.2 °F (36.8 °C)-99 °F (37.2 °C)] 98.4 °F (36.9 °C)  HR:  [68-69] 69  BP: (131-141)/(67-76) 141/76  Resp:  [18] 18  SpO2:  [90 %-95 %] 90 %  O2 Device: None (Room air)    Physical Exam  Physical Exam:     General:  Alert, cooperative, and in no distress.  Lower extremity exam:  Cardiovascular status at baseline.  Neurological status at baseline.  Musculoskeletal status at baseline. No calf tenderness noted.     Lower extremity wound(s) as noted below:  Left lower extremity -stable hyperkeratotic wound at the distal tuft of the hallux.  No erythema, edema, drainage noted.    Right lower extremity -significant improvement in erythema and edema noted with only a small residual erythema, edema and tenderness noted to the dorsal medial midfoot.  Stable wound on the distal tuft of the hallux.  Clinical Images 07/16/25:                Additional Data:     Labs:    Results from last 7 days   Lab Units 07/16/25  0452 07/15/25  0611 07/14/25  0459   WBC Thousand/uL 4.74   < > 5.82   HEMOGLOBIN g/dL 11.2*   < > 11.1*   HEMATOCRIT % 33.5*   < > 32.8*   PLATELETS Thousands/uL 248   < > 210   SEGS PCT %  --   --  71   LYMPHO PCT %  --   --  18   MONO PCT %  --   --  9   EOS PCT %  --   --  1    < > = values in  this interval not displayed.     Results from last 7 days   Lab Units 07/16/25  0452   POTASSIUM mmol/L 3.9   CHLORIDE mmol/L 105   CO2 mmol/L 22   BUN mg/dL 14   CREATININE mg/dL 0.89   CALCIUM mg/dL 8.1*           * I Have Reviewed All Lab Data Listed Above.    Recent Cultures (last 7 days):               Imaging: I have personally reviewed pertinent films in PACS  EKG, Pathology, and Other Studies: I have personally reviewed pertinent reports.     Lab Results: I have reviewed the following results:

## 2025-07-16 NOTE — ASSESSMENT & PLAN NOTE
MSSA bacteremia.  There was growth in both admission blood cultures, although obtain via peripheral IV.  Although site of blood draw was not ideal, given presence of clinical sepsis and soft tissue infection, this is most likely true MSSA bacteremia.  Given comorbid illnesses, patient will need long-term IV antibiotic.  TTE is without obvious vegetation.  Therefore, as long as bacteremia clears, ANSON will not be necessary since it does not alter treatment plan.  Bacteremia cleared rapidly.  Continue high-dose IV cefazolin.  Treat x 6 weeks IV antibiotic after clearance of bacteremia, through 8/18.  Monitor weekly CBCD/creatinine while on IV antibiotic.

## 2025-07-16 NOTE — ASSESSMENT & PLAN NOTE
R foot pain x4 days. Inability to bear weight. Limited ROM. Endorses fatigue, fevers, chills, loss of appetite. Febrile on admissoin to 101.5. ED Workup significant for WBC of 15.99, stable Hb, mild thrombocytopenia, mild hyponatremia, NORMA w Cr of 1.95 (baseline of 1), glucose of 191, AST elevated at 55, T bili 1.48. Lactic 1.5, procal 4.06, INR 1.34. Bcx drawn, and patient started on Ancef    Of note, patient has hx of R foot cellulitis, admission at [facility] in Jan 2024. Received Abx but left AMA before recommended amputation vs. Debridement by care team there. Has questionable hx of diabetes. Patient denies any hx of diabetes.    XR of b/l feet done this admission.  No acute osseous abnormalities were visualized on the bilateral x-rays of the feet  MRI R Foot: concern for diffuse subcutaneous edema and two fluid collections suspicious for abscesses  Plan:  Continue Abx Tx w Ancef, as patient's Bcx have come back as S. Aureus, favor MSSA. This is Day 12 of IV Abx, needs 6 wks per ID through 8/18  Patient had very large, watery bowel movement Wednesday evening in the setting of constipation for many days with bowel regimen. No other episodes since.   Continue F/u repeat Bcx from 07/07, currently no growth to date.  Control glucose between 140-180 to promote healing  Encourage patient to eat/drink throughout day to promote healing  Podiatry consulted:  On 07/06, podiatry debrided distal hallux b/l. Open wound was found on distal L hallux  R foot abscesses improving with IV abx, no surgical intervention  Trend WBC, fever  Given patient's prior admission and him leaving AMA, continue to explain importance of receiving Abx therapy  Continue work with PT and OT

## 2025-07-16 NOTE — ASSESSMENT & PLAN NOTE
River Pedro is a 75 y.o. male admitted 7/5/2025 for right foot pain for the past few days.  Of note, patient has history of right foot cellulitis, admission at [facility] in January 2024.  Podiatry was consulted to evaluate the right foot pain.     Diagnostics:  7/6: Right foot X-Ray - no acute osseous abnormality   7/6: Left foot X-Ray - no acute osseous abnormality  7/8: Right foot MRI: -Diffuse subcutaneous edema with 1.6 x 0.6 x 1.0 cm subcutaneous fluid collection dorsal to the first tarsometatarsal joint, and another similar 1.2 x 0.6 x 1.0 cm subcutaneous fluid collection medial to the first tarsometatarsal joint, suspicious of abscess.  No evidence of osteomyelitis.    Initial lab work/cultures  WBC: 4.74       Plan:    Patient seen and evaluated bedside.  Of note, patient's MRI shows 2 possible abscess to the right midfoot.  However, clinically the erythema and edema to the right lower extremity has significantly improved compared to the last evaluation 7/14/2025.  The bilateral halluces wounds were also stable with no signs of infection.  Patient also reports significant improvement of the right lower extremity pain.  No podiatric surgical intervention and patient stable for discharge from podiatry standpoint and can follow-up outpatient. Plan to continue IV antibiotics management per infectious disease team and local wound care. Will continue to monitor the cellulitic changes on the right foot.  We will continue to observe weekly while patient is in house.  Antibiotic plans on discharge per ID team.  Local wound care on the left great toe consisting of Betadine paint, 2 x 2 gauze, wrap with Mendez. Wound care instructions placed.  Elevation on green foam wedges or pillows when non-ambulatory.  Rest of care per primary team.    Weightbearing status: Weightbearing as tolerated to bilateral lower extremities

## 2025-07-16 NOTE — PLAN OF CARE
Problem: OCCUPATIONAL THERAPY ADULT  Goal: Performs self-care activities at highest level of function for planned discharge setting.  See evaluation for individualized goals.  Description: Treatment Interventions: ADL retraining, Functional transfer training, Endurance training, Cognitive reorientation, Patient/family training, Compensatory technique education, Continued evaluation, Energy conservation, Activityengagement          See flowsheet documentation for full assessment, interventions and recommendations.   Outcome: Progressing  Note: Limitation: Decreased ADL status, Decreased endurance, Decreased self-care trans, Decreased high-level ADLs  Prognosis: Fair  Assessment: Pt seen for skilled OT treatment session from 0913 to 0945 w/ interventions focusing on ADL participation, activity tolerance, sitting balance, standing tolerance, standing balance, bed mobility , transfer skills, and fxnl mobility. Pt was agreeable and willing to participate in session. Pt engaged in the following tasks: S for grooming tasks standing at the sink, S for UB ADLs, and min A for LB ADLs. Pt also required min Ax1 for bed mobility, transfers, and fxnl mobility today. In comparison to previous session, pt demonstrated improvements in ADLs and mobility tasks as he required less physical assistance for LB dressing, transfers, and fxnl mobility today. Pt continues to be functioning below baseline level as occupational performance remains limited by decreased ADL status, decreased activity tolerance, decreased endurance, decreased standing tolerance, decreased standing balance, decreased transfer skills, and decreased fxnl mobility. From OT standpoint, recommend Level II (Moderate Resource Intensity) at time of d/c. Pt will benefit from continued OT treatment while in acute care to address deficits as defined above and maximize level of functional independence with ADLs and functional mobility. Pt seated OOB in chair w/ alarm  activated and all needs met at end of session.     Rehab Resource Intensity Level, OT: II (Moderate Resource Intensity)

## 2025-07-16 NOTE — ASSESSMENT & PLAN NOTE
R foot pain x4 days. Inability to bear weight. Limited ROM. Endorses fatigue, fevers, chills, loss of appetite. Febrile on admissoin to 101.5. ED Workup significant for WBC of 15.99, stable Hb, mild thrombocytopenia, mild hyponatremia, NORMA w Cr of 1.95 (baseline of 1), glucose of 191, AST elevated at 55, T bili 1.48. Lactic 1.5, procal 4.06, INR 1.34. Bcx drawn, and patient started on Ancef    Of note, patient has hx of R foot cellulitis, admission at [facility] in Jan 2024. Received Abx but left AMA before recommended amputation vs. Debridement by care team there. Has questionable hx of diabetes. Patient denies any hx of diabetes.    XR of b/l feet done this admission.  No acute osseous abnormalities were visualized on the bilateral x-rays of the feet  MRI R Foot: concern for diffuse subcutaneous edema and two fluid collections suspicious for abscesses  Plan:  Continue Abx Tx w Ancef, as patient's Bcx have come back as S. Aureus, favor MSSA. This is Day 12 of IV Abx, needs 6 wks per ID through 8/18  Patient had very large, watery bowel movement Wednesday evening in the setting of constipation for many days with bowel regimen. No other episodes since.   Continue F/u repeat Bcx from 07/07, currently no growth to date.  Control glucose between 140-180 to promote healing  Encourage patient to eat/drink throughout day to promote healing  Podiatry consulted:  On 07/06, podiatry debrided distal hallux b/l. Open wound was found on distal L hallux  R foot abscesses improving with IV abx, no surgical intervention  Trend WBC, fever  Given patient's prior admission and him leaving AMA, continue to explain importance of receiving Abx therapy  Continue work with PT and OT

## 2025-07-16 NOTE — PROGRESS NOTES
Progress Note - Infectious Disease   Name: River Pedro 75 y.o. male I MRN: [id number]  Unit/Bed#: [facility] 901-01 I Date of Admission: 7/5/2025   Date of Service: 7/16/2025 I Hospital Day: 11    Assessment & Plan  MSSA bacteremia  MSSA bacteremia.  There was growth in both admission blood cultures, although obtain via peripheral IV.  Although site of blood draw was not ideal, given presence of clinical sepsis and soft tissue infection, this is most likely true MSSA bacteremia.  Given comorbid illnesses, patient will need long-term IV antibiotic.  TTE is without obvious vegetation.  Therefore, as long as bacteremia clears, ANSON will not be necessary since it does not alter treatment plan.  Bacteremia cleared rapidly.  Continue high-dose IV cefazolin.  Treat x 6 weeks IV antibiotic after clearance of bacteremia, through 8/18.  Monitor weekly CBCD/creatinine while on IV antibiotic.  Cellulitis of right foot  Right foot cellulitis.  Source is most likely the chronic right first toe ulcer.  This is likely source of bacteremia and sepsis above.  Foot x-ray is without obvious bony abnormalities.   Foot cellulitis is much improved.  However,  right foot MRI show 2 small soft tissue collections.  There was no obvious osteomyelitis.  Given dramatic improvement and with patient on long-term antibiotic, there is no plan for I&D per podiatry.  Antibiotic as above.  Serial foot exam.  Sepsis (HCC)  Sepsis, with fever and leukocytosis.  Source of sepsis is most likely right foot cellulitis, complicated by probable MSSA bacteremia.  Patient is clinically improved.  Temperature is down.  WBC has normalized.  Despite sepsis, he has remained systemically well, without toxicity and hemodynamically stable, without hypotension.  Antibiotic as below.  Monitor temperature/WBC.  Monitor hemodynamics.  CVA (cerebral vascular accident) (HCC)  CVA.  Patient with history of basal ganglia infarct.  New facial droop noted, with head CT, reviewed  personally, showing no acute infarct.  However, brain MRI show acute infarct involving right ganglial capsular region.  Management per primary service.  History of diabetes mellitus  History of DM.  Patient is not on any medication.  Hemoglobin A1c elevated and stable.  This is risk factor for poor wound healing and infection above.  Management per primary service.        Antibiotics:  Cefazolin  Antibiotic # 11  Last negative blood culture 7/7    Subjective   Patient is well.  No further pain in foot.  He is ambulating in lopez.  Temperature stays down.  No chills.  He is tolerating antibiotics well.  No nausea, vomiting or diarrhea.    Objective :  Temp:  [98.2 °F (36.8 °C)-99 °F (37.2 °C)] 98.4 °F (36.9 °C)  HR:  [68-69] 69  BP: (131-141)/(67-76) 141/76  Resp:  [18] 18  SpO2:  [90 %-95 %] 90 %  O2 Device: None (Room air)    Physical Exam:     General: Awake, alert, cooperative, no distress.   Neck:  Supple. No mass.  No lymphadenopathy.   Lungs: Expansion symmetric, no rales, no wheezing, respirations unlabored.   Heart:  Regular rate and rhythm, S1 and S2 normal, no murmur.   Abdomen: Soft, nondistended, non-tender, bowel sounds active all four quadrants, no masses, no organomegaly.   Extremities: Mild and improved edema.  Stable bilateral big toe superficial ulcers, without purulence.  Minimal right foot erythema/warmth.  No further tenderness.   Skin:  No rash.   Neuro: Moves all extremities.        Lab Results: I have reviewed the following results:  Results from last 7 days   Lab Units 07/16/25  0452 07/15/25  0611 07/14/25  0459   WBC Thousand/uL 4.74 6.07 5.82   HEMOGLOBIN g/dL 11.2* 11.1* 11.1*   PLATELETS Thousands/uL 248 235 210     Results from last 7 days   Lab Units 07/16/25  0452 07/15/25  0611 07/14/25  0459   SODIUM mmol/L 135 134* 133*   POTASSIUM mmol/L 3.9 4.0 3.5   CHLORIDE mmol/L 105 104 101   CO2 mmol/L 22 27 24   BUN mg/dL 14 16 19   CREATININE mg/dL 0.89 0.93 0.86   EGFR ml/min/1.73sq m 84  80 85   CALCIUM mg/dL 8.1* 8.1* 7.7*

## 2025-07-16 NOTE — PLAN OF CARE
Problem: PHYSICAL THERAPY ADULT  Goal: Performs mobility at highest level of function for planned discharge setting.  See evaluation for individualized goals.  Description: Treatment/Interventions: ADL retraining, Functional transfer training, LE strengthening/ROM, Elevations, Therapeutic exercise, Endurance training, Patient/family training, Equipment eval/education, Bed mobility, Gait training, Spoke to nursing, Spoke to case management, OT          See flowsheet documentation for full assessment, interventions and recommendations.  Outcome: Progressing  Note: Prognosis: Good  Problem List: Decreased strength, Decreased endurance, Impaired balance, Decreased coordination, Decreased mobility, Impaired judgement, Decreased safety awareness, Decreased cognition  Assessment: Pt very pleasant and cooperative. Completes mobility and therapeutic activity as outlined above. Pt able to improve mobility ocmpared to previous session. Initially require mod A for transfers progressing to min A. Able to improve hand placement today during transfers without as many verbal cues. Able to ambulate with min A and RW for support. Pt demonstrates less ataxic gait and improved L foot clearance with less seated rest breaks throughout ambulation. However, pt does demonstrates LOB x3 throughout session requiring mod A to correct. Most unsteady when turning, negotiating obstacles and attempting to walk backwards out of bathroom with RW. Pt is progressing well towards his goals. Pt left upright in chair with chair alarm donned, call bell and personal items within reach and all needs met.  Barriers to Discharge: Inaccessible home environment, Decreased caregiver support     Rehab Resource Intensity Level, PT: I (Maximum Resource Intensity)    See flowsheet documentation for full assessment.

## 2025-07-16 NOTE — ASSESSMENT & PLAN NOTE
onychomycosis on both the right and left feet    Plan:  itraconazole 100 mg twice daily.  Patient should be on treatment for onychomycosis for 12 consecutive weeks. Pharmacy advised reduce atorvastatin dose from 40 mg daily to 20 mg daily, risk outweighed benefit

## 2025-07-16 NOTE — ASSESSMENT & PLAN NOTE
River Pedro is a 75 y.o. male admitted 7/5/2025 for right foot pain for the past few days.  Of note, patient has history of right foot cellulitis, admission at [facility] in January 2024.  Podiatry was consulted to evaluate the right foot pain.     Diagnostics:  7/6: Right foot X-Ray - no acute osseous abnormality   7/6: Left foot X-Ray - no acute osseous abnormality  7/8: Right foot MRI: -Diffuse subcutaneous edema with 1.6 x 0.6 x 1.0 cm subcutaneous fluid collection dorsal to the first tarsometatarsal joint, and another similar 1.2 x 0.6 x 1.0 cm subcutaneous fluid collection medial to the first tarsometatarsal joint, suspicious of abscess.  No evidence of osteomyelitis.    Initial lab work/cultures  WBC: 4.74       Plan:    Patient seen and evaluated bedside.  Of note, patient's MRI shows 2 possible abscess to the right midfoot.  However, clinically the erythema and edema to the right lower extremity has significantly improved compared to the last evaluation 7/14/2025.  The bilateral halluces wounds were also stable with no signs of infection.  Patient also reports significant improvement of the right lower extremity pain.  No podiatric surgical intervention and patient stable for discharge from podiatry standpoint and can follow-up outpatient. Plan to continue IV antibiotics management per infectious disease team and local wound care. Will continue to monitor the cellulitic changes on the right foot.  We will continue to observe weekly while patient is in house.  Antibiotic plans on discharge per ID team.  Local wound care on the left great toe consisting of Betadine paint, 2 x 2 gauze, wrap with Mendez. Wound care instructions placed.  Elevation on green foam wedges or pillows when non-ambulatory.  Rest of care per primary team.    Weightbearing status: Weightbearing as tolerated to bilateral lower extremities

## 2025-07-16 NOTE — CASE MANAGEMENT
Case Management Discharge Planning Note    Patient name River Pedro  Location [facility] 901/[facility] 901-01 MRN [id number]  : 1950 Date 2025       Current Admission Date: 2025  Current Admission Diagnosis:Cellulitis of right foot   Patient Active Problem List    Diagnosis Date Noted    Onychomycosis 2025    Stroke-like symptoms 2025    Urinary retention 2025    Open wound bilateral 1st toes 2025    Cellulitis of right foot 2025    CVA (cerebral vascular accident) (HCC) 2025    Impaired cognition 2022    History of transient ischemic attack (TIA) 2022    History of diabetes mellitus 2022      LOS (days): 11  Geometric Mean LOS (GMLOS) (days): 4.9  Days to GMLOS:-6.1     OBJECTIVE:  Risk of Unplanned Readmission Score: 12.06         Current admission status: Inpatient   Preferred Pharmacy:   PATIENT/FAMILY REPORTS NO PREFERRED PHARMACY  No address on file      Primary Care Provider: No primary care provider on file.    Primary Insurance: [employer] REP  Secondary Insurance:     DISCHARGE DETAILS:           Message from SOD A & can submit for auth for STR. Cm dc request sent. Message to SNF to update & confirmed [facility] is aware pt will be on iv abx. They are aware he will be on them for 6 wks & can submit for auth. S/w pt & ex-wife Sabrina & agreeable to plan.

## 2025-07-16 NOTE — OCCUPATIONAL THERAPY NOTE
Occupational Therapy Progress Note     Patient Name: River Pedro  Today's Date: 7/16/2025  Problem List  Principal Problem:    Cellulitis of right foot  Active Problems:    History of diabetes mellitus    CVA (cerebral vascular accident) (HCC)    Open wound bilateral 1st toes    Urinary retention    Stroke-like symptoms    Onychomycosis       07/16/25 0945   OT Last Visit   OT Visit Date 07/16/25   Note Type   Note Type Treatment   Pain Assessment   Pain Assessment Tool 0-10   Pain Score No Pain   Restrictions/Precautions   Weight Bearing Precautions Per Order Yes   RLE Weight Bearing Per Order WBAT   LLE Weight Bearing Per Order WBAT   Other Precautions Cognitive;Chair Alarm;Bed Alarm;Multiple lines;Fall Risk  (expressive aphasia)   Lifestyle   Autonomy Pta pt ind. w/ adls and iadls, functional mobility, and (-) . Utilizes public transportation.   Reciprocal Relationships Supportive family   Service to Others Retired   ADL   Where Assessed Standing at sink  (+ chair)   Grooming Assistance 5  Supervision/Setup   Grooming Deficit Setup;Supervision/safety;Increased time to complete;Wash/dry face;Teeth care;Brushing hair   Grooming Comments Pt completed grooming tasks standing at the sink w/ CCG.   UB Bathing Assistance 5  Supervision/Setup   UB Bathing Deficit Setup;Increased time to complete;Chest;Right arm;Left arm;Abdomen   LB Bathing Assistance 4  Minimal Assistance   LB Bathing Deficit Setup;Increased time to complete;Perineal area;Buttocks;Right upper leg;Left upper leg;Right lower leg including foot;Left lower leg including foot   UB Dressing Assistance 5  Supervision/Setup   UB Dressing Deficit Setup;Verbal cueing;Thread RUE;Thread LUE;Pull over head   LB Dressing Assistance 4  Minimal Assistance   LB Dressing Deficit Setup;Don/doff R sock;Don/doff L sock   Bed Mobility   Supine to Sit 4  Minimal assistance   Additional items Assist x 1;HOB elevated;Bedrails;Increased time required;Verbal cues   Sit  to Supine Unable to assess   Additional Comments Pt seated OOB in chair at end of OT tx session w/ alarm activated and all needs within reach.   Transfers   Sit to Stand 4  Minimal assistance   Additional items Assist x 1;Increased time required;Verbal cues;Armrests   Stand to Sit 4  Minimal assistance   Additional items Assist x 1;Increased time required;Verbal cues;Armrests   Additional Comments Pt requiring mod Ax1 for stand from EOB but progress to min Ax1 for following two stand from chair; w/ RW for support.   Functional Mobility   Functional Mobility 4  Minimal assistance   Additional Comments Pt initial required mod Ax1 but progressed to min Ax1 for fxnl mobility w/ RW for support; household distances x2 trials w/ seated rest break between each trial.   Additional items Rolling walker   Cognition   Arousal/Participation Alert;Responsive;Cooperative   Attention Attends with cues to redirect   Following Commands Follows one step commands with increased time or repetition   Comments Pt was pleasant, cooperative, and willing to participate in OT tx session. Pt w/ expressive aphasia and required increased processing and response time.   Activity Tolerance   Activity Tolerance Patient tolerated treatment well   Medical Staff Made Aware RN clearance prior to session; OTS, Aung; PT, Khushbu, due to pt's medical complexity and multiple comorbidities   Assessment   Assessment Pt seen for skilled OT treatment session from 0913 to 0945 w/ interventions focusing on ADL participation, activity tolerance, sitting balance, standing tolerance, standing balance, bed mobility , transfer skills, and fxnl mobility. Pt was agreeable and willing to participate in session. Pt engaged in the following tasks: S for grooming tasks standing at the sink, S for UB ADLs, and min A for LB ADLs. Pt also required min Ax1 for bed mobility, transfers, and fxnl mobility today. In comparison to previous session, pt demonstrated improvements in  ADLs and mobility tasks as he required less physical assistance for LB dressing, transfers, and fxnl mobility today. Pt continues to be functioning below baseline level as occupational performance remains limited by decreased ADL status, decreased activity tolerance, decreased endurance, decreased standing tolerance, decreased standing balance, decreased transfer skills, and decreased fxnl mobility. From OT standpoint, recommend Level II (Moderate Resource Intensity) at time of d/c. Pt will benefit from continued OT treatment while in acute care to address deficits as defined above and maximize level of functional independence with ADLs and functional mobility. Pt seated OOB in chair w/ alarm activated and all needs met at end of session.   Plan   Treatment Interventions ADL retraining;Functional transfer training;Endurance training;Patient/family training;Equipment evaluation/education;Compensatory technique education;Continued evaluation;Energy conservation;Activityengagement   Goal Expiration Date 07/21/25   OT Treatment Day 2   OT Frequency 2-3x/wk   Discharge Recommendation   Rehab Resource Intensity Level, OT II (Moderate Resource Intensity)   -PAC Daily Activity Inpatient   Lower Body Dressing 3   Bathing 3   Toileting 3   Upper Body Dressing 3   Grooming 3   Eating 4   Daily Activity Raw Score 19   Daily Activity Standardized Score (Calc for Raw Score >=11) 40.22   -PAC Applied Cognition Inpatient   Following a Speech/Presentation 3   Understanding Ordinary Conversation 4   Taking Medications 3   Remembering Where Things Are Placed or Put Away 4   Remembering List of 4-5 Errands 3   Taking Care of Complicated Tasks 2   Applied Cognition Raw Score 19   Applied Cognition Standardized Score 39.77       The patient's raw score on the -PAC Daily Activity Inpatient Short Form is 19. A raw score of greater than or equal to 19 suggests the patient may benefit from discharge to home. Please refer to the  recommendation of the Occupational Therapist for safe discharge planning.    RODOLFO Angulo, OTR/L

## 2025-07-16 NOTE — QUICK NOTE
Updated patient's ex-wife Emily over the phone. Explained that River is medically stable for discharge, we are just waiting for insurance authorization.     Minnie Ni, DO

## 2025-07-16 NOTE — DISCHARGE SUMMARY
Discharge Summary - Internal Medicine   Name: River Pedro 75 y.o. male I MRN: [id number]  [facility] I Date of Admission: 7/5/2025   Date of Service: 7/16/2025 I Hospital Day: 11    BRIEF OVERVIEW  Admitting Provider: Camila Blanton MD  Discharge Provider: Claire Zelaya DO  Primary Care Physician at Discharge: No primary care provider on file.    Discharge To:  Short-term rehab  Facility / Family Member Name: [facility]    Admission Date: 7/5/2025     Discharge Date: 7/17/2025    Primary Discharge Diagnosis  Cellulitis of right foot    Other Problems Addressed: MSSA bacteremia, urinary retention, diabetes, stroke-like symptoms     Consulting Providers   Infectious disease, neuropsychiatry, podiatry, neurology, urology       Therapeutic Operative Procedures Performed  Wound debridement of distal hallux bilaterally    Diagnostic Procedures Performed  microbiology: blood culture: negative and radiology: MRI: head: acute/recent infarct of right galgliocapsular region with cytotoxic edema, old infarct in left basal ganglia and periventricular left frontal lobe white matter. Old lacunar infarcts involving bilateral cerebellar hemispheres. Mild chronic white matter microangiopathic changes in body cerebral hemisphere. Right foot MRI: diffuse subcutaneous edema with 1.6x0.6x1.o cm subcutaneous fluid collection dorsal to first tarsometatarsal joint and 1.2 x 0.6 x 1.0 cm subcutaneous fluid collection medical to first tarsometatarsal joint suspicious for abscesses.  and CT scan: head, negative for acute cortical infarct or hemorrhage.    Discharge Disposition: Home/Self Care  Discharged With Lines: yes    Type: picc  Reason for line: requires 6 weeks of cefazolin (through 8/18) for MSSA bacteremia and cellulitis  When to remove: after completing antibiotics.        Test Results Pending at Discharge: none    Outpatient Follow-Up  yes      Follow up: [facility]  Date and time: please  make appointment  Location: [address] [place] [address] [address] [place] [address]  Follow up within next: 1 week    Follow up with consulting providers  yes      Physician name: Dr. Feliciano  Specialty: Podiatry  Office phone number: [phone]   Follow up within next: 1 week      Physician name: Xavier Bolton PA-C  Specialty: infectious disease  Office phone number: [phone]   Follow up within next: 1 week     Specialty: Neurology  Office phone number: [phone]     Specialty: Urology  Office phone number: [phone]     Active Issues Requiring Follow-up   Urinary retention with hanks, follow-up with urology outpatient    Code Status: Level 1 - Full Code  Advance Directive and Living Will: <no information>  Power of :    POLST:      Medications   [unfilled]    Allergies  Allergies   Allergen Reactions    Penicillins Hives     Discharge Diet: Nectar thick liquids, dental soft, carbohydrate diet level 2 (5 carb servings/75g/meal)  Activity restrictions: weight bearing as tolerated    DETAILS OF HOSPITAL STAY  Hospital Course:     River Pedro is a 74 y/o M w remote hx of CVA- L basal ganglia infarct in 2012, TIA 2019 who presents to the ED with R foot pain for the past 4 days. When asked what brought him to the hospital, he stated that he was having extreme difficulty putting weight on the foot and was unable to move his foot much. He also presents with mild word-finding difficulty, fatigue, fevers, chills, loss of appetite for these 4 days. He denies any falls with his difficulty walking, any trauma to the foot, and any drainage from the foot. He also denies any loss of sensation over this area. He cannot remember the last time that he saw a provider, and he does not take any medications at home. Of note, he was admitted at [facility] on 01/13/2024 for a similar infection that was labeled a diabetic foot wound extending from R great toe up his distal R leg. He denies any hx of diabetes. Patient  left hospital AMA, although care team there recommended debridement vs. Amputation. Imaging at this visit was negative for OM. Of note, patient was seen on 04/04/2022 by cardiology following an admission in March of this year for ambulatory dysfxn. He was found down in his home on the floor for 2 days and was in SVT upon presentation to the ED. Patient declined any further cardiac testing beyond an echocardiogram done this year.    Upon admission to [facility], a BL foot x-ray showed no osseous abnormalities. Podiatry was consulted and performed debridemebnt on 7/6, An Mri was performed on 7/12 showed diffuse subcutaneous edema, with 1.6 x 0.6 x 1.0 cm subcutaneous fluid collection dorsal to the first tarsometatarsal joint and another similar 1.2 x 0.6 x 1.0 cm subcutaneous fluid collection medial to the first tarsometatarsal joint suspicious for abscesses. Podiatry was consulted and recommended no surgical intervention as he continued to improve clinically on IV antibiotics.    On 7/8, the SOD team was notified by nursing that he had left facial droop. A stat CT head was negative for acute hemorrhage. An MRI brain was done 7/10 revealing a right gangliocapsular region infarct with cytotoxic edema. A CTA head and neck was performed on 7/11 and was negative for large vessel occlusions.    On 7/10 PM the rapid response team was notified he fell when coming back from the bathroom. He hit his head but did not lose consciousness. CT head was performed without intracranial hemorrhage, a CT c-spine revealed no fractures or traumatic malalignment.    Speech evaluated him and a barium swallow was performed on 7/9, recommended thickened liquids. He was evaluated by infectious disease, recommended 6 weeks of ancef via picc ending 8/18. Evaluated by PT/OT who recommended skilled nursing. Neuropsych evaluated him on 7/15 and found him to have capacity to make medical decisions.     Patient was seen and examined at bedside this morning.  He denies chest pain SOB, nausea, vomiting, fever, chills, headache. On 7/17/25 he was found to be stable for discharge to [facility]. He should establish with a PCP at [facility] and make a follow-up appointment in 1 week for a post-hospital follow-up. He should follow-up with neurology and follow-up with urology for management of his hanks catheter and urinary retention. He should also follow-up with podiatry, and with infectious disease (appointment made 7/19 11:45AM).     Presenting Problem/History of Present Illness  Principal Problem:    Cellulitis of right foot  Active Problems:    History of diabetes mellitus    CVA (cerebral vascular accident) ([facility])    Open wound bilateral 1st toes    Urinary retention    Stroke-like symptoms    Onychomycosis  Resolved Problems:    NORMA (acute kidney injury) ([facility])    Elevated LFTs    MSSA bacteremia    Sepsis ([facility])      Other Pertinent Test Results  none     Discharge Condition: good    Cellulitis of right foot  Sepsis ([facility])  R foot pain x4 days. Inability to bear weight. Limited ROM. Endorses fatigue, fevers, chills, loss of appetite. Febrile on admissoin to 101.5. ED Workup significant for WBC of 15.99, stable Hb, mild thrombocytopenia, mild hyponatremia, NORMA w Cr of 1.95 (baseline of 1), glucose of 191, AST elevated at 55, T bili 1.48. Lactic 1.5, procal 4.06, INR 1.34. Bcx drawn, and patient started on Ancef     Of note, patient has hx of R foot cellulitis, admission at [facility] in Jan 2024. Received Abx but left AMA before recommended amputation vs. Debridement by care team there. Has questionable hx of diabetes. Patient denies any hx of diabetes.     XR of b/l feet done this admission.  No acute osseous abnormalities were visualized on the bilateral x-rays of the feet  MRI R Foot: concern for diffuse subcutaneous edema and two fluid collections suspicious for abscesses  Plan:  Continue Abx Tx w Ancef, as patient's Bcx have come back as S. Aureus, favor MSSA. This  "is Day 13 of IV Abx, needs 6 wks per ID through 8/18  Bcx from 07/07: no growth, finalized  Podiatry consulted:  On 07/06, podiatry debrided distal hallux b/l. Open wound was found on distal L hallux  R foot abscesses improving with IV abx, no surgical intervention  Continue work with PT and OT at [facility]     MSSA bacteremia  Patient's blood cultures 2/2 for gram positive cocci in clusters. Escalated antibiotics to vancomycin     ANSON did not reveal any vegetation     Plan:   ID consulted, appreciate recs:  Recommended TTE: ejection fraction of 60%, mild right atrial dilation, mild aortic valve regurgitation, no echocardiographic evidence of valvular vegetation affecting the mitral, aortic, or tricuspid valves.  Pulmonic valve not adequately visualized to assess for endocarditis. Overall, no significant changes noted compared to the prior study.  Also recommended 6 weeks of IV Abx tx  Blood cultures from 7/7 finalized with no growth  Stroke-like symptoms  07/08 morning, nursing staff alerted our team that patient had left-sided facial droop, around the corner of his mouth.  While examining him, this was noted, but the rest of his neuroexam was benign.  When taking his morning aspirin, staff noticed that he started to choke.     Obtained chest x-ray.  No acute cardiopulmonary process. patient satting 94% on room air.  Has not required any supplemental oxygen     STAT CT head was ordered with the following findings: No hemorrhage or evidence of acute cortical infarct.  Chronic infarct in the left basal ganglia was noted.     Findings of brain MRI stated below in this note, showing new infarct \"Acute/recent infarct involving the right gangliocapsular region, with associated cytotoxic edema\"     Bubble study that was done during ANSON revealed small PFO.  No thrombus was visualized during the study  Patient's rope score is a 5, indicating that there is a 34% chance that a new stroke is due to a patent PFO.   "   Neurology consulted, recommended CTA head and neck.  No large vessel occlusion, high-grade stenosis, or intracranial aneurysm.     Plan:  Current recommendations from speech therapy are dysphagia 3 dental soft with nectar thick liquids, as patient was choking on thin liquids on Wednesday.  Trialed thin liquids per SLP with coughing, recommended nectar thick.   Continue aspirin and statin  Follow-up additional recommendations from neurology  Urinary retention  Urology consulted for retention and re-colored urine in [facility] bag     Plan:  Continue flomax 0.4 mg daily  Avoid medications that cause urinary retention such as antihistamines and anticholinergics  Patient did not tolerate voiding trial, urology replaced on 7/12 .  Patient will remain with [facility] until he follows up with them outpatient   History of diabetes mellitus  Remote hx of T2DM. Patient states that he does not take any medications and that he is not diabetics, although this is mentioned in prior documentation  Hemoglobin 6.7     Plan:  Continue managing diabetes with diet and exercise.  CVA (cerebral vascular accident) (HCC)  Hx of L basal ganglia infarct in 2012 and TIA in 2019. Unable to see in medical record if patient had residual deficits following diagnosis. Only + neuro findings are mild word finding difficulty     Plan:  Continue ASA 81 mg daily. Patient was not taking this as an outpatient, although it is listed in home meds  Continue Lipitor 40 mg daily,   Open wound bilateral 1st toes  Podiatry consulted, appreciate recs  Onychomycosis  onychomycosis on both the right and left feet     Plan:  itraconazole 100 mg twice daily.  Patient should be on treatment for onychomycosis for 12 consecutive weeks. Pharmacy advised reduce atorvastatin dose from 40 mg daily to 20 mg daily, risk outweighed benefit       Physical Exam  Vitals and nursing note reviewed.   Constitutional:       General: He is not in acute distress.     Appearance: He is  well-developed.   HENT:      Head: Normocephalic and atraumatic.     Eyes:      Conjunctiva/sclera: Conjunctivae normal.       Cardiovascular:      Rate and Rhythm: Normal rate and regular rhythm.      Heart sounds: No murmur heard.  Pulmonary:      Effort: Pulmonary effort is normal. No respiratory distress.      Breath sounds: Normal breath sounds.   Abdominal:      Palpations: Abdomen is soft.      Tenderness: There is no abdominal tenderness.     Musculoskeletal:         General: No swelling.      Cervical back: Neck supple.     Skin:     General: Skin is warm and dry.      Capillary Refill: Capillary refill takes less than 2 seconds.     Neurological:      Mental Status: He is alert and oriented to person, place, and time. Mental status is at baseline.      Motor: Weakness present.      Comments: L facial droop, L SCM weakness, and dysarthria at baseline.   Psychiatric:         Mood and Affect: Mood normal.                Discharge Statement:  I have spent a total time of 30 minutes in caring for this patient on the day of the visit/encounter. .

## 2025-07-16 NOTE — ASSESSMENT & PLAN NOTE
Right foot cellulitis.  Source is most likely the chronic right first toe ulcer.  This is likely source of bacteremia and sepsis above.  Foot x-ray is without obvious bony abnormalities.   Foot cellulitis is much improved.  However,  right foot MRI show 2 small soft tissue collections.  There was no obvious osteomyelitis.  Given dramatic improvement and with patient on long-term antibiotic, there is no plan for I&D per podiatry.  Antibiotic as above.  Serial foot exam.

## 2025-07-16 NOTE — ASSESSMENT & PLAN NOTE
Patient's blood cultures 2/2 for gram positive cocci in clusters. Escalated antibiotics to vancomycin    ANSON did not reveal any vegetation     Plan:   ID consulted, appreciate recs:  Recommended TTE for possible IE, given positive blood culture for typical IE organism and fever  Echo showing ejection fraction of 60%, mild right atrial dilation, mild aortic valve regurgitation, no echocardiographic evidence of valvular vegetation affecting the mitral, aortic, or tricuspid valves.  Pulmonic valve not adequately visualized to assess for endocarditis. Overall, no significant changes noted compared to the prior study.  Also recommended 6 weeks of IV Abx tx  Blood cultures from 7/7 finalized with no growth

## 2025-07-16 NOTE — PROGRESS NOTES
Progress Note - Internal Medicine   Name: River Pedro 75 y.o. male I MRN: [id number]  Unit/Bed#: [facility] 901-01 I Date of Admission: 7/5/2025   Date of Service: 7/16/2025 I Hospital Day: 11     Assessment & Plan  Cellulitis of right foot  Sepsis (HCC)  R foot pain x4 days. Inability to bear weight. Limited ROM. Endorses fatigue, fevers, chills, loss of appetite. Febrile on admissoin to 101.5. ED Workup significant for WBC of 15.99, stable Hb, mild thrombocytopenia, mild hyponatremia, NORMA w Cr of 1.95 (baseline of 1), glucose of 191, AST elevated at 55, T bili 1.48. Lactic 1.5, procal 4.06, INR 1.34. Bcx drawn, and patient started on Ancef    Of note, patient has hx of R foot cellulitis, admission at [facility] in Jan 2024. Received Abx but left AMA before recommended amputation vs. Debridement by care team there. Has questionable hx of diabetes. Patient denies any hx of diabetes.    XR of b/l feet done this admission.  No acute osseous abnormalities were visualized on the bilateral x-rays of the feet  MRI R Foot: concern for diffuse subcutaneous edema and two fluid collections suspicious for abscesses  Plan:  Continue Abx Tx w Ancef, as patient's Bcx have come back as S. Aureus, favor MSSA. This is Day 12 of IV Abx, needs 6 wks per ID through 8/18  Patient had very large, watery bowel movement Wednesday evening in the setting of constipation for many days with bowel regimen. No other episodes since.   Continue F/u repeat Bcx from 07/07, currently no growth to date.  Control glucose between 140-180 to promote healing  Encourage patient to eat/drink throughout day to promote healing  Podiatry consulted:  On 07/06, podiatry debrided distal hallux b/l. Open wound was found on distal L hallux  R foot abscesses improving with IV abx, no surgical intervention  Trend WBC, fever  Given patient's prior admission and him leaving AMA, continue to explain importance of receiving Abx therapy  Continue work with PT and OT    MSSA  "bacteremia  Patient's blood cultures 2/2 for gram positive cocci in clusters. Escalated antibiotics to vancomycin    ANSON did not reveal any vegetation     Plan:   ID consulted, appreciate recs:  Recommended TTE for possible IE, given positive blood culture for typical IE organism and fever  Echo showing ejection fraction of 60%, mild right atrial dilation, mild aortic valve regurgitation, no echocardiographic evidence of valvular vegetation affecting the mitral, aortic, or tricuspid valves.  Pulmonic valve not adequately visualized to assess for endocarditis. Overall, no significant changes noted compared to the prior study.  Also recommended 6 weeks of IV Abx tx  Blood cultures from 7/7 finalized with no growth  Stroke-like symptoms  07/08 morning, nursing staff alerted our team that patient had left-sided facial droop, around the corner of his mouth.  While examining him, this was noted, but the rest of his neuroexam was benign.  When taking his morning aspirin, staff noticed that he started to choke.    Obtained chest x-ray.  No acute cardiopulmonary process. patient satting 94% on room air.  Has not required any supplemental oxygen    STAT CT head was ordered with the following findings: No hemorrhage or evidence of acute cortical infarct.  Chronic infarct in the left basal ganglia was noted.    Findings of brain MRI stated below in this note, showing new infarct \"Acute/recent infarct involving the right gangliocapsular region, with associated cytotoxic edema\"    Bubble study that was done during ANSON revealed small PFO.  No thrombus was visualized during the study  Patient's rope score is a 5, indicating that there is a 34% chance that a new stroke is due to a patent PFO.    Neurology consulted, recommended CTA head and neck.  No large vessel occlusion, high-grade stenosis, or intracranial aneurysm.    Plan:  Current recommendations from speech therapy are dysphagia 3 dental soft with nectar thick liquids, as " patient was choking on thin liquids on Wednesday.  Trialed thin liquids per SLP with coughing, recommended nectar thick.   Continue aspirin and statin  Follow-up additional recommendations from neurology  Urinary retention  Urology consulted for retention and re-colored urine in Hanks bag    Plan:  Continue to monitor for further hematuria. Favor traumatic hanks insertion  Continue flomax 0.4 mg daily  Avoid medications that cause urinary retention such as antihistamines and anticholinergics  Bowel regimen: Held at this time, as patient had large watery bowel movement on Tuesday night, and has been having bowel movements since then without laxatives  Patient did not tolerate voiding trial, urology replaced on 7/12 .  Patient will remain with [facility] until he follows up with them outpatient   History of diabetes mellitus  Remote hx of T2DM. Patient states that he does not take any medications and that he is not diabetics, although this is mentioned in prior documentation  Hemoglobin 6.7    Plan:  Algorithm 2 initiated   After patient resumes more regular will consider algorithm 3   CVA (cerebral vascular accident) (HCC)  Hx of L basal ganglia infarct in 2012 and TIA in 2019. Unable to see in medical record if patient had residual deficits following diagnosis. Only + neuro findings are mild word finding difficulty    Plan:  Continue ASA 81 mg daily. Patient was not taking this as an outpatient, although it is listed in home meds  Continue Lipitor 40 mg daily,   Open wound bilateral 1st toes  Podiatry consulted, appreciate recs  Onychomycosis  onychomycosis on both the right and left feet    Plan:  itraconazole 100 mg twice daily.  Patient should be on treatment for onychomycosis for 12 consecutive weeks. Pharmacy advised reduce atorvastatin dose from 40 mg daily to 20 mg daily, risk outweighed benefit     Disposition: inpatient     Team: SOD TEAM A    Subjective   Patient seen and examined. No acute events overnight.  Pain in right foot is well controlled. Denies SOB, chest pain, abdominal pain, nausea, vomiting, fever, chills. Accepted to [facility] for SNF.     Objective :  Temp:  [98.2 °F (36.8 °C)-99 °F (37.2 °C)] 98.4 °F (36.9 °C)  HR:  [68-69] 69  BP: (131-141)/(67-76) 141/76  Resp:  [18] 18  SpO2:  [90 %-95 %] 90 %  O2 Device: None (Room air)    I/O         07/14 0701  07/15 0700 07/15 0701  07/16 0700 07/16 0701  07/17 0700    P.O. 742 740     I.V. (mL/kg) 30 (0.4)      IV Piggyback 150 150     Total Intake(mL/kg) 922 (11.1) 890 (10.7)     Urine (mL/kg/hr) 3250 (1.6) 2400 (1.2)     Stool 0      Total Output 3250 2400     Net -2328 -1510            Unmeasured Stool Occurrence 1 x            Weights:   IBW (Ideal Body Weight): 75.3 kg    Body mass index is 25.52 kg/m².  Weight (last 2 days)       None            Physical Exam  Vitals and nursing note reviewed.   Constitutional:       General: He is not in acute distress.     Appearance: He is well-developed.   HENT:      Head: Normocephalic and atraumatic.     Eyes:      Conjunctiva/sclera: Conjunctivae normal.       Cardiovascular:      Rate and Rhythm: Normal rate and regular rhythm.      Heart sounds: No murmur heard.  Pulmonary:      Effort: Pulmonary effort is normal. No respiratory distress.      Breath sounds: Normal breath sounds.   Abdominal:      Palpations: Abdomen is soft.      Tenderness: There is no abdominal tenderness.     Musculoskeletal:         General: No swelling.      Cervical back: Neck supple.     Skin:     General: Skin is warm and dry.      Capillary Refill: Capillary refill takes less than 2 seconds.     Neurological:      Mental Status: He is alert and oriented to person, place, and time. Mental status is at baseline.      Cranial Nerves: No cranial nerve deficit.      Sensory: No sensory deficit.      Motor: Weakness present.      Comments: L facial droop and dysarthria, at baseline.   Psychiatric:         Mood and Affect: Mood normal.            Lab Results: I have reviewed the following results:  Recent Labs     07/16/25  0452   WBC 4.74   HGB 11.2*   HCT 33.5*      SODIUM 135   K 3.9      CO2 22   BUN 14   CREATININE 0.89   GLUC 109             Currently Ordered Meds:   Current Facility-Administered Medications:     acetaminophen (TYLENOL) tablet 975 mg, Q8H PRN    amLODIPine (NORVASC) tablet 5 mg, Daily    aspirin chewable tablet 81 mg, Daily    atorvastatin (LIPITOR) tablet 40 mg, Daily With Dinner    ceFAZolin (ANCEF) IVPB (premix in dextrose) 2,000 mg 50 mL, Q8H, Last Rate: 2,000 mg (07/16/25 0553)    heparin (porcine) subcutaneous injection 5,000 Units, Q8H JOSÉ MIGUEL    insulin lispro (HumALOG/ADMELOG) 100 units/mL subcutaneous injection 1-5 Units, 4x Daily (AC & HS) **AND** Fingerstick Glucose (POCT), 4x Daily AC and at bedtime    itraconazole (SPORANOX) oral solution 100 mg, Daily    LORazepam (ATIVAN) injection 0.5 mg, Once PRN    melatonin tablet 3 mg, HS    ondansetron (ZOFRAN) injection 4 mg, Q6H PRN    tamsulosin (FLOMAX) capsule 0.4 mg, Daily With Dinner  VTE Pharmacologic Prophylaxis: VTE covered by:  heparin (porcine), Subcutaneous, 5,000 Units at 07/15/25 2113     VTE Mechanical Prophylaxis: sequential compression device    Administrative Statements     Portions of the record may have been created with voice recognition software.

## 2025-07-17 VITALS
DIASTOLIC BLOOD PRESSURE: 73 MMHG | HEIGHT: 71 IN | WEIGHT: 183 LBS | OXYGEN SATURATION: 96 % | RESPIRATION RATE: 18 BRPM | TEMPERATURE: 97.2 F | BODY MASS INDEX: 25.62 KG/M2 | HEART RATE: 66 BPM | SYSTOLIC BLOOD PRESSURE: 139 MMHG

## 2025-07-17 LAB
ANION GAP SERPL CALCULATED.3IONS-SCNC: 5 MMOL/L (ref 4–13)
BUN SERPL-MCNC: 13 MG/DL (ref 5–25)
CALCIUM SERPL-MCNC: 8.2 MG/DL (ref 8.4–10.2)
CHLORIDE SERPL-SCNC: 104 MMOL/L (ref 96–108)
CO2 SERPL-SCNC: 24 MMOL/L (ref 21–32)
CREAT SERPL-MCNC: 0.96 MG/DL (ref 0.6–1.3)
ERYTHROCYTE [DISTWIDTH] IN BLOOD BY AUTOMATED COUNT: 15 % (ref 11.6–15.1)
GFR SERPL CREATININE-BSD FRML MDRD: 77 ML/MIN/1.73SQ M
GLUCOSE SERPL-MCNC: 113 MG/DL (ref 65–140)
GLUCOSE SERPL-MCNC: 148 MG/DL (ref 65–140)
GLUCOSE SERPL-MCNC: 163 MG/DL (ref 65–140)
HCT VFR BLD AUTO: 33.1 % (ref 36.5–49.3)
HGB BLD-MCNC: 10.9 G/DL (ref 12–17)
MCH RBC QN AUTO: 30.4 PG (ref 26.8–34.3)
MCHC RBC AUTO-ENTMCNC: 32.9 G/DL (ref 31.4–37.4)
MCV RBC AUTO: 92 FL (ref 82–98)
PLATELET # BLD AUTO: 263 THOUSANDS/UL (ref 149–390)
PMV BLD AUTO: 11.9 FL (ref 8.9–12.7)
POTASSIUM SERPL-SCNC: 4.2 MMOL/L (ref 3.5–5.3)
RBC # BLD AUTO: 3.59 MILLION/UL (ref 3.88–5.62)
SODIUM SERPL-SCNC: 133 MMOL/L (ref 135–147)
WBC # BLD AUTO: 6.05 THOUSAND/UL (ref 4.31–10.16)

## 2025-07-17 PROCEDURE — 99232 SBSQ HOSP IP/OBS MODERATE 35: CPT | Performed by: INTERNAL MEDICINE

## 2025-07-17 PROCEDURE — 85027 COMPLETE CBC AUTOMATED: CPT

## 2025-07-17 PROCEDURE — 82948 REAGENT STRIP/BLOOD GLUCOSE: CPT

## 2025-07-17 PROCEDURE — 99238 HOSP IP/OBS DSCHRG MGMT 30/<: CPT | Performed by: STUDENT IN AN ORGANIZED HEALTH CARE EDUCATION/TRAINING PROGRAM

## 2025-07-17 PROCEDURE — 80048 BASIC METABOLIC PNL TOTAL CA: CPT

## 2025-07-17 PROCEDURE — G0545 PR INHERENT VISIT TO INPT: HCPCS | Performed by: INTERNAL MEDICINE

## 2025-07-17 RX ORDER — AMLODIPINE BESYLATE 5 MG/1
5 TABLET ORAL DAILY
Status: ON HOLD
Start: 2025-07-17

## 2025-07-17 RX ORDER — TAMSULOSIN HYDROCHLORIDE 0.4 MG/1
0.4 CAPSULE ORAL
Status: ON HOLD
Start: 2025-07-17

## 2025-07-17 RX ORDER — ITRACONAZOLE 10 MG/ML
100 SOLUTION ORAL DAILY
Status: ON HOLD
Start: 2025-07-17 | End: 2025-10-05

## 2025-07-17 RX ORDER — CEFAZOLIN SODIUM 2 G/50ML
2000 SOLUTION INTRAVENOUS EVERY 8 HOURS
Status: ON HOLD
Start: 2025-07-17 | End: 2025-08-20

## 2025-07-17 RX ADMIN — HEPARIN SODIUM 5000 UNITS: 5000 INJECTION INTRAVENOUS; SUBCUTANEOUS at 13:40

## 2025-07-17 RX ADMIN — INSULIN LISPRO 1 UNITS: 100 INJECTION, SOLUTION INTRAVENOUS; SUBCUTANEOUS at 11:10

## 2025-07-17 RX ADMIN — ITRACONAZOLE 100 MG: 10 SOLUTION ORAL at 09:23

## 2025-07-17 RX ADMIN — CEFAZOLIN SODIUM 2000 MG: 2 SOLUTION INTRAVENOUS at 13:40

## 2025-07-17 RX ADMIN — AMLODIPINE BESYLATE 5 MG: 5 TABLET ORAL at 09:23

## 2025-07-17 RX ADMIN — CEFAZOLIN SODIUM 2000 MG: 2 SOLUTION INTRAVENOUS at 05:49

## 2025-07-17 RX ADMIN — HEPARIN SODIUM 5000 UNITS: 5000 INJECTION INTRAVENOUS; SUBCUTANEOUS at 05:49

## 2025-07-17 RX ADMIN — ASPIRIN 81 MG CHEWABLE TABLET 81 MG: 81 TABLET CHEWABLE at 09:23

## 2025-07-17 NOTE — PLAN OF CARE
Problem: PAIN - ADULT  Goal: Verbalizes/displays adequate comfort level or baseline comfort level  Description: Interventions:  - Encourage patient to monitor pain and request assistance  - Assess pain using appropriate pain scale  - Administer analgesics as ordered based on type and severity of pain and evaluate response  - Implement non-pharmacological measures as appropriate and evaluate response  - Consider cultural and social influences on pain and pain management  - Notify physician/advanced practitioner if interventions unsuccessful or patient reports new pain  - Educate patient/family on pain management process including their role and importance of  reporting pain   - Provide non-pharmacologic/complimentary pain relief interventions  Outcome: Progressing     Problem: INFECTION - ADULT  Goal: Absence or prevention of progression during hospitalization  Description: INTERVENTIONS:  - Assess and monitor for signs and symptoms of infection  - Monitor lab/diagnostic results  - Monitor all insertion sites, i.e. indwelling lines, tubes, and drains  - Monitor endotracheal if appropriate and nasal secretions for changes in amount and color  - Karthaus appropriate cooling/warming therapies per order  - Administer medications as ordered  - Instruct and encourage patient and family to use good hand hygiene technique  - Identify and instruct in appropriate isolation precautions for identified infection/condition  Outcome: Progressing  Goal: Absence of fever/infection during neutropenic period  Description: INTERVENTIONS:  - Monitor WBC  - Perform strict hand hygiene  - Limit to healthy visitors only  - No plants, dried, fresh or silk flowers with hobbs in patient room  Outcome: Progressing     Problem: SAFETY ADULT  Goal: Patient will remain free of falls  Description: INTERVENTIONS:  - Educate patient/family on patient safety including physical limitations  - Instruct patient to call for assistance with activity   -  Consider consulting OT/PT to assist with strengthening/mobility based on AM PAC & JH-HLM score  - Consult OT/PT to assist with strengthening/mobility   - Keep Call bell within reach  - Keep bed low and locked with side rails adjusted as appropriate  - Keep care items and personal belongings within reach  - Initiate and maintain comfort rounds  - Make Fall Risk Sign visible to staff  - Offer Toileting every  Hours, in advance of need  - Initiate/Maintain alarm  - Obtain necessary fall risk management equipment:   - Apply yellow socks and bracelet for high fall risk patients  - Consider moving patient to room near nurses station  Outcome: Progressing  Goal: Maintain or return to baseline ADL function  Description: INTERVENTIONS:  -  Assess patient's ability to carry out ADLs; assess patient's baseline for ADL function and identify physical deficits which impact ability to perform ADLs (bathing, care of mouth/teeth, toileting, grooming, dressing, etc.)  - Assess/evaluate cause of self-care deficits   - Assess range of motion  - Assess patient's mobility; develop plan if impaired  - Assess patient's need for assistive devices and provide as appropriate  - Encourage maximum independence but intervene and supervise when necessary  - Involve family in performance of ADLs  - Assess for home care needs following discharge   - Consider OT consult to assist with ADL evaluation and planning for discharge  - Provide patient education as appropriate  - Monitor functional capacity and physical performance, use of AM PAC & JH-HLM   - Monitor gait, balance and fatigue with ambulation    Outcome: Progressing  Goal: Maintains/Returns to pre admission functional level  Description: INTERVENTIONS:  - Perform AM-PAC 6 Click Basic Mobility/ Daily Activity assessment daily.  - Set and communicate daily mobility goal to care team and patient/family/caregiver.   - Collaborate with rehabilitation services on mobility goals if consulted  -  Perform Range of Motion  times a day.  - Reposition patient every  hours.  - Dangle patient  times a day  - Stand patient  times a day  - Ambulate patient  times a day  - Out of bed to chair  times a day   - Out of bed for meals  times a day  - Out of bed for toileting  - Record patient progress and toleration of activity level   Outcome: Progressing     Problem: DISCHARGE PLANNING  Goal: Discharge to home or other facility with appropriate resources  Description: INTERVENTIONS:  - Identify barriers to discharge w/patient and caregiver  - Arrange for needed discharge resources and transportation as appropriate  - Identify discharge learning needs (meds, wound care, etc.)  - Arrange for interpretive services to assist at discharge as needed  - Refer to Case Management Department for coordinating discharge planning if the patient needs post-hospital services based on physician/advanced practitioner order or complex needs related to functional status, cognitive ability, or social support system  Outcome: Progressing     Problem: Knowledge Deficit  Goal: Patient/family/caregiver demonstrates understanding of disease process, treatment plan, medications, and discharge instructions  Description: Complete learning assessment and assess knowledge base.  Interventions:  - Provide teaching at level of understanding  - Provide teaching via preferred learning methods  Outcome: Progressing     Problem: Nutrition/Hydration-ADULT  Goal: Nutrient/Hydration intake appropriate for improving, restoring or maintaining nutritional needs  Description: Monitor and assess patient's nutrition/hydration status for malnutrition. Collaborate with interdisciplinary team and initiate plan and interventions as ordered.  Monitor patient's weight and dietary intake as ordered or per policy. Utilize nutrition screening tool and intervene as necessary. Determine patient's food preferences and provide high-protein, high-caloric foods as appropriate.      INTERVENTIONS:  - Monitor oral intake, urinary output, labs, and treatment plans  - Assess nutrition and hydration status and recommend course of action  - Evaluate amount of meals eaten  - Assist patient with eating if necessary   - Allow adequate time for meals  - Recommend/ encourage appropriate diets, oral nutritional supplements, and vitamin/mineral supplements  - Order, calculate, and assess calorie counts as needed  - Recommend, monitor, and adjust tube feedings and TPN/PPN based on assessed needs  - Assess need for intravenous fluids  - Provide specific nutrition/hydration education as appropriate  - Include patient/family/caregiver in decisions related to nutrition  Outcome: Progressing     Problem: Prexisting or High Potential for Compromised Skin Integrity  Goal: Skin integrity is maintained or improved  Description: INTERVENTIONS:  - Identify patients at risk for skin breakdown  - Assess and monitor skin integrity including under and around medical devices   - Assess and monitor nutrition and hydration status  - Monitor labs  - Assess for incontinence   - Turn and reposition patient  - Assist with mobility/ambulation  - Relieve pressure over addy prominences   - Avoid friction and shearing  - Provide appropriate hygiene as needed including keeping skin clean and dry  - Evaluate need for skin moisturizer/barrier cream  - Collaborate with interdisciplinary team  - Patient/family teaching  - Consider wound care consult    Assess:  - Review Tony scale daily  - Clean and moisturize skin every   - Inspect skin when repositioning, toileting, and assisting with ADLS  - Assess under medical devices such as  every   - Assess extremities for adequate circulation and sensation     Bed Management:  - Have minimal linens on bed & keep smooth, unwrinkled  - Change linens as needed when moist or perspiring  - Avoid sitting or lying in one position for more than  hours while in bed?Keep HOB at degrees   -  Toileting:  - Offer bedside commode  - Assess for incontinence every   - Use incontinent care products after each incontinent episode such as     Activity:  - Mobilize patient  times a day  - Encourage activity and walks on unit  - Encourage or provide ROM exercises   - Turn and reposition patient every  Hours  - Use appropriate equipment to lift or move patient in bed  - Instruct/ Assist with weight shifting every  when out of bed in chair  - Consider limitation of chair time  hour intervals    Skin Care:  - Avoid use of baby powder, tape, friction and shearing, hot water or constrictive clothing  - Relieve pressure over bony prominences using   - Do not massage red bony areas    Next Steps:  - Teach patient strategies to minimize risks such as   - Consider consults to  interdisciplinary teams such as   Outcome: Progressing

## 2025-07-17 NOTE — DISCHARGE SUPPORT
Called Cinda at [phone] to check status of SNF auth request spoke with Ismael SEGUNDO who confirmed that fax was received and auth is pending at this time. Pending auth #  [id number]  Jatinder notified Penny Mo

## 2025-07-17 NOTE — CASE MANAGEMENT
Case Management Discharge Planning Note    Patient name River Pedro  Location [facility] 901/[facility] 901-01 MRN [id number]  : 1950 Date 2025       Current Admission Date: 2025  Current Admission Diagnosis:Cellulitis of right foot   Patient Active Problem List    Diagnosis Date Noted    Onychomycosis 2025    Stroke-like symptoms 2025    Urinary retention 2025    Open wound bilateral 1st toes 2025    Cellulitis of right foot 2025    CVA (cerebral vascular accident) (HCC) 2025    Impaired cognition 2022    History of transient ischemic attack (TIA) 2022    History of diabetes mellitus 2022      LOS (days): 12  Geometric Mean LOS (GMLOS) (days): 4.9  Days to GMLOS:-7     OBJECTIVE:  Risk of Unplanned Readmission Score: 12.24         Current admission status: Inpatient   Preferred Pharmacy:   PATIENT/FAMILY REPORTS NO PREFERRED PHARMACY  No address on file      Primary Care Provider: No primary care provider on file.    Primary Insurance: [employer]ARE [facility]  Secondary Insurance:     DISCHARGE DETAILS:    Discharge planning discussed with:: Patient and Emily  [place] of Choice: Yes     CM contacted family/caregiver?: Yes  Were Treatment Team discharge recommendations reviewed with patient/caregiver?: Yes  Did patient/caregiver verbalize understanding of patient care needs?: N/A- going to facility  Were patient/caregiver advised of the risks associated with not following Treatment Team discharge recommendations?: Yes    Contacts  Patient Contacts: Emily  Relationship to Patient:: Family  Contact Method: In Person  Reason/Outcome: Referral, Discharge Planning    Requested Home Health Care         Is the patient interested in [facility] at discharge?: No         Other Referral/Resources/Interventions Provided:  Interventions: Short Term Rehab  Referral Comments: Pt to d/c to [facility] for STR and IV ABX.  CM received auth and provided it to facility.  ALYSSA  placed a [facility] transport request for 1500, waiting for confirmed  time.  Please call nursign report to:  FAX  [phone]   Phone [phone].         Treatment Team Recommendation: Short Term Rehab  Expected Discharge Disposition: Short Term Rehab  Additional Discharge Dispositions: Short Term Rehab  Transport at Discharge : Stretcher van        Accepting Facility Name, City & State : [facility]  Receiving Facility/Agency Phone Number: Phone [phone]  Facility/Agency Fax Number: [phone]

## 2025-07-17 NOTE — PROGRESS NOTES
Progress Note - Infectious Disease   Name: River Pedro 75 y.o. male I MRN: [id number]  Unit/Bed#: [facility] 901-01 I Date of Admission: 7/5/2025   Date of Service: 7/17/2025 I Hospital Day: 12    Assessment & Plan  MSSA bacteremia  MSSA bacteremia.  There was growth in both admission blood cultures, although obtain via peripheral IV.  Although site of blood draw was not ideal, given presence of clinical sepsis and soft tissue infection, this is most likely true MSSA bacteremia.  Given comorbid illnesses, patient will need long-term IV antibiotic.  TTE is without obvious vegetation.  Therefore, as long as bacteremia clears, ANSON will not be necessary since it does not alter treatment plan.  Bacteremia cleared rapidly.  Continue high-dose IV cefazolin.  Treat x 6 weeks IV antibiotic after clearance of bacteremia, through 8/18.  Monitor weekly CBCD/creatinine while on IV antibiotic.  Cellulitis of right foot  Right foot cellulitis.  Source is most likely the chronic right first toe ulcer.  This is likely source of bacteremia and sepsis above.  Foot x-ray is without obvious bony abnormalities.   Foot cellulitis is much improved.  However,  right foot MRI show 2 small soft tissue collections.  There was no obvious osteomyelitis.  Given dramatic improvement and with patient on long-term antibiotic, there is no plan for I&D per podiatry.  Antibiotic as above.  Serial foot exam.  CVA (cerebral vascular accident) (HCC)  CVA.  Patient with history of basal ganglia infarct.  New facial droop noted, with head CT, reviewed personally, showing no acute infarct.  However, brain MRI show acute infarct involving right ganglial capsular region.  Management per primary service.  History of diabetes mellitus  History of DM.  Patient is not on any medication.  Hemoglobin A1c elevated and stable.  This is risk factor for poor wound healing and infection above.  Management per primary service.    Discussed with patient and his family in  detail regarding the above plan.  Okay for discharge from ID viewpoint  Follow-up with us after discharge.      Antibiotics:  Cefazolin  Antibiotic # 12  Last negative blood culture 7/7    Subjective   Patient feels well.  No further pain in foot.  Temperature stays down.  No chills.  He is tolerating antibiotic well.  No nausea, vomiting or diarrhea.    Objective :  Temp:  [97.2 °F (36.2 °C)-98.8 °F (37.1 °C)] 97.2 °F (36.2 °C)  HR:  [66-74] 66  BP: (137-140)/(70-74) 139/73  Resp:  [18] 18  SpO2:  [94 %-97 %] 96 %    Physical Exam:     General: Awake, alert, cooperative, no distress.   Neck:  Supple. No mass.  No lymphadenopathy.   Lungs: Expansion symmetric, no rales, no wheezing, respirations unlabored.   Heart:  Regular rate and rhythm, S1 and S2 normal, no murmur.   Abdomen: Soft, nondistended, non-tender, bowel sounds active all four quadrants, no masses, no organomegaly.   Extremities: Much improved edema.  Stable chronic bilateral first toe superficial ulcers, without purulence.  Nearly resolved erythema/warmth.  Minimal tenderness.   Skin:  No rash.   Neuro: Moves all extremities.        Lab Results: I have reviewed the following results:  Results from last 7 days   Lab Units 07/17/25  0531 07/16/25  0452 07/15/25  0611   WBC Thousand/uL 6.05 4.74 6.07   HEMOGLOBIN g/dL 10.9* 11.2* 11.1*   PLATELETS Thousands/uL 263 248 235     Results from last 7 days   Lab Units 07/17/25  0531 07/16/25  0452 07/15/25  0611   SODIUM mmol/L 133* 135 134*   POTASSIUM mmol/L 4.2 3.9 4.0   CHLORIDE mmol/L 104 105 104   CO2 mmol/L 24 22 27   BUN mg/dL 13 14 16   CREATININE mg/dL 0.96 0.89 0.93   EGFR ml/min/1.73sq m 77 84 80   CALCIUM mg/dL 8.2* 8.1* 8.1*

## 2025-07-17 NOTE — DISCHARGE SUPPORT
Case Management Assessment & Discharge Planning Note    Patient name River Pedro  Location [facility] 901/[facility] 901-01 MRN [id number]  : 1950 Date 2025       Current Admission Date: 2025  Current Admission Diagnosis:Cellulitis of right foot   Patient Active Problem List    Diagnosis Date Noted    Onychomycosis 2025    Stroke-like symptoms 2025    Urinary retention 2025    Open wound bilateral 1st toes 2025    Cellulitis of right foot 2025    CVA (cerebral vascular accident) (HCC) 2025    Impaired cognition 2022    History of transient ischemic attack (TIA) 2022    History of diabetes mellitus 2022      LOS (days): 12  Geometric Mean LOS (GMLOS) (days): 4.9  Days to GMLOS:-6.9   Facility Authorization Approved  [facility] received approved auth for: [facility]  Insurance: [facility]  Authorization Obtained Via: Phone  Name/Phone # of Rep who called in determination: Pavithra [phone]  Facility Name: [facility]  Approved Authorization Number: [id number]  Start of Care Date: 25  Next Review Date: 25  Continued Stay Care Coordinator Name: Natividad  Continued Stay Care Coordinator Phone #: [phone]  Submit Next Review to: [phone]   notified: Penny ROJO     Updates to authorization status will be noted in chart.    Please reach out to CM for updates on any clinical information.

## 2025-07-17 NOTE — PROGRESS NOTES
Patient discharged to facility with transport. PICC line stayed in place due to long term antibiotics. Tonny left in room. Stroke education provided

## 2025-07-18 ENCOUNTER — TELEPHONE (OUTPATIENT)
Dept: INFECTIOUS DISEASES | Facility: CLINIC | Age: 75
End: 2025-07-18

## 2025-07-18 DIAGNOSIS — B95.61 MSSA BACTEREMIA: Primary | ICD-10-CM

## 2025-07-18 DIAGNOSIS — R78.81 MSSA BACTEREMIA: Primary | ICD-10-CM

## 2025-07-18 NOTE — TELEPHONE ENCOUNTER
Consuelo from [facility] called to schedule patient's follow up for hanks removal after hospital stay.  Patient has [employer] insurance.  It states non par. Please review if that is correct. Are we able to schedule patient?  He did have a consult appt at the hospital with Nate Cordero at [facility] [phone] ext [phone]

## 2025-07-18 NOTE — TELEPHONE ENCOUNTER
Consuelo from [facility] called to schedule patients HFU; requested [facility] office.    Scheduled 9/9/25 3 PM Alda Garnett PA-C [facility]; placed on wait list as well.  HFU/ SL [facility]/ Small vessel lacunar infarct    DC-7/17/25-[facility]  ----- Message from Mike Evans DO sent at 7/11/2025  3:30 PM EDT -----  Regarding: HFU  River Pedro will need follow-up in in 6 weeks with neurovascular team for Small vessel lacunar infarct in 60 minute appointment. They will not require outpatient neurological testing    Thank you    Could you please assist with a sooner HFU for this patient in [facility] office if possible?    Thank you

## 2025-07-18 NOTE — PROGRESS NOTES
OPAT NOTE    Supervising/Discharge provider: Maria Elena      Diagnosis: MSSA bacteremia / cellulitis of right foot     Drug: Cefazolin    Dose/Route/Frequency: [id number]    Labs/Frequency: CBCD Cre weekly    End Date: 8/18    Infusion/VNA/SNF contact: [facility]    Next appointment: 7/29

## 2025-07-18 NOTE — TELEPHONE ENCOUNTER
Called [facility] and spoke with Aixa today.   Jes confirms antibiotic plan, weekly labs and follow up appointment. Informed Jes orders refaxed at this time.   Informed Jes if there are any further questions or concerns to call the office   Jes verbalizes understanding at this time.

## 2025-07-18 NOTE — UTILIZATION REVIEW
NOTIFICATION OF ADMISSION DISCHARGE   This is a Notification of Discharge from [facility]. Please be advised that this patient has been discharge from our facility. Below you will find the admission and discharge date and time including the patient’s disposition.   UTILIZATION REVIEW CONTACT:  Utilization Review Assistants  Network Utilization Review Department  Phone: [phone] x carefully listen to the prompts. All voicemails are confidential.  Email: [email or web address]     ADMISSION INFORMATION  PRESENTATION DATE: 7/5/2025  1:52 PM  OBERVATION ADMISSION DATE: N/A  INPATIENT ADMISSION DATE: 7/5/25  3:46 PM   DISCHARGE DATE: 7/17/2025  2:50 PM   DISPOSITION:Non [facility] SNF/TCU/SNU    Network Utilization Review Department  ATTENTION: Please call with any questions or concerns to [phone] and carefully listen to the prompts so that you are directed to the right person. All voicemails are confidential.   For Discharge needs, contact Care Management DC Support Team at [phone] opt. [phone]  Send all requests for admission clinical reviews, approved or denied determinations and any other requests to dedicated fax number below belonging to the campus where the patient is receiving treatment. List of dedicated fax numbers for the Facilities:  FACILITY NAME UR FAX NUMBER   ADMISSION DENIALS (Administrative/Medical Necessity) [phone]   DISCHARGE SUPPORT TEAM ([facility]) [phone]   PARENT CHILD HEALTH (Maternity/NICU/Pediatrics) [phone]   [facility] [phone]   [facility] [phone]   [facility] [phone]   [facility] [phone]   [facility] [phone]   [facility] [phone]   [facility] [phone]   [facility] [phone]   [facility]  [facility] [phone]   [facility] [phone]   [facility] [phone]   [facility] [phone]

## 2025-07-21 ENCOUNTER — TELEPHONE (OUTPATIENT)
Dept: NEUROLOGY | Facility: CLINIC | Age: 75
End: 2025-07-21

## 2025-07-21 NOTE — TELEPHONE ENCOUNTER
[facility] and [facility] will [facility] and nurse in the pm

## 2025-07-21 NOTE — TELEPHONE ENCOUNTER
Consuelo Duncan called back to report their unit manager is requiring a faxed order for the catheter removal. Please send to F: [phone]  Attn: Unit Manager Chidi Palmer

## 2025-07-21 NOTE — TELEPHONE ENCOUNTER
Follow Up Call Neurology s/p CVA 5 day:  Hospitalization: 07/05/2025-07/17/2025   MT to [facility] :  Called facility and s.w nurse Melly : She reports he is doing well with no new or worsening stroke like symptoms. During conversation reviewed the following:     Diet: dysphagia advanced/ salt control, mildly thick liquids    Ambulation/ADLS: getting therapy. Couldn't report ambulation status      Medications:   Taking as prescribed with no neg side effects or signs of bleeding    BP/BS:   136/69    Appointments:

## 2025-07-21 NOTE — TELEPHONE ENCOUNTER
Remove catheter 8/23 @ 8:00 am patient will present to urology in the Afternoon 2:20 pm  for appointment with AP  and nurse  follow up and PVR

## 2025-07-22 NOTE — TELEPHONE ENCOUNTER
Called and spoke with nurse at facility. Clarified order should be for hanks removal tomorrow, 7/23 and that the patient needs to be here for 2:05 for a 2:20 appt with the provider. They verbalized understanding.

## 2025-07-22 NOTE — TELEPHONE ENCOUNTER
Consuelo from [facility] called to inquire if catheter is to be removed tomorrow or not until 8/23 as the order stated that was faxed.  Please clarify and notified the nursing facility.      Please advise and call back #[phone]

## 2025-07-23 ENCOUNTER — TELEPHONE (OUTPATIENT)
Age: 75
End: 2025-07-23

## 2025-07-23 ENCOUNTER — OFFICE VISIT (OUTPATIENT)
Dept: UROLOGY | Facility: AMBULATORY SURGERY CENTER | Age: 75
End: 2025-07-23
Payer: MEDICARE

## 2025-07-23 ENCOUNTER — TELEPHONE (OUTPATIENT)
Dept: UROLOGY | Facility: AMBULATORY SURGERY CENTER | Age: 75
End: 2025-07-23

## 2025-07-23 DIAGNOSIS — R33.9 URINARY RETENTION: Primary | ICD-10-CM

## 2025-07-23 PROCEDURE — 99204 OFFICE O/P NEW MOD 45 MIN: CPT

## 2025-07-23 PROCEDURE — 51702 INSERT TEMP BLADDER CATH: CPT

## 2025-07-23 NOTE — ASSESSMENT & PLAN NOTE
Trial of void performed today.  PVR in the office noted be 514 mL  We discussed that my recommendation would be for the patient undergo Manuel catheter placement in the office given his urinary retention.  Patient initially declined Manuel catheter placement.  We discussed the risks of not having the Manuel catheter replaced given decreased renal function over time, perforation of the bladder, and/or infection.  After discussion, the patient was agreeable to undergo Manuel catheter placement.  A new Manuel catheter was placed at the time of today's office appointment by the nursing staff and about 550 mL was drained.  Recommended the patient remain on Flomax 0.4 mg daily.  Discussed that with the patient failing to trial of voids would recommend maintaining Manuel catheter.  Recommended that the patient be scheduled for a in office cystoscopy for further evaluation of bladder outlet obstruction.  Was noted that there may be a neurogenic bladder component given the patient's history of diabetes and prior CVA.  Patient and his ex-wife are agreeable and amenable to this plan.  Patient will be scheduled for a cystoscopy for further bladder outlet obstruction workup.

## 2025-07-23 NOTE — PROGRESS NOTES
7/23/2025      Assessment and Plan    75 y.o. male new patient to [facility] for urology    Urinary retention  Trial of void performed today.  PVR in the office noted be 514 mL  We discussed that my recommendation would be for the patient undergo Manuel catheter placement in the office given his urinary retention.  Patient initially declined Manuel catheter placement.  We discussed the risks of not having the Manuel catheter replaced given decreased renal function over time, perforation of the bladder, and/or infection.  After discussion, the patient was agreeable to undergo Manuel catheter placement.  A new Manuel catheter was placed at the time of today's office appointment by the nursing staff and about 550 mL was drained.  Recommended the patient remain on Flomax 0.4 mg daily.  Discussed that with the patient failing to trial of voids would recommend maintaining Manuel catheter.  Recommended that the patient be scheduled for a in office cystoscopy for further evaluation of bladder outlet obstruction.  Was noted that there may be a neurogenic bladder component given the patient's history of diabetes and prior CVA.  Patient and his ex-wife are agreeable and amenable to this plan.  Patient will be scheduled for a cystoscopy for further bladder outlet obstruction workup.        History of Present Illness  River Pedro is a 75 y.o. male here for evaluation of BPH with urinary retention.  Patient was seen in the hospital on 7/7/2025 after failing urinary retention protocol and requiring Manuel catheter placement.    Patient underwent Manuel catheter placement on 7/7 with the output of 350 mL of urine.  Prior straight catheterization produced upwards of 600 mL.  Patient's episode of urinary retention was thought to be multifactorial in the setting of prior CVA and diabetes.  Patient underwent a trial of void prior to discharge in the hospital on 7/12 which she was unable to pass and Manuel catheter had to be  replaced.    Patient had his Manuel catheter removed at his assisted living facility this morning.  Patient reports that he voided 1 time following Manuel catheter removal this morning.  PVR in the office today was noted to be 514 ml.     Today has remained on Flomax 0.4 mg daily since discharge from the hospital.  Patient is currently asymptomatic and denies suprapubic or flank discomfort.    Review of Systems   Constitutional:  Negative for chills and fever.   HENT:  Negative for ear pain and sore throat.    Eyes:  Negative for pain and visual disturbance.   Respiratory:  Negative for cough and shortness of breath.    Cardiovascular:  Negative for chest pain and palpitations.   Gastrointestinal:  Negative for abdominal pain and vomiting.   Genitourinary:  Positive for difficulty urinating. Negative for decreased urine volume, dysuria, flank pain, frequency, hematuria and urgency.   Musculoskeletal:  Negative for arthralgias and back pain.   Skin:  Negative for color change and rash.   Neurological:  Negative for seizures and syncope.   All other systems reviewed and are negative.               Vitals  There were no vitals filed for this visit.    Physical Exam  Vitals reviewed.   Constitutional:       General: He is not in acute distress.     Appearance: Normal appearance. He is not ill-appearing.   HENT:      Head: Normocephalic and atraumatic.      Nose: Nose normal.     Eyes:      General: No scleral icterus.    Pulmonary:      Effort: No respiratory distress.   Abdominal:      General: Abdomen is flat. There is no distension.      Palpations: Abdomen is soft.      Tenderness: There is no abdominal tenderness.     Musculoskeletal:         General: Normal range of motion.      Cervical back: Normal range of motion.     Skin:     General: Skin is warm.      Coloration: Skin is not jaundiced.     Neurological:      Mental Status: He is alert and oriented to person, place, and time.      Gait: Gait normal.  "    Psychiatric:         Mood and Affect: Mood normal.         Behavior: Behavior normal.           Past History  Past Medical History[1]  Social History[2]  Tobacco Use History[3]  Family History[4]    The following portions of the patient's history were reviewed and updated as appropriate: allergies, current medications, past medical history, past social history, past surgical history and problem list.    Results  No results found for this or any previous visit (from the past hour).]  No results found for: \"PSA\"  Lab Results   Component Value Date    CALCIUM 8.2 (L) 07/17/2025    K 4.2 07/17/2025    CO2 24 07/17/2025     07/17/2025    BUN 13 07/17/2025    CREATININE 0.96 07/17/2025     Lab Results   Component Value Date    WBC 6.05 07/17/2025    HGB 10.9 (L) 07/17/2025    HCT 33.1 (L) 07/17/2025    MCV 92 07/17/2025     07/17/2025             [1] No past medical history on file.  [2]   Social History  Socioeconomic History    Marital status:    Tobacco Use    Smoking status: Never    Smokeless tobacco: Never   Vaping Use    Vaping status: Never Used   Substance and Sexual Activity    Alcohol use: Never    Drug use: Yes     Types: Marijuana     Social Drivers of Health     Financial Resource Strain: Low Risk  (1/13/2024)    Received from [facility]    Overall Financial Resource Strain (CARDIA)     Difficulty of Paying Living Expenses: Not very hard   Food Insecurity: No Food Insecurity (7/5/2025)    Nursing - Inadequate Food Risk Classification     Ran Out of Food in the Last Year: Never true   Transportation Needs: No Transportation Needs (7/5/2025)    Nursing - Transportation Risk Classification     Lack of Transportation: No   Intimate Partner Violence: Unknown (7/5/2025)    Nursing IPS     Physically Hurt by Someone: No     Hurt or Threatened by Someone: No   Housing Stability: Unknown (7/5/2025)    Nursing: Inadequate Housing Risk Classification     Unable to Pay for " Housing in the Last Year: No     Has Housing: No   [3]   Social History  Tobacco Use   Smoking Status Never   Smokeless Tobacco Never   [4] No family history on file.

## 2025-07-23 NOTE — PROGRESS NOTES
7/23/2025  River Pedro is a 75 y.o. male  [id number]      Patient presents for void trial managed by our office    Plan:  Follow up with Cystoscopy  Patient instructed to call with any questions or concerns in the meantime.      Procedure Manuel removal/voiding trial    Manuel catheter removed this AM via facility.    Patient returned this afternoon. Patient states unable to void. Patient unable to void while in the office. Bladder ultrasound performed and PVR measured 515 ml.      Procedure:      Universal Protocol:  procedure performed by consultantConsent: Verbal consent obtained  Consent given by: patient  Patient understanding: patient states understanding of the procedure being performed  Patient identity confirmed: verbally with patient    Bladder catheterization    Date/Time: 7/23/2025 2:20 PM    Performed by: Maude Tang RN  Authorized by: Man Quiros MD    Consent:     Consent given by:  Patient  Universal protocol:     Procedure explained and questions answered to patient or proxy's satisfaction: yes      Patient identity confirmed:  Verbally with patient  Pre-procedure details:     Procedure purpose:  Diagnostic    Preparation: Patient was prepped and draped in usual sterile fashion    Anesthesia (see MAR for exact dosages):     Anesthesia method:  None  Procedure details:     Catheter insertion:  Indwelling    Approach: natural orifice      Catheter type:  Manuel, coude and latex    Catheter size:  16 Fr    Number of attempts:  1    Successful placement: yes      Urine characteristics:  Clear and yellow  Post-procedure details:     Patient tolerance of procedure:  Tolerated well, no immediate complications  Comments:      Balloon filled with 10 ml of sterile water. Catheter drained 550 ml of jonna-colored urine. Catheter attached to overnight bag. Stat lock placed.              GEREMIAS Bruner, RN

## 2025-07-25 ENCOUNTER — HOSPITAL ENCOUNTER (INPATIENT)
Facility: HOSPITAL | Age: 75
LOS: 7 days | Discharge: HOME HEALTH | DRG: 603 | End: 2025-08-01
Attending: EMERGENCY MEDICINE | Admitting: INTERNAL MEDICINE
Payer: MEDICARE

## 2025-07-25 ENCOUNTER — APPOINTMENT (EMERGENCY)
Dept: RADIOLOGY | Facility: HOSPITAL | Age: 75
DRG: 603 | End: 2025-07-25
Payer: MEDICARE

## 2025-07-25 ENCOUNTER — TELEPHONE (OUTPATIENT)
Dept: INFECTIOUS DISEASES | Facility: CLINIC | Age: 75
End: 2025-07-25

## 2025-07-25 DIAGNOSIS — L03.115 CELLULITIS OF RIGHT FOOT: Primary | ICD-10-CM

## 2025-07-25 DIAGNOSIS — M86.9 OSTEOMYELITIS OF RIGHT FOOT, UNSPECIFIED TYPE (HCC): ICD-10-CM

## 2025-07-25 DIAGNOSIS — D64.9 ANEMIA, UNSPECIFIED TYPE: ICD-10-CM

## 2025-07-25 DIAGNOSIS — I10 HYPERTENSION: ICD-10-CM

## 2025-07-25 DIAGNOSIS — B95.61 MSSA BACTEREMIA: ICD-10-CM

## 2025-07-25 DIAGNOSIS — R94.6 THYROID FUNCTION STUDY ABNORMALITY: ICD-10-CM

## 2025-07-25 DIAGNOSIS — R33.9 URINARY RETENTION: ICD-10-CM

## 2025-07-25 DIAGNOSIS — R78.81 MSSA BACTEREMIA: ICD-10-CM

## 2025-07-25 DIAGNOSIS — K76.9 LIVER DISEASE, UNSPECIFIED: ICD-10-CM

## 2025-07-25 DIAGNOSIS — Z86.73 HISTORY OF TRANSIENT ISCHEMIC ATTACK (TIA): ICD-10-CM

## 2025-07-25 PROBLEM — E11.59 TYPE 2 DIABETES MELLITUS WITH CIRCULATORY DISORDER (HCC): Status: ACTIVE | Noted: 2022-03-16

## 2025-07-25 LAB
ALBUMIN SERPL BCG-MCNC: 3.3 G/DL (ref 3.5–5)
ALP SERPL-CCNC: 103 U/L (ref 34–104)
ALT SERPL W P-5'-P-CCNC: 6 U/L (ref 7–52)
ANION GAP SERPL CALCULATED.3IONS-SCNC: 9 MMOL/L (ref 4–13)
AST SERPL W P-5'-P-CCNC: 23 U/L (ref 13–39)
BASOPHILS # BLD AUTO: 0.05 THOUSANDS/ÂΜL (ref 0–0.1)
BASOPHILS NFR BLD AUTO: 1 % (ref 0–1)
BILIRUB SERPL-MCNC: 0.42 MG/DL (ref 0.2–1)
BUN SERPL-MCNC: 20 MG/DL (ref 5–25)
CALCIUM ALBUM COR SERPL-MCNC: 9.3 MG/DL (ref 8.3–10.1)
CALCIUM SERPL-MCNC: 8.7 MG/DL (ref 8.4–10.2)
CHLORIDE SERPL-SCNC: 102 MMOL/L (ref 96–108)
CO2 SERPL-SCNC: 22 MMOL/L (ref 21–32)
CREAT SERPL-MCNC: 1.11 MG/DL (ref 0.6–1.3)
CRP SERPL QL: 10.1 MG/L
EOSINOPHIL # BLD AUTO: 0.23 THOUSAND/ÂΜL (ref 0–0.61)
EOSINOPHIL NFR BLD AUTO: 5 % (ref 0–6)
ERYTHROCYTE [DISTWIDTH] IN BLOOD BY AUTOMATED COUNT: 15.4 % (ref 11.6–15.1)
ERYTHROCYTE [SEDIMENTATION RATE] IN BLOOD: 99 MM/HOUR (ref 0–19)
GFR SERPL CREATININE-BSD FRML MDRD: 65 ML/MIN/1.73SQ M
GLUCOSE SERPL-MCNC: 105 MG/DL (ref 65–140)
HCT VFR BLD AUTO: 35.5 % (ref 36.5–49.3)
HGB BLD-MCNC: 11.4 G/DL (ref 12–17)
IMM GRANULOCYTES # BLD AUTO: 0.01 THOUSAND/UL (ref 0–0.2)
IMM GRANULOCYTES NFR BLD AUTO: 0 % (ref 0–2)
LYMPHOCYTES # BLD AUTO: 0.84 THOUSANDS/ÂΜL (ref 0.6–4.47)
LYMPHOCYTES NFR BLD AUTO: 16 % (ref 14–44)
MCH RBC QN AUTO: 30.5 PG (ref 26.8–34.3)
MCHC RBC AUTO-ENTMCNC: 32.1 G/DL (ref 31.4–37.4)
MCV RBC AUTO: 95 FL (ref 82–98)
MONOCYTES # BLD AUTO: 0.73 THOUSAND/ÂΜL (ref 0.17–1.22)
MONOCYTES NFR BLD AUTO: 14 % (ref 4–12)
NEUTROPHILS # BLD AUTO: 3.25 THOUSANDS/ÂΜL (ref 1.85–7.62)
NEUTS SEG NFR BLD AUTO: 64 % (ref 43–75)
NRBC BLD AUTO-RTO: 0 /100 WBCS
PLATELET # BLD AUTO: 250 THOUSANDS/UL (ref 149–390)
PMV BLD AUTO: 11.7 FL (ref 8.9–12.7)
POTASSIUM SERPL-SCNC: 4.5 MMOL/L (ref 3.5–5.3)
PROT SERPL-MCNC: 7.4 G/DL (ref 6.4–8.4)
RBC # BLD AUTO: 3.74 MILLION/UL (ref 3.88–5.62)
SODIUM SERPL-SCNC: 133 MMOL/L (ref 135–147)
WBC # BLD AUTO: 5.11 THOUSAND/UL (ref 4.31–10.16)

## 2025-07-25 PROCEDURE — 96365 THER/PROPH/DIAG IV INF INIT: CPT

## 2025-07-25 PROCEDURE — 86140 C-REACTIVE PROTEIN: CPT

## 2025-07-25 PROCEDURE — 99284 EMERGENCY DEPT VISIT MOD MDM: CPT

## 2025-07-25 PROCEDURE — 85025 COMPLETE CBC W/AUTO DIFF WBC: CPT

## 2025-07-25 PROCEDURE — 85652 RBC SED RATE AUTOMATED: CPT

## 2025-07-25 PROCEDURE — 99285 EMERGENCY DEPT VISIT HI MDM: CPT | Performed by: EMERGENCY MEDICINE

## 2025-07-25 PROCEDURE — 73630 X-RAY EXAM OF FOOT: CPT

## 2025-07-25 PROCEDURE — 99223 1ST HOSP IP/OBS HIGH 75: CPT | Performed by: INTERNAL MEDICINE

## 2025-07-25 PROCEDURE — 96375 TX/PRO/DX INJ NEW DRUG ADDON: CPT

## 2025-07-25 PROCEDURE — 36415 COLL VENOUS BLD VENIPUNCTURE: CPT

## 2025-07-25 PROCEDURE — 80053 COMPREHEN METABOLIC PANEL: CPT

## 2025-07-25 RX ORDER — CEFAZOLIN SODIUM 2 G/50ML
2000 SOLUTION INTRAVENOUS EVERY 8 HOURS
Status: COMPLETED | OUTPATIENT
Start: 2025-07-25 | End: 2025-07-25

## 2025-07-25 RX ORDER — KETOROLAC TROMETHAMINE 30 MG/ML
15 INJECTION, SOLUTION INTRAMUSCULAR; INTRAVENOUS ONCE
Status: COMPLETED | OUTPATIENT
Start: 2025-07-25 | End: 2025-07-25

## 2025-07-25 RX ORDER — INSULIN LISPRO 100 [IU]/ML
1-5 INJECTION, SOLUTION INTRAVENOUS; SUBCUTANEOUS
Status: DISCONTINUED | OUTPATIENT
Start: 2025-07-26 | End: 2025-08-01 | Stop reason: HOSPADM

## 2025-07-25 RX ORDER — ENOXAPARIN SODIUM 100 MG/ML
40 INJECTION SUBCUTANEOUS DAILY
Status: DISCONTINUED | OUTPATIENT
Start: 2025-07-26 | End: 2025-08-01 | Stop reason: HOSPADM

## 2025-07-25 RX ORDER — ATORVASTATIN CALCIUM 40 MG/1
40 TABLET, FILM COATED ORAL
Status: DISCONTINUED | OUTPATIENT
Start: 2025-07-26 | End: 2025-08-01 | Stop reason: HOSPADM

## 2025-07-25 RX ORDER — TAMSULOSIN HYDROCHLORIDE 0.4 MG/1
0.4 CAPSULE ORAL
Status: DISCONTINUED | OUTPATIENT
Start: 2025-07-26 | End: 2025-08-01 | Stop reason: HOSPADM

## 2025-07-25 RX ORDER — AMLODIPINE BESYLATE 5 MG/1
5 TABLET ORAL DAILY
Status: DISCONTINUED | OUTPATIENT
Start: 2025-07-26 | End: 2025-08-01 | Stop reason: HOSPADM

## 2025-07-25 RX ORDER — CEFAZOLIN SODIUM 2 G/50ML
2000 SOLUTION INTRAVENOUS EVERY 8 HOURS
Status: DISCONTINUED | OUTPATIENT
Start: 2025-07-26 | End: 2025-08-01 | Stop reason: HOSPADM

## 2025-07-25 RX ORDER — ACETAMINOPHEN 325 MG/1
650 TABLET ORAL EVERY 6 HOURS PRN
Status: DISCONTINUED | OUTPATIENT
Start: 2025-07-25 | End: 2025-08-01 | Stop reason: HOSPADM

## 2025-07-25 RX ORDER — ASPIRIN 81 MG/1
81 TABLET, CHEWABLE ORAL DAILY
Status: DISCONTINUED | OUTPATIENT
Start: 2025-07-26 | End: 2025-08-01 | Stop reason: HOSPADM

## 2025-07-25 RX ADMIN — KETOROLAC TROMETHAMINE 15 MG: 30 INJECTION, SOLUTION INTRAMUSCULAR; INTRAVENOUS at 22:36

## 2025-07-25 RX ADMIN — CEFAZOLIN SODIUM 2000 MG: 2 SOLUTION INTRAVENOUS at 22:15

## 2025-07-25 RX ADMIN — ACETAMINOPHEN 650 MG: 325 TABLET ORAL at 21:35

## 2025-07-25 NOTE — TELEPHONE ENCOUNTER
"-Received notification from facility provider (Vasiel Thornton) via epic secure chat. Facility provider indicated that the \"It looks like his infection may be worsening with swelling and redness up to the right ankle. I added doxycycline for now and he is scheduled to see you in office 7/29. Please advise if you would like anything further\". Dr. Arredondo indicated that based on the facilities assessment if it is felt that the patients ankle is becoming worse, that evaluation in the ED is recommended.    -Call placed to [facility] to discuss Dr. Arredondo's recommendations. Spoke with the patients nurse Estela. She indicated that the patients ankle has increased in redness and swelling. The patient is without fevers. She was informed of Dr. Arredondo's recommendation to have the patient evaluated in the ED since the ankle has become worse despite being on antibiotics. Estela verbalized her understanding and had no additional questions at this time.  "

## 2025-07-26 ENCOUNTER — APPOINTMENT (INPATIENT)
Dept: RADIOLOGY | Facility: HOSPITAL | Age: 75
DRG: 603 | End: 2025-07-26
Payer: MEDICARE

## 2025-07-26 ENCOUNTER — APPOINTMENT (INPATIENT)
Dept: NON INVASIVE DIAGNOSTICS | Facility: HOSPITAL | Age: 75
DRG: 603 | End: 2025-07-26
Payer: MEDICARE

## 2025-07-26 PROBLEM — K76.9 LIVER DISEASE, UNSPECIFIED: Status: ACTIVE | Noted: 2025-07-26

## 2025-07-26 PROBLEM — D64.9 ANEMIA: Status: ACTIVE | Noted: 2025-07-26

## 2025-07-26 PROBLEM — R13.10 DYSPHAGIA: Status: ACTIVE | Noted: 2025-07-26

## 2025-07-26 LAB
ANION GAP SERPL CALCULATED.3IONS-SCNC: 6 MMOL/L (ref 4–13)
BASOPHILS # BLD AUTO: 0.03 THOUSANDS/ÂΜL (ref 0–0.1)
BASOPHILS NFR BLD AUTO: 1 % (ref 0–1)
BUN SERPL-MCNC: 24 MG/DL (ref 5–25)
CALCIUM SERPL-MCNC: 8.5 MG/DL (ref 8.4–10.2)
CHLORIDE SERPL-SCNC: 104 MMOL/L (ref 96–108)
CO2 SERPL-SCNC: 22 MMOL/L (ref 21–32)
CORTIS SERPL-MCNC: 14.2 UG/DL
CREAT SERPL-MCNC: 1.04 MG/DL (ref 0.6–1.3)
CRP SERPL QL: 16.9 MG/L
EOSINOPHIL # BLD AUTO: 0.2 THOUSAND/ÂΜL (ref 0–0.61)
EOSINOPHIL NFR BLD AUTO: 5 % (ref 0–6)
ERYTHROCYTE [DISTWIDTH] IN BLOOD BY AUTOMATED COUNT: 15.2 % (ref 11.6–15.1)
ERYTHROCYTE [SEDIMENTATION RATE] IN BLOOD: 73 MM/HOUR (ref 0–19)
FERRITIN SERPL-MCNC: 139 NG/ML (ref 30–336)
FOLATE SERPL-MCNC: 14.1 NG/ML
GFR SERPL CREATININE-BSD FRML MDRD: 70 ML/MIN/1.73SQ M
GLUCOSE SERPL-MCNC: 124 MG/DL (ref 65–140)
GLUCOSE SERPL-MCNC: 135 MG/DL (ref 65–140)
GLUCOSE SERPL-MCNC: 143 MG/DL (ref 65–140)
GLUCOSE SERPL-MCNC: 175 MG/DL (ref 65–140)
HCT VFR BLD AUTO: 30 % (ref 36.5–49.3)
HGB BLD-MCNC: 9.9 G/DL (ref 12–17)
IMM GRANULOCYTES # BLD AUTO: 0.01 THOUSAND/UL (ref 0–0.2)
IMM GRANULOCYTES NFR BLD AUTO: 0 % (ref 0–2)
IRON SATN MFR SERPL: 12 % (ref 15–50)
IRON SERPL-MCNC: 26 UG/DL (ref 50–212)
LYMPHOCYTES # BLD AUTO: 0.71 THOUSANDS/ÂΜL (ref 0.6–4.47)
LYMPHOCYTES NFR BLD AUTO: 17 % (ref 14–44)
MCH RBC QN AUTO: 30.8 PG (ref 26.8–34.3)
MCHC RBC AUTO-ENTMCNC: 33 G/DL (ref 31.4–37.4)
MCV RBC AUTO: 94 FL (ref 82–98)
MONOCYTES # BLD AUTO: 0.55 THOUSAND/ÂΜL (ref 0.17–1.22)
MONOCYTES NFR BLD AUTO: 13 % (ref 4–12)
NEUTROPHILS # BLD AUTO: 2.8 THOUSANDS/ÂΜL (ref 1.85–7.62)
NEUTS SEG NFR BLD AUTO: 64 % (ref 43–75)
NRBC BLD AUTO-RTO: 0 /100 WBCS
OSMOLALITY UR: 452 MMOL/KG (ref 250–900)
PLATELET # BLD AUTO: 208 THOUSANDS/UL (ref 149–390)
PMV BLD AUTO: 11.8 FL (ref 8.9–12.7)
POTASSIUM SERPL-SCNC: 4.5 MMOL/L (ref 3.5–5.3)
RBC # BLD AUTO: 3.21 MILLION/UL (ref 3.88–5.62)
SODIUM SERPL-SCNC: 132 MMOL/L (ref 135–147)
SODIUM UR-SCNC: 33 MMOL/L
TIBC SERPL-MCNC: 217 UG/DL (ref 250–450)
TRANSFERRIN SERPL-MCNC: 155 MG/DL (ref 203–362)
TSH SERPL DL<=0.05 MIU/L-ACNC: 6.06 UIU/ML (ref 0.45–4.5)
UIBC SERPL-MCNC: 191 UG/DL (ref 155–355)
VIT B12 SERPL-MCNC: 136 PG/ML (ref 180–914)
WBC # BLD AUTO: 4.3 THOUSAND/UL (ref 4.31–10.16)

## 2025-07-26 PROCEDURE — 99232 SBSQ HOSP IP/OBS MODERATE 35: CPT | Performed by: INTERNAL MEDICINE

## 2025-07-26 PROCEDURE — 36415 COLL VENOUS BLD VENIPUNCTURE: CPT

## 2025-07-26 PROCEDURE — 84300 ASSAY OF URINE SODIUM: CPT

## 2025-07-26 PROCEDURE — 82728 ASSAY OF FERRITIN: CPT

## 2025-07-26 PROCEDURE — 82948 REAGENT STRIP/BLOOD GLUCOSE: CPT

## 2025-07-26 PROCEDURE — A9585 GADOBUTROL INJECTION: HCPCS | Performed by: INTERNAL MEDICINE

## 2025-07-26 PROCEDURE — 93926 LOWER EXTREMITY STUDY: CPT

## 2025-07-26 PROCEDURE — 83935 ASSAY OF URINE OSMOLALITY: CPT

## 2025-07-26 PROCEDURE — 80048 BASIC METABOLIC PNL TOTAL CA: CPT

## 2025-07-26 PROCEDURE — 73720 MRI LWR EXTREMITY W/O&W/DYE: CPT

## 2025-07-26 PROCEDURE — 86140 C-REACTIVE PROTEIN: CPT | Performed by: INTERNAL MEDICINE

## 2025-07-26 PROCEDURE — 83550 IRON BINDING TEST: CPT

## 2025-07-26 PROCEDURE — 84439 ASSAY OF FREE THYROXINE: CPT

## 2025-07-26 PROCEDURE — 83540 ASSAY OF IRON: CPT

## 2025-07-26 PROCEDURE — 85025 COMPLETE CBC W/AUTO DIFF WBC: CPT

## 2025-07-26 PROCEDURE — 82746 ASSAY OF FOLIC ACID SERUM: CPT

## 2025-07-26 PROCEDURE — 82607 VITAMIN B-12: CPT

## 2025-07-26 PROCEDURE — 93926 LOWER EXTREMITY STUDY: CPT | Performed by: SURGERY

## 2025-07-26 PROCEDURE — 93922 UPR/L XTREMITY ART 2 LEVELS: CPT | Performed by: SURGERY

## 2025-07-26 PROCEDURE — 85652 RBC SED RATE AUTOMATED: CPT | Performed by: INTERNAL MEDICINE

## 2025-07-26 PROCEDURE — 82533 TOTAL CORTISOL: CPT

## 2025-07-26 PROCEDURE — 84443 ASSAY THYROID STIM HORMONE: CPT

## 2025-07-26 RX ORDER — CYANOCOBALAMIN 1000 UG/ML
1000 INJECTION, SOLUTION INTRAMUSCULAR; SUBCUTANEOUS DAILY
Status: COMPLETED | OUTPATIENT
Start: 2025-07-26 | End: 2025-07-28

## 2025-07-26 RX ORDER — GADOBUTROL 604.72 MG/ML
8 INJECTION INTRAVENOUS
Status: COMPLETED | OUTPATIENT
Start: 2025-07-26 | End: 2025-07-26

## 2025-07-26 RX ADMIN — CEFAZOLIN SODIUM 2000 MG: 2 SOLUTION INTRAVENOUS at 23:52

## 2025-07-26 RX ADMIN — CEFAZOLIN SODIUM 2000 MG: 2 SOLUTION INTRAVENOUS at 06:10

## 2025-07-26 RX ADMIN — INSULIN LISPRO 1 UNITS: 100 INJECTION, SOLUTION INTRAVENOUS; SUBCUTANEOUS at 12:43

## 2025-07-26 RX ADMIN — AMLODIPINE BESYLATE 5 MG: 5 TABLET ORAL at 09:05

## 2025-07-26 RX ADMIN — CYANOCOBALAMIN 1000 MCG: 1000 INJECTION, SOLUTION INTRAMUSCULAR; SUBCUTANEOUS at 16:47

## 2025-07-26 RX ADMIN — GADOBUTROL 8 ML: 604.72 INJECTION INTRAVENOUS at 16:08

## 2025-07-26 RX ADMIN — TAMSULOSIN HYDROCHLORIDE 0.4 MG: 0.4 CAPSULE ORAL at 18:36

## 2025-07-26 RX ADMIN — ENOXAPARIN SODIUM 40 MG: 40 INJECTION SUBCUTANEOUS at 09:09

## 2025-07-26 RX ADMIN — CEFAZOLIN SODIUM 2000 MG: 2 SOLUTION INTRAVENOUS at 16:49

## 2025-07-26 RX ADMIN — ASPIRIN 81 MG CHEWABLE TABLET 81 MG: 81 TABLET CHEWABLE at 09:05

## 2025-07-26 RX ADMIN — ATORVASTATIN CALCIUM 40 MG: 40 TABLET, FILM COATED ORAL at 18:36

## 2025-07-27 LAB
GLUCOSE SERPL-MCNC: 131 MG/DL (ref 65–140)
GLUCOSE SERPL-MCNC: 140 MG/DL (ref 65–140)
GLUCOSE SERPL-MCNC: 141 MG/DL (ref 65–140)
GLUCOSE SERPL-MCNC: 149 MG/DL (ref 65–140)
T4 FREE SERPL-MCNC: 2.16 NG/DL (ref 0.61–1.12)

## 2025-07-27 PROCEDURE — 82948 REAGENT STRIP/BLOOD GLUCOSE: CPT

## 2025-07-27 PROCEDURE — 99232 SBSQ HOSP IP/OBS MODERATE 35: CPT | Performed by: INTERNAL MEDICINE

## 2025-07-27 RX ADMIN — ENOXAPARIN SODIUM 40 MG: 40 INJECTION SUBCUTANEOUS at 09:36

## 2025-07-27 RX ADMIN — ATORVASTATIN CALCIUM 40 MG: 40 TABLET, FILM COATED ORAL at 15:56

## 2025-07-27 RX ADMIN — ASPIRIN 81 MG CHEWABLE TABLET 81 MG: 81 TABLET CHEWABLE at 09:36

## 2025-07-27 RX ADMIN — CEFAZOLIN SODIUM 2000 MG: 2 SOLUTION INTRAVENOUS at 23:44

## 2025-07-27 RX ADMIN — TAMSULOSIN HYDROCHLORIDE 0.4 MG: 0.4 CAPSULE ORAL at 15:56

## 2025-07-27 RX ADMIN — CYANOCOBALAMIN 1000 MCG: 1000 INJECTION, SOLUTION INTRAMUSCULAR; SUBCUTANEOUS at 09:36

## 2025-07-27 RX ADMIN — CEFAZOLIN SODIUM 2000 MG: 2 SOLUTION INTRAVENOUS at 07:30

## 2025-07-27 RX ADMIN — AMLODIPINE BESYLATE 5 MG: 5 TABLET ORAL at 09:36

## 2025-07-27 RX ADMIN — CEFAZOLIN SODIUM 2000 MG: 2 SOLUTION INTRAVENOUS at 15:56

## 2025-07-28 PROBLEM — R94.6 THYROID FUNCTION STUDY ABNORMALITY: Status: ACTIVE | Noted: 2025-07-28

## 2025-07-28 LAB
ANION GAP SERPL CALCULATED.3IONS-SCNC: 5 MMOL/L (ref 4–13)
BASOPHILS # BLD AUTO: 0.03 THOUSANDS/ÂΜL (ref 0–0.1)
BASOPHILS NFR BLD AUTO: 1 % (ref 0–1)
BUN SERPL-MCNC: 20 MG/DL (ref 5–25)
CALCIUM SERPL-MCNC: 8.6 MG/DL (ref 8.4–10.2)
CHLORIDE SERPL-SCNC: 106 MMOL/L (ref 96–108)
CO2 SERPL-SCNC: 23 MMOL/L (ref 21–32)
CREAT SERPL-MCNC: 0.92 MG/DL (ref 0.6–1.3)
EOSINOPHIL # BLD AUTO: 0.19 THOUSAND/ÂΜL (ref 0–0.61)
EOSINOPHIL NFR BLD AUTO: 5 % (ref 0–6)
ERYTHROCYTE [DISTWIDTH] IN BLOOD BY AUTOMATED COUNT: 15.1 % (ref 11.6–15.1)
GFR SERPL CREATININE-BSD FRML MDRD: 81 ML/MIN/1.73SQ M
GLUCOSE SERPL-MCNC: 123 MG/DL (ref 65–140)
GLUCOSE SERPL-MCNC: 126 MG/DL (ref 65–140)
GLUCOSE SERPL-MCNC: 136 MG/DL (ref 65–140)
GLUCOSE SERPL-MCNC: 153 MG/DL (ref 65–140)
GLUCOSE SERPL-MCNC: 172 MG/DL (ref 65–140)
HCT VFR BLD AUTO: 32 % (ref 36.5–49.3)
HGB BLD-MCNC: 10 G/DL (ref 12–17)
IMM GRANULOCYTES # BLD AUTO: 0.01 THOUSAND/UL (ref 0–0.2)
IMM GRANULOCYTES NFR BLD AUTO: 0 % (ref 0–2)
LYMPHOCYTES # BLD AUTO: 0.79 THOUSANDS/ÂΜL (ref 0.6–4.47)
LYMPHOCYTES NFR BLD AUTO: 21 % (ref 14–44)
MCH RBC QN AUTO: 30.5 PG (ref 26.8–34.3)
MCHC RBC AUTO-ENTMCNC: 31.3 G/DL (ref 31.4–37.4)
MCV RBC AUTO: 98 FL (ref 82–98)
MONOCYTES # BLD AUTO: 0.43 THOUSAND/ÂΜL (ref 0.17–1.22)
MONOCYTES NFR BLD AUTO: 12 % (ref 4–12)
NEUTROPHILS # BLD AUTO: 2.24 THOUSANDS/ÂΜL (ref 1.85–7.62)
NEUTS SEG NFR BLD AUTO: 61 % (ref 43–75)
NRBC BLD AUTO-RTO: 0 /100 WBCS
PLATELET # BLD AUTO: 157 THOUSANDS/UL (ref 149–390)
PMV BLD AUTO: 11.9 FL (ref 8.9–12.7)
POTASSIUM SERPL-SCNC: 3.9 MMOL/L (ref 3.5–5.3)
RBC # BLD AUTO: 3.28 MILLION/UL (ref 3.88–5.62)
SODIUM SERPL-SCNC: 134 MMOL/L (ref 135–147)
T4 FREE SERPL-MCNC: 2.31 NG/DL (ref 0.61–1.12)
TSH SERPL DL<=0.05 MIU/L-ACNC: 5.88 UIU/ML (ref 0.45–4.5)
WBC # BLD AUTO: 3.69 THOUSAND/UL (ref 4.31–10.16)

## 2025-07-28 PROCEDURE — 87075 CULTR BACTERIA EXCEPT BLOOD: CPT

## 2025-07-28 PROCEDURE — 84443 ASSAY THYROID STIM HORMONE: CPT

## 2025-07-28 PROCEDURE — 99232 SBSQ HOSP IP/OBS MODERATE 35: CPT | Performed by: INTERNAL MEDICINE

## 2025-07-28 PROCEDURE — 84439 ASSAY OF FREE THYROXINE: CPT

## 2025-07-28 PROCEDURE — 80048 BASIC METABOLIC PNL TOTAL CA: CPT

## 2025-07-28 PROCEDURE — 85025 COMPLETE CBC W/AUTO DIFF WBC: CPT

## 2025-07-28 PROCEDURE — 87070 CULTURE OTHR SPECIMN AEROBIC: CPT

## 2025-07-28 PROCEDURE — 87205 SMEAR GRAM STAIN: CPT

## 2025-07-28 PROCEDURE — 92610 EVALUATE SWALLOWING FUNCTION: CPT

## 2025-07-28 PROCEDURE — 0Y9M3ZX DRAINAGE OF RIGHT FOOT, PERCUTANEOUS APPROACH, DIAGNOSTIC: ICD-10-PCS | Performed by: PODIATRIST

## 2025-07-28 PROCEDURE — 82948 REAGENT STRIP/BLOOD GLUCOSE: CPT

## 2025-07-28 RX ADMIN — ASPIRIN 81 MG CHEWABLE TABLET 81 MG: 81 TABLET CHEWABLE at 09:26

## 2025-07-28 RX ADMIN — AMLODIPINE BESYLATE 5 MG: 5 TABLET ORAL at 09:26

## 2025-07-28 RX ADMIN — INSULIN LISPRO 1 UNITS: 100 INJECTION, SOLUTION INTRAVENOUS; SUBCUTANEOUS at 09:27

## 2025-07-28 RX ADMIN — CYANOCOBALAMIN 1000 MCG: 1000 INJECTION, SOLUTION INTRAMUSCULAR; SUBCUTANEOUS at 09:24

## 2025-07-28 RX ADMIN — INSULIN LISPRO 1 UNITS: 100 INJECTION, SOLUTION INTRAVENOUS; SUBCUTANEOUS at 17:16

## 2025-07-28 RX ADMIN — CEFAZOLIN SODIUM 2000 MG: 2 SOLUTION INTRAVENOUS at 07:54

## 2025-07-28 RX ADMIN — ATORVASTATIN CALCIUM 40 MG: 40 TABLET, FILM COATED ORAL at 17:16

## 2025-07-28 RX ADMIN — TAMSULOSIN HYDROCHLORIDE 0.4 MG: 0.4 CAPSULE ORAL at 17:16

## 2025-07-28 RX ADMIN — ENOXAPARIN SODIUM 40 MG: 40 INJECTION SUBCUTANEOUS at 09:26

## 2025-07-28 RX ADMIN — CEFAZOLIN SODIUM 2000 MG: 2 SOLUTION INTRAVENOUS at 23:31

## 2025-07-28 RX ADMIN — CEFAZOLIN SODIUM 2000 MG: 2 SOLUTION INTRAVENOUS at 15:20

## 2025-07-28 NOTE — TELEPHONE ENCOUNTER
Follow Up Call Neurology s/p CVA 21 day:  Hospitalization: 07/05/2025-07/17/2025   [facility] Skilled :  Called facility and s.w nurse Estela      She reported that patient was currently admitted to [facility] for increased swelling in foot. She did confirm no new or worsening stroke like symptoms.

## 2025-07-29 LAB
ANION GAP SERPL CALCULATED.3IONS-SCNC: 6 MMOL/L (ref 4–13)
BASOPHILS # BLD AUTO: 0.04 THOUSANDS/ÂΜL (ref 0–0.1)
BASOPHILS NFR BLD AUTO: 1 % (ref 0–1)
BUN SERPL-MCNC: 17 MG/DL (ref 5–25)
CALCIUM SERPL-MCNC: 8.2 MG/DL (ref 8.4–10.2)
CHLORIDE SERPL-SCNC: 107 MMOL/L (ref 96–108)
CO2 SERPL-SCNC: 23 MMOL/L (ref 21–32)
CREAT SERPL-MCNC: 0.86 MG/DL (ref 0.6–1.3)
EOSINOPHIL # BLD AUTO: 0.31 THOUSAND/ÂΜL (ref 0–0.61)
EOSINOPHIL NFR BLD AUTO: 8 % (ref 0–6)
ERYTHROCYTE [DISTWIDTH] IN BLOOD BY AUTOMATED COUNT: 15 % (ref 11.6–15.1)
GFR SERPL CREATININE-BSD FRML MDRD: 85 ML/MIN/1.73SQ M
GLUCOSE SERPL-MCNC: 107 MG/DL (ref 65–140)
GLUCOSE SERPL-MCNC: 118 MG/DL (ref 65–140)
GLUCOSE SERPL-MCNC: 130 MG/DL (ref 65–140)
GLUCOSE SERPL-MCNC: 139 MG/DL (ref 65–140)
GLUCOSE SERPL-MCNC: 144 MG/DL (ref 65–140)
HCT VFR BLD AUTO: 30.7 % (ref 36.5–49.3)
HGB BLD-MCNC: 10.1 G/DL (ref 12–17)
IMM GRANULOCYTES # BLD AUTO: 0.01 THOUSAND/UL (ref 0–0.2)
IMM GRANULOCYTES NFR BLD AUTO: 0 % (ref 0–2)
LYMPHOCYTES # BLD AUTO: 1.14 THOUSANDS/ÂΜL (ref 0.6–4.47)
LYMPHOCYTES NFR BLD AUTO: 30 % (ref 14–44)
MCH RBC QN AUTO: 30.7 PG (ref 26.8–34.3)
MCHC RBC AUTO-ENTMCNC: 32.9 G/DL (ref 31.4–37.4)
MCV RBC AUTO: 93 FL (ref 82–98)
MONOCYTES # BLD AUTO: 0.55 THOUSAND/ÂΜL (ref 0.17–1.22)
MONOCYTES NFR BLD AUTO: 15 % (ref 4–12)
NEUTROPHILS # BLD AUTO: 1.72 THOUSANDS/ÂΜL (ref 1.85–7.62)
NEUTS SEG NFR BLD AUTO: 46 % (ref 43–75)
NRBC BLD AUTO-RTO: 0 /100 WBCS
PLATELET # BLD AUTO: 153 THOUSANDS/UL (ref 149–390)
PMV BLD AUTO: 11.7 FL (ref 8.9–12.7)
POTASSIUM SERPL-SCNC: 3.9 MMOL/L (ref 3.5–5.3)
RBC # BLD AUTO: 3.29 MILLION/UL (ref 3.88–5.62)
SODIUM SERPL-SCNC: 136 MMOL/L (ref 135–147)
WBC # BLD AUTO: 3.77 THOUSAND/UL (ref 4.31–10.16)

## 2025-07-29 PROCEDURE — G0545 PR INHERENT VISIT TO INPT: HCPCS | Performed by: INTERNAL MEDICINE

## 2025-07-29 PROCEDURE — 80048 BASIC METABOLIC PNL TOTAL CA: CPT

## 2025-07-29 PROCEDURE — 97163 PT EVAL HIGH COMPLEX 45 MIN: CPT

## 2025-07-29 PROCEDURE — 97167 OT EVAL HIGH COMPLEX 60 MIN: CPT

## 2025-07-29 PROCEDURE — 97530 THERAPEUTIC ACTIVITIES: CPT

## 2025-07-29 PROCEDURE — 82948 REAGENT STRIP/BLOOD GLUCOSE: CPT

## 2025-07-29 PROCEDURE — 85025 COMPLETE CBC W/AUTO DIFF WBC: CPT

## 2025-07-29 PROCEDURE — 99232 SBSQ HOSP IP/OBS MODERATE 35: CPT | Performed by: INTERNAL MEDICINE

## 2025-07-29 PROCEDURE — 99223 1ST HOSP IP/OBS HIGH 75: CPT | Performed by: INTERNAL MEDICINE

## 2025-07-29 PROCEDURE — 97535 SELF CARE MNGMENT TRAINING: CPT

## 2025-07-29 RX ADMIN — ENOXAPARIN SODIUM 40 MG: 40 INJECTION SUBCUTANEOUS at 09:21

## 2025-07-29 RX ADMIN — ASPIRIN 81 MG CHEWABLE TABLET 81 MG: 81 TABLET CHEWABLE at 09:21

## 2025-07-29 RX ADMIN — TAMSULOSIN HYDROCHLORIDE 0.4 MG: 0.4 CAPSULE ORAL at 17:59

## 2025-07-29 RX ADMIN — AMLODIPINE BESYLATE 5 MG: 5 TABLET ORAL at 09:21

## 2025-07-29 RX ADMIN — CEFAZOLIN SODIUM 2000 MG: 2 SOLUTION INTRAVENOUS at 07:36

## 2025-07-29 RX ADMIN — CEFAZOLIN SODIUM 2000 MG: 2 SOLUTION INTRAVENOUS at 23:30

## 2025-07-29 RX ADMIN — CEFAZOLIN SODIUM 2000 MG: 2 SOLUTION INTRAVENOUS at 15:46

## 2025-07-29 RX ADMIN — ATORVASTATIN CALCIUM 40 MG: 40 TABLET, FILM COATED ORAL at 17:59

## 2025-07-30 LAB
ANION GAP SERPL CALCULATED.3IONS-SCNC: 7 MMOL/L (ref 4–13)
BASOPHILS # BLD AUTO: 0.03 THOUSANDS/ÂΜL (ref 0–0.1)
BASOPHILS NFR BLD AUTO: 1 % (ref 0–1)
BUN SERPL-MCNC: 18 MG/DL (ref 5–25)
CALCIUM SERPL-MCNC: 8.8 MG/DL (ref 8.4–10.2)
CHLORIDE SERPL-SCNC: 107 MMOL/L (ref 96–108)
CO2 SERPL-SCNC: 24 MMOL/L (ref 21–32)
CREAT SERPL-MCNC: 0.85 MG/DL (ref 0.6–1.3)
EOSINOPHIL # BLD AUTO: 0.33 THOUSAND/ÂΜL (ref 0–0.61)
EOSINOPHIL NFR BLD AUTO: 9 % (ref 0–6)
ERYTHROCYTE [DISTWIDTH] IN BLOOD BY AUTOMATED COUNT: 15 % (ref 11.6–15.1)
GFR SERPL CREATININE-BSD FRML MDRD: 85 ML/MIN/1.73SQ M
GLUCOSE SERPL-MCNC: 102 MG/DL (ref 65–140)
GLUCOSE SERPL-MCNC: 114 MG/DL (ref 65–140)
GLUCOSE SERPL-MCNC: 119 MG/DL (ref 65–140)
GLUCOSE SERPL-MCNC: 167 MG/DL (ref 65–140)
HCT VFR BLD AUTO: 31.8 % (ref 36.5–49.3)
HGB BLD-MCNC: 10.3 G/DL (ref 12–17)
IMM GRANULOCYTES # BLD AUTO: 0.01 THOUSAND/UL (ref 0–0.2)
IMM GRANULOCYTES NFR BLD AUTO: 0 % (ref 0–2)
LYMPHOCYTES # BLD AUTO: 1.18 THOUSANDS/ÂΜL (ref 0.6–4.47)
LYMPHOCYTES NFR BLD AUTO: 31 % (ref 14–44)
MCH RBC QN AUTO: 30.6 PG (ref 26.8–34.3)
MCHC RBC AUTO-ENTMCNC: 32.4 G/DL (ref 31.4–37.4)
MCV RBC AUTO: 94 FL (ref 82–98)
MONOCYTES # BLD AUTO: 0.58 THOUSAND/ÂΜL (ref 0.17–1.22)
MONOCYTES NFR BLD AUTO: 15 % (ref 4–12)
NEUTROPHILS # BLD AUTO: 1.64 THOUSANDS/ÂΜL (ref 1.85–7.62)
NEUTS SEG NFR BLD AUTO: 44 % (ref 43–75)
NRBC BLD AUTO-RTO: 0 /100 WBCS
PLATELET # BLD AUTO: 141 THOUSANDS/UL (ref 149–390)
PMV BLD AUTO: 12 FL (ref 8.9–12.7)
POTASSIUM SERPL-SCNC: 3.9 MMOL/L (ref 3.5–5.3)
RBC # BLD AUTO: 3.37 MILLION/UL (ref 3.88–5.62)
SODIUM SERPL-SCNC: 138 MMOL/L (ref 135–147)
WBC # BLD AUTO: 3.77 THOUSAND/UL (ref 4.31–10.16)

## 2025-07-30 PROCEDURE — 99232 SBSQ HOSP IP/OBS MODERATE 35: CPT | Performed by: INTERNAL MEDICINE

## 2025-07-30 PROCEDURE — 82948 REAGENT STRIP/BLOOD GLUCOSE: CPT

## 2025-07-30 PROCEDURE — 85025 COMPLETE CBC W/AUTO DIFF WBC: CPT

## 2025-07-30 PROCEDURE — G0545 PR INHERENT VISIT TO INPT: HCPCS | Performed by: INTERNAL MEDICINE

## 2025-07-30 PROCEDURE — 80048 BASIC METABOLIC PNL TOTAL CA: CPT

## 2025-07-30 PROCEDURE — 99233 SBSQ HOSP IP/OBS HIGH 50: CPT | Performed by: INTERNAL MEDICINE

## 2025-07-30 PROCEDURE — 92526 ORAL FUNCTION THERAPY: CPT

## 2025-07-30 RX ADMIN — ATORVASTATIN CALCIUM 40 MG: 40 TABLET, FILM COATED ORAL at 15:45

## 2025-07-30 RX ADMIN — TAMSULOSIN HYDROCHLORIDE 0.4 MG: 0.4 CAPSULE ORAL at 15:46

## 2025-07-30 RX ADMIN — INSULIN LISPRO 1 UNITS: 100 INJECTION, SOLUTION INTRAVENOUS; SUBCUTANEOUS at 17:42

## 2025-07-30 RX ADMIN — CEFAZOLIN SODIUM 2000 MG: 2 SOLUTION INTRAVENOUS at 15:46

## 2025-07-30 RX ADMIN — AMLODIPINE BESYLATE 5 MG: 5 TABLET ORAL at 09:13

## 2025-07-30 RX ADMIN — CEFAZOLIN SODIUM 2000 MG: 2 SOLUTION INTRAVENOUS at 23:06

## 2025-07-30 RX ADMIN — Medication 3 MG: at 21:08

## 2025-07-30 RX ADMIN — ENOXAPARIN SODIUM 40 MG: 40 INJECTION SUBCUTANEOUS at 09:13

## 2025-07-30 RX ADMIN — CEFAZOLIN SODIUM 2000 MG: 2 SOLUTION INTRAVENOUS at 07:43

## 2025-07-30 RX ADMIN — ASPIRIN 81 MG CHEWABLE TABLET 81 MG: 81 TABLET CHEWABLE at 09:13

## 2025-07-31 PROBLEM — E87.1 HYPONATREMIA: Status: RESOLVED | Noted: 2022-03-16 | Resolved: 2025-07-31

## 2025-07-31 LAB
BACTERIA SPEC ANAEROBE CULT: NO GROWTH
BACTERIA WND AEROBE CULT: NO GROWTH
DME PARACHUTE DELIVERY DATE ACTUAL: NORMAL
DME PARACHUTE DELIVERY DATE REQUESTED: NORMAL
DME PARACHUTE ITEM DESCRIPTION: NORMAL
DME PARACHUTE ORDER STATUS: NORMAL
DME PARACHUTE SUPPLIER NAME: NORMAL
DME PARACHUTE SUPPLIER PHONE: NORMAL
GLUCOSE SERPL-MCNC: 106 MG/DL (ref 65–140)
GLUCOSE SERPL-MCNC: 117 MG/DL (ref 65–140)
GLUCOSE SERPL-MCNC: 146 MG/DL (ref 65–140)
GLUCOSE SERPL-MCNC: 160 MG/DL (ref 65–140)
GRAM STN SPEC: NORMAL

## 2025-07-31 PROCEDURE — 82948 REAGENT STRIP/BLOOD GLUCOSE: CPT

## 2025-07-31 PROCEDURE — NC001 PR NO CHARGE: Performed by: INTERNAL MEDICINE

## 2025-07-31 PROCEDURE — G0545 PR INHERENT VISIT TO INPT: HCPCS | Performed by: INTERNAL MEDICINE

## 2025-07-31 PROCEDURE — 99232 SBSQ HOSP IP/OBS MODERATE 35: CPT | Performed by: INTERNAL MEDICINE

## 2025-07-31 PROCEDURE — 99233 SBSQ HOSP IP/OBS HIGH 50: CPT | Performed by: INTERNAL MEDICINE

## 2025-07-31 RX ORDER — ATORVASTATIN CALCIUM 40 MG/1
20 TABLET, FILM COATED ORAL
Qty: 15 TABLET | Refills: 0 | Status: CANCELLED | OUTPATIENT
Start: 2025-07-31 | End: 2025-08-30

## 2025-07-31 RX ADMIN — ASPIRIN 81 MG CHEWABLE TABLET 81 MG: 81 TABLET CHEWABLE at 08:25

## 2025-07-31 RX ADMIN — AMLODIPINE BESYLATE 5 MG: 5 TABLET ORAL at 08:25

## 2025-07-31 RX ADMIN — CEFAZOLIN SODIUM 2000 MG: 2 SOLUTION INTRAVENOUS at 08:26

## 2025-07-31 RX ADMIN — Medication 3 MG: at 23:10

## 2025-07-31 RX ADMIN — CEFAZOLIN SODIUM 2000 MG: 2 SOLUTION INTRAVENOUS at 23:10

## 2025-07-31 RX ADMIN — ENOXAPARIN SODIUM 40 MG: 40 INJECTION SUBCUTANEOUS at 08:25

## 2025-07-31 RX ADMIN — ATORVASTATIN CALCIUM 40 MG: 40 TABLET, FILM COATED ORAL at 17:18

## 2025-07-31 RX ADMIN — TAMSULOSIN HYDROCHLORIDE 0.4 MG: 0.4 CAPSULE ORAL at 17:18

## 2025-07-31 RX ADMIN — CEFAZOLIN SODIUM 2000 MG: 2 SOLUTION INTRAVENOUS at 17:18

## 2025-08-01 VITALS
RESPIRATION RATE: 16 BRPM | SYSTOLIC BLOOD PRESSURE: 136 MMHG | HEART RATE: 64 BPM | TEMPERATURE: 97.6 F | OXYGEN SATURATION: 98 % | DIASTOLIC BLOOD PRESSURE: 70 MMHG

## 2025-08-01 PROBLEM — R93.89 ABNORMAL MRI: Status: ACTIVE | Noted: 2025-08-01

## 2025-08-01 LAB
ANION GAP SERPL CALCULATED.3IONS-SCNC: 6 MMOL/L (ref 4–13)
BASOPHILS # BLD AUTO: 0.04 THOUSANDS/ÂΜL (ref 0–0.1)
BASOPHILS NFR BLD AUTO: 1 % (ref 0–1)
BUN SERPL-MCNC: 18 MG/DL (ref 5–25)
CALCIUM SERPL-MCNC: 8.5 MG/DL (ref 8.4–10.2)
CHLORIDE SERPL-SCNC: 106 MMOL/L (ref 96–108)
CO2 SERPL-SCNC: 24 MMOL/L (ref 21–32)
CREAT SERPL-MCNC: 0.86 MG/DL (ref 0.6–1.3)
EOSINOPHIL # BLD AUTO: 0.32 THOUSAND/ÂΜL (ref 0–0.61)
EOSINOPHIL NFR BLD AUTO: 8 % (ref 0–6)
ERYTHROCYTE [DISTWIDTH] IN BLOOD BY AUTOMATED COUNT: 14.8 % (ref 11.6–15.1)
GFR SERPL CREATININE-BSD FRML MDRD: 85 ML/MIN/1.73SQ M
GLUCOSE SERPL-MCNC: 104 MG/DL (ref 65–140)
GLUCOSE SERPL-MCNC: 112 MG/DL (ref 65–140)
GLUCOSE SERPL-MCNC: 118 MG/DL (ref 65–140)
HCT VFR BLD AUTO: 32.6 % (ref 36.5–49.3)
HGB BLD-MCNC: 10.7 G/DL (ref 12–17)
IMM GRANULOCYTES # BLD AUTO: 0.01 THOUSAND/UL (ref 0–0.2)
IMM GRANULOCYTES NFR BLD AUTO: 0 % (ref 0–2)
LYMPHOCYTES # BLD AUTO: 1.14 THOUSANDS/ÂΜL (ref 0.6–4.47)
LYMPHOCYTES NFR BLD AUTO: 29 % (ref 14–44)
MCH RBC QN AUTO: 30.7 PG (ref 26.8–34.3)
MCHC RBC AUTO-ENTMCNC: 32.8 G/DL (ref 31.4–37.4)
MCV RBC AUTO: 94 FL (ref 82–98)
MONOCYTES # BLD AUTO: 0.55 THOUSAND/ÂΜL (ref 0.17–1.22)
MONOCYTES NFR BLD AUTO: 14 % (ref 4–12)
NEUTROPHILS # BLD AUTO: 1.89 THOUSANDS/ÂΜL (ref 1.85–7.62)
NEUTS SEG NFR BLD AUTO: 48 % (ref 43–75)
NRBC BLD AUTO-RTO: 0 /100 WBCS
PLATELET # BLD AUTO: 144 THOUSANDS/UL (ref 149–390)
PMV BLD AUTO: 11.7 FL (ref 8.9–12.7)
POTASSIUM SERPL-SCNC: 3.8 MMOL/L (ref 3.5–5.3)
RBC # BLD AUTO: 3.48 MILLION/UL (ref 3.88–5.62)
SODIUM SERPL-SCNC: 136 MMOL/L (ref 135–147)
WBC # BLD AUTO: 3.95 THOUSAND/UL (ref 4.31–10.16)

## 2025-08-01 PROCEDURE — 80048 BASIC METABOLIC PNL TOTAL CA: CPT

## 2025-08-01 PROCEDURE — 85025 COMPLETE CBC W/AUTO DIFF WBC: CPT

## 2025-08-01 PROCEDURE — 82948 REAGENT STRIP/BLOOD GLUCOSE: CPT

## 2025-08-01 PROCEDURE — G0545 PR INHERENT VISIT TO INPT: HCPCS | Performed by: INTERNAL MEDICINE

## 2025-08-01 PROCEDURE — 99233 SBSQ HOSP IP/OBS HIGH 50: CPT | Performed by: INTERNAL MEDICINE

## 2025-08-01 RX ORDER — FERROUS SULFATE 324(65)MG
324 TABLET, DELAYED RELEASE (ENTERIC COATED) ORAL EVERY OTHER DAY
Qty: 15 TABLET | Refills: 0 | Status: SHIPPED | OUTPATIENT
Start: 2025-08-19

## 2025-08-01 RX ORDER — AMLODIPINE BESYLATE 5 MG/1
5 TABLET ORAL DAILY
Qty: 30 TABLET | Refills: 3 | Status: SHIPPED | OUTPATIENT
Start: 2025-08-01

## 2025-08-01 RX ORDER — ATORVASTATIN CALCIUM 40 MG/1
40 TABLET, FILM COATED ORAL
Qty: 30 TABLET | Refills: 3 | Status: SHIPPED | OUTPATIENT
Start: 2025-08-01 | End: 2025-11-29

## 2025-08-01 RX ORDER — ASPIRIN 81 MG/1
81 TABLET, CHEWABLE ORAL DAILY
Qty: 30 TABLET | Refills: 3 | Status: SHIPPED | OUTPATIENT
Start: 2025-08-01 | End: 2025-11-29

## 2025-08-01 RX ORDER — TAMSULOSIN HYDROCHLORIDE 0.4 MG/1
0.4 CAPSULE ORAL
Qty: 30 CAPSULE | Refills: 3 | Status: SHIPPED | OUTPATIENT
Start: 2025-08-01

## 2025-08-01 RX ORDER — FERROUS SULFATE 324(65)MG
324 TABLET, DELAYED RELEASE (ENTERIC COATED) ORAL EVERY OTHER DAY
Qty: 15 TABLET | Refills: 3 | Status: SHIPPED | OUTPATIENT
Start: 2025-08-19 | End: 2025-08-01

## 2025-08-01 RX ORDER — CEFAZOLIN SODIUM 2 G/50ML
2000 SOLUTION INTRAVENOUS EVERY 8 HOURS
Start: 2025-08-01 | End: 2025-08-19

## 2025-08-01 RX ADMIN — ENOXAPARIN SODIUM 40 MG: 40 INJECTION SUBCUTANEOUS at 10:44

## 2025-08-01 RX ADMIN — ASPIRIN 81 MG CHEWABLE TABLET 81 MG: 81 TABLET CHEWABLE at 10:44

## 2025-08-01 RX ADMIN — CEFAZOLIN SODIUM 2000 MG: 2 SOLUTION INTRAVENOUS at 06:56

## 2025-08-01 RX ADMIN — AMLODIPINE BESYLATE 5 MG: 5 TABLET ORAL at 10:44

## 2025-08-04 ENCOUNTER — TELEPHONE (OUTPATIENT)
Age: 75
End: 2025-08-04

## 2025-08-04 ENCOUNTER — LAB REQUISITION (OUTPATIENT)
Dept: LAB | Facility: HOSPITAL | Age: 75
End: 2025-08-04
Payer: MEDICARE

## 2025-08-04 DIAGNOSIS — R53.83 OTHER FATIGUE: ICD-10-CM

## 2025-08-04 LAB
BASOPHILS # BLD AUTO: 0.04 THOUSANDS/ÂΜL (ref 0–0.1)
BASOPHILS NFR BLD AUTO: 1 % (ref 0–1)
CREAT SERPL-MCNC: 0.88 MG/DL (ref 0.6–1.3)
EOSINOPHIL # BLD AUTO: 0.18 THOUSAND/ÂΜL (ref 0–0.61)
EOSINOPHIL NFR BLD AUTO: 4 % (ref 0–6)
ERYTHROCYTE [DISTWIDTH] IN BLOOD BY AUTOMATED COUNT: 15.3 % (ref 11.6–15.1)
GFR SERPL CREATININE-BSD FRML MDRD: 84 ML/MIN/1.73SQ M
HCT VFR BLD AUTO: 32.9 % (ref 36.5–49.3)
HGB BLD-MCNC: 10.7 G/DL (ref 12–17)
IMM GRANULOCYTES # BLD AUTO: 0.01 THOUSAND/UL (ref 0–0.2)
IMM GRANULOCYTES NFR BLD AUTO: 0 % (ref 0–2)
LYMPHOCYTES # BLD AUTO: 0.98 THOUSANDS/ÂΜL (ref 0.6–4.47)
LYMPHOCYTES NFR BLD AUTO: 24 % (ref 14–44)
MCH RBC QN AUTO: 30.6 PG (ref 26.8–34.3)
MCHC RBC AUTO-ENTMCNC: 32.5 G/DL (ref 31.4–37.4)
MCV RBC AUTO: 94 FL (ref 82–98)
MONOCYTES # BLD AUTO: 0.52 THOUSAND/ÂΜL (ref 0.17–1.22)
MONOCYTES NFR BLD AUTO: 13 % (ref 4–12)
NEUTROPHILS # BLD AUTO: 2.42 THOUSANDS/ÂΜL (ref 1.85–7.62)
NEUTS SEG NFR BLD AUTO: 58 % (ref 43–75)
NRBC BLD AUTO-RTO: 0 /100 WBCS
PLATELET # BLD AUTO: 133 THOUSANDS/UL (ref 149–390)
PMV BLD AUTO: 12.5 FL (ref 8.9–12.7)
RBC # BLD AUTO: 3.5 MILLION/UL (ref 3.88–5.62)
WBC # BLD AUTO: 4.15 THOUSAND/UL (ref 4.31–10.16)

## 2025-08-04 PROCEDURE — 85025 COMPLETE CBC W/AUTO DIFF WBC: CPT | Performed by: INTERNAL MEDICINE

## 2025-08-04 PROCEDURE — 82565 ASSAY OF CREATININE: CPT | Performed by: INTERNAL MEDICINE

## 2025-08-06 ENCOUNTER — TELEPHONE (OUTPATIENT)
Age: 75
End: 2025-08-06

## 2025-08-11 ENCOUNTER — OFFICE VISIT (OUTPATIENT)
Dept: INFECTIOUS DISEASES | Facility: CLINIC | Age: 75
End: 2025-08-11

## 2025-08-11 ENCOUNTER — TELEPHONE (OUTPATIENT)
Dept: INFECTIOUS DISEASES | Facility: CLINIC | Age: 75
End: 2025-08-11

## 2025-08-11 ENCOUNTER — TELEPHONE (OUTPATIENT)
Age: 75
End: 2025-08-11

## 2025-08-11 ENCOUNTER — APPOINTMENT (OUTPATIENT)
Dept: LAB | Facility: CLINIC | Age: 75
End: 2025-08-11
Attending: PHYSICIAN ASSISTANT
Payer: MEDICARE

## 2025-08-12 ENCOUNTER — APPOINTMENT (OUTPATIENT)
Dept: LAB | Facility: CLINIC | Age: 75
End: 2025-08-12
Payer: MEDICARE

## 2025-08-12 ENCOUNTER — TELEPHONE (OUTPATIENT)
Dept: UROLOGY | Facility: AMBULATORY SURGERY CENTER | Age: 75
End: 2025-08-12

## 2025-08-12 ENCOUNTER — NURSE TRIAGE (OUTPATIENT)
Age: 75
End: 2025-08-12

## 2025-08-13 ENCOUNTER — TELEPHONE (OUTPATIENT)
Age: 75
End: 2025-08-13

## 2025-08-14 LAB
DME PARACHUTE DELIVERY DATE ACTUAL: NORMAL
DME PARACHUTE DELIVERY DATE REQUESTED: NORMAL
DME PARACHUTE ITEM DESCRIPTION: NORMAL
DME PARACHUTE ORDER STATUS: NORMAL
DME PARACHUTE SUPPLIER NAME: NORMAL
DME PARACHUTE SUPPLIER PHONE: NORMAL

## 2025-08-18 ENCOUNTER — TELEPHONE (OUTPATIENT)
Age: 75
End: 2025-08-18

## 2025-08-19 ENCOUNTER — APPOINTMENT (OUTPATIENT)
Dept: LAB | Facility: CLINIC | Age: 75
End: 2025-08-19
Attending: PHYSICIAN ASSISTANT
Payer: MEDICARE

## 2025-08-19 DIAGNOSIS — B95.61 MSSA BACTEREMIA: ICD-10-CM

## 2025-08-19 DIAGNOSIS — R78.81 MSSA BACTEREMIA: ICD-10-CM

## 2025-08-21 ENCOUNTER — TELEPHONE (OUTPATIENT)
Dept: UROLOGY | Facility: AMBULATORY SURGERY CENTER | Age: 75
End: 2025-08-21

## 2025-08-21 ENCOUNTER — PROCEDURE VISIT (OUTPATIENT)
Dept: UROLOGY | Facility: AMBULATORY SURGERY CENTER | Age: 75
End: 2025-08-21

## 2025-08-21 VITALS
HEART RATE: 84 BPM | SYSTOLIC BLOOD PRESSURE: 132 MMHG | BODY MASS INDEX: 24.92 KG/M2 | WEIGHT: 184 LBS | DIASTOLIC BLOOD PRESSURE: 70 MMHG | HEIGHT: 72 IN

## 2025-08-21 DIAGNOSIS — R33.9 URINARY RETENTION: Primary | ICD-10-CM

## 2025-08-21 LAB — POST-VOID RESIDUAL VOLUME, ML POC: 656 ML

## 2025-08-21 PROCEDURE — 99024 POSTOP FOLLOW-UP VISIT: CPT

## 2025-08-21 PROCEDURE — 51798 US URINE CAPACITY MEASURE: CPT
